# Patient Record
Sex: MALE | Race: WHITE | NOT HISPANIC OR LATINO | Employment: OTHER | URBAN - METROPOLITAN AREA
[De-identification: names, ages, dates, MRNs, and addresses within clinical notes are randomized per-mention and may not be internally consistent; named-entity substitution may affect disease eponyms.]

---

## 2017-02-15 ENCOUNTER — DOCUMENTATION ONLY (OUTPATIENT)
Dept: VASCULAR SURGERY | Facility: CLINIC | Age: 77
End: 2017-02-15

## 2017-04-11 ENCOUNTER — OFFICE VISIT (OUTPATIENT)
Dept: OPHTHALMOLOGY | Facility: CLINIC | Age: 77
End: 2017-04-11
Attending: OPHTHALMOLOGY
Payer: MEDICARE

## 2017-04-11 VITALS — HEIGHT: 68 IN | WEIGHT: 164 LBS | BODY MASS INDEX: 24.86 KG/M2

## 2017-04-11 DIAGNOSIS — Z96.1 PSEUDOPHAKIA: ICD-10-CM

## 2017-04-11 DIAGNOSIS — H26.9 CATARACT: Primary | ICD-10-CM

## 2017-04-11 DIAGNOSIS — H35.371 ERM OD (EPIRETINAL MEMBRANE, RIGHT EYE): ICD-10-CM

## 2017-04-11 PROCEDURE — 99214 OFFICE O/P EST MOD 30 MIN: CPT | Mod: 25,ZF

## 2017-04-11 RX ORDER — OFLOXACIN 3 MG/ML
1 SOLUTION/ DROPS OPHTHALMIC 3 TIMES DAILY
Qty: 1 BOTTLE | Refills: 1 | Status: SHIPPED | OUTPATIENT
Start: 2017-04-11 | End: 2018-01-06

## 2017-04-11 RX ORDER — PREDNISOLONE ACETATE 10 MG/ML
SUSPENSION/ DROPS OPHTHALMIC
Qty: 1 BOTTLE | Refills: 1 | Status: SHIPPED | OUTPATIENT
Start: 2017-04-11 | End: 2017-05-01

## 2017-04-11 ASSESSMENT — VISUAL ACUITY
OD_CC: 20/50
METHOD: SNELLEN - LINEAR
CORRECTION_TYPE: GLASSES
OS_CC: 20/40
OD_CC+: +2

## 2017-04-11 ASSESSMENT — EXTERNAL EXAM - LEFT EYE: OS_EXAM: NORMAL

## 2017-04-11 ASSESSMENT — REFRACTION_WEARINGRX
OD_AXIS: 004
OD_SPHERE: -4.00
OD_CYLINDER: +1.25
OS_CYLINDER: +1.25
OD_ADD: +2.50
OS_ADD: +2.50
OS_AXIS: 180
OS_SPHERE: -4.25

## 2017-04-11 ASSESSMENT — TONOMETRY
OD_IOP_MMHG: 15
IOP_METHOD: TONOPEN
OS_IOP_MMHG: 14

## 2017-04-11 ASSESSMENT — SLIT LAMP EXAM - LIDS
COMMENTS: MILD MGD
COMMENTS: MILD MGD

## 2017-04-11 ASSESSMENT — CONF VISUAL FIELD
OS_NORMAL: 1
OD_NORMAL: 1

## 2017-04-11 ASSESSMENT — EXTERNAL EXAM - RIGHT EYE: OD_EXAM: NORMAL

## 2017-04-11 ASSESSMENT — CUP TO DISC RATIO
OD_RATIO: 0.3
OS_RATIO: 0.3

## 2017-04-11 NOTE — PROGRESS NOTES
CC: Yearly-Exam    HPI: Felice Blood is a 76 year old male who presents for his yearly comprehensive exam. Noting worsening vision both eyes.  NO change in eye comfort or health    POHx:  None    Current Eye Medications:   None    Assessment & Plan   Felice Blood is a 76 year old male with the following diagnoses:     Assessment & Plan      Felice Blood is a 76 year old male with the following diagnoses:   1. Cataract - Both Eyes    2. ERM OD (epiretinal membrane, right eye)    3. Pseudophakia         Cataract, both eyes  Visually significant  Risks, benefits and alternatives to cataract extraction/IOL implantation discussed; consent obtained.  Will schedule surgery today      Special equipment/needs:    Anesthesia:Topical  Dilation:Good  Iris expansion:Not needed  Pseudoexfoliation: No pseudoexfoliation  Trypan Blue: No  Goal -2.00 right eye, -2.50 left eye    Right eye first (may wish to update glasses for driving after 1st eye)         Attending Physician Attestation: Complete documentation of historical and exam elements from today's encounter can be found in the full encounter summary report (not reduplicated in this progress note). I personally obtained the chief complaint(s) and history of present illness.  I confirmed and edited as necessary the review of systems, past medical/surgical history, family history, social history, and examination findings as documented by others; and I examined the patient myself. I personally reviewed the relevant tests, images, and reports as documented above. I formulated and edited as necessary the assessment and plan and discussed the findings and management plan with the patient and family. - Camron Hansen MD 9:50 AM 4/11/2017

## 2017-04-11 NOTE — MR AVS SNAPSHOT
After Visit Summary   4/11/2017    Felice Blood    MRN: [id number]           Patient Information     Date Of Birth          1940        Visit Information        Provider Department      4/11/2017 8:45 AM Camron Hansen MD [facility]        Today's Diagnoses     Cataract - Both Eyes    -  1    ERM OD (epiretinal membrane, right eye)        Pseudophakia           Follow-ups after your visit        Your next 10 appointments already scheduled     Apr 14, 2017  7:05 AM CDT   (Arrive by 6:50 AM)   PHYSICAL with Anthony Maddox MD   [facility] ([facility] [place])    [address] [place] [address]  4th [facility] [address]   [phone]            May 01, 2017 12:15 PM CDT   (Arrive by 12:00 PM)   Post-Op with Camron Hansen MD   [facility] ([facility])    [address] [place] [address]  4th [facility] [address] [phone]            May 16, 2017  8:15 AM CDT   Post-Op with Camron Hansen MD   Eye Clinic ([facility])    [address]  [address]  [address] Clin [address]  [facility] [address]   [phone]            May 18, 2017  8:15 AM CDT   Post-Op with Camron Hansen MD   Eye Clinic ([facility])    [address]  [address]  [address] Clin [address]a  [facility] [address]   [phone]            Jun 01, 2017  8:15 AM CDT   Post-Op with Camron Hansen MD   Eye Clinic ([facility])    [address]  [address]  [address] Clin [address]a  [facility] [address]   [phone]              Who to contact     Please call your clinic at [phone] to:    Ask questions about your health    Make or cancel appointments    Discuss your medicines    Learn about your test results    Speak to your doctor   If you have compliments or concerns about an experience at your clinic, or if you wish to file a complaint, please contact [facility] Patient Relations at [phone] or email us at [email or web address]         Additional Information About Your Visit        [employer]hart Information     DOZ is an electronic gateway that provides easy, online access to your medical records. With DOZ, you can request a clinic appointment, read your test results, renew a prescription or communicate with your care team.     To sign up for DOZ visit the website at [email or web address]   You will be asked to enter the access code listed below, as well as some personal information. Please follow the directions to create your username and password.     Your access code is: [email or web address]  Expires: 2017  8:30 AM     Your access code will  in 90 days. If you need help or a new code, please contact your [facility] or call [phone] for assistance.        Care EveryWhere ID     This is your Care EveryWhere ID. This could be used by other organizations to access your [facility] medical records  [id number]        Your Vitals Were     Height BMI (Body Mass Index)                1.727 m (5' 8\") 24.94 kg/m2           Blood Pressure from Last 3 Encounters:   16 146/85   10/25/16 142/84   16 134/86    Weight from Last 3 Encounters:   17 74.4 kg (164 lb)   16 73.8 kg (162 lb 9.6 oz)   10/25/16 73.5 kg (162 lb)              We Performed the Following     Biometry w/o IOL calc OU (both eyes)     [facility]-Operative Worksheet          Today's Medication Changes          These changes are accurate as of: 17 10:22 AM.  If you have any questions, ask your nurse or doctor.               Start taking these medicines.        Dose/Directions    ofloxacin 0.3 % ophthalmic solution   Commonly known as:  OCUFLOX   Used for:  Cataract   Started by:  Camron Hansen MD        Dose:  1 drop   Apply 1 drop to eye 3 times daily   Quantity:  1 Bottle   Refills:  1       " prednisoLONE acetate 1 % ophthalmic susp   Commonly known as:  PRED FORTE   Used for:  Cataract   Started by:  Camron Hansen MD        Operated eye  Start the day after surgery 1 drop four times a day for 1 week, then three times a day for 1 week, then twice a day for 1 week, then once a day for 1 week, then stop   Quantity:  1 Bottle   Refills:  1         Stop taking these medicines if you haven't already. Please contact your care team if you have questions.     CALCIUM-MAGNESUIUM-ZINC 333-133-8.3 MG Tabs   Stopped by:  Camron Hansen MD           glucosamine chondroitin 1500 complex Caps   Stopped by:  Camron Hansen MD                Where to get your medicines      These medications were sent to [facility] [address] [place] [address]     Phone:  [phone]     ofloxacin 0.3 % ophthalmic solution         Some of these will need a paper prescription and others can be bought over the counter.  Ask your nurse if you have questions.     Bring a paper prescription for each of these medications     prednisoLONE acetate 1 % ophthalmic susp                Primary Care Provider Office Phone # Fax #    Anthony Maddox [phone]       PRIMARY [facility] [address] [place] [address]        Thank you!     Thank you for choosing EYE CLINIC  for your care. Our goal is always to provide you with excellent care. Hearing back from our patients is one way we can continue to improve our services. Please take a few minutes to complete the written survey that you may receive in the mail after your visit with us. Thank you!             Your Updated Medication List - Protect others around you: Learn how to safely use, store and throw away your medicines at www.disposemymeds.org.          This list is accurate as of: 4/11/17 10:22 AM.  Always use your most recent med list.                   Brand Name Dispense  Instructions for use    aspirin 81 MG tablet      Take 1 tablet by mouth daily.       atorvastatin 10 MG tablet    LIPITOR    90 tablet    Take 1 tablet (10 mg) by mouth daily       azithromycin 250 MG tablet    ZITHROMAX    6 tablet    Take 1 tablet (250 mg) by mouth daily       cholecalciferol 1000 UNITS capsule    vitamin  -D     Take 1 capsule by mouth daily.       lisinopril 10 MG tablet    PRINIVIL/ZESTRIL    90 tablet    Take 1 tablet (10 mg) by mouth daily       montelukast 10 MG tablet    SINGULAIR    30 tablet    Take 1 tablet (10 mg) by mouth At Bedtime       ofloxacin 0.3 % ophthalmic solution    OCUFLOX    1 Bottle    Apply 1 drop to eye 3 times daily       prednisoLONE acetate 1 % ophthalmic susp    PRED FORTE    1 Bottle    Operated eye  Start the day after surgery 1 drop four times a day for 1 week, then three times a day for 1 week, then twice a day for 1 week, then once a day for 1 week, then stop       sildenafil 100 MG cap/tab    VIAGRA    10 tablet    Take  by mouth daily as needed for erectile dysfunction. One half to one tab po qd prn

## 2017-04-11 NOTE — NURSING NOTE
Chief Complaints and History of Present Illnesses   Patient presents with     Follow Up For     cataract eval     HPI    Affected eye(s):  Both   Symptoms:     Blurred vision   No floaters   No flashes   Glare         Do you have eye pain now?:  No      Comments:  Annual follow up and cataract evaluation.  The patient is interested in discussing cataract surgery.  The patient notes blurred vision and glare.  TERESO Leonard 9:08 AM 04/11/2017

## 2017-04-12 ENCOUNTER — OFFICE VISIT (OUTPATIENT)
Dept: INTERNAL MEDICINE | Facility: CLINIC | Age: 77
End: 2017-04-12

## 2017-04-12 VITALS
BODY MASS INDEX: 25.04 KG/M2 | DIASTOLIC BLOOD PRESSURE: 74 MMHG | OXYGEN SATURATION: 95 % | HEART RATE: 105 BPM | WEIGHT: 164.7 LBS | SYSTOLIC BLOOD PRESSURE: 121 MMHG | TEMPERATURE: 97.9 F

## 2017-04-12 DIAGNOSIS — R05.9 COUGH: ICD-10-CM

## 2017-04-12 DIAGNOSIS — J20.9 ACUTE BRONCHITIS, UNSPECIFIED ORGANISM: ICD-10-CM

## 2017-04-12 DIAGNOSIS — R05.9 COUGH: Primary | ICD-10-CM

## 2017-04-12 DIAGNOSIS — J18.9 COMMUNITY ACQUIRED PNEUMONIA: Primary | ICD-10-CM

## 2017-04-12 LAB
BASOPHILS # BLD AUTO: 0 10E9/L (ref 0–0.2)
BASOPHILS NFR BLD AUTO: 0.3 %
DIFFERENTIAL METHOD BLD: NORMAL
EOSINOPHIL # BLD AUTO: 0.1 10E9/L (ref 0–0.7)
EOSINOPHIL NFR BLD AUTO: 1.4 %
ERYTHROCYTE [DISTWIDTH] IN BLOOD BY AUTOMATED COUNT: 12.5 % (ref 10–15)
HCT VFR BLD AUTO: 44.2 % (ref 40–53)
HGB BLD-MCNC: 15.7 G/DL (ref 13.3–17.7)
IMM GRANULOCYTES # BLD: 0 10E9/L (ref 0–0.4)
IMM GRANULOCYTES NFR BLD: 0.2 %
LYMPHOCYTES # BLD AUTO: 1.7 10E9/L (ref 0.8–5.3)
LYMPHOCYTES NFR BLD AUTO: 19 %
MCH RBC QN AUTO: 32.3 PG (ref 26.5–33)
MCHC RBC AUTO-ENTMCNC: 35.5 G/DL (ref 31.5–36.5)
MCV RBC AUTO: 91 FL (ref 78–100)
MONOCYTES # BLD AUTO: 0.8 10E9/L (ref 0–1.3)
MONOCYTES NFR BLD AUTO: 8.7 %
NEUTROPHILS # BLD AUTO: 6.2 10E9/L (ref 1.6–8.3)
NEUTROPHILS NFR BLD AUTO: 70.4 %
NRBC # BLD AUTO: 0 10*3/UL
NRBC BLD AUTO-RTO: 0 /100
PLATELET # BLD AUTO: 234 10E9/L (ref 150–450)
RBC # BLD AUTO: 4.86 10E12/L (ref 4.4–5.9)
WBC # BLD AUTO: 8.9 10E9/L (ref 4–11)

## 2017-04-12 RX ORDER — BENZONATATE 100 MG/1
100 CAPSULE ORAL 3 TIMES DAILY PRN
Qty: 42 CAPSULE | Refills: 0 | Status: SHIPPED | OUTPATIENT
Start: 2017-04-12 | End: 2017-04-27

## 2017-04-12 RX ORDER — AZITHROMYCIN 250 MG/1
TABLET, FILM COATED ORAL
Qty: 6 TABLET | Refills: 0 | Status: SHIPPED | OUTPATIENT
Start: 2017-04-12 | End: 2017-04-27

## 2017-04-12 ASSESSMENT — PAIN SCALES - GENERAL: PAINLEVEL: NO PAIN (0)

## 2017-04-12 NOTE — MR AVS SNAPSHOT
After Visit Summary   4/12/2017    Felice Blood    MRN: [id number]           Patient Information     Date Of Birth          1940        Visit Information        Provider Department      4/12/2017 10:40 AM Sharmaine Garcia MD [facility]        Today's Diagnoses     Cough    -  1    Acute bronchitis, unspecified organism          Care Instructions    Primary Care Center Medication Refill Request Information:  * Please contact your pharmacy regarding ANY request for medication refills.  ** [facility] Prescription Fax = [phone]  * Please allow 3 business days for routine medication refills.  * Please allow 5 business days for controlled substance medication refills.     Primary Care Center Test Result notification information:  *You will be notified with in 7-10 days of your appointment day regarding the results of your test.  If you are on MyChart you will be notified as soon as the provider has reviewed the results and signed off on them.    Primary Care Center [phone]           Follow-ups after your visit        Your next 10 appointments already scheduled     Apr 14, 2017  7:05 AM CDT   (Arrive by 6:50 AM)   PHYSICAL with Anthony Maddox MD   [facility] ([facility])    [address] [place] [address]  4th [facility] [id number] [phone]            May 01, 2017   Procedure with Camron Hansen MD   [facility] ([facility] [place])    [address] [place] [address]  5th [facility] [id number] [phone]           Located in the Clinics and Surgery Center at [address] [place] [address].   parking is very convenient and highly recommended.  is a $6 flat rate fee.  Both  and self parkers should enter the main arrival plaza from [facility]; parking attendants will direct you based on your parking preference.            May 01, 2017 12:15 PM  CDT   (Arrive by 12:00 PM)   Post-Op with Camron Hansen MD   [facility] ([facility] [place])    [address]  4th Floor  [facility] [address]   [phone]            May 16, 2017  8:15 AM CDT   Post-Op with Camron Hansen MD   Eye Clinic ([facility])    [address]  [address] [facility]  [address] Clin [address]a  [facility] [address]   [phone]            May 18, 2017  8:15 AM CDT   Post-Op with Camron Hansen MD   Eye Clinic ([facility])    [address]  [address]   [address] Clin 9a  [facility] [address]   [phone]            Jun 01, 2017  8:15 AM CDT   Post-Op with Camron Hansen MD   Eye Clinic ([facility])    [address]  [address] [facility]  [address] Clin [address] [place] [address] [phone]              Who to contact     Please call your clinic at [phone] to:    Ask questions about your health    Make or cancel appointments    Discuss your medicines    Learn about your test results    Speak to your doctor   If you have compliments or concerns about an experience at your clinic, or if you wish to file a complaint, please contact [facility] Patient Relations at [phone] or email us at [email or web address]         Additional Information About Your Visit        [employer]hart Information     [employer] is an electronic gateway that provides easy, online access to your medical records. With [employer], you can request a clinic appointment, read your test results, renew a prescription or communicate with your care team.     To sign up for [employer] visit the website at [email or web address]   You will be asked to enter the access code listed below, as well as some personal information. Please follow the directions to create your username and password.     Your access code is: [email or web address]  Expires: 6/26/2017  8:30 AM     Your access code will   in 90 days. If you need help or a new code, please contact your [facility] or call [phone] for assistance.        Care EveryWhere ID     This is your Care EveryWhere ID. This could be used by other organizations to access your [facility] medical records  [id number]        Your Vitals Were     Pulse Temperature Pulse Oximetry BMI (Body Mass Index)          105 97.9  F (36.6  C) (Oral) 95% 25.04 kg/m2         Blood Pressure from Last 3 Encounters:   17 121/74   16 146/85   10/25/16 142/84    Weight from Last 3 Encounters:   17 74.7 kg (164 lb 11.2 oz)   17 74.4 kg (164 lb)   16 73.8 kg (162 lb 9.6 oz)                 Today's Medication Changes          These changes are accurate as of: 17 12:10 PM.  If you have any questions, ask your nurse or doctor.               Start taking these medicines.        Dose/Directions    benzonatate 100 MG capsule   Commonly known as:  TESSALON   Used for:  Acute bronchitis, unspecified organism   Started by:  Sharmaine Garcia MD        Dose:  100 mg   Take 1 capsule (100 mg) by mouth 3 times daily as needed for cough   Quantity:  42 capsule   Refills:  0         Stop taking these medicines if you haven't already. Please contact your care team if you have questions.     montelukast 10 MG tablet   Commonly known as:  SINGULAIR   Stopped by:  Sharmaine Garcia MD                Where to get your medicines      These medications were sent to [facility] [address] [place] [address]     Phone:  [phone]     benzonatate 100 MG capsule                Primary Care Provider Office Phone # Fax #    Anthony Maddox [phone]       PRIMARY CARE CENTER [address] [place] [address]        Thank you!     Thank you for choosing [facility]  for your care. Our goal is always to provide you with  excellent care. Hearing back from our patients is one way we can continue to improve our services. Please take a few minutes to complete the written survey that you may receive in the mail after your visit with us. Thank you!             Your Updated Medication List - Protect others around you: Learn how to safely use, store and throw away your medicines at www.disposemymeds.org.          This list is accurate as of: 4/12/17 12:10 PM.  Always use your most recent med list.                   Brand Name Dispense Instructions for use    aspirin 81 MG tablet      Take 1 tablet by mouth daily.       atorvastatin 10 MG tablet    LIPITOR    90 tablet    Take 1 tablet (10 mg) by mouth daily       azithromycin 250 MG tablet    ZITHROMAX    6 tablet    Take 1 tablet (250 mg) by mouth daily       benzonatate 100 MG capsule    TESSALON    42 capsule    Take 1 capsule (100 mg) by mouth 3 times daily as needed for cough       cholecalciferol 1000 UNITS capsule    vitamin  -D     Take 1 capsule by mouth daily.       lisinopril 10 MG tablet    PRINIVIL/ZESTRIL    90 tablet    Take 1 tablet (10 mg) by mouth daily       ofloxacin 0.3 % ophthalmic solution    OCUFLOX    1 Bottle    Apply 1 drop to eye 3 times daily       prednisoLONE acetate 1 % ophthalmic susp    PRED FORTE    1 Bottle    Operated eye  Start the day after surgery 1 drop four times a day for 1 week, then three times a day for 1 week, then twice a day for 1 week, then once a day for 1 week, then stop       sildenafil 100 MG cap/tab    VIAGRA    10 tablet    Take  by mouth daily as needed for erectile dysfunction. One half to one tab po qd prn

## 2017-04-12 NOTE — PROGRESS NOTES
Chief complaint: Cough  History of present illness: Felice is a 76-year-old man with a past medical history of essential hypertension and hyperlipidemia who presents with a 2 week history of  Fatigue and a 4 day history of severe cough.  He began coughing up yellow thick sputum tinged with hemoptysis  4 days ago and that has exhausted him. He coughs all night. He is not short of breath and has no pleurisy. He has no history of asthma and does not smoke cigarettes.   He has felt feverish but has not had a fever. He has had no myalgias. He has a mild headache for which she has taken ibuprofen. He has a mild sense of tightness in the chest such as one gets with bronchitis but no exertional chest pain. He takes no other medications. He does not have a history of asthma or COPD.    Immunization History   Administered Date(s) Administered     Influenza (High Dose) 3 valent vaccine 10/06/2014, 11/01/2016     Influenza (IIV3) 11/10/2011, 10/04/2012, 11/18/2013     Pneumococcal (PCV 13) 04/10/2015     Pneumococcal 23 valent 01/24/2006     TD (ADULT, 7+) 01/24/2006, 11/16/2015     Zoster vaccine, live 03/19/2010     Past Medical History:   Diagnosis Date     Cobblestone retinal degeneration      Impotence of organic origin      Nonsenile cataract      Pure hypercholesterolemia      Unspecified essential hypertension      Vitreous detachment of both eyes      Past Surgical History:   Procedure Laterality Date     APPENDECTOMY       TONSILLECTOMY        ROS: 4 point ROS neg other than the symptoms noted above in the HPI.    /74  Pulse 105  Temp 97.9  F (36.6  C) (Oral)  Wt 74.7 kg (164 lb 11.2 oz)  SpO2 95%  BMI 25.04 kg/m2    Exam:  Constitutional: coughing, exhausted appearing 76-year-old man accompanied by his wife  Neck: Neck supple. No adenopathy. Thyroid symmetric, normal size,, Carotids without bruits.  ENT: conjunctival injection, posterior pharynx is mildly injected without ulceration or exudate. TMs are  clear bilaterally.  There iso cervical lymphadenopathy  Cardiovascular: tachycardic, egular rate and rhythm without murmur or gallop.  Respiratory: clear to percussion, butleft lower lobe crackles are present    CBC RESULTS:   Recent Labs   Lab Test  04/12/17   1125   WBC  8.9   RBC  4.86   HGB  15.7   HCT  44.2   MCV  91   MCH  32.3   MCHC  35.5   RDW  12.5   PLT  234     Chest x-ray: By my read, there is no infiltrate cardiomegaly or pleural effusion.    ASSESSMENT  Acute bronchitis. No evidence of pneumonia.    Plan treat with Tessalon 100 mg t.i.d. As needed for cough.  I reviewed with him that there is no indication for treatment with antibiotics at this point.    I will contact patient if my read on the chest x-ray is incorrect.  If he develops high fever, more sputum production or shortness of breath, please contact clinic.    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 Low probability for pneumonia but still possible. I call the patient and we will start azithromycin for five day CAP course.    Sharmaine Garcia

## 2017-04-12 NOTE — PATIENT INSTRUCTIONS
[facility] Medication Refill Request Information:  * Please contact your pharmacy regarding ANY request for medication refills.  ** [facility] Prescription Fax = [phone]  * Please allow 3 business days for routine medication refills.  * Please allow 5 business days for controlled substance medication refills.     [facility] Test Result notification information:  *You will be notified with in 7-10 days of your appointment day regarding the results of your test.  If you are on MyChart you will be notified as soon as the provider has reviewed the results and signed off on them.    [facility] [phone]

## 2017-04-12 NOTE — NURSING NOTE
Chief Complaint   Patient presents with     Cough     Patient here for productive cough with yellow sputum x1 week. Patient states he is having dizziness, fatigue and headache.       Pilar Patel CMA at 10:38 AM on 4/12/2017.

## 2017-04-14 ENCOUNTER — OFFICE VISIT (OUTPATIENT)
Dept: FAMILY MEDICINE | Facility: CLINIC | Age: 77
End: 2017-04-14

## 2017-04-14 VITALS
WEIGHT: 158.8 LBS | SYSTOLIC BLOOD PRESSURE: 133 MMHG | DIASTOLIC BLOOD PRESSURE: 88 MMHG | HEART RATE: 79 BPM | BODY MASS INDEX: 22.73 KG/M2 | HEIGHT: 70 IN | RESPIRATION RATE: 16 BRPM

## 2017-04-14 DIAGNOSIS — E78.00 HIGH BLOOD CHOLESTEROL: ICD-10-CM

## 2017-04-14 DIAGNOSIS — H61.23 BILATERAL IMPACTED CERUMEN: ICD-10-CM

## 2017-04-14 DIAGNOSIS — Z12.11 SCREEN FOR COLON CANCER: ICD-10-CM

## 2017-04-14 DIAGNOSIS — E78.00 HIGH BLOOD CHOLESTEROL: Primary | ICD-10-CM

## 2017-04-14 DIAGNOSIS — I10 ESSENTIAL HYPERTENSION: ICD-10-CM

## 2017-04-14 DIAGNOSIS — N52.9 IMPOTENCE OF ORGANIC ORIGIN: ICD-10-CM

## 2017-04-14 DIAGNOSIS — E78.00 PURE HYPERCHOLESTEROLEMIA: ICD-10-CM

## 2017-04-14 LAB
ALBUMIN SERPL-MCNC: 3.6 G/DL (ref 3.4–5)
ALP SERPL-CCNC: 61 U/L (ref 40–150)
ALT SERPL W P-5'-P-CCNC: 20 U/L (ref 0–70)
ANION GAP SERPL CALCULATED.3IONS-SCNC: 7 MMOL/L (ref 3–14)
AST SERPL W P-5'-P-CCNC: 17 U/L (ref 0–45)
BILIRUB SERPL-MCNC: 0.6 MG/DL (ref 0.2–1.3)
BUN SERPL-MCNC: 9 MG/DL (ref 7–30)
CALCIUM SERPL-MCNC: 9 MG/DL (ref 8.5–10.1)
CHLORIDE SERPL-SCNC: 103 MMOL/L (ref 94–109)
CHOLEST SERPL-MCNC: 121 MG/DL
CO2 SERPL-SCNC: 27 MMOL/L (ref 20–32)
CREAT SERPL-MCNC: 0.76 MG/DL (ref 0.66–1.25)
GFR SERPL CREATININE-BSD FRML MDRD: NORMAL ML/MIN/1.7M2
GLUCOSE SERPL-MCNC: 97 MG/DL (ref 70–99)
HDLC SERPL-MCNC: 53 MG/DL
LDLC SERPL CALC-MCNC: 52 MG/DL
NONHDLC SERPL-MCNC: 68 MG/DL
POTASSIUM SERPL-SCNC: 3.8 MMOL/L (ref 3.4–5.3)
PROT SERPL-MCNC: 7.4 G/DL (ref 6.8–8.8)
SODIUM SERPL-SCNC: 137 MMOL/L (ref 133–144)
TRIGL SERPL-MCNC: 80 MG/DL

## 2017-04-14 RX ORDER — ATORVASTATIN CALCIUM 10 MG/1
10 TABLET, FILM COATED ORAL DAILY
Qty: 90 TABLET | Refills: 3 | Status: SHIPPED | OUTPATIENT
Start: 2017-04-14 | End: 2018-04-17

## 2017-04-14 RX ORDER — LISINOPRIL 10 MG/1
10 TABLET ORAL DAILY
Qty: 90 TABLET | Refills: 3 | Status: SHIPPED | OUTPATIENT
Start: 2017-04-14 | End: 2018-04-17

## 2017-04-14 RX ORDER — SILDENAFIL 100 MG/1
TABLET, FILM COATED ORAL
Qty: 10 TABLET | Refills: 11 | Status: SHIPPED | OUTPATIENT
Start: 2017-04-14 | End: 2018-01-06 | Stop reason: DRUGHIGH

## 2017-04-14 ASSESSMENT — PAIN SCALES - GENERAL: PAINLEVEL: NO PAIN (0)

## 2017-04-14 NOTE — LETTER
4/14/2017      RE: Felice WRAY Caitie  [address]  [facility] [address]       SUBJECTIVE:    Pt is a 76 year old male with pmh of     Patient Active Problem List   Diagnosis     Essential hypertension     Pure hypercholesterolemia     Impotence of organic origin       who is here for evaluation of had concerns including Pre-Op Exam.   Here for a preop. Eye surgery. Gradual decline visiual acuity, no red/warm/swollen or draining eye, no trauma or prednisone use.   Cough; see Dr Garcia note two days ago, bronchitis, vs small pneumonia although abnl area on CXR seen on old CXR's and likely not infectious. He's had similar episodes once or twice a year. No ENT symptoms, no GI symptoms, no fever, no SOB. Cough, mild fatigue only sx.       No personal or FH easy bleed or anesthesia reaction    Past Medical History:   Diagnosis Date     Cobblestone retinal degeneration      Impotence of organic origin      Nonsenile cataract      Pure hypercholesterolemia      Unspecified essential hypertension      Vitreous detachment of both eyes      Past Surgical History:   Procedure Laterality Date     APPENDECTOMY       TONSILLECTOMY       Family History   Problem Relation Age of Onset     Glaucoma No family hx of      Macular Degeneration No family hx of      No personal or FH easy bleeding or anesthesia reaction    Allergies   Allergen Reactions     Etodolac      Percocet [Oxycodone-Acetaminophen]            Current Outpatient Prescriptions   Medication Sig Dispense Refill     atorvastatin (LIPITOR) 10 MG tablet Take 1 tablet (10 mg) by mouth daily 90 tablet 3     lisinopril (PRINIVIL/ZESTRIL) 10 MG tablet Take 1 tablet (10 mg) by mouth daily 90 tablet 3     sildenafil (VIAGRA) 100 MG cap/tab Take  by mouth daily as needed for erectile dysfunction. One half to one tab po qd prn 10 tablet 11     benzonatate (TESSALON) 100 MG capsule Take 1 capsule (100 mg) by mouth 3 times daily as needed for cough 42 capsule 0      "azithromycin (ZITHROMAX) 250 MG tablet Take two tablets by mouth today and one tablet daily days two through five 6 tablet 0     prednisoLONE acetate (PRED FORTE) 1 % ophthalmic susp Operated eye    Start the day after surgery 1 drop four times a day for 1 week, then three times a day for 1 week, then twice a day for 1 week, then once a day for 1 week, then stop 1 Bottle 1     ofloxacin (OCUFLOX) 0.3 % ophthalmic solution Apply 1 drop to eye 3 times daily 1 Bottle 1     aspirin 81 MG tablet Take 1 tablet by mouth daily.       cholecalciferol (VITAMIN D3) 1000 UNIT capsule Take 1 capsule by mouth daily.       [DISCONTINUED] atorvastatin (LIPITOR) 10 MG tablet Take 1 tablet (10 mg) by mouth daily 90 tablet 3     [DISCONTINUED] lisinopril (PRINIVIL,ZESTRIL) 10 MG tablet Take 1 tablet (10 mg) by mouth daily 90 tablet 3     [DISCONTINUED] sildenafil (VIAGRA) 100 MG tablet Take  by mouth daily as needed for erectile dysfunction. One half to one tab po qd prn 10 tablet 11       Social History   Substance Use Topics     Smoking status: Never Smoker     Smokeless tobacco: Never Used     Alcohol use Yes       Ten pt ROS completed, o/w neg    OBJECTIVE:  /88 (BP Location: Right arm, Patient Position: Chair, Cuff Size: Adult Large)  Pulse 79  Resp 16  Ht 1.765 m (5' 9.5\")  Wt 72 kg (158 lb 12.8 oz)  BMI 23.11 kg/m2  GENERAL APPEARANCE: Alert, no acute distress  EYES: PERRL, EOM normal, conjunctiva and lids normal  HENT: Ears and TMs normal, oral mucosa and posterior oropharynx normal  NECK: No adenopathy,masses or thyromegaly  RESP: lungs clear to auscultation   CV: normal rate, regular rhythm, no murmur or gallop  ABDOMEN: soft, no organomegaly, masses or tenderness   (male): penis, scrotum, testes normal, no hernias  LYMPHATICS: No cervical, supraclavicular or inguinal adenopathy  MS: extremities normal, no peripheral edema  NEURO: Alert, oriented, speech and mentation normal  PSYCHE: mentation appears normal, " affect and mood normal    ASSESSMENT/PLAN:    Preop:  Cleared for surgery on a medical basis    Lipids, cmet done, normal, for high chol/htn and meds for    Htn: cont as is    ED: cont Viagra    Cough: if no better week after next he will let me know; consdier then chest ct and f/u in clinic    See me in a year earlier ac FAJARDO MD

## 2017-04-14 NOTE — PATIENT INSTRUCTIONS
[facility] Center Medication Refill Request Information:  * Please contact your pharmacy regarding ANY request for medication refills.  ** [facility] Prescription Fax = [phone]  * Please allow 3 business days for routine medication refills.  * Please allow 5 business days for controlled substance medication refills.     Primary Care Center Test Result notification information:  *You will be notified with in 7-10 days of your appointment day regarding the results of your test.  If you are on MyChart you will be notified as soon as the provider has reviewed the results and signed off on them.      Primary Care Center   [facility]  [address] [place] [address] (4th Floor )   [place] [address] [phone]  [phone]

## 2017-04-14 NOTE — NURSING NOTE
Chief Complaint   Patient presents with     Pre-Op Exam     pt having cataract surgery on 05/01/17 at [facility] and 05/17/17 at [facility] by Dr Camron Solomon CMA at 6:55 AM on 4/14/2017.      Surgeon (please enter first and last name): Dr Camron Hansen  Fax number for Preop Evaluation:   Location of Surgery: [facility] and [facility]  Date of Surgery: 05/01/17 and 05/17/17  Procedure: cataract  History of reaction to anesthesia? NO

## 2017-04-14 NOTE — PROGRESS NOTES
SUBJECTIVE:    Pt is a 76 year old male with pmh of     Patient Active Problem List   Diagnosis     Essential hypertension     Pure hypercholesterolemia     Impotence of organic origin       who is here for evaluation of had concerns including Pre-Op Exam.   Here for a preop. Eye surgery. Gradual decline visiual acuity, no red/warm/swollen or draining eye, no trauma or prednisone use.   Cough; see Dr Garcia note two days ago, bronchitis, vs small pneumonia although abnl area on CXR seen on old CXR's and likely not infectious. He's had similar episodes once or twice a year. No ENT symptoms, no GI symptoms, no fever, no SOB. Cough, mild fatigue only sx.       No personal or FH easy bleed or anesthesia reaction    Past Medical History:   Diagnosis Date     Cobblestone retinal degeneration      Impotence of organic origin      Nonsenile cataract      Pure hypercholesterolemia      Unspecified essential hypertension      Vitreous detachment of both eyes      Past Surgical History:   Procedure Laterality Date     APPENDECTOMY       TONSILLECTOMY       Family History   Problem Relation Age of Onset     Glaucoma No family hx of      Macular Degeneration No family hx of      No personal or FH easy bleeding or anesthesia reaction    Allergies   Allergen Reactions     Etodolac      Percocet [Oxycodone-Acetaminophen]            Current Outpatient Prescriptions   Medication Sig Dispense Refill     atorvastatin (LIPITOR) 10 MG tablet Take 1 tablet (10 mg) by mouth daily 90 tablet 3     lisinopril (PRINIVIL/ZESTRIL) 10 MG tablet Take 1 tablet (10 mg) by mouth daily 90 tablet 3     sildenafil (VIAGRA) 100 MG cap/tab Take  by mouth daily as needed for erectile dysfunction. One half to one tab po qd prn 10 tablet 11     benzonatate (TESSALON) 100 MG capsule Take 1 capsule (100 mg) by mouth 3 times daily as needed for cough 42 capsule 0     azithromycin (ZITHROMAX) 250 MG tablet Take two tablets by mouth today and one tablet daily  "days two through five 6 tablet 0     prednisoLONE acetate (PRED FORTE) 1 % ophthalmic susp Operated eye    Start the day after surgery 1 drop four times a day for 1 week, then three times a day for 1 week, then twice a day for 1 week, then once a day for 1 week, then stop 1 Bottle 1     ofloxacin (OCUFLOX) 0.3 % ophthalmic solution Apply 1 drop to eye 3 times daily 1 Bottle 1     aspirin 81 MG tablet Take 1 tablet by mouth daily.       cholecalciferol (VITAMIN D3) 1000 UNIT capsule Take 1 capsule by mouth daily.       [DISCONTINUED] atorvastatin (LIPITOR) 10 MG tablet Take 1 tablet (10 mg) by mouth daily 90 tablet 3     [DISCONTINUED] lisinopril (PRINIVIL,ZESTRIL) 10 MG tablet Take 1 tablet (10 mg) by mouth daily 90 tablet 3     [DISCONTINUED] sildenafil (VIAGRA) 100 MG tablet Take  by mouth daily as needed for erectile dysfunction. One half to one tab po qd prn 10 tablet 11       Social History   Substance Use Topics     Smoking status: Never Smoker     Smokeless tobacco: Never Used     Alcohol use Yes       Ten pt ROS completed, o/w neg    OBJECTIVE:  /88 (BP Location: Right arm, Patient Position: Chair, Cuff Size: Adult Large)  Pulse 79  Resp 16  Ht 1.765 m (5' 9.5\")  Wt 72 kg (158 lb 12.8 oz)  BMI 23.11 kg/m2  GENERAL APPEARANCE: Alert, no acute distress  EYES: PERRL, EOM normal, conjunctiva and lids normal  HENT: Ears and TMs normal, oral mucosa and posterior oropharynx normal  NECK: No adenopathy,masses or thyromegaly  RESP: lungs clear to auscultation   CV: normal rate, regular rhythm, no murmur or gallop  ABDOMEN: soft, no organomegaly, masses or tenderness   (male): penis, scrotum, testes normal, no hernias  LYMPHATICS: No cervical, supraclavicular or inguinal adenopathy  MS: extremities normal, no peripheral edema  NEURO: Alert, oriented, speech and mentation normal  PSYCHE: mentation appears normal, affect and mood normal    ASSESSMENT/PLAN:    Preop:  Cleared for surgery on a medical " basis    Lipids, cmet done, normal, for high chol/htn and meds for    Htn: cont as is    ED: cont Viagra    Cough: if no better week after next he will let me know; consdier then chest ct and f/u in clinic    See me in a year earlier prn    RANCHO FAJARDO MD

## 2017-04-17 DIAGNOSIS — N52.9 ED (ERECTILE DYSFUNCTION): Primary | ICD-10-CM

## 2017-04-17 RX ORDER — SILDENAFIL CITRATE 20 MG/1
100 TABLET ORAL DAILY
Qty: 50 TABLET | Refills: 11 | Status: SHIPPED | OUTPATIENT
Start: 2017-04-17 | End: 2018-04-17

## 2017-05-01 ENCOUNTER — HOSPITAL ENCOUNTER (OUTPATIENT)
Facility: AMBULATORY SURGERY CENTER | Age: 77
End: 2017-05-01
Attending: OPHTHALMOLOGY

## 2017-05-01 ENCOUNTER — SURGERY (OUTPATIENT)
Age: 77
End: 2017-05-01

## 2017-05-01 ENCOUNTER — OFFICE VISIT (OUTPATIENT)
Dept: OPHTHALMOLOGY | Facility: CLINIC | Age: 77
End: 2017-05-01

## 2017-05-01 ENCOUNTER — ANESTHESIA EVENT (OUTPATIENT)
Dept: SURGERY | Facility: AMBULATORY SURGERY CENTER | Age: 77
End: 2017-05-01

## 2017-05-01 ENCOUNTER — TELEPHONE (OUTPATIENT)
Dept: PSYCHIATRY | Facility: CLINIC | Age: 77
End: 2017-05-01

## 2017-05-01 ENCOUNTER — ANESTHESIA (OUTPATIENT)
Dept: SURGERY | Facility: AMBULATORY SURGERY CENTER | Age: 77
End: 2017-05-01

## 2017-05-01 VITALS
WEIGHT: 160 LBS | BODY MASS INDEX: 23.7 KG/M2 | TEMPERATURE: 97 F | SYSTOLIC BLOOD PRESSURE: 137 MMHG | OXYGEN SATURATION: 96 % | HEART RATE: 66 BPM | DIASTOLIC BLOOD PRESSURE: 82 MMHG | RESPIRATION RATE: 16 BRPM | HEIGHT: 69 IN

## 2017-05-01 DIAGNOSIS — H26.9 CATARACT: Primary | ICD-10-CM

## 2017-05-01 DIAGNOSIS — H26.9 CATARACT: ICD-10-CM

## 2017-05-01 DIAGNOSIS — Z96.1 PSEUDOPHAKIA: Primary | ICD-10-CM

## 2017-05-01 DIAGNOSIS — Z96.1 PSEUDOPHAKIA: ICD-10-CM

## 2017-05-01 DEVICE — IMPLANTABLE DEVICE: Type: IMPLANTABLE DEVICE | Site: EYE | Status: FUNCTIONAL

## 2017-05-01 RX ORDER — ONDANSETRON 4 MG/1
4 TABLET, ORALLY DISINTEGRATING ORAL EVERY 30 MIN PRN
Status: DISCONTINUED | OUTPATIENT
Start: 2017-05-01 | End: 2017-05-02 | Stop reason: HOSPADM

## 2017-05-01 RX ORDER — BALANCED SALT SOLUTION 6.4; .75; .48; .3; 3.9; 1.7 MG/ML; MG/ML; MG/ML; MG/ML; MG/ML; MG/ML
SOLUTION OPHTHALMIC PRN
Status: DISCONTINUED | OUTPATIENT
Start: 2017-05-01 | End: 2017-05-01 | Stop reason: HOSPADM

## 2017-05-01 RX ORDER — PREDNISOLONE ACETATE 10 MG/ML
SUSPENSION/ DROPS OPHTHALMIC
Qty: 1 BOTTLE | Refills: 1 | Status: SHIPPED | OUTPATIENT
Start: 2017-05-01 | End: 2018-01-06

## 2017-05-01 RX ORDER — PREDNISOLONE ACETATE 10 MG/ML
SUSPENSION/ DROPS OPHTHALMIC
Qty: 1 BOTTLE | Refills: 1 | Status: CANCELLED | OUTPATIENT
Start: 2017-05-01

## 2017-05-01 RX ORDER — MEPERIDINE HYDROCHLORIDE 25 MG/ML
12.5 INJECTION INTRAMUSCULAR; INTRAVENOUS; SUBCUTANEOUS
Status: DISCONTINUED | OUTPATIENT
Start: 2017-05-01 | End: 2017-05-02 | Stop reason: HOSPADM

## 2017-05-01 RX ORDER — PHENYLEPHRINE HYDROCHLORIDE 25 MG/ML
1 SOLUTION/ DROPS OPHTHALMIC
Status: COMPLETED | OUTPATIENT
Start: 2017-05-01 | End: 2017-05-01

## 2017-05-01 RX ORDER — DEXAMETHASONE SODIUM PHOSPHATE 4 MG/ML
4 INJECTION, SOLUTION INTRA-ARTICULAR; INTRALESIONAL; INTRAMUSCULAR; INTRAVENOUS; SOFT TISSUE EVERY 10 MIN PRN
Status: DISCONTINUED | OUTPATIENT
Start: 2017-05-01 | End: 2017-05-02 | Stop reason: HOSPADM

## 2017-05-01 RX ORDER — SODIUM CHLORIDE, SODIUM LACTATE, POTASSIUM CHLORIDE, CALCIUM CHLORIDE 600; 310; 30; 20 MG/100ML; MG/100ML; MG/100ML; MG/100ML
INJECTION, SOLUTION INTRAVENOUS CONTINUOUS
Status: DISCONTINUED | OUTPATIENT
Start: 2017-05-01 | End: 2017-05-02 | Stop reason: HOSPADM

## 2017-05-01 RX ORDER — CYCLOPENTOLATE HYDROCHLORIDE 10 MG/ML
1 SOLUTION/ DROPS OPHTHALMIC
Status: COMPLETED | OUTPATIENT
Start: 2017-05-01 | End: 2017-05-01

## 2017-05-01 RX ORDER — TETRACAINE HYDROCHLORIDE 5 MG/ML
SOLUTION OPHTHALMIC PRN
Status: DISCONTINUED | OUTPATIENT
Start: 2017-05-01 | End: 2017-05-01 | Stop reason: HOSPADM

## 2017-05-01 RX ORDER — PREDNISOLONE ACETATE 10 MG/ML
SUSPENSION/ DROPS OPHTHALMIC
Qty: 1 BOTTLE | Refills: 1 | Status: SHIPPED | OUTPATIENT
Start: 2017-05-01 | End: 2017-05-01

## 2017-05-01 RX ORDER — FENTANYL CITRATE 50 UG/ML
25-50 INJECTION, SOLUTION INTRAMUSCULAR; INTRAVENOUS
Status: DISCONTINUED | OUTPATIENT
Start: 2017-05-01 | End: 2017-05-01 | Stop reason: HOSPADM

## 2017-05-01 RX ORDER — FENTANYL CITRATE 50 UG/ML
25-50 INJECTION, SOLUTION INTRAMUSCULAR; INTRAVENOUS
Status: DISCONTINUED | OUTPATIENT
Start: 2017-05-01 | End: 2017-05-02 | Stop reason: HOSPADM

## 2017-05-01 RX ORDER — ONDANSETRON 2 MG/ML
4 INJECTION INTRAMUSCULAR; INTRAVENOUS EVERY 30 MIN PRN
Status: DISCONTINUED | OUTPATIENT
Start: 2017-05-01 | End: 2017-05-02 | Stop reason: HOSPADM

## 2017-05-01 RX ORDER — ALBUTEROL SULFATE 0.83 MG/ML
2.5 SOLUTION RESPIRATORY (INHALATION) EVERY 4 HOURS PRN
Status: DISCONTINUED | OUTPATIENT
Start: 2017-05-01 | End: 2017-05-01 | Stop reason: HOSPADM

## 2017-05-01 RX ORDER — PROPARACAINE HYDROCHLORIDE 5 MG/ML
1 SOLUTION/ DROPS OPHTHALMIC ONCE
Status: COMPLETED | OUTPATIENT
Start: 2017-05-01 | End: 2017-05-01

## 2017-05-01 RX ORDER — LIDOCAINE HYDROCHLORIDE 10 MG/ML
INJECTION, SOLUTION EPIDURAL; INFILTRATION; INTRACAUDAL; PERINEURAL PRN
Status: DISCONTINUED | OUTPATIENT
Start: 2017-05-01 | End: 2017-05-01 | Stop reason: HOSPADM

## 2017-05-01 RX ORDER — TROPICAMIDE 10 MG/ML
1 SOLUTION/ DROPS OPHTHALMIC
Status: COMPLETED | OUTPATIENT
Start: 2017-05-01 | End: 2017-05-01

## 2017-05-01 RX ORDER — NALOXONE HYDROCHLORIDE 0.4 MG/ML
.1-.4 INJECTION, SOLUTION INTRAMUSCULAR; INTRAVENOUS; SUBCUTANEOUS
Status: DISCONTINUED | OUTPATIENT
Start: 2017-05-01 | End: 2017-05-02 | Stop reason: HOSPADM

## 2017-05-01 RX ADMIN — CYCLOPENTOLATE HYDROCHLORIDE 1 DROP: 10 SOLUTION/ DROPS OPHTHALMIC at 09:02

## 2017-05-01 RX ADMIN — PHENYLEPHRINE HYDROCHLORIDE 1 DROP: 25 SOLUTION/ DROPS OPHTHALMIC at 09:02

## 2017-05-01 RX ADMIN — TROPICAMIDE 1 DROP: 10 SOLUTION/ DROPS OPHTHALMIC at 09:13

## 2017-05-01 RX ADMIN — BALANCED SALT SOLUTION 1 APPLICATOR: 6.4; .75; .48; .3; 3.9; 1.7 SOLUTION OPHTHALMIC at 09:50

## 2017-05-01 RX ADMIN — LIDOCAINE HYDROCHLORIDE 0.5 ML: 10 INJECTION, SOLUTION EPIDURAL; INFILTRATION; INTRACAUDAL; PERINEURAL at 09:33

## 2017-05-01 RX ADMIN — TETRACAINE HYDROCHLORIDE 2 DROP: 5 SOLUTION OPHTHALMIC at 09:24

## 2017-05-01 RX ADMIN — PROPARACAINE HYDROCHLORIDE 1 DROP: 5 SOLUTION/ DROPS OPHTHALMIC at 09:00

## 2017-05-01 RX ADMIN — Medication 500 ML: at 09:50

## 2017-05-01 RX ADMIN — TROPICAMIDE 1 DROP: 10 SOLUTION/ DROPS OPHTHALMIC at 09:20

## 2017-05-01 RX ADMIN — CYCLOPENTOLATE HYDROCHLORIDE 1 DROP: 10 SOLUTION/ DROPS OPHTHALMIC at 09:20

## 2017-05-01 RX ADMIN — PHENYLEPHRINE HYDROCHLORIDE 1 DROP: 25 SOLUTION/ DROPS OPHTHALMIC at 09:20

## 2017-05-01 RX ADMIN — PHENYLEPHRINE HYDROCHLORIDE 1 DROP: 25 SOLUTION/ DROPS OPHTHALMIC at 09:13

## 2017-05-01 RX ADMIN — CYCLOPENTOLATE HYDROCHLORIDE 1 DROP: 10 SOLUTION/ DROPS OPHTHALMIC at 09:13

## 2017-05-01 RX ADMIN — TROPICAMIDE 1 DROP: 10 SOLUTION/ DROPS OPHTHALMIC at 09:02

## 2017-05-01 ASSESSMENT — REFRACTION_WEARINGRX
OD_CYLINDER: +1.25
OD_AXIS: 004
OS_ADD: +2.50
OS_CYLINDER: +1.25
OD_SPHERE: -4.00
OS_AXIS: 180
OD_SPHERE: -4.00
OS_SPHERE: -4.25
OS_ADD: +2.50
OS_AXIS: 180
OS_CYLINDER: +1.25
OD_ADD: +2.50
OD_AXIS: 004
OD_ADD: +2.50
OD_CYLINDER: +1.25
OS_SPHERE: -4.25

## 2017-05-01 ASSESSMENT — VISUAL ACUITY
OD_PH_SC: 20/50+2
OD_SC: 20/150
METHOD: SNELLEN - LINEAR
OS_SC: X

## 2017-05-01 ASSESSMENT — TONOMETRY
IOP_METHOD: ICARE
OD_IOP_MMHG: 19
OS_IOP_MMHG: X
IOP_METHOD: ICARE
OD_IOP_MMHG: 18

## 2017-05-01 ASSESSMENT — EXTERNAL EXAM - RIGHT EYE: OD_EXAM: NORMAL

## 2017-05-01 ASSESSMENT — EXTERNAL EXAM - LEFT EYE: OS_EXAM: NORMAL

## 2017-05-01 ASSESSMENT — SLIT LAMP EXAM - LIDS
COMMENTS: MILD MGD
COMMENTS: MILD MGD

## 2017-05-01 NOTE — DISCHARGE INSTRUCTIONS
[facility]     Home Care Following Cataract Surgery    If you have a gauze eye patch on, please do not remove it until it is removed by your doctor at your first appointment after your surgery.  You will start your eye drops the next day.    OR    If you only have a clear eye shield on, you may remove the eye shield when you get home and begin eye drops today as directed by your doctor.      Wear the clear eye shield for protection when sleeping for the next 5 days.      Do not rub the eye that had the operation.      Your eye might be sensitive to light.  Wearing sunglasses may be more comfortable for you.      You may have some discomfort and irritation.  Acetaminophen (Tylenol) or Ibuprofen (Advil) may be taken for discomfort. If pain persists please call your doctor s office.      Keep the eye that had the surgery dry. You may wash your hair, bathe or shower, but keep your eye closed while doing so.       Avoid bending over, strenuous activity or heavy lifting until this activity has been approved by your doctor.      You have a follow-up appointment with your doctor tomorrow at the [facility] ([phone]).  Bring all your prescribed eye drops with you to this follow-up appointment.        If you take glaucoma medications, bring them with you to your follow-up appointment tomorrow.      Use medication exactly as prescribed by your doctor. You may restart your regular home medications.       Call your doctor s office at [phone] if any of the following should occur:    - Any sudden vision changes, including decreased vision  - Nausea or severe headache  - Increase in pain that is not controlled with Acetaminophen (Tylenol) or Ibuprofen (Advil)  - Signs of infection (pus, increasing redness or tenderness)  - Severe sensitivity to light  - An increase in floaters (black spots in front of your vision)

## 2017-05-01 NOTE — NURSING NOTE
Chief Complaints and History of Present Illnesses   Patient presents with     Post Op (Ophthalmology) Right Eye     POD 0 CEIOL RE     HPI    Symptoms:           Do you have eye pain now?:  No      Comments:  No pain. Things seem brighter  Miriam RIDER May 1, 2017 10:34 AM

## 2017-05-01 NOTE — TELEPHONE ENCOUNTER
Patient had surgery this morning and was seen in the clinic this afternoon. Called this afternoon for a refill of Pred forte drops.    Patient was called and informed that the order was sent to the pharmacy earlier today. The wife states she just picked it up.    Kathleen M Doege RN

## 2017-05-01 NOTE — IP AVS SNAPSHOT
[facility]    [address] [place] [address]    Phone:  [phone]    Fax:  [phone]                                       After Visit Summary   5/1/2017    Felice Blood    MRN: [id number]           After Visit Summary Signature Page     I have received my discharge instructions, and my questions have been answered. I have discussed any challenges I see with this plan with the nurse or doctor.    ..........................................................................................................................................  Patient/Patient Representative Signature      ..........................................................................................................................................  Patient Representative Print Name and Relationship to Patient    ..................................................               ................................................  Date                                            Time    ..........................................................................................................................................  Reviewed by Signature/Title    ...................................................              ..............................................  Date                                                            Time

## 2017-05-01 NOTE — PROGRESS NOTES
Assessment & Plan      Felice Blood is a 76 year old male with the following diagnoses:   1. Pseudophakia - Right Eye         right eye, day 0    Doing well  Keep patch in place at night for 5 days  Start post-operative drops and taper according to instructions  Post-operative do's and don'ts reviewed, questions answered    Recheck 2-3 weeks with refraction               Attending Physician Attestation:  I have seen and examined this patient.  I have confirmed and edited as necessary the chief complaint(s), history of present illness, review of systems, relevant history, and examination findings as documented by others.  I have personally reviewed the relevant tests, images, and reports as documented above.  I have confirmed and edited as necessary the assessment and plan and agree with this note.  - Camron Hansen MD 10:46 AM 5/1/2017

## 2017-05-01 NOTE — ANESTHESIA CARE TRANSFER NOTE
Patient: Felice Blood    Procedure(s):  Right Eye Phacoemulsification with intraocular Lens Implant - Wound Class: I-Clean    Diagnosis: Right Eye Cataract  Diagnosis Additional Information: No value filed.    Anesthesia Type:   MAC     Note:  Airway :Room Air  Patient transferred to:Phase II  Comments: Arrive Phase II, Stable, Airway Intact  137/85, 70,16,98,98.1  All questions answered.      Vitals: (Last set prior to Anesthesia Care Transfer)    CRNA VITALS  5/1/2017 0916 - 5/1/2017 0950      5/1/2017             NIBP: 131/85    Pulse: 68    SpO2: 98 %    Resp Rate (observed): 16    EKG: NSR                Electronically Signed By: ARGELIA Flores CRNA  May 1, 2017  9:50 AM

## 2017-05-01 NOTE — MR AVS SNAPSHOT
After Visit Summary   5/1/2017    Felice Blood    MRN: [id number]           Patient Information     Date Of Birth          1940        Visit Information        Provider Department      5/1/2017 12:15 PM Camron Hansen MD [facility]        Today's Diagnoses     Pseudophakia - Right Eye    -  1    Pseudophakia           Follow-ups after your visit        Your next 10 appointments already scheduled     May 01, 2017 12:15 PM CDT   (Arrive by 12:00 PM)   Post-Op with Camron Hansen MD   [facility] ([facility])    [address]  4th Floor  [facility] [address]   [phone]            May 16, 2017  8:15 AM CDT   Post-Op with Camron Hansen MD   Eye Clinic ([facility])    [address]  [address]  [address]  [facility] [address]   [phone]            May 18, 2017  8:15 AM CDT   Post-Op with Camron Hansen MD   Eye Clinic ([facility])    [address]  [address]  [address]  [facility] [address] [phone]            Jun 01, 2017  8:15 AM CDT   Post-Op with Camron Hansen MD   Eye Clinic ([facility])    [address]  [address]  [address]  [facility] [address]   [phone]              Who to contact     Please call your clinic at [phone] to:    Ask questions about your health    Make or cancel appointments    Discuss your medicines    Learn about your test results    Speak to your doctor   If you have compliments or concerns about an experience at your clinic, or if you wish to file a complaint, please contact [facility] Patient Relations at [phone] or email us at [email or web address]         Additional Information About Your Visit        MyChart Information     [employer] is an electronic gateway that provides easy, online access to your medical  records. With [employer], you can request a clinic appointment, read your test results, renew a prescription or communicate with your care team.     To sign up for [employer] visit the website at [email or web address]   You will be asked to enter the access code listed below, as well as some personal information. Please follow the directions to create your username and password.     Your access code is: [email or web address]  Expires: 2017  8:30 AM     Your access code will  in 90 days. If you need help or a new code, please contact your [facility] or call [phone] for assistance.        Care EveryWhere ID     This is your Care EveryWhere ID. This could be used by other organizations to access your [place] medical records  [id number]         Blood Pressure from Last 3 Encounters:   17 137/82   17 133/88   17 121/74    Weight from Last 3 Encounters:   17 72.6 kg (160 lb)   17 72 kg (158 lb 12.8 oz)   17 74.7 kg (164 lb 11.2 oz)              Today, you had the following     No orders found for display       Primary Care Provider Office Phone # Fax #    Anthony Maddox [phone]       [facility] [address] [place] [id number]        Thank you!     Thank you for choosing [facility]  for your care. Our goal is always to provide you with excellent care. Hearing back from our patients is one way we can continue to improve our services. Please take a few minutes to complete the written survey that you may receive in the mail after your visit with us. Thank you!             Your Updated Medication List - Protect others around you: Learn how to safely use, store and throw away your medicines at www.disposemymeds.org.          This list is accurate as of: 17 10:47 AM.  Always use your most recent med list.                   Brand Name Dispense Instructions for use    aspirin 81 MG tablet       Take 1 tablet by mouth daily.       atorvastatin 10 MG tablet    LIPITOR    90 tablet    Take 1 tablet (10 mg) by mouth daily       cholecalciferol 1000 UNITS capsule    vitamin  -D     Take 1 capsule by mouth daily.       lisinopril 10 MG tablet    PRINIVIL/ZESTRIL    90 tablet    Take 1 tablet (10 mg) by mouth daily       ofloxacin 0.3 % ophthalmic solution    OCUFLOX    1 Bottle    Apply 1 drop to eye 3 times daily       prednisoLONE acetate 1 % ophthalmic susp    PRED FORTE    1 Bottle    Operated eye  Start the day after surgery 1 drop four times a day for 1 week, then three times a day for 1 week, then twice a day for 1 week, then once a day for 1 week, then stop       sildenafil 100 MG cap/tab    VIAGRA    10 tablet    Take  by mouth daily as needed for erectile dysfunction. One half to one tab po qd prn       sildenafil 20 MG tablet    REVATIO/VIAGRA    50 tablet    Take 5 tablets (100 mg) by mouth daily

## 2017-05-01 NOTE — TELEPHONE ENCOUNTER
----- Message from Brittany Nagel sent at 5/1/2017 12:05 PM CDT -----  Regarding: MED REFILL  Contact: [phone]  Patient calls today requesting a refill of  prednisoLONE acetate (PRED FORTE) 1 % ophthalmic susp  Has the patient called the pharmacy to request?YES     The specific requested pharmacy [facility] [address] [place] [address]  How soon is the refill needed?5/1/2017    Is there any special information the nurse/provider would needed to be sure that this is taken care of correctly and quickly?N/A    Is there a particular number that should be used if there are questions when refilling this medication?NN/A

## 2017-05-01 NOTE — ANESTHESIA PREPROCEDURE EVALUATION
Anesthesia Evaluation     . Pt has had prior anesthetic.     No history of anesthetic complications          ROS/MED HX    ENT/Pulmonary: Comment: cataract - neg pulmonary ROS     Neurologic:  - neg neurologic ROS     Cardiovascular:  - neg cardiovascular ROS   (+) hypertension----. : . . . :. .       METS/Exercise Tolerance:     Hematologic:  - neg hematologic  ROS       Musculoskeletal:  - neg musculoskeletal ROS       GI/Hepatic:  - neg GI/hepatic ROS       Renal/Genitourinary:  - ROS Renal section negative       Endo: Comment: High cholesterol - neg endo ROS       Psychiatric:  - neg psychiatric ROS       Infectious Disease:  - neg infectious disease ROS       Malignancy:      - no malignancy   Other:    - neg other ROS                 Physical Exam  Normal systems: cardiovascular, pulmonary and dental    Airway   Mallampati: II  TM distance: >3 FB  Neck ROM: full    Dental     Cardiovascular       Pulmonary                     Anesthesia Plan      History & Physical Review      ASA Status:  2 .    NPO Status:  > 8 hours    Plan for MAC with Intravenous induction. Reason for MAC:  Procedure to face, neck, head or breast  PONV prophylaxis:  Ondansetron (or other 5HT-3)       Postoperative Care  Postoperative pain management:  IV analgesics and Oral pain medications.      Consents  Anesthetic plan, risks, benefits and alternatives discussed with:  Patient..                          .

## 2017-05-01 NOTE — ANESTHESIA POSTPROCEDURE EVALUATION
Patient: Felice Blood    Procedure(s):  Right Eye Phacoemulsification with intraocular Lens Implant - Wound Class: I-Clean    Diagnosis:Right Eye Cataract  Diagnosis Additional Information: No value filed.    Anesthesia Type:  MAC    Note:  Anesthesia Post Evaluation    Patient location during evaluation: PACU  Patient participation: Able to fully participate in evaluation  Level of consciousness: awake  Pain management: adequate  Airway patency: patent  Cardiovascular status: acceptable  Respiratory status: acceptable  Hydration status: balanced  PONV: none     Anesthetic complications: None          Last vitals:  Vitals:    05/01/17 0835 05/01/17 0954   BP: (!) 149/95 137/82   Pulse: 66    Resp: 16 16   Temp: 35.9  C (96.6  F) 36.1  C (97  F)   SpO2: 99% 96%         Electronically Signed By: Héctor Nelson MD  May 1, 2017  3:11 PM

## 2017-05-01 NOTE — OP NOTE
PREOPERATIVE DIAGNOSIS: Visually significant cataract, Right eye   POSTOPERATIVE DIAGNOSIS: Same   PROCEDURES:   1. Cataract extraction with intraocular lens implant Right eye.  SURGEON: Camron Hansen M.D.  ASSISTANT:  None     INDICATIONS: The patient Felice Blood presented to the eye clinic with decreased vision secondary to cataract in the Right eye. The risks, benefits and alternatives to cataract extraction were discussed. The patient elected to proceed. All questions were answered to the patient's satisfaction.   DESCRIPTION OF PROCEDURE:   Prior to the procedure, appropriate cardiac and respiratory monitors were applied to the patient.  In the pre-operative holding area, a drop of topical tetracaine followed by lidocaine gel followed by povidone iodine.  The patient was brought to the operating room where a surgical pause was carried out to identify with all members of the surgical team the correct surgical site.  With adequate anesthesia, the Right eye was prepped and draped in the usual sterile fashion. A lid speculum was placed, and the operating microscope was rotated into position. A paracentesis was created.  Through this limbal paracentesis, the anterior chamber was filled with preservative-free lidocaine followed by viscoelastic.  A temporal wound was created at the limbus using a 2.6 mm blade. A capsulorrhexis was initiated using a bent 25-gauge needle and was completed in continuous and circular fashion using the capsulorrhexis forceps. The lens nucleus was hydrodissected using balanced salt solution.  The lens nucleus was rotated and removed using phacoemulsification in a stop and chop technique.  Residual cortical material was removed using irrigation-aspiration.  The capsular bag was reinflated to its maximal extent with cohesive viscoelastic.  A 14.5 diopter ZCBOO inserted into the capsular bag.  The lens power selected was reviewed using the intraocular lens power measurements that  were obtained preoperatively to confirm that the correct lens was selected for the desired post-operative refractive state. The residual viscoelastic was removed in its entirety, the wound were hydrated and found to be self-sealing.  Tactile pressure was confirmed to be in a normal range.  The lid speculum was removed, Maxitrol ointment followed by a patch and shield were applied.  The patient tolerated the procedure well, and there were no complications.    PLAN: The patient will be discharged to home and will follow up tomorrow morning in the eye clinic.  EBL:  None  Complications:  None    Implant Name Type Inv. Item Serial No.  Lot No. LRB No. Used   EYE IMP IOL ROXANNE PCL TECNIS PCB00 14.5 Lens/Eye Implant EYE IMP IOL ROXANNE PCL TECNIS PCB00 14.5 [id number]   Right 1

## 2017-05-01 NOTE — IP AVS SNAPSHOT
MRN:[id number]                      After Visit Summary   5/1/2017    Felice Blood    MRN: [id number]           Thank you!     Thank you for choosing [facility] for your care. Our goal is always to provide you with excellent care. Hearing back from our patients is one way we can continue to improve our services. Please take a few minutes to complete the written survey that you may receive in the mail after you visit with us. Thank you!        Patient Information     Date Of Birth          1940        About your hospital stay     You were admitted on:  May 1, 2017 You last received care in the:  [facility]    You were discharged on:  May 1, 2017       Who to Call     For medical emergencies, please call 911.  For non-urgent questions about your medical care, please call your primary care provider or clinic, [phone]  For questions related to your surgery, please call your surgery clinic        Attending Provider     Provider Specialty    Camron Hansen MD Ophthalmology       Primary Care Provider Office Phone # Fax #    Anthony Maddox [phone]       PRIMARY [facility]ARE CENTER [address] [place] [address]        Your next 10 appointments already scheduled     May 01, 2017   Procedure with Camron Hansen MD   [facility] ([facility])    [address] [place], [place] [id number] [phone]           Located in the Clinics and Surgery Center at [address] [place] [address].   parking is very convenient and highly recommended.  is a $6 flat rate fee.  Both  and self parkers should enter the main arrival plaza from [facility]; parking attendants will direct you based on your parking preference.            May 01, 2017 12:15 PM CDT   (Arrive by 12:00 PM)   Post-Op with Camron Hansen MD   [facility] (  [facility])    [address]  4th Floor  [facility] [address]   [phone]            May 16, 2017  8:15 AM CDT   Post-Op with Camron Hansen MD   Eye Clinic ([facility])    [address]  [address]  [address]  [facility] [address]   [phone]            May 18, 2017  8:15 AM CDT   Post-Op with Camron Hansen MD   Eye Clinic ([facility])    [address]  [address]  [address]  [facility] [address]   [phone]            Jun 01, 2017  8:15 AM CDT   Post-Op with Camron Hansen MD   Eye Clinic ([facility])    [address]  [address]  [address]  [facility] [address]   [phone]              Further instructions from your care team       [facility]     Home Care Following Cataract Surgery    If you have a gauze eye patch on, please do not remove it until it is removed by your doctor at your first appointment after your surgery.  You will start your eye drops the next day.    OR    If you only have a clear eye shield on, you may remove the eye shield when you get home and begin eye drops today as directed by your doctor.      Wear the clear eye shield for protection when sleeping for the next 5 days.      Do not rub the eye that had the operation.      Your eye might be sensitive to light.  Wearing sunglasses may be more comfortable for you.      You may have some discomfort and irritation.  Acetaminophen (Tylenol) or Ibuprofen (Advil) may be taken for discomfort. If pain persists please call your doctor s office.      Keep the eye that had the surgery dry. You may wash your hair, bathe or shower, but keep your eye closed while doing so.       Avoid bending over, strenuous activity or heavy lifting until this activity has been approved by your doctor.      You have a follow-up appointment with your doctor tomorrow at the  "[facility] ([phone]).  Bring all your prescribed eye drops with you to this follow-up appointment.        If you take glaucoma medications, bring them with you to your follow-up appointment tomorrow.      Use medication exactly as prescribed by your doctor. You may restart your regular home medications.       Call your doctor s office at [phone] if any of the following should occur:    - Any sudden vision changes, including decreased vision  - Nausea or severe headache  - Increase in pain that is not controlled with Acetaminophen (Tylenol) or Ibuprofen (Advil)  - Signs of infection (pus, increasing redness or tenderness)  - Severe sensitivity to light  - An increase in floaters (black spots in front of your vision)      Pending Results     No orders found from 2017 to 2017.            Admission Information     Date & Time Provider Department Dept. Phone    2017 Camron Hansen MD [facility] [phone]      Your Vitals Were     Blood Pressure Pulse Temperature Respirations Height Weight    149/95 66 96.6  F (35.9  C) (Tympanic) 16 1.753 m (5' 9\") 72.6 kg (160 lb)    Pulse Oximetry BMI (Body Mass Index)                99% 23.63 kg/m2          [employer] Information     introNetworks is an electronic gateway that provides easy, online access to your medical records. With introNetworks, you can request a clinic appointment, read your test results, renew a prescription or communicate with your care team.     To sign up for introNetworks visit the website at [email or web address]   You will be asked to enter the access code listed below, as well as some personal information. Please follow the directions to create your username and password.     Your access code is: [email or web address]  Expires: 2017  8:30 AM     Your access code will  in 90 days. If you need help or a new code, please contact your [facility] or call " [phone] for assistance.        Care EveryWhere ID     This is your Care EveryWhere ID. This could be used by other organizations to access your [facility] medical records  [id number]           Review of your medicines      UNREVIEWED medicines. Ask your doctor about these medicines        Dose / Directions    aspirin 81 MG tablet        Dose:  1 tablet   Take 1 tablet by mouth daily.   Refills:  0       atorvastatin 10 MG tablet   Commonly known as:  LIPITOR   Used for:  Pure hypercholesterolemia, Screen for colon cancer, Impotence of organic origin, Essential hypertension, Bilateral impacted cerumen        Dose:  10 mg   Take 1 tablet (10 mg) by mouth daily   Quantity:  90 tablet   Refills:  3       cholecalciferol 1000 UNITS capsule   Commonly known as:  vitamin  -D        Dose:  1 capsule   Take 1 capsule by mouth daily.   Refills:  0       lisinopril 10 MG tablet   Commonly known as:  PRINIVIL/ZESTRIL   Used for:  Essential hypertension, Screen for colon cancer, Impotence of organic origin, Pure hypercholesterolemia, Bilateral impacted cerumen        Dose:  10 mg   Take 1 tablet (10 mg) by mouth daily   Quantity:  90 tablet   Refills:  3       ofloxacin 0.3 % ophthalmic solution   Commonly known as:  OCUFLOX   Used for:  Cataract        Dose:  1 drop   Apply 1 drop to eye 3 times daily   Quantity:  1 Bottle   Refills:  1       prednisoLONE acetate 1 % ophthalmic susp   Commonly known as:  PRED FORTE   Used for:  Cataract        Operated eye  Start the day after surgery 1 drop four times a day for 1 week, then three times a day for 1 week, then twice a day for 1 week, then once a day for 1 week, then stop   Quantity:  1 Bottle   Refills:  1       sildenafil 100 MG cap/tab   Commonly known as:  VIAGRA   Used for:  Impotence of organic origin, Pure hypercholesterolemia, Screen for colon cancer, Essential hypertension, Bilateral impacted cerumen        Take  by mouth daily as needed for erectile dysfunction.  One half to one tab po qd prn   Quantity:  10 tablet   Refills:  11       sildenafil 20 MG tablet   Commonly known as:  REVATIO/VIAGRA   Used for:  ED (erectile dysfunction)        Dose:  100 mg   Take 5 tablets (100 mg) by mouth daily   Quantity:  50 tablet   Refills:  11                Protect others around you: Learn how to safely use, store and throw away your medicines at www.disposemymeds.org.             Medication List: This is a list of all your medications and when to take them. Check marks below indicate your daily home schedule. Keep this list as a reference.      Medications           Morning Afternoon Evening Bedtime As Needed    aspirin 81 MG tablet   Take 1 tablet by mouth daily.                                atorvastatin 10 MG tablet   Commonly known as:  LIPITOR   Take 1 tablet (10 mg) by mouth daily                                cholecalciferol 1000 UNITS capsule   Commonly known as:  vitamin  -D   Take 1 capsule by mouth daily.                                lisinopril 10 MG tablet   Commonly known as:  PRINIVIL/ZESTRIL   Take 1 tablet (10 mg) by mouth daily                                ofloxacin 0.3 % ophthalmic solution   Commonly known as:  OCUFLOX   Apply 1 drop to eye 3 times daily                                prednisoLONE acetate 1 % ophthalmic susp   Commonly known as:  PRED FORTE   Operated eye  Start the day after surgery 1 drop four times a day for 1 week, then three times a day for 1 week, then twice a day for 1 week, then once a day for 1 week, then stop                                sildenafil 100 MG cap/tab   Commonly known as:  VIAGRA   Take  by mouth daily as needed for erectile dysfunction. One half to one tab po qd prn                                sildenafil 20 MG tablet   Commonly known as:  REVATIO/VIAGRA   Take 5 tablets (100 mg) by mouth daily

## 2017-05-16 ENCOUNTER — OFFICE VISIT (OUTPATIENT)
Dept: OPHTHALMOLOGY | Facility: CLINIC | Age: 77
End: 2017-05-16
Attending: OPHTHALMOLOGY
Payer: MEDICARE

## 2017-05-16 DIAGNOSIS — Z96.1 PSEUDOPHAKIA: Primary | ICD-10-CM

## 2017-05-16 DIAGNOSIS — H35.371 ERM OD (EPIRETINAL MEMBRANE, RIGHT EYE): ICD-10-CM

## 2017-05-16 DIAGNOSIS — H26.9 CATARACT OF LEFT EYE: ICD-10-CM

## 2017-05-16 PROCEDURE — 99213 OFFICE O/P EST LOW 20 MIN: CPT | Mod: ZF

## 2017-05-16 PROCEDURE — 92015 DETERMINE REFRACTIVE STATE: CPT | Mod: 52,ZF

## 2017-05-16 ASSESSMENT — VISUAL ACUITY
OS_CC: 20/25-3
METHOD: SNELLEN - LINEAR
OD_PH_SC: 20/40-2
CORRECTION_TYPE: GLASSES
OD_SC: 20/80

## 2017-05-16 ASSESSMENT — SLIT LAMP EXAM - LIDS
COMMENTS: MILD MGD
COMMENTS: MILD MGD

## 2017-05-16 ASSESSMENT — EXTERNAL EXAM - LEFT EYE: OS_EXAM: NORMAL

## 2017-05-16 ASSESSMENT — TONOMETRY
OS_IOP_MMHG: 15
OD_IOP_MMHG: 16
IOP_METHOD: APPLANATION

## 2017-05-16 ASSESSMENT — REFRACTION_MANIFEST
OD_ADD: +2.50
OD_SPHERE: -1.75
OD_CYLINDER: +0.75
OD_AXIS: 163

## 2017-05-16 ASSESSMENT — EXTERNAL EXAM - RIGHT EYE: OD_EXAM: NORMAL

## 2017-05-16 NOTE — MR AVS SNAPSHOT
After Visit Summary   5/16/2017    Felice Blood    MRN: [id number]           Patient Information     Date Of Birth          1940        Visit Information        Provider Department      5/16/2017 8:15 AM Camron Hansen MD Eye Clinic        Today's Diagnoses     Pseudophakia - Right Eye    -  1    ERM OD (epiretinal membrane, right eye)        Cataract of left eye           Follow-ups after your visit        Your next 10 appointments already scheduled     May 18, 2017  8:15 AM CDT   Post-Op with Camron Hansen MD   Eye Clinic ([facility])    [address]  [address] [address]  [facility] [address]   [phone]            Jun 01, 2017  8:15 AM CDT   Post-Op with Camron Hansen MD   Eye Clinic ([facility])    [address]  [address]  [address] [address]  [facility] [address]   [phone]              Who to contact     Please call your clinic at [phone] to:    Ask questions about your health    Make or cancel appointments    Discuss your medicines    Learn about your test results    Speak to your doctor   If you have compliments or concerns about an experience at your clinic, or if you wish to file a complaint, please contact [facility] Patient Relations at [phone] or email us at [email or web address]         Additional Information About Your Visit        MyChart Information     Uevoct is an electronic gateway that provides easy, online access to your medical records. With Omn[employer], you can request a clinic appointment, read your test results, renew a prescription or communicate with your care team.     To sign up for Uevoct visit the website at [email or web address]   You will be asked to enter the access code listed below, as well as some personal information. Please follow the directions to create your username and password.     Your access code is:  [email or web address]  Expires: 2017  8:30 AM     Your access code will  in 90 days. If you need help or a new code, please contact your [facility] or call [phone] for assistance.        Care EveryWhere ID     This is your Care EveryWhere ID. This could be used by other organizations to access your [facility] medical records  [id number]         Blood Pressure from Last 3 Encounters:   17 137/82   17 133/88   17 121/74    Weight from Last 3 Encounters:   17 72.6 kg (160 lb)   17 72 kg (158 lb 12.8 oz)   17 74.7 kg (164 lb 11.2 oz)              Today, you had the following     No orders found for display       Primary Care Provider Office Phone # Fax #    Anthony Maddox [phone]       PRIMARY CARE CENTER [address] [place] [address]        Thank you!     Thank you for choosing EYE CLINIC  for your care. Our goal is always to provide you with excellent care. Hearing back from our patients is one way we can continue to improve our services. Please take a few minutes to complete the written survey that you may receive in the mail after your visit with us. Thank you!             Your Updated Medication List - Protect others around you: Learn how to safely use, store and throw away your medicines at www.disposemymeds.org.          This list is accurate as of: 17 10:13 AM.  Always use your most recent med list.                   Brand Name Dispense Instructions for use    aspirin 81 MG tablet      Take 1 tablet by mouth daily.       atorvastatin 10 MG tablet    LIPITOR    90 tablet    Take 1 tablet (10 mg) by mouth daily       cholecalciferol 1000 UNITS capsule    vitamin  -D     Take 1 capsule by mouth daily.       lisinopril 10 MG tablet    PRINIVIL/ZESTRIL    90 tablet    Take 1 tablet (10 mg) by mouth daily       ofloxacin 0.3 % ophthalmic solution    OCUFLOX    1 Bottle    Apply 1 drop to eye 3 times  daily       prednisoLONE acetate 1 % ophthalmic susp    PRED FORTE    1 Bottle    Start OD 1 drop four times a day for 1 week, then three times a day for 1 week, then twice a day for 1 week, then once a day for 1 week       sildenafil 100 MG cap/tab    VIAGRA    10 tablet    Take  by mouth daily as needed for erectile dysfunction. One half to one tab po qd prn       sildenafil 20 MG tablet    REVATIO/VIAGRA    50 tablet    Take 5 tablets (100 mg) by mouth daily

## 2017-05-16 NOTE — PROGRESS NOTES
Assessment & Plan      Felice Blood is a 76 year old male with the following diagnoses:   1. Pseudophakia    2. ERM OD (epiretinal membrane, right eye)    3. Cataract of left eye         Right eye target -2, spherical equivalent today -1.37  Vision likely limited by ERM seen prior to CEIOL  Hoping for closer correction left eye.  Will increase goal refraction to -2.50 D    Doing well  Ok to resume normal activities  Taper Predforte as directed  Glasses prescription deferred until after CEIOL OS tomorrow  Artificial tears as needed          Kevon Steen MD    Attending Physician Attestation: Complete documentation of historical and exam elements from today's encounter can be found in the full encounter summary report (not reduplicated in this progress note). I personally obtained the chief complaint(s) and history of present illness.  I confirmed and edited as necessary the review of systems, past medical/surgical history, family history, social history, and examination findings as documented by others; and I examined the patient myself. I personally reviewed the relevant tests, images, and reports as documented above. I formulated and edited as necessary the assessment and plan and discussed the findings and management plan with the patient and family. - Camron Hansen MD  5/16/2017

## 2017-05-16 NOTE — NURSING NOTE
Chief Complaints and History of Present Illnesses   Patient presents with     Post Op (Ophthalmology) Right Eye     CEIOL 5/1/17     HPI    Affected eye(s):  Right   Symptoms:     No decreased vision   No floaters   No flashes   Dryness         Do you have eye pain now?:  No      Comments:  PD bid RE. VA seems stable. No floaters or flashes. Occ dryness  Miriam RIDER May 16, 2017 8:32 AM

## 2017-05-17 ENCOUNTER — TRANSFERRED RECORDS (OUTPATIENT)
Dept: HEALTH INFORMATION MANAGEMENT | Facility: CLINIC | Age: 77
End: 2017-05-17

## 2017-05-18 ENCOUNTER — OFFICE VISIT (OUTPATIENT)
Dept: OPHTHALMOLOGY | Facility: CLINIC | Age: 77
End: 2017-05-18
Attending: OPHTHALMOLOGY
Payer: MEDICARE

## 2017-05-18 DIAGNOSIS — Z96.1 PSEUDOPHAKIA OF BOTH EYES: Primary | ICD-10-CM

## 2017-05-18 DIAGNOSIS — H35.371 ERM OD (EPIRETINAL MEMBRANE, RIGHT EYE): ICD-10-CM

## 2017-05-18 PROCEDURE — 99213 OFFICE O/P EST LOW 20 MIN: CPT | Mod: ZF

## 2017-05-18 ASSESSMENT — EXTERNAL EXAM - RIGHT EYE: OD_EXAM: NORMAL

## 2017-05-18 ASSESSMENT — VISUAL ACUITY
OS_SC: 20/60
OD_SC: 20/70
OS_SC+: -1
OS_PH_SC: 20/30-1
OD_PH_SC: 20/40
OD_SC+: -1
METHOD: SNELLEN - LINEAR

## 2017-05-18 ASSESSMENT — TONOMETRY
OS_IOP_MMHG: 19
IOP_METHOD: TONOPEN
OD_IOP_MMHG: 19

## 2017-05-18 ASSESSMENT — EXTERNAL EXAM - LEFT EYE: OS_EXAM: NORMAL

## 2017-05-18 ASSESSMENT — SLIT LAMP EXAM - LIDS
COMMENTS: MILD MGD
COMMENTS: MILD MGD

## 2017-05-18 NOTE — PROGRESS NOTES
Assessment & Plan      Felice Blood is a 76 year old male with the following diagnoses:   1. 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 ERM OD (epiretinal membrane, right eye)         left eye, day 1  Right eye week 2. Vision likely limited 2/2 ERM    Doing well  Keep patch in place at night for 5 days  Start post-operative drops and taper according to instructions  Post-operative do's and don'ts reviewed, questions answered    Recheck 2-3 weeks with refraction      Kevon Steen MD         Attending Physician Attestation:  I have seen and examined this patient.  I have confirmed and edited as necessary the chief complaint(s), history of present illness, review of systems, relevant history, and examination findings as documented by others.  I have personally reviewed the relevant tests, images, and reports as documented above.  I have confirmed and edited as necessary the assessment and plan and agree with this note.  - Kevon Steen MD 8:34 AM 5/18/2017

## 2017-05-18 NOTE — NURSING NOTE
"Chief Complaints and History of Present Illnesses   Patient presents with     Post Op (Ophthalmology) Left Eye     cataract surgery LE     HPI    Affected eye(s):  Left   Symptoms:     No blurred vision   No decreased vision   No redness   No tearing   No Dryness   No itching   No burning      Duration:  1 day      Do you have eye pain now?:  No      Comments:  Pt is not having any issues today. \"So far everything is good\". Pt started drops yesterday.   Anthony Marion  8:16 AM May 18, 2017                "

## 2017-05-25 ENCOUNTER — OFFICE VISIT (OUTPATIENT)
Dept: OPHTHALMOLOGY | Facility: CLINIC | Age: 77
End: 2017-05-25
Attending: OPHTHALMOLOGY
Payer: MEDICARE

## 2017-05-25 DIAGNOSIS — H43.21 ASTEROID HYALITIS OF RIGHT EYE: ICD-10-CM

## 2017-05-25 DIAGNOSIS — Z96.1 PSEUDOPHAKIA OF BOTH EYES: Primary | ICD-10-CM

## 2017-05-25 DIAGNOSIS — H35.371 ERM OD (EPIRETINAL MEMBRANE, RIGHT EYE): ICD-10-CM

## 2017-05-25 PROCEDURE — 92015 DETERMINE REFRACTIVE STATE: CPT | Mod: GY,ZF

## 2017-05-25 PROCEDURE — 99213 OFFICE O/P EST LOW 20 MIN: CPT | Mod: ZF

## 2017-05-25 ASSESSMENT — SLIT LAMP EXAM - LIDS
COMMENTS: MILD MGD
COMMENTS: MILD MGD

## 2017-05-25 ASSESSMENT — TONOMETRY
OD_IOP_MMHG: 12
IOP_METHOD: TONOPEN
OS_IOP_MMHG: 13

## 2017-05-25 ASSESSMENT — CUP TO DISC RATIO
OD_RATIO: 0.3
OS_RATIO: 0.3

## 2017-05-25 ASSESSMENT — VISUAL ACUITY
OD_SC: 20/60
OD_SC+: -2
METHOD: SNELLEN - LINEAR
OS_SC+: -2
OS_SC: 20/60

## 2017-05-25 ASSESSMENT — REFRACTION_MANIFEST
OD_AXIS: 157
OS_CYLINDER: SPHERE
OD_CYLINDER: +0.75
OS_ADD: +2.75
OD_SPHERE: -1.75
OS_SPHERE: -2.00
OD_ADD: +2.75

## 2017-05-25 ASSESSMENT — EXTERNAL EXAM - RIGHT EYE: OD_EXAM: NORMAL

## 2017-05-25 ASSESSMENT — EXTERNAL EXAM - LEFT EYE: OS_EXAM: NORMAL

## 2017-05-25 NOTE — MR AVS SNAPSHOT
After Visit Summary   2017    Felice Blood    MRN: [id number]           Patient Information     Date Of Birth          1940        Visit Information        Provider Department      2017 7:45 AM Camron Hansen MD [facility]        Today's Diagnoses     Pseudophakia of both eyes - Both Eyes    -  1    ERM OD (epiretinal membrane, right eye)        Asteroid hyalitis of right eye           Follow-ups after your visit        Follow-up notes from your care team     Return in about 1 year (around 2018) for DFE.      Who to contact     Please call your clinic at [phone] to:    Ask questions about your health    Make or cancel appointments    Discuss your medicines    Learn about your test results    Speak to your doctor   If you have compliments or concerns about an experience at your clinic, or if you wish to file a complaint, please contact [facility] Patient Relations at [phone] or email us at [email or web address]         Additional Information About Your Visit        MyChart Information     CitizenShipper is an electronic gateway that provides easy, online access to your medical records. With CitizenShipper, you can request a clinic appointment, read your test results, renew a prescription or communicate with your care team.     To sign up for CitizenShipper visit the website at [email or web address]   You will be asked to enter the access code listed below, as well as some personal information. Please follow the directions to create your username and password.     Your access code is: [email or web address]  Expires: 2017  8:30 AM     Your access code will  in 90 days. If you need help or a new code, please contact your [facility] or call [phone] for assistance.        Care EveryWhere ID     This is your Care EveryWhere ID. This could be used by other organizations to access your [facility]  records  [id number]         Blood Pressure from Last 3 Encounters:   05/01/17 137/82   04/14/17 133/88   04/12/17 121/74    Weight from Last 3 Encounters:   05/01/17 72.6 kg (160 lb)   04/14/17 72 kg (158 lb 12.8 oz)   04/12/17 74.7 kg (164 lb 11.2 oz)              Today, you had the following     No orders found for display       Primary Care Provider Office Phone # Fax #    Anthony Maddox [phone]       [facility] [address] [place] [address] 88  [facility] [address]        Thank you!     Thank you for choosing EYE CLINIC  for your care. Our goal is always to provide you with excellent care. Hearing back from our patients is one way we can continue to improve our services. Please take a few minutes to complete the written survey that you may receive in the mail after your visit with us. Thank you!             Your Updated Medication List - Protect others around you: Learn how to safely use, store and throw away your medicines at www.disposemymeds.org.          This list is accurate as of: 5/25/17  8:49 AM.  Always use your most recent med list.                   Brand Name Dispense Instructions for use    aspirin 81 MG tablet      Take 1 tablet by mouth daily.       atorvastatin 10 MG tablet    LIPITOR    90 tablet    Take 1 tablet (10 mg) by mouth daily       cholecalciferol 1000 UNITS capsule    vitamin  -D     Take 1 capsule by mouth daily.       lisinopril 10 MG tablet    PRINIVIL/ZESTRIL    90 tablet    Take 1 tablet (10 mg) by mouth daily       ofloxacin 0.3 % ophthalmic solution    OCUFLOX    1 Bottle    Apply 1 drop to eye 3 times daily       prednisoLONE acetate 1 % ophthalmic susp    PRED FORTE    1 Bottle    Start OD 1 drop four times a day for 1 week, then three times a day for 1 week, then twice a day for 1 week, then once a day for 1 week       sildenafil 100 MG cap/tab    VIAGRA    10 tablet    Take  by mouth daily as needed for erectile dysfunction. One half to one tab po  qd prn       sildenafil 20 MG tablet    REVATIO/VIAGRA    50 tablet    Take 5 tablets (100 mg) by mouth daily

## 2017-05-25 NOTE — PROGRESS NOTES
Assessment & Plan      Felice Blood is a 76 year old male with the following diagnoses:   1. 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 ERM OD (epiretinal membrane, right eye)    3. Asteroid hyalitis of right eye           Doing well  More myopic outcome left eye as intended  Happy with uncorrected mid-near vision at this time  Ok to resume normal activities  Taper Predforte as directed  Artificial tears as needed          Attending Physician Attestation: Complete documentation of historical and exam elements from today's encounter can be found in the full encounter summary report (not reduplicated in this progress note). I personally obtained the chief complaint(s) and history of present illness.  I confirmed and edited as necessary the review of systems, past medical/surgical history, family history, social history, and examination findings as documented by others; and I examined the patient myself. I personally reviewed the relevant tests, images, and reports as documented above. I formulated and edited as necessary the assessment and plan and discussed the findings and management plan with the patient and family. - Camron Hansen MD  5/25/2017

## 2017-05-25 NOTE — NURSING NOTE
Chief Complaints and History of Present Illnesses   Patient presents with     Follow Up For     PCIOL OD 05/01/2017 OS 05/17/2017     HPI    Affected eye(s):  Both   Symptoms:     No blurred vision   No decreased vision         Do you have eye pain now?:  No      Comments:  Kim Adames COA 7:57 AM May 25, 2017

## 2018-01-06 ENCOUNTER — OFFICE VISIT (OUTPATIENT)
Dept: INTERNAL MEDICINE | Facility: CLINIC | Age: 78
End: 2018-01-06
Payer: MEDICARE

## 2018-01-06 VITALS
OXYGEN SATURATION: 97 % | WEIGHT: 164.3 LBS | HEART RATE: 85 BPM | SYSTOLIC BLOOD PRESSURE: 130 MMHG | RESPIRATION RATE: 20 BRPM | DIASTOLIC BLOOD PRESSURE: 78 MMHG | TEMPERATURE: 97.6 F | BODY MASS INDEX: 24.26 KG/M2

## 2018-01-06 DIAGNOSIS — R05.9 COUGH: Primary | ICD-10-CM

## 2018-01-06 DIAGNOSIS — R53.83 FATIGUE, UNSPECIFIED TYPE: ICD-10-CM

## 2018-01-06 RX ORDER — ALBUTEROL SULFATE 90 UG/1
2 AEROSOL, METERED RESPIRATORY (INHALATION) EVERY 4 HOURS PRN
Qty: 1 INHALER | Refills: 0 | Status: SHIPPED | OUTPATIENT
Start: 2018-01-06 | End: 2019-04-22

## 2018-01-06 RX ORDER — AZITHROMYCIN 250 MG/1
TABLET, FILM COATED ORAL
Qty: 6 TABLET | Refills: 0 | Status: SHIPPED | OUTPATIENT
Start: 2018-01-06 | End: 2018-01-23

## 2018-01-06 ASSESSMENT — PAIN SCALES - GENERAL: PAINLEVEL: NO PAIN (0)

## 2018-01-06 NOTE — NURSING NOTE
"Chief Complaint   Patient presents with     URI     Cold for \" for over week\" cough (yellow sputum), \" very fatigued\"   Julia Hobbs LPN 11:21 AM on 1/6/2018  "

## 2018-01-06 NOTE — PATIENT INSTRUCTIONS
[facility]: [phone]     [facility] Medication Refill Request Information:  * Please contact your pharmacy regarding ANY request for medication refills.  ** [facility] Prescription Fax = [phone]  * Please allow 3 business days for routine medication refills.  * Please allow 5 business days for controlled substance medication refills.     [facility] Test Result notification information:  *You will be notified with in 7-10 days of your appointment day regarding the results of your test.  If you are on MyChart you will be notified as soon as the provider has reviewed the results and signed off on them.

## 2018-01-06 NOTE — MR AVS SNAPSHOT
After Visit Summary   1/6/2018    Felice Blood    MRN: [id number]           Patient Information     Date Of Birth          1940        Visit Information        Provider Department      1/6/2018 11:20 AM Krissy Carbone MD [facility]        Today's Diagnoses     Cough    -  1    Fatigue, unspecified type          Care Instructions    Primary Care Center: [phone]     Primary Care Center Medication Refill Request Information:  * Please contact your pharmacy regarding ANY request for medication refills.  ** [facility] Prescription Fax = [phone]  * Please allow 3 business days for routine medication refills.  * Please allow 5 business days for controlled substance medication refills.     [facility] Center Test Result notification information:  *You will be notified with in 7-10 days of your appointment day regarding the results of your test.  If you are on MyChart you will be notified as soon as the provider has reviewed the results and signed off on them.          Follow-ups after your visit        Follow-up notes from your care team     Return if symptoms worsen or fail to improve.      Your next 10 appointments already scheduled     May 29, 2018  8:00 AM CDT   RETURN GENERAL with Camron Hansen MD   Eye Clinic ([facility])    Marcell Weaver [facility]  [address]  [address] [address]  [facility] [id number] [phone]              Who to contact     Please call your clinic at [phone] to:    Ask questions about your health    Make or cancel appointments    Discuss your medicines    Learn about your test results    Speak to your doctor   If you have compliments or concerns about an experience at your clinic, or if you wish to file a complaint, please contact [facility] Patient Relations at [phone] or email us at [email or web address]         Additional Information About Your Visit        MyChart  Information     R&V is an electronic gateway that provides easy, online access to your medical records. With R&V, you can request a clinic appointment, read your test results, renew a prescription or communicate with your care team.     To sign up for R&V visit the website at [email or web address]   You will be asked to enter the access code listed below, as well as some personal information. Please follow the directions to create your username and password.     Your access code is: [email or web address]  Expires: 2018 12:05 PM     Your access code will  in 90 days. If you need help or a new code, please contact your [facility] or call [phone] for assistance.        Care EveryWhere ID     This is your Care EveryWhere ID. This could be used by other organizations to access your [place] medical records  [id number]        Your Vitals Were     Pulse Temperature Respirations Pulse Oximetry BMI (Body Mass Index)       85 97.6  F (36.4  C) (Oral) 20 97% 24.26 kg/m2        Blood Pressure from Last 3 Encounters:   18 130/78   17 137/82   17 133/88    Weight from Last 3 Encounters:   18 74.5 kg (164 lb 4.8 oz)   17 72.6 kg (160 lb)   17 72 kg (158 lb 12.8 oz)              Today, you had the following     No orders found for display         Today's Medication Changes          These changes are accurate as of: 18 12:05 PM.  If you have any questions, ask your nurse or doctor.               Start taking these medicines.        Dose/Directions    albuterol 108 (90 BASE) MCG/ACT Inhaler   Commonly known as:  PROAIR HFA   Used for:  Cough   Started by:  Krissy Carbone MD        Dose:  2 puff   Inhale 2 puffs into the lungs every 4 hours as needed for shortness of breath / dyspnea or wheezing   Quantity:  1 Inhaler   Refills:  0       azithromycin 250 MG tablet   Commonly known as:  ZITHROMAX   Used for:  Cough   Started by:   Krissy Carbone MD        Take 2 tablets on day one, then one tablet daily for the following 4 days   Quantity:  6 tablet   Refills:  0         Stop taking these medicines if you haven't already. Please contact your care team if you have questions.     sildenafil 100 MG tablet   Commonly known as:  VIAGRA   Stopped by:  Krissy Carbone MD                Where to get your medicines      These medications were sent to [facility] [address] [place], [place] [address] [facility]  [address] [place] [address]     Phone:  [phone]     albuterol 108 (90 BASE) MCG/ACT Inhaler    azithromycin 250 MG tablet                Primary Care Provider Office Phone # Fax #    Anthony Maddox [phone]       [address] [address]        Equal Access to Services     [facility] : Hadii slava mendieta hadasho Sowilliam, waaxda luqadaha, qaybta kaalmada adecarliyada, ally bello . So [facility] [phone].    ATENCIÓN: Si habla español, tiene a broderick disposición servicios gratuitos de asistencia lingüística. [facility] [phone].    We comply with applicable federal civil rights laws and [place] laws. We do not discriminate on the basis of race, color, national origin, age, disability, sex, sexual orientation, or gender identity.            Thank you!     Thank you for choosing [facility]  for your care. Our goal is always to provide you with excellent care. Hearing back from our patients is one way we can continue to improve our services. Please take a few minutes to complete the written survey that you may receive in the mail after your visit with us. Thank you!             Your Updated Medication List - Protect others around you: Learn how to safely use, store and throw away your medicines at www.disposemymeds.org.          This list is accurate as of: 1/6/18 12:05 PM.  Always use your most recent med list.                   Brand Name  Dispense Instructions for use Diagnosis    albuterol 108 (90 BASE) MCG/ACT Inhaler    PROAIR HFA    1 Inhaler    Inhale 2 puffs into the lungs every 4 hours as needed for shortness of breath / dyspnea or wheezing    Cough       aspirin 81 MG tablet      Take 1 tablet by mouth daily.        atorvastatin 10 MG tablet    LIPITOR    90 tablet    Take 1 tablet (10 mg) by mouth daily    Pure hypercholesterolemia, Screen for colon cancer, Impotence of organic origin, Essential hypertension, Bilateral impacted cerumen       azithromycin 250 MG tablet    ZITHROMAX    6 tablet    Take 2 tablets on day one, then one tablet daily for the following 4 days    Cough       cholecalciferol 1000 UNITS capsule    vitamin  -D     Take 1 capsule by mouth daily.        lisinopril 10 MG tablet    PRINIVIL/ZESTRIL    90 tablet    Take 1 tablet (10 mg) by mouth daily    Essential hypertension, Screen for colon cancer, Impotence of organic origin, Pure hypercholesterolemia, Bilateral impacted cerumen       sildenafil 20 MG tablet    REVATIO    50 tablet    Take 5 tablets (100 mg) by mouth daily    ED (erectile dysfunction)

## 2018-01-06 NOTE — PROGRESS NOTES
CC: escalating productive cough, fatigue    S:  One week ago developed sinus congestion, NP cough, minor URI symptoms which continued at a low grade.  2 days ago became more rapidly fatigued, tired, with escalating productive cough.  No fevers, dizziness, vertigo, ear pain, sinus pain, headache, stiff neck, myalgias, arthralgias, sore throat.  Maybe feels a little SOB and wheezy.  No sharp chest pain, does have some low grade pain in a non specific area over bilateral anterior chest when coughs, breathes.  No hemoptysis, nausea, vomiting, abd pain, diarrhea, rashes, joint swelling.  Did use Mucinex and Afrin last night, Possible exposures to sick contacts at [employer].  No travels.  Does not smoke.  No hx asthma/copd.  Vaccines up to date.    Patient Active Problem List   Diagnosis     Essential hypertension     Pure hypercholesterolemia     Impotence of organic origin     Current Outpatient Prescriptions   Medication Sig Dispense Refill     azithromycin (ZITHROMAX) 250 MG tablet Take 2 tablets on day one, then one tablet daily for the following 4 days 6 tablet 0     albuterol (PROAIR HFA) 108 (90 BASE) MCG/ACT Inhaler Inhale 2 puffs into the lungs every 4 hours as needed for shortness of breath / dyspnea or wheezing 1 Inhaler 0     sildenafil (REVATIO/VIAGRA) 20 MG tablet Take 5 tablets (100 mg) by mouth daily 50 tablet 11     atorvastatin (LIPITOR) 10 MG tablet Take 1 tablet (10 mg) by mouth daily 90 tablet 3     lisinopril (PRINIVIL/ZESTRIL) 10 MG tablet Take 1 tablet (10 mg) by mouth daily 90 tablet 3     aspirin 81 MG tablet Take 1 tablet by mouth daily.       cholecalciferol (VITAMIN D3) 1000 UNIT capsule Take 1 capsule by mouth daily.       Allergies   Allergen Reactions     Etodolac Unknown     Percocet [Oxycodone-Acetaminophen] Nausea     Other reaction(s): Muscle Weakness     /78 (BP Location: Right arm, Patient Position: Sitting, Cuff Size: Adult Regular)  Pulse 85  Temp 97.6  F (36.4  C) (Oral)   Resp 20  Wt 74.5 kg (164 lb 4.8 oz)  SpO2 97%  BMI 24.26 kg/m2  Physical Examination:  General:  Pleasant, alert, slightly short of breath at rest when speaking, no audible wheezing.  Appears tired.  Eyes:  Pupils 2-3 mm, sclera white, EOM's full.    Ears:  Bilateral cerumen impaction, advised Debrox.  Nose:  Nasal passages clear, turbinates are mildly swollen bilaterally, left more than right.  Just used Afrin.  Throat/Mouth:  No pharyngeal erythema or exudate, oral mucosa and tongue moist, no suspicious oral lesions  Neck:  Full AROM, supple, thyroid smooth, symmetric, not enlarged, no nodules  Lungs:  Clear to auscultation throughout, no wheezes, rhonchi or rales but does have a mild increase in exp phase of breathing  C/V:  Regular rate and rhythm, no murmurs, rubs or gallops. No peripheral edema.   Abdomen:  Not distended.  Bowel sounds active.  No tenderness, no hepatosplenomegaly or masses.  No CVA tenderness or masses.  Lymph:  No cervical lymph nodes.  Neuro:  Alert and oriented, face symmetric. No tremor.  Gait steady.    M/S:   No joint deformities noted.  No joint swelling.  Skin:   No rashes or jaundice.  Normal moisture, good skin turgor.  Psych:  Broad range affect.  Not psychomotor slowed.  No signs of anxiety or agitation.    Felice was seen today for uri.    Diagnoses and all orders for this visit:    Cough.  Given course of illness my working diagnosis is initially viral URI, now super infection bronchitis with bacterial pathogens, associated with some reactive airways.  Less likely pneumonia.    -     azithromycin (ZITHROMAX) 250 MG tablet; Take 2 tablets on day one, then one tablet daily for the following 4 days  -     albuterol (PROAIR HFA) 108 (90 BASE) MCG/ACT Inhaler; Inhale 2 puffs into the lungs every 4 hours as needed for shortness of breath / dyspnea or wheezing  -     Supportive cares  -     If symptoms escalate, go to ED  -     I do not think a CXR nor labs are indicated given  clinical picture, and it would not change treatment    Fatigue, unspecified type    F/U prn    Krissy Carbone M.D.  Internal Medicine  Primary Care Center   pager [phone]

## 2018-01-23 ENCOUNTER — RADIANT APPOINTMENT (OUTPATIENT)
Dept: GENERAL RADIOLOGY | Facility: CLINIC | Age: 78
End: 2018-01-23
Attending: INTERNAL MEDICINE
Payer: MEDICARE

## 2018-01-23 ENCOUNTER — OFFICE VISIT (OUTPATIENT)
Dept: INTERNAL MEDICINE | Facility: CLINIC | Age: 78
End: 2018-01-23
Payer: MEDICARE

## 2018-01-23 VITALS
BODY MASS INDEX: 24.13 KG/M2 | SYSTOLIC BLOOD PRESSURE: 133 MMHG | WEIGHT: 163.4 LBS | OXYGEN SATURATION: 95 % | HEART RATE: 80 BPM | RESPIRATION RATE: 20 BRPM | DIASTOLIC BLOOD PRESSURE: 78 MMHG | TEMPERATURE: 98 F

## 2018-01-23 DIAGNOSIS — R05.3 PERSISTENT COUGH FOR 3 WEEKS OR LONGER: Primary | ICD-10-CM

## 2018-01-23 DIAGNOSIS — R09.89 BIBASILAR CRACKLES: ICD-10-CM

## 2018-01-23 DIAGNOSIS — R05.3 PERSISTENT COUGH FOR 3 WEEKS OR LONGER: ICD-10-CM

## 2018-01-23 RX ORDER — MONTELUKAST SODIUM 10 MG/1
10 TABLET ORAL AT BEDTIME
Qty: 30 TABLET | Refills: 1 | Status: CANCELLED | OUTPATIENT
Start: 2018-01-23

## 2018-01-23 RX ORDER — FLUTICASONE PROPIONATE 50 MCG
2 SPRAY, SUSPENSION (ML) NASAL DAILY
Qty: 1 BOTTLE | Refills: 1 | Status: CANCELLED | OUTPATIENT
Start: 2018-01-23

## 2018-01-23 RX ORDER — LEVOFLOXACIN 500 MG/1
500 TABLET, FILM COATED ORAL DAILY
Qty: 7 TABLET | Refills: 0 | Status: SHIPPED | OUTPATIENT
Start: 2018-01-23 | End: 2018-10-29

## 2018-01-23 RX ORDER — MONTELUKAST SODIUM 10 MG/1
10 TABLET ORAL AT BEDTIME
Qty: 30 TABLET | Refills: 0 | Status: SHIPPED | OUTPATIENT
Start: 2018-01-23 | End: 2019-09-13

## 2018-01-23 RX ORDER — FLUTICASONE PROPIONATE 50 MCG
2 SPRAY, SUSPENSION (ML) NASAL AT BEDTIME
Qty: 1 BOTTLE | Refills: 1 | Status: SHIPPED | OUTPATIENT
Start: 2018-01-23

## 2018-01-23 ASSESSMENT — PAIN SCALES - GENERAL: PAINLEVEL: NO PAIN (0)

## 2018-01-23 NOTE — MR AVS SNAPSHOT
After Visit Summary   1/23/2018    Felice Blood    MRN: [id number]           Patient Information     Date Of Birth          1940        Visit Information        Provider Department      1/23/2018 11:30 AM Laila Martinez MD [facility]        Today's Diagnoses     Persistent cough for 3 weeks or longer    -  1    Bibasilar crackles          Care Instructions    Primary Care Center Phone Number [phone]  Primary Care Center Medication Refill Request Information:  * Please contact your pharmacy regarding ANY request for medication refills.  ** [facility] Prescription Fax = [phone]  * Please allow 3 business days for routine medication refills.  * Please allow 5 business days for controlled substance medication refills.     Primary Care Center Test Result notification information:  *You will be notified with in 7-10 days of your appointment day regarding the results of your test.  If you are on MyChart you will be notified as soon as the provider has reviewed the results and signed off on them.                  Follow-ups after your visit        Your next 10 appointments already scheduled     May 29, 2018  8:00 AM CDT   RETURN GENERAL with Camron Hansen MD   Eye Clinic ([facility])    [address]  [address]  [address]  [facility] [address] [phone]              Who to contact     Please call your clinic at [phone] to:    Ask questions about your health    Make or cancel appointments    Discuss your medicines    Learn about your test results    Speak to your doctor   If you have compliments or concerns about an experience at your clinic, or if you wish to file a complaint, please contact [facility] Patient Relations at [phone] or email us at [email or web address]         Additional Information About Your Visit        MyChart Information     MyChart is an electronic  gateway that provides easy, online access to your medical records. With AxoGen, you can request a clinic appointment, read your test results, renew a prescription or communicate with your care team.     To sign up for AxoGen visit the website at [email or web address]   You will be asked to enter the access code listed below, as well as some personal information. Please follow the directions to create your username and password.     Your access code is: [email or web address]  Expires: 2018 12:05 PM     Your access code will  in 90 days. If you need help or a new code, please contact your [facility] or call [phone] for assistance.        Care EveryWhere ID     This is your Care EveryWhere ID. This could be used by other organizations to access your [facility] medical records  [id number]        Your Vitals Were     Pulse Temperature Respirations Pulse Oximetry BMI (Body Mass Index)       80 98  F (36.7  C) (Oral) 20 95% 24.13 kg/m2        Blood Pressure from Last 3 Encounters:   18 133/78   18 130/78   17 137/82    Weight from Last 3 Encounters:   18 74.1 kg (163 lb 6.4 oz)   18 74.5 kg (164 lb 4.8 oz)   17 72.6 kg (160 lb)                 Today's Medication Changes          These changes are accurate as of: 18 12:46 PM.  If you have any questions, ask your nurse or doctor.               Start taking these medicines.        Dose/Directions    fluticasone 50 MCG/ACT spray   Commonly known as:  FLONASE   Used for:  Persistent cough for 3 weeks or longer, Bibasilar crackles   Started by:  Laila Martinez MD        Dose:  2 spray   Spray 2 sprays into both nostrils At Bedtime   Quantity:  1 Bottle   Refills:  1       levofloxacin 500 MG tablet   Commonly known as:  LEVAQUIN   Used for:  Persistent cough for 3 weeks or longer, Bibasilar crackles   Started by:  Laila Martinez MD        Dose:  500 mg   Take 1  tablet (500 mg) by mouth daily   Quantity:  7 tablet   Refills:  0       montelukast 10 MG tablet   Commonly known as:  SINGULAIR   Used for:  Persistent cough for 3 weeks or longer, Bibasilar crackles   Started by:  Laila Martinez MD        Dose:  10 mg   Take 1 tablet (10 mg) by mouth At Bedtime   Quantity:  30 tablet   Refills:  0         Stop taking these medicines if you haven't already. Please contact your care team if you have questions.     azithromycin 250 MG tablet   Commonly known as:  ZITHROMAX   Stopped by:  Laila Martinez MD                Where to get your medicines      These medications were sent to [facility] [address] [place], [place] [address] [address] [address]     Phone:  [phone]     fluticasone 50 MCG/ACT spray    levofloxacin 500 MG tablet    montelukast 10 MG tablet                Primary Care Provider Office Phone # Fax #    Anthony Maddox [phone]       0 [address] [address]        Equal Access to Services     [facility]: Hadii aad ku hadasho Soomaali, waaxda luqadaha, qaybta kaalmada adeegyazahra, ally bello . So [facility] [phone].    ATENCIÓN: Si habla español, tiene a broderick disposición servicios gratuitos de asistencia lingüística. [facility] [phone].    We comply with applicable federal civil rights laws and [place] laws. We do not discriminate on the basis of race, color, national origin, age, disability, sex, sexual orientation, or gender identity.            Thank you!     Thank you for choosing [facility]  for your care. Our goal is always to provide you with excellent care. Hearing back from our patients is one way we can continue to improve our services. Please take a few minutes to complete the written survey that you may receive in the mail after your visit with us. Thank you!             Your Updated Medication  List - Protect others around you: Learn how to safely use, store and throw away your medicines at www.disposemymeds.org.          This list is accurate as of: 1/23/18 12:46 PM.  Always use your most recent med list.                   Brand Name Dispense Instructions for use Diagnosis    albuterol 108 (90 BASE) MCG/ACT Inhaler    PROAIR HFA    1 Inhaler    Inhale 2 puffs into the lungs every 4 hours as needed for shortness of breath / dyspnea or wheezing    Cough       aspirin 81 MG tablet      Take 1 tablet by mouth daily.        atorvastatin 10 MG tablet    LIPITOR    90 tablet    Take 1 tablet (10 mg) by mouth daily    Pure hypercholesterolemia, Screen for colon cancer, Impotence of organic origin, Essential hypertension, Bilateral impacted cerumen       cholecalciferol 1000 UNITS capsule    vitamin  -D     Take 1 capsule by mouth daily.        fluticasone 50 MCG/ACT spray    FLONASE    1 Bottle    Spray 2 sprays into both nostrils At Bedtime    Persistent cough for 3 weeks or longer, Bibasilar crackles       levofloxacin 500 MG tablet    LEVAQUIN    7 tablet    Take 1 tablet (500 mg) by mouth daily    Persistent cough for 3 weeks or longer, Bibasilar crackles       lisinopril 10 MG tablet    PRINIVIL/ZESTRIL    90 tablet    Take 1 tablet (10 mg) by mouth daily    Essential hypertension, Screen for colon cancer, Impotence of organic origin, Pure hypercholesterolemia, Bilateral impacted cerumen       montelukast 10 MG tablet    SINGULAIR    30 tablet    Take 1 tablet (10 mg) by mouth At Bedtime    Persistent cough for 3 weeks or longer, Bibasilar crackles       sildenafil 20 MG tablet    REVATIO    50 tablet    Take 5 tablets (100 mg) by mouth daily    ED (erectile dysfunction)

## 2018-01-23 NOTE — NURSING NOTE
Chief Complaint   Patient presents with     Cough     pt is here to follow up on cough     Roselia Leon CMA at 11:51 AM on 1/23/2018

## 2018-01-23 NOTE — PROGRESS NOTES
Health Maintenance   Health Maintenance Due   Topic Date Due     ADVANCE DIRECTIVE PLANNING Q5 YRS  11/14/1995    All health maintenance items UP TO DATE  Blood Pressure   BP Readings from Last 1 Encounters:   01/06/18 130/78    Single BP recheck started, 11:52 AM (4 minutes)

## 2018-01-23 NOTE — PATIENT INSTRUCTIONS
Primary Care Center Phone Number [phone]  Primary Care Center Medication Refill Request Information:  * Please contact your pharmacy regarding ANY request for medication refills.  ** [facility] Prescription Fax = [phone]  * Please allow 3 business days for routine medication refills.  * Please allow 5 business days for controlled substance medication refills.     Primary Care Center Test Result notification information:  *You will be notified with in 7-10 days of your appointment day regarding the results of your test.  If you are on MyChart you will be notified as soon as the provider has reviewed the results and signed off on them.

## 2018-01-23 NOTE — PROGRESS NOTES
CC: Cough    Subjective: Felice Blood is a 77 year old man with a history of HTN and HLD who presents for cough. He was seen in clinic 3 weeks ago for the same complaint and was prescribed azithromycin and an albuterol inhaler. He reports that neither medication helped his symptoms. He has been having now 6 weeks of productive cough that worsens when lying down with fatigue and mild shortness of breath. Has a lot of phlegm production that is yellow-grey. Denies fever, chills, chest pain, myalgias or joint pains. No history of asthma or COPD. Not a smoker. Had one episode of diarrhea.    ROS:  10-point ROS otherwise negative except as above    Past Medical History:   Diagnosis Date     Cobblestone retinal degeneration      Impotence of organic origin      Nonsenile cataract      Pure hypercholesterolemia      Unspecified essential hypertension      Vitreous detachment of both eyes      Current Outpatient Prescriptions   Medication     albuterol (PROAIR HFA) 108 (90 BASE) MCG/ACT Inhaler     sildenafil (REVATIO/VIAGRA) 20 MG tablet     atorvastatin (LIPITOR) 10 MG tablet     lisinopril (PRINIVIL/ZESTRIL) 10 MG tablet     aspirin 81 MG tablet     cholecalciferol (VITAMIN D3) 1000 UNIT capsule     No current facility-administered medications for this visit.      Social History     Social History     Marital status:      Spouse name: N/A     Number of children: N/A     Years of education: N/A     Occupational History     Not on file.     Social History Main Topics     Smoking status: Never Smoker     Smokeless tobacco: Never Used     Alcohol use Yes      Comment: 1 day     Drug use: Not on file     Sexual activity: Not on file     Other Topics Concern     Not on file     Social History Narrative     Objective:  /78  Pulse 80  Temp 98  F (36.7  C) (Oral)  Resp 20  Wt 74.1 kg (163 lb 6.4 oz)  SpO2 95%  BMI 24.13 kg/m2  Vitals reviewed. A febrile.     General: Mildly fatigued man. No distress.  Intermittent dry cough.  Ears: TM without fluid or injection  ENT: Oropharynx without erythema, exudate, or lesions. No sinus tenderness.   Neck: No palpable lymph nodes. No tenderness  CV: RRR. No murmurs, rubs, or gallops  Resp: Fine crackles in bilateral bases, L>R. Not improved after coughing.  Extremities: No swelling.    Assessment/Plan:  Persistent cough: Previously treated with azithromycin. Differential includes Walking pneumonia, pneumonia, or post-nasal drip. No other signs of heart failure or cardiac etiology. CXR shows some mild interstitial haziness indicating a possible pneumonia. Will broaden antibiotic regimen as well as prescribe flonase and montelukast for  control of cough.   - CXR  - Levaquin 7 days  - Montelukast, flonase    Argentina Tripathi, MS4    Scribe Disclosure:   I, Argentina Tripathi, MS4, am serving as a scribe; to document services personally performed by Laila White MD  - -based on data collection and the provider's statements to me.     Provider Disclosure:  I agree with above History, Review of Systems, Physical exam and Plan.  I have reviewed the content of the documentation and have edited it as needed. I have personally performed the services documented here and the documentation accurately represents those services and the decisions I have made.      Electronically signed by:  Laila White MD

## 2018-04-17 ENCOUNTER — RADIANT APPOINTMENT (OUTPATIENT)
Dept: GENERAL RADIOLOGY | Facility: CLINIC | Age: 78
End: 2018-04-17
Attending: FAMILY MEDICINE
Payer: MEDICARE

## 2018-04-17 ENCOUNTER — OFFICE VISIT (OUTPATIENT)
Dept: ORTHOPEDICS | Facility: CLINIC | Age: 78
End: 2018-04-17
Payer: MEDICARE

## 2018-04-17 ENCOUNTER — OFFICE VISIT (OUTPATIENT)
Dept: FAMILY MEDICINE | Facility: CLINIC | Age: 78
End: 2018-04-17
Payer: MEDICARE

## 2018-04-17 VITALS
HEART RATE: 70 BPM | WEIGHT: 164 LBS | DIASTOLIC BLOOD PRESSURE: 89 MMHG | BODY MASS INDEX: 24.22 KG/M2 | SYSTOLIC BLOOD PRESSURE: 145 MMHG

## 2018-04-17 VITALS
DIASTOLIC BLOOD PRESSURE: 89 MMHG | WEIGHT: 164.7 LBS | OXYGEN SATURATION: 97 % | HEART RATE: 70 BPM | RESPIRATION RATE: 20 BRPM | BODY MASS INDEX: 24.32 KG/M2 | SYSTOLIC BLOOD PRESSURE: 145 MMHG

## 2018-04-17 DIAGNOSIS — E78.00 HIGH BLOOD CHOLESTEROL: ICD-10-CM

## 2018-04-17 DIAGNOSIS — E78.00 PURE HYPERCHOLESTEROLEMIA: ICD-10-CM

## 2018-04-17 DIAGNOSIS — M79.671 RIGHT FOOT PAIN: Primary | ICD-10-CM

## 2018-04-17 DIAGNOSIS — Z12.11 SCREEN FOR COLON CANCER: ICD-10-CM

## 2018-04-17 DIAGNOSIS — H61.23 BILATERAL IMPACTED CERUMEN: ICD-10-CM

## 2018-04-17 DIAGNOSIS — E78.00 HIGH BLOOD CHOLESTEROL: Primary | ICD-10-CM

## 2018-04-17 DIAGNOSIS — I10 ESSENTIAL HYPERTENSION: ICD-10-CM

## 2018-04-17 DIAGNOSIS — M79.671 RIGHT FOOT PAIN: ICD-10-CM

## 2018-04-17 DIAGNOSIS — M76.71 PERONEAL TENDONITIS OF RIGHT LOWER LEG: ICD-10-CM

## 2018-04-17 DIAGNOSIS — N52.9 IMPOTENCE OF ORGANIC ORIGIN: ICD-10-CM

## 2018-04-17 DIAGNOSIS — N52.9 ERECTILE DYSFUNCTION, UNSPECIFIED ERECTILE DYSFUNCTION TYPE: ICD-10-CM

## 2018-04-17 LAB
ALBUMIN SERPL-MCNC: 3.9 G/DL (ref 3.4–5)
ALP SERPL-CCNC: 53 U/L (ref 40–150)
ALT SERPL W P-5'-P-CCNC: 18 U/L (ref 0–70)
ANION GAP SERPL CALCULATED.3IONS-SCNC: 6 MMOL/L (ref 3–14)
AST SERPL W P-5'-P-CCNC: 15 U/L (ref 0–45)
BILIRUB SERPL-MCNC: 0.9 MG/DL (ref 0.2–1.3)
BUN SERPL-MCNC: 10 MG/DL (ref 7–30)
CALCIUM SERPL-MCNC: 9.1 MG/DL (ref 8.5–10.1)
CHLORIDE SERPL-SCNC: 104 MMOL/L (ref 94–109)
CHOLEST SERPL-MCNC: 136 MG/DL
CO2 SERPL-SCNC: 27 MMOL/L (ref 20–32)
CREAT SERPL-MCNC: 0.82 MG/DL (ref 0.66–1.25)
GFR SERPL CREATININE-BSD FRML MDRD: >90 ML/MIN/1.7M2
GLUCOSE SERPL-MCNC: 95 MG/DL (ref 70–99)
HDLC SERPL-MCNC: 54 MG/DL
LDLC SERPL CALC-MCNC: 60 MG/DL
NONHDLC SERPL-MCNC: 82 MG/DL
POTASSIUM SERPL-SCNC: 4.2 MMOL/L (ref 3.4–5.3)
PROT SERPL-MCNC: 7.4 G/DL (ref 6.8–8.8)
SODIUM SERPL-SCNC: 136 MMOL/L (ref 133–144)
TRIGL SERPL-MCNC: 111 MG/DL

## 2018-04-17 RX ORDER — SILDENAFIL CITRATE 20 MG/1
100 TABLET ORAL DAILY
Qty: 50 TABLET | Refills: 11 | Status: SHIPPED | OUTPATIENT
Start: 2018-04-17 | End: 2019-04-22

## 2018-04-17 RX ORDER — LISINOPRIL 10 MG/1
10 TABLET ORAL DAILY
Qty: 90 TABLET | Refills: 3 | Status: SHIPPED | OUTPATIENT
Start: 2018-04-17 | End: 2019-04-22

## 2018-04-17 RX ORDER — ATORVASTATIN CALCIUM 10 MG/1
10 TABLET, FILM COATED ORAL DAILY
Qty: 90 TABLET | Refills: 3 | Status: SHIPPED | OUTPATIENT
Start: 2018-04-17 | End: 2019-04-22

## 2018-04-17 ASSESSMENT — PAIN SCALES - GENERAL: PAINLEVEL: NO PAIN (0)

## 2018-04-17 NOTE — MR AVS SNAPSHOT
After Visit Summary   4/17/2018    Felice Blood    MRN: [id number]           Patient Information     Date Of Birth          1940        Visit Information        Provider Department      4/17/2018 9:00 AM Anthony Maddox MD [facility]        Today's Diagnoses     High blood cholesterol    -  1    Erectile dysfunction, unspecified erectile dysfunction type        PURE HYPERCHOLESTEROLEM        Screen for colon cancer        Impotence of organic origin        Essential hypertension        Bilateral impacted cerumen          Care Instructions    Primary Care Center: [phone]     Primary Care Center Medication Refill Request Information:  * Please contact your pharmacy regarding ANY request for medication refills.  ** [facility] Prescription Fax = [phone]  * Please allow 3 business days for routine medication refills.  * Please allow 5 business days for controlled substance medication refills.     Primary Care Center Test Result notification information:  *You will be notified with in 7-10 days of your appointment day regarding the results of your test.  If you are on MyChart you will be notified as soon as the provider has reviewed the results and signed off on them.          Follow-ups after your visit        Follow-up notes from your care team     Return in about 1 year (around 4/17/2019).      Your next 10 appointments already scheduled     May 29, 2018  8:00 AM CDT   RETURN GENERAL with Camron Hansen MD   Eye Clinic ([facility])    [address] [place] [id number] [phone]              Future tests that were ordered for you today     Open Future Orders        Priority Expected Expires Ordered    Lipid panel reflex to direct LDL Fasting Routine 4/17/2018 5/1/2018 4/17/2018    Comprehensive metabolic panel Routine 4/17/2018 5/1/2018 4/17/2018            Who to contact     Please call your  clinic at [phone] to:    Ask questions about your health    Make or cancel appointments    Discuss your medicines    Learn about your test results    Speak to your doctor            Additional Information About Your Visit        MyChart Information     [employer] S[employer] Technologieshart is an electronic gateway that provides easy, online access to your medical records. With IMGuest, you can request a clinic appointment, read your test results, renew a prescription or communicate with your care team.     To sign up for First[employer] visit the website at [email or web address]   You will be asked to enter the access code listed below, as well as some personal information. Please follow the directions to create your username and password.     Your access code is: [email or web address]  Expires: 2018 10:04 AM     Your access code will  in 90 days. If you need help or a new code, please contact your [facility] or call [phone] for assistance.        Care EveryWhere ID     This is your Care EveryWhere ID. This could be used by other organizations to access your [place] medical records  [id number]        Your Vitals Were     Pulse Respirations Pulse Oximetry BMI (Body Mass Index)          70 20 97% 24.32 kg/m2         Blood Pressure from Last 3 Encounters:   18 145/89   18 133/78   18 130/78    Weight from Last 3 Encounters:   18 74.7 kg (164 lb 11.2 oz)   18 74.1 kg (163 lb 6.4 oz)   18 74.5 kg (164 lb 4.8 oz)                 Where to get your medicines      These medications were sent to [facility] [address] [place] [address]     Phone:  [phone]     atorvastatin 10 MG tablet    lisinopril 10 MG tablet    sildenafil 20 MG tablet          Primary Care Provider Office Phone # Fax #    Anthony Maddox [phone]       [address] [place] [id number]        Equal Access  to Services     [facility] [place] : Cecil Villalta, waelisada jessica, qameerarichard kaalmazahra mcclendon, ally baptiste. So [facility] [phone].    ATENCIÓN: Si habla ana, tiene a broderick disposición servicios gratuitos de asistencia lingüística. Llame al [phone].    We comply with applicable federal civil rights laws and [place] laws. We do not discriminate on the basis of race, color, national origin, age, disability, sex, sexual orientation, or gender identity.            Thank you!     Thank you for choosing [facility]  for your care. Our goal is always to provide you with excellent care. Hearing back from our patients is one way we can continue to improve our services. Please take a few minutes to complete the written survey that you may receive in the mail after your visit with us. Thank you!             Your Updated Medication List - Protect others around you: Learn how to safely use, store and throw away your medicines at www.disposemymeds.org.          This list is accurate as of 4/17/18 10:04 AM.  Always use your most recent med list.                   Brand Name Dispense Instructions for use Diagnosis    albuterol 108 (90 Base) MCG/ACT Inhaler    PROAIR HFA    1 Inhaler    Inhale 2 puffs into the lungs every 4 hours as needed for shortness of breath / dyspnea or wheezing    Cough       aspirin 81 MG tablet      Take 1 tablet by mouth daily.        atorvastatin 10 MG tablet    LIPITOR    90 tablet    Take 1 tablet (10 mg) by mouth daily    Pure hypercholesterolemia, Screen for colon cancer, Impotence of organic origin, Essential hypertension, Bilateral impacted cerumen       cholecalciferol 1000 units capsule    vitamin  -D     Take 1 capsule by mouth daily.        fluticasone 50 MCG/ACT spray    FLONASE    1 Bottle    Spray 2 sprays into both nostrils At Bedtime    Persistent cough for 3 weeks or longer, Bibasilar crackles       levofloxacin 500 MG tablet     LEVAQUIN    7 tablet    Take 1 tablet (500 mg) by mouth daily    Persistent cough for 3 weeks or longer, Bibasilar crackles       lisinopril 10 MG tablet    PRINIVIL/ZESTRIL    90 tablet    Take 1 tablet (10 mg) by mouth daily    Essential hypertension, Screen for colon cancer, Impotence of organic origin, Pure hypercholesterolemia, Bilateral impacted cerumen       montelukast 10 MG tablet    SINGULAIR    30 tablet    Take 1 tablet (10 mg) by mouth At Bedtime    Persistent cough for 3 weeks or longer, Bibasilar crackles       sildenafil 20 MG tablet    REVATIO    50 tablet    Take 5 tablets (100 mg) by mouth daily    Erectile dysfunction, unspecified erectile dysfunction type

## 2018-04-17 NOTE — PROGRESS NOTES
SPORTS & ORTHOPEDIC WALK-IN VISIT 4/17/2018    Primary Care Physician: Dr. Maddox    Hx of rolling foot a couple times walking on uneven trails, inversion SADAF. Progressively worsening pain. TTP base of 5th MT. Intermittently radiates up to his knee and hip. This is brought on with walking and standing for long periods of time,. He has no pain with laying down.    Reason for visit:     What part of your body is injured / painful?  right foot, base of the 5th MT    What caused the injury /pain? No inciting event     How long ago did your injury occur or pain begin? several years ago    What are your most bothersome symptoms? Pain    How would you characterize your symptom?  dull    What makes your symptoms better? Rest    What makes your symptoms worse? Other: prolonged standing.walking    Have you been previously seen for this problem? Yes, PCP    Medical History:    Any recent changes to your medical history? No    Any new medication prescribed since last visit? No    Have you had surgery on this body part before? No    Social History:    Occupation: Retired    Handedness: Right    Exercise: [employer] 3 times a week, walking/class    Review of Systems:    Do you have fever, chills, weight loss? No    Do you have any vision problems? No    Do you have any chest pain or edema? No    Do you have any shortness of breath or wheezing?  No    Do you have stomach problems? No    Do you have any numbness or focal weakness? No    Do you have diabetes? No    Do you have problems with bleeding or clotting? No    Do you have an rashes or other skin lesions? No

## 2018-04-17 NOTE — NURSING NOTE
Chief Complaint   Patient presents with     Physical     yearly physical     Pain     pain in right foot- has had for a couple years, but is getting worse   Julia Hobbs LPN 9:39 AM on 4/17/2018    Health Maintenance: Will bring Health Directive to clinic to have scanned into chart.Julia Hobbs LPN 9:39 AM on 4/17/2018

## 2018-04-17 NOTE — MR AVS SNAPSHOT
After Visit Summary   4/17/2018    Felice Blood    MRN: [id number]           Patient Information     Date Of Birth          1940        Visit Information        Provider Department      4/17/2018 10:10 AM Woo Rashid [facility]        Today's Diagnoses     Right foot pain    -  1    Peroneal tendonitis of right lower leg          Care Instructions    PERONEAL TENDONITIS  What is a peroneal tendon injury?   A peroneal tendon injury is a problem with the tendons and muscles on the outer side of your lower leg and foot. Tendons are strong bands of tissue that attach muscle to bone. The peroneal tendons help keep your foot and ankle stable when you walk.   Tendons can be injured suddenly or they may be slowly damaged over time. You can have tiny or partial tears in your tendon. If you have a complete tear of your tendon, it is called a rupture. Other tendon injuries may be called a strain, tendinosis, or tendonitis.  What is the cause?   Peroneal injuries can be caused by:  Overuse of the tendon from a sport or work activity that causes your foot and ankle to roll inward, like when you run on sloped surfaces or run in shoes that are getting worn out on the outside of the heel.   A sudden activity that forces your foot upward toward your shin, like landing on your feet after a fall, or rolling your ankle on a rock while running.   What are the symptoms?   You may hear a pop or a snap when the injury happens. You may have pain and swelling on the outer side of your lower leg or ankle.   How is it diagnosed?   Your healthcare provider will examine you and ask about your symptoms, activities, and medical history. You may have X-rays or other scans.  How is it treated?   While you are recovering from your injury, you will need to change your sport or activity to one that will not make your condition worse. For example, you may need to swim instead of run.    Your healthcare provider may recommend stretching and strengthening exercises to help you heal.  Use an elastic bandage or an ankle brace as directed by your provider. You may need to use crutches until you can walk without pain.   The pain often gets better within a few weeks with self-care, but some injuries may take several months or longer to heal. It s important to follow all of your healthcare provider s instructions.  How can I take care of myself?   To help relieve swelling and pain:  Put an ice pack, gel pack, or package of frozen vegetables wrapped in a cloth, on the area every 3 to 4 hours for up to 20 minutes at a time.   Do ice massage. To do this, first freeze water in a Styrofoam cup, then peel the top of the cup away to expose the ice. Hold the bottom of the cup and rub the ice over your tendon for 5 to 10 minutes. Do this several times a day while you have pain.   Keep your ankle up on a pillow when you sit or lie down.   Take pain medicine, such as acetaminophen, ibuprofen, or other medicine as directed by your provider. Nonsteroidal anti-inflammatory medicines (NSAIDs), such as ibuprofen, may cause stomach bleeding and other problems. These risks increase with age. Read the label and take as directed. Unless recommended by your healthcare provider, do not take for more than 10 days.  Moist heat may help relax your muscles and make it easier to move your leg. Put moist heat on the injured area for 10 to 15 minutes at a time before you do warm-up and stretching exercises. Moist heat includes heat patches or moist heating pads that you can purchase at most drugstores, a wet washcloth or towel that has been heated in the dryer, or a hot shower. Don t use heat if you have swelling.   Follow your healthcare provider's instructions, including any exercises recommended by your provider. Ask your provider:  How and when you will hear your test results   How long it will take to recover   What activities  you should avoid, including how much you can lift, and when you can return to your normal activities   How to take care of yourself at home   What symptoms or problems you should watch for and what to do if you have them  Make sure you know when you should come back for a checkup.  How can I help prevent a peroneal tendon injury?   Warm-up exercises and stretching before activities can help prevent injuries. For example, do exercises that keep your ankles and leg muscles strong. If your leg or ankle hurts after exercise, putting ice on it may help keep it from getting injured.   Follow safety rules and use any protective equipment recommended for your work or sport. For example, wear high-top athletic shoes or a supportive ankle brace. When you run, choose level surfaces and avoid rocks or holes.  Developed by [employer].  Published by [employer].  Copyright  2014 [facility] [employer] and/or one of its subsidiaries. All rights reserved.                Peroneal Tendon Exercises  You may start these exercises when you can stand comfortably on your injured leg with your heel resting on the floor and your full weight evenly distributed on both legs.  Towel stretch: Sit on a hard surface with your injured leg stretched out in front of you. Loop a towel around your toes and the ball of your foot and pull the towel toward your body keeping your leg straight. Hold this position for 15 to 30 seconds and then relax. Repeat 3 times.  When you don't feel much of a stretch using the towel, you can start the following exercises.  Standing calf stretch: Stand facing a wall with your hands on the wall at about eye level. Keep your injured leg back with your heel on the floor. Keep the other leg forward with the knee bent. Turn your back foot slightly inward (as if you were pigeon-toed). Slowly lean into the wall until you feel a stretch in the back of your calf. Hold the stretch for 15 to 30 seconds. Return to the starting  position. Repeat 3 times. Do this exercise several times each day.   Standing soleus stretch: Stand facing a wall with your hands on the wall at about chest height. Keep your injured leg back with your heel on the floor. Keep the other leg forward with the knee bent. Turn your back foot slightly inward (as if you were pigeon-toed). Bend your back knee slightly and gently lean into the wall until you feel a stretch in the lower calf of your injured leg. Hold the stretch for 15 to 30 seconds. Return to the starting position. Repeat 3 times.   Achilles stretch: Stand with the ball of one foot on a stair. Reach for the step below with your heel until you feel a stretch in the arch of your foot. Hold this position for 15 to 30 seconds and then relax. Repeat 3 times.   Heel raise: Stand behind a chair or counter with both feet flat on the floor. Using the chair or counter as a support, rise up onto your toes and hold for 5 seconds. Then slowly lower yourself down without holding onto the support. (It's OK to keep holding onto the support if you need to.) When this exercise becomes less painful, try doing this exercise while you are standing on the injured leg only. Repeat 15 times. Do 2 sets of 15. Rest 30 seconds between sets.   Step-up: Stand with the foot of your injured leg on a support 3 to 5 inches (8 to 13 centimeters) high --like a small step or block of wood. Keep your other foot flat on the floor. Shift your weight onto the injured leg on the support. Straighten your injured leg as the other leg comes off the floor. Return to the starting position by bending your injured leg and slowly lowering your uninjured leg back to the floor. Do 2 sets of 15.   Resisted ankle eversion: Sit with both legs stretched out in front of you, with your feet about a shoulder's width apart. Tie a loop in one end of elastic tubing. Put the foot of your injured leg through the loop so that the tubing goes around the arch of that foot  and wraps around the outside of the other foot. Hold onto the other end of the tubing with your hand to provide tension. Turn the foot of your injured leg up and out. Make sure you keep your other foot still so that it will allow the tubing to stretch as you move the foot of your injured leg. Return to the starting position. Do 2 sets of 15.   Balance and reach exercises: Stand next to a chair with your injured leg farther from the chair. The chair will provide support if you need it. Stand on the foot of your injured leg and bend your knee slightly. Try to raise the arch of this foot while keeping your big toe on the floor. Keep your foot in this position.   With the hand that is farther away from the chair, reach forward in front of you by bending at the waist. Avoid bending your knee any more as you do this. Repeat this 15 times. To make the exercise more challenging, reach farther in front of you. Do 2 sets of 15.   While keeping your arch raised, reach the hand that is farther away from the chair across your body toward the chair. The farther you reach, the more challenging the exercise. Do 2 sets of 15.  If you have access to a wobble board, do the following exercises:  Wobble board exercises   Stand on a wobble board with your feet shoulder-width apart.   Rock the board forwards and backwards 30 times, then side to side 30 times. Hold on to a chair if you need support.   Rotate the wobble board around so that the edge of the board is in contact with the floor at all times. Do this 30 times in a clockwise and then a counterclockwise direction.   Balance on the wobble board for as long as you can without letting the edges touch the floor. Try to do this for 2 minutes without touching the floor.   Rotate the wobble board in clockwise and counterclockwise circles, but do not let the edge of the board touch the floor.   When you have mastered the wobble exercises standing on both legs, try repeating them while  standing on just your injured leg. After you are able to do these exercises on one leg, try to do them with your eyes closed. Make sure you have something nearby to support you in case you lose your balance.  Developed by [employer].  Published by [employer].  Copyright  2014 [employer] and/or one of its subsidiaries. All rights reserved.                    Follow-ups after your visit        Additional Services     PHYSICAL THERAPY REFERRAL (Internal)       Physical Therapy Referral                  Your next 10 appointments already scheduled     May 29, 2018  8:00 AM CDT   RETURN GENERAL with Camron Hansen MD   Eye Clinic ([facility])    [address]  [facility]  [facility] [address]   [phone]              Future tests that were ordered for you today     Open Future Orders        Priority Expected Expires Ordered    Lipid panel reflex to direct LDL Fasting Routine 2018    Comprehensive metabolic panel Routine 2018            Who to contact     Please call your clinic at [phone] to:    Ask questions about your health    Make or cancel appointments    Discuss your medicines    Learn about your test results    Speak to your doctor            Additional Information About Your Visit        MyChart Information     [email or web address] is an electronic gateway that provides easy, online access to your medical records. With [employer], you can request a clinic appointment, read your test results, renew a prescription or communicate with your care team.     To sign up for [email or web address] visit the website at [email or web address]   You will be asked to enter the access code listed below, as well as some personal information. Please follow the directions to create your username and password.     Your access code is: [email or web address]  Expires: 2018 10:04 AM     Your access code will  in 90 days. If you need  help or a new code, please contact your [facility] or call [phone] for assistance.        Care EveryWhere ID     This is your Care EveryWhere ID. This could be used by other organizations to access your [facility] medical records  [id number]        Your Vitals Were     Pulse BMI (Body Mass Index)                70 24.22 kg/m2           Blood Pressure from Last 3 Encounters:   04/17/18 145/89   04/17/18 145/89   01/23/18 133/78    Weight from Last 3 Encounters:   04/17/18 74.4 kg (164 lb)   04/17/18 74.7 kg (164 lb 11.2 oz)   01/23/18 74.1 kg (163 lb 6.4 oz)              We Performed the Following     PHYSICAL THERAPY REFERRAL (Internal)          Where to get your medicines      These medications were sent to [facility] [address] [place] [id number]     Phone:  [phone]     atorvastatin 10 MG tablet    lisinopril 10 MG tablet    sildenafil 20 MG tablet          Primary Care Provider Office Phone # Fax #    Anthony Maddox [phone]       [address] [address]        Equal Access to Services     LUX [place]TH AH: Hadii slava ku hadasho Soomaali, waaxda luqadaha, qaybta kaalmada adeegyada, waxay phaniin hayroberton savanna baptiste. So [facility] [phone].    ATENCIÓN: Si habla español, tiene a broderick disposición servicios gratuitos de asistencia lingüística. Ll[facility] [phone].    We comply with applicable federal civil rights laws and [place] laws. We do not discriminate on the basis of race, color, national origin, age, disability, sex, sexual orientation, or gender identity.            Thank you!     Thank you for choosing [facility]  for your care. Our goal is always to provide you with excellent care. Hearing back from our patients is one way we can continue to improve our services. Please take a few minutes to complete the written survey  that you may receive in the mail after your visit with us. Thank you!             Your Updated Medication List - Protect others around you: Learn how to safely use, store and throw away your medicines at www.disposemymeds.org.          This list is accurate as of 4/17/18 11:29 AM.  Always use your most recent med list.                   Brand Name Dispense Instructions for use Diagnosis    albuterol 108 (90 Base) MCG/ACT Inhaler    PROAIR HFA    1 Inhaler    Inhale 2 puffs into the lungs every 4 hours as needed for shortness of breath / dyspnea or wheezing    Cough       aspirin 81 MG tablet      Take 1 tablet by mouth daily.        atorvastatin 10 MG tablet    LIPITOR    90 tablet    Take 1 tablet (10 mg) by mouth daily    Pure hypercholesterolemia, Screen for colon cancer, Impotence of organic origin, Essential hypertension, Bilateral impacted cerumen       cholecalciferol 1000 units capsule    vitamin  -D     Take 1 capsule by mouth daily.        fluticasone 50 MCG/ACT spray    FLONASE    1 Bottle    Spray 2 sprays into both nostrils At Bedtime    Persistent cough for 3 weeks or longer, Bibasilar crackles       levofloxacin 500 MG tablet    LEVAQUIN    7 tablet    Take 1 tablet (500 mg) by mouth daily    Persistent cough for 3 weeks or longer, Bibasilar crackles       lisinopril 10 MG tablet    PRINIVIL/ZESTRIL    90 tablet    Take 1 tablet (10 mg) by mouth daily    Essential hypertension, Screen for colon cancer, Impotence of organic origin, Pure hypercholesterolemia, Bilateral impacted cerumen       montelukast 10 MG tablet    SINGULAIR    30 tablet    Take 1 tablet (10 mg) by mouth At Bedtime    Persistent cough for 3 weeks or longer, Bibasilar crackles       sildenafil 20 MG tablet    REVATIO    50 tablet    Take 5 tablets (100 mg) by mouth daily    Erectile dysfunction, unspecified erectile dysfunction type

## 2018-04-17 NOTE — PROGRESS NOTES
[facility]  Primary Care Center   Established Patient Encounter   Anthony Maddox MD  04/17/2018      Chief Complaint:   Physical      History of Present Illness:   Felice Blood is a 77 year old male with a history of hyperlipidemia and hypertension who presents to clinic for an annual physical.  I last saw the patient on 4/14/17, at this time I performed a pre-op examination and discussed his cough.     Right foot: The patient reports that within the last couple years he has rolled his foot a couple times. Since, he has had progressively worsening pain in the lateral aspect of his foot. This pain intermittently radiates up to his knee and hip laterally. This is brought on with walking and standing for long periods of time, though is ricardo to complete exercises 3 times a week at the [facility], using exercise machines and classes. He has no pain with laying down and has not had redness, warmth, or swelling to the area. He has no numbness or tingling.     His blood pressures at home are varied. Typically he measures 138/83. Today his BP is 145/89, though this was taking after alarms in the building had been sounded.     Other topics discussed:  1. Routine Health Maintenance - He is aware of the shingles vaccine. He plans to get it.   2. Eating healthy, intermittent greasy foods.     Health Maintenance:  Lipid panel: every 5 years, yearly if risk factors - Recommended  Immunization History   Administered Date(s) Administered     Influenza (High Dose) 3 valent vaccine 10/06/2014, 11/01/2016     Influenza (IIV3) PF 11/10/2011, 10/04/2012, 11/18/2013     Pneumo Conj 13-V (2010&after) 04/10/2015     Pneumococcal 23 valent 01/24/2006     TD (ADULT, 7+) 01/24/2006, 11/16/2015     Zoster vaccine, live 03/19/2010        The following health maintenance issues were also reviewed and addressed as needed:  Blood pressure (>18 years annually)  Lifestyle habits (including exercise, diet, weight)  Health risk behaviors (sexual  history, alcohol, tobacco, drug use, partner violence)  Routine eye, dental, hearing, and skin exams     Review of Systems:   Pertinent items are noted in HPI, remainder of complete ROS is negative.      Active Medications:     Current Outpatient Prescriptions:      sildenafil (REVATIO) 20 MG tablet, Take 5 tablets (100 mg) by mouth daily, Disp: 50 tablet, Rfl: 11     atorvastatin (LIPITOR) 10 MG tablet, Take 1 tablet (10 mg) by mouth daily, Disp: 90 tablet, Rfl: 3     lisinopril (PRINIVIL/ZESTRIL) 10 MG tablet, Take 1 tablet (10 mg) by mouth daily, Disp: 90 tablet, Rfl: 3     fluticasone (FLONASE) 50 MCG/ACT spray, Spray 2 sprays into both nostrils At Bedtime, Disp: 1 Bottle, Rfl: 1     albuterol (PROAIR HFA) 108 (90 BASE) MCG/ACT Inhaler, Inhale 2 puffs into the lungs every 4 hours as needed for shortness of breath / dyspnea or wheezing, Disp: 1 Inhaler, Rfl: 0     aspirin 81 MG tablet, Take 1 tablet by mouth daily., Disp: , Rfl:      cholecalciferol (VITAMIN D3) 1000 UNIT capsule, Take 1 capsule by mouth daily., Disp: , Rfl:      montelukast (SINGULAIR) 10 MG tablet, Take 1 tablet (10 mg) by mouth At Bedtime (Patient not taking: Reported on 4/17/2018), Disp: 30 tablet, Rfl: 0     levofloxacin (LEVAQUIN) 500 MG tablet, Take 1 tablet (500 mg) by mouth daily (Patient not taking: Reported on 4/17/2018), Disp: 7 tablet, Rfl: 0     [DISCONTINUED] sildenafil (REVATIO/VIAGRA) 20 MG tablet, Take 5 tablets (100 mg) by mouth daily, Disp: 50 tablet, Rfl: 11     [DISCONTINUED] atorvastatin (LIPITOR) 10 MG tablet, Take 1 tablet (10 mg) by mouth daily, Disp: 90 tablet, Rfl: 3     [DISCONTINUED] lisinopril (PRINIVIL/ZESTRIL) 10 MG tablet, Take 1 tablet (10 mg) by mouth daily, Disp: 90 tablet, Rfl: 3      Allergies:   Etodolac and Percocet [oxycodone-acetaminophen]      Past Medical History:  Essential hypertension   Hyperlipidemia   Impotence of organic origin   Cobblestone retinal degeneration   Nonsenile cataract   Vitreous  detachment of both eyes      Past Surgical History:  Appendectomy    Cataract, right   Phacoemulsification with standard intraocular lens implant, right   Tonsillectomy      Family History:   History reviewed. No pertinent family history.        Social History:   Presents alone   Tobacco use: Never smoker   Alcohol consumption: 1/day   Marital status:        Physical Exam:   /89 (BP Location: Right arm, Patient Position: Sitting, Cuff Size: Adult Regular)  Pulse 70  Resp 20  Wt 74.7 kg (164 lb 11.2 oz)  SpO2 97%  BMI 24.32 kg/m2   Constitutional: Alert. In no distress.  Head: Normocephalic. No masses, lesions, tenderness or abnormalities.  ENT: No neck nodes or sinus tenderness.  Cardiovascular: RRR. No murmurs, clicks, gallops, or rub.  Respiratory: Clear to auscultation bilaterally, no wheezes or crackles.  Gastrointestinal: Abdomen soft. Non-tender. BS normal. No masses or organomegaly.  : Penis and testes are normal. No hernia noted  Musculoskeletal: Extremities normal. No gross deformities noted. Normal muscle tone. Right foot: Lateral mid foot tenderness to palpation with no bony step off noted and overlying skin is normal.   Skin: No suspicious lesions. No rashes.  Neurologic: Gait normal. Reflexes normal and symmetric. Sensation grossly WNL.  Psychiatric: Mentation appears normal. Normal affect.   Hematologic/Lymphatic/Immunologic: Normal cervical lymph nodes.         Assessment and Plan:  Right foot: Likely tendonitis. He would like to see sports medicine. Will follow up with me as needed.     High blood cholesterol  I instructed him to take his blood pressure measurements after a period of rest. If he is getting higher measurements we made need to increase his medications. Fasting labs will be obtained today.   - Lipid panel reflex to direct LDL Fasting  - Comprehensive metabolic panel    Erectile dysfunction, unspecified erectile dysfunction type  - sildenafil (REVATIO) 20 MG tablet   Dispense: 50 tablet; Refill: 11    PURE HYPERCHOLESTEROLEM  - atorvastatin (LIPITOR) 10 MG tablet  Dispense: 90 tablet; Refill: 3  - lisinopril (PRINIVIL/ZESTRIL) 10 MG tablet  Dispense: 90 tablet; Refill: 3    Screen for colon cancer  UTD, last colonoscopy reviewd    Essential hypertension  Continue blood pressure medication     Follow-up: In one year or PRN     Scribe Disclosure:   I, Pardeep Wong, am serving as a scribe to document services personally performed by Anthony Maddox MD at this visit, based upon the provider's statements to me. All documentation has been reviewed by the aforementioned provider prior to being entered into the official medical record.     Portions of this medical record were completed by a scribe. UPON MY REVIEW AND AUTHENTICATION BY ELECTRONIC SIGNATURE, this confirms (a) I performed the applicable clinical services, and (b) the record is accurate.    Anthony Maddox MD

## 2018-04-17 NOTE — LETTER
4/17/2018       RE: Felice Blood  [address] [facility] [address]     Dear Colleague,    Thank you for referring your patient, Felice Blood, to the [facility] SPORTS AND ORTHOPAEDIC WALK IN CLINIC at [facility]. Please see a copy of my visit note below.          SPORTS & ORTHOPEDIC WALK-IN VISIT 4/17/2018    Primary Care Physician: Dr. Maddox    Hx of rolling foot a couple times walking on uneven trails, inversion SADAF. Progressively worsening pain. TTP base of 5th MT. Intermittently radiates up to his knee and hip. This is brought on with walking and standing for long periods of time,. He has no pain with laying down.    Reason for visit:     What part of your body is injured / painful?  right foot, base of the 5th MT    What caused the injury /pain? No inciting event     How long ago did your injury occur or pain begin? several years ago    What are your most bothersome symptoms? Pain    How would you characterize your symptom?  dull    What makes your symptoms better? Rest    What makes your symptoms worse? Other: prolonged standing.walking    Have you been previously seen for this problem? Yes, PCP    Medical History:    Any recent changes to your medical history? No    Any new medication prescribed since last visit? No    Have you had surgery on this body part before? No    Social History:    Occupation: Retired    Handedness: Right    Exercise: [employer] 3 times a week, walking/class    Review of Systems:    Do you have fever, chills, weight loss? No    Do you have any vision problems? No    Do you have any chest pain or edema? No    Do you have any shortness of breath or wheezing?  No    Do you have stomach problems? No    Do you have any numbness or focal weakness? No    Do you have diabetes? No    Do you have problems with bleeding or clotting? No    Do you have an rashes or other skin lesions? No           CHIEF COMPLAINT:  Consult (RIght foot pain)    "    HISTORY OF PRESENT ILLNESS  Mr. Blood is a pleasant 77 year old year old male who presents to clinic today with right foot pain.  Felice explains that he has had intermittent lateral foot pain over the last year.  Patient states he initially experienced lateral foot pain after ankle inversion, \"a couple times\" while walking on uneven trails.  He states that he would have intermittent resolution of pain gradually after these inciting events.  However he denies any recent inversion type mechanism, but does have somewhat persistent pain at the lateral aspect of his foot over the past several weeks.  He is active and exercises regularly at the gym, walking and fitness classes.  This pain is not limiting his activities but he does have to work through the pain.  No swelling no numbness or tingling.  Treatment to date has included rest.  He was seen by his primary care physician today who recommended that he came to the sports & orthopedic walk-in clinic for evaluation.    Additional history: as documented    MEDICAL HISTORY  Patient Active Problem List   Diagnosis     Essential hypertension     Pure hypercholesterolemia     Impotence of organic origin       Current Outpatient Prescriptions   Medication Sig Dispense Refill     sildenafil (REVATIO) 20 MG tablet Take 5 tablets (100 mg) by mouth daily 50 tablet 11     atorvastatin (LIPITOR) 10 MG tablet Take 1 tablet (10 mg) by mouth daily 90 tablet 3     lisinopril (PRINIVIL/ZESTRIL) 10 MG tablet Take 1 tablet (10 mg) by mouth daily 90 tablet 3     montelukast (SINGULAIR) 10 MG tablet Take 1 tablet (10 mg) by mouth At Bedtime (Patient not taking: Reported on 4/17/2018) 30 tablet 0     levofloxacin (LEVAQUIN) 500 MG tablet Take 1 tablet (500 mg) by mouth daily (Patient not taking: Reported on 4/17/2018) 7 tablet 0     fluticasone (FLONASE) 50 MCG/ACT spray Spray 2 sprays into both nostrils At Bedtime 1 Bottle 1     albuterol (PROAIR HFA) 108 (90 BASE) MCG/ACT Inhaler " Inhale 2 puffs into the lungs every 4 hours as needed for shortness of breath / dyspnea or wheezing 1 Inhaler 0     [DISCONTINUED] sildenafil (REVATIO/VIAGRA) 20 MG tablet Take 5 tablets (100 mg) by mouth daily 50 tablet 11     [DISCONTINUED] atorvastatin (LIPITOR) 10 MG tablet Take 1 tablet (10 mg) by mouth daily 90 tablet 3     [DISCONTINUED] lisinopril (PRINIVIL/ZESTRIL) 10 MG tablet Take 1 tablet (10 mg) by mouth daily 90 tablet 3     aspirin 81 MG tablet Take 1 tablet by mouth daily.       cholecalciferol (VITAMIN D3) 1000 UNIT capsule Take 1 capsule by mouth daily.         Allergies   Allergen Reactions     Etodolac Unknown     Percocet [Oxycodone-Acetaminophen] Nausea     Other reaction(s): Muscle Weakness       Family History   Problem Relation Age of Onset     Glaucoma No family hx of      Macular Degeneration No family hx of        Additional medical/Social/Surgical histories reviewed in [facility] and updated as appropriate.     REVIEW OF SYSTEMS (4/17/2018)  CONSTITUTIONAL: Denies fever and weight loss  EYES: Denies acute vision changes  ENT: Denies hearing changes or difficulty swallowing  CARDIAC: Denies chest pain or edema  RESPIRATORY: Denies dyspnea, cough or wheeze  GASTROINTESTINAL: Denies abdominal pain, nausea, vomiting  MUSCULOSKELETAL: See HPI  SKIN: Denies any recent rash or lesion  NEUROLOGICAL: Denies numbness or focal weakness  PSYCHIATRIC: No history of psychiatric symptoms or problems  ENDOCRINE: Diagnosis of diabetes: No  HEMATOLOGY: Denies episodes of easy bleeding      PHYSICAL EXAM  /89  Pulse 70  Wt 74.4 kg (164 lb)  BMI 24.22 kg/m2    General  - normal appearance, in no obvious distress  CV  - normal pulses at posterior tib and dorsalis pedis  Pulm  - normal respiratory pattern, non-labored  Musculoskeletal - Right foot  - stance: normal gait without limp, normal stance without excessive pronation, normal heel inversion with standing heel raise, no obvious leg length  discrepancy  - inspection: no swelling or effusion,  normal bone and joint alignment, no obvious deformity  - palpation: Moderate tenderness to palpation at proximal fifth metatarsal and distal aspect of peroneus brevis tendon.  - ROM: normal active and passive ROM of great and lesser toes, no pain with MT translation  - strength: 5/5 in all planes   Neuro  - no sensory or motor deficit, grossly normal coordination, normal muscle tone  Skin  - no ecchymosis, erythema, warmth, or induration, no obvious rash  Psych  - interactive, appropriate, normal mood and affect    IMAGING : . Final results and radiologist's interpretation, available in the [facility] health record. Images were reviewed with the patient/family members in the office today. 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  Diffuse IP joint narrowing.  No abnormality seen at base of fifth metatarsal.    U/S in office LTD: Ultrasound evaluation using hockey-stick high-frequency linear transducer.  Lateral foot was assessed including peroneal tendons at lateral malleolus.  No tearing visualized Fernea's longus or brevis.  Following peroneus brevis into fifth metatarsal head, heterogeneous echoes consistent with tendinopathy is apparent.  No significant swelling however.     ASSESSMENT & PLAN  Mr. Blood is a 77 year old year old male who presents to clinic today with lateral foot pain that has been present intermittently over the last year.  Limited diagnostic ultrasound revealing chronic changes of peroneus brevis tendon.  Will initiate conservative treatment for peroneal tendinopathy.    Diagnosis:   Lateral left foot pain  Left peroneal tendinopathy    -Ice, ibuprofen  -Lace up ankle brace for exercise and during periods of exacerbation.  -Home exercise program and physical therapy referral provided  -No specific activity restrictions, however minimal impact/cross training is  advised  -Follow up 6 weeks    It was a pleasure seeing Felice today.    Woo Rashid DO, CAQSM  Primary Care Sports Medicine

## 2018-04-17 NOTE — PROGRESS NOTES
"CHIEF COMPLAINT:  Consult (RIght foot pain)       HISTORY OF PRESENT ILLNESS  Mr. Blood is a pleasant 77 year old year old male who presents to clinic today with right foot pain.  Felice explains that he has had intermittent lateral foot pain over the last year.  Patient states he initially experienced lateral foot pain after ankle inversion, \"a couple times\" while walking on uneven trails.  He states that he would have intermittent resolution of pain gradually after these inciting events.  However he denies any recent inversion type mechanism, but does have somewhat persistent pain at the lateral aspect of his foot over the past several weeks.  He is active and exercises regularly at the gym, walking and fitness classes.  This pain is not limiting his activities but he does have to work through the pain.  No swelling no numbness or tingling.  Treatment to date has included rest.  He was seen by his primary care physician today who recommended that he came to the sports & orthopedic walk-in clinic for evaluation.    Additional history: as documented    MEDICAL HISTORY  Patient Active Problem List   Diagnosis     Essential hypertension     Pure hypercholesterolemia     Impotence of organic origin       Current Outpatient Prescriptions   Medication Sig Dispense Refill     sildenafil (REVATIO) 20 MG tablet Take 5 tablets (100 mg) by mouth daily 50 tablet 11     atorvastatin (LIPITOR) 10 MG tablet Take 1 tablet (10 mg) by mouth daily 90 tablet 3     lisinopril (PRINIVIL/ZESTRIL) 10 MG tablet Take 1 tablet (10 mg) by mouth daily 90 tablet 3     montelukast (SINGULAIR) 10 MG tablet Take 1 tablet (10 mg) by mouth At Bedtime (Patient not taking: Reported on 4/17/2018) 30 tablet 0     levofloxacin (LEVAQUIN) 500 MG tablet Take 1 tablet (500 mg) by mouth daily (Patient not taking: Reported on 4/17/2018) 7 tablet 0     fluticasone (FLONASE) 50 MCG/ACT spray Spray 2 sprays into both nostrils At Bedtime 1 Bottle 1     " albuterol (PROAIR HFA) 108 (90 BASE) MCG/ACT Inhaler Inhale 2 puffs into the lungs every 4 hours as needed for shortness of breath / dyspnea or wheezing 1 Inhaler 0     [DISCONTINUED] sildenafil (REVATIO/VIAGRA) 20 MG tablet Take 5 tablets (100 mg) by mouth daily 50 tablet 11     [DISCONTINUED] atorvastatin (LIPITOR) 10 MG tablet Take 1 tablet (10 mg) by mouth daily 90 tablet 3     [DISCONTINUED] lisinopril (PRINIVIL/ZESTRIL) 10 MG tablet Take 1 tablet (10 mg) by mouth daily 90 tablet 3     aspirin 81 MG tablet Take 1 tablet by mouth daily.       cholecalciferol (VITAMIN D3) 1000 UNIT capsule Take 1 capsule by mouth daily.         Allergies   Allergen Reactions     Etodolac Unknown     Percocet [Oxycodone-Acetaminophen] Nausea     Other reaction(s): Muscle Weakness       Family History   Problem Relation Age of Onset     Glaucoma No family hx of      Macular Degeneration No family hx of        Additional medical/Social/Surgical histories reviewed in [facility] and updated as appropriate.     REVIEW OF SYSTEMS (4/17/2018)  CONSTITUTIONAL: Denies fever and weight loss  EYES: Denies acute vision changes  ENT: Denies hearing changes or difficulty swallowing  CARDIAC: Denies chest pain or edema  RESPIRATORY: Denies dyspnea, cough or wheeze  GASTROINTESTINAL: Denies abdominal pain, nausea, vomiting  MUSCULOSKELETAL: See HPI  SKIN: Denies any recent rash or lesion  NEUROLOGICAL: Denies numbness or focal weakness  PSYCHIATRIC: No history of psychiatric symptoms or problems  ENDOCRINE: Diagnosis of diabetes: No  HEMATOLOGY: Denies episodes of easy bleeding      PHYSICAL EXAM  /89  Pulse 70  Wt 74.4 kg (164 lb)  BMI 24.22 kg/m2    General  - normal appearance, in no obvious distress  CV  - normal pulses at posterior tib and dorsalis pedis  Pulm  - normal respiratory pattern, non-labored  Musculoskeletal - Right foot  - stance: normal gait without limp, normal stance without excessive pronation, normal heel inversion with  standing heel raise, no obvious leg length discrepancy  - inspection: no swelling or effusion,  normal bone and joint alignment, no obvious deformity  - palpation: Moderate tenderness to palpation at proximal fifth metatarsal and distal aspect of peroneus brevis tendon.  - ROM: normal active and passive ROM of great and lesser toes, no pain with MT translation  - strength: 5/5 in all planes   Neuro  - no sensory or motor deficit, grossly normal coordination, normal muscle tone  Skin  - no ecchymosis, erythema, warmth, or induration, no obvious rash  Psych  - interactive, appropriate, normal mood and affect    IMAGING : . Final results and radiologist's interpretation, available in the [facility] health record. Images were reviewed with the patient/family members in the office today. 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  Diffuse IP joint narrowing.  No abnormality seen at base of fifth metatarsal.    U/S in office LTD: Ultrasound evaluation using hockey-stick high-frequency linear transducer.  Lateral foot was assessed including peroneal tendons at lateral malleolus.  No tearing visualized Fernea's longus or brevis.  Following peroneus brevis into fifth metatarsal head, heterogeneous echoes consistent with tendinopathy is apparent.  No significant swelling however.     ASSESSMENT & PLAN  Mr. Blood is a 77 year old year old male who presents to clinic today with lateral foot pain that has been present intermittently over the last year.  Limited diagnostic ultrasound revealing chronic changes of peroneus brevis tendon.  Will initiate conservative treatment for peroneal tendinopathy.    Diagnosis:   Lateral left foot pain  Left peroneal tendinopathy    -Ice, ibuprofen  -Lace up ankle brace for exercise and during periods of exacerbation.  -Home exercise program and physical therapy referral provided  -No specific activity restrictions, however  minimal impact/cross training is advised  -Follow up 6 weeks    It was a pleasure seeing Felice today.    Woo Rashid DO, CAQSM  Primary Care Sports Medicine

## 2018-04-18 ENCOUNTER — TELEPHONE (OUTPATIENT)
Dept: FAMILY MEDICINE | Facility: CLINIC | Age: 78
End: 2018-04-18

## 2018-04-18 DIAGNOSIS — N52.9 ERECTILE DYSFUNCTION, UNSPECIFIED ERECTILE DYSFUNCTION TYPE: ICD-10-CM

## 2018-04-20 NOTE — TELEPHONE ENCOUNTER
Central Prior Authorization Team   Phone: [phone]      [place] Initiation    Medication: sildenafil  Insurance Company: [employer] - Phone [phone] Fax [phone]  Pharmacy Filling the Rx: [facility] [address] [place] [address]  Filling Pharmacy Phone: [phone]  Filling Pharmacy Fax:    Start Date: 4/20/2018

## 2018-04-23 ENCOUNTER — THERAPY VISIT (OUTPATIENT)
Dept: PHYSICAL THERAPY | Facility: CLINIC | Age: 78
End: 2018-04-23
Payer: MEDICARE

## 2018-04-23 DIAGNOSIS — M25.571 PAIN IN JOINT INVOLVING ANKLE AND FOOT, RIGHT: Primary | ICD-10-CM

## 2018-04-23 PROCEDURE — 97140 MANUAL THERAPY 1/> REGIONS: CPT | Mod: GP | Performed by: PHYSICAL THERAPIST

## 2018-04-23 PROCEDURE — G8978 MOBILITY CURRENT STATUS: HCPCS | Mod: GP | Performed by: PHYSICAL THERAPIST

## 2018-04-23 PROCEDURE — 97112 NEUROMUSCULAR REEDUCATION: CPT | Mod: GP | Performed by: PHYSICAL THERAPIST

## 2018-04-23 PROCEDURE — G8979 MOBILITY GOAL STATUS: HCPCS | Mod: GP | Performed by: PHYSICAL THERAPIST

## 2018-04-23 PROCEDURE — 97161 PT EVAL LOW COMPLEX 20 MIN: CPT | Mod: GP | Performed by: PHYSICAL THERAPIST

## 2018-04-23 NOTE — PROGRESS NOTES
[facility] Initial Evaluation  Subjective:  Patient is a 77 year old male presenting with rehab right ankle/foot hpi.   Felice Blood is a 77 year old male with a right foot condition.  Condition occurred with:  A twist.  Condition occurred: in the community.  This is a new condition  Pt presents to clinic with complaints of lateral R foot pain over the past 2 months. Pt has sprained R ankle on several occasions over the past 5 years with hiking. Pt recalls most recently twisting R ankle in August 2017 during hiking. Pt currently having most pain with prolonged standing and walking. Pt had MD appointment on 4/17/18 and referred to PT with diagnosis of R peroneal tendonitis. Pt was given ankle brace and has been using for workouts. .    Patient reports pain:  Lateral.  Radiates to:  No radiation.  Pain is described as aching and is intermittent and reported as 2/10.  Associated symptoms:  Loss of strength. Pain is the same all the time.  Symptoms are exacerbated by weight bearing, standing, walking and activity and relieved by rest and bracing/immobilizing.  Since onset symptoms are gradually worsening.  Special tests:  X-ray.  Previous treatment: none.    General health as reported by patient is excellent.  Pertinent medical history includes:  High blood pressure.  Medical allergies: no.  Other surgeries include:  None reported.  Current medications:  High blood pressure medication.  Current occupation is retired.        Barriers include:  None as reported by patient.    Red flags:  None as reported by patient.                        Objective:  Standing Alignment:                Ankle/Foot:  Pes planus R  General Deviations:  Toe out R            Ankle/Foot Evaluation  ROM:    AROM:    Dorsiflexion:  Left:   15  Right:   10  Plantarflexion:  Left:  55    Right:  55  Inversion:  Left:  WNL     Right:  WNL +pain  Eversion:  WNL     Right:  WNL        Strength:    Dorsiflexion:  Left: 5/5      Pain:   Right: 5/5   Pain:  Plantarflexion: Left: 5/5   Pain:   Right: 5/5  Pain:  Inversion:Left: 5/5  Pain:     Right: 5/5  Pain:  Eversion:Left: 5/5  Pain:  Right: 4+/5   Pain:+              Strength wnl ankle: glute med 4/5 R 4+/5 L, glute max 4/5 bilaterally.      PALPATION:     Right ankle tenderness present at:   gastroc/soleus  EDEMA: normal            FUNCTIONAL TESTS:         Quad:  Single Leg Squat Left: Control is moderate loss of control, significant loss of control, femoral IR and excessive anterior knee excursion.  Single Leg Squat Right: Control is exessive anterior knee excursion, femoral IR, moderate loss of control and significant loss of control  Bilateral Leg Squat:  Control is moderate loss of control and excessive anterior knee excursion    Proprioception:  Stork Balance Test: Left: EO x 5 seconds-poor pelvic control  Right: EO x 3 seconds-poor pelvic control                                                     General     ROS    Assessment/Plan:    Patient is a 77 year old male with right side ankle complaints.    Patient has the following significant findings with corresponding treatment plan.                Diagnosis 1:  R peroneal tendonitis  Pain -  hot/cold therapy, manual therapy, self management, education and home program  Decreased ROM/flexibility - manual therapy, therapeutic exercise, therapeutic activity and home program  Decreased strength - therapeutic exercise, therapeutic activities and home program  Impaired balance - neuro re-education, therapeutic activities and home program  Impaired gait - gait training, assistive devices and home program  Impaired muscle performance - neuro re-education and home program  Decreased function - therapeutic activities and home program    Therapy Evaluation Codes:   1) History comprised of:   Personal factors that impact the plan of care:      None.    Comorbidity factors that impact the plan of care are:      High blood pressure.      Medications impacting care: High blood pressure.  2) Examination of Body Systems comprised of:   Body structures and functions that impact the plan of care:      Ankle.   Activity limitations that impact the plan of care are:      Stairs, Standing and Walking.  3) Clinical presentation characteristics are:   Stable/Uncomplicated.  4) Decision-Making    Low complexity using standardized patient assessment instrument and/or measureable assessment of functional outcome.  Cumulative Therapy Evaluation is: Low complexity.    Previous and current functional limitations:  (See Goal Flow Sheet for this information)    Short term and Long term goals: (See Goal Flow Sheet for this information)     Communication ability:  Patient appears to be able to clearly communicate and understand verbal and written communication and follow directions correctly.  Treatment Explanation - The following has been discussed with the patient:   RX ordered/plan of care  Anticipated outcomes  Possible risks and side effects  This patient would benefit from PT intervention to resume normal activities.   Rehab potential is good.    Frequency:  1 X week, once daily  Duration:  for 8 weeks  Discharge Plan:  Achieve all LTG.  Independent in home treatment program.  Return to previous functional level by discharge.  Reach maximal therapeutic benefit.    Please refer to the daily flowsheet for treatment today, total treatment time and time spent performing 1:1 timed codes.

## 2018-04-23 NOTE — LETTER
DEPARTMENT OF HEALTH AND HUMAN SERVICES  CENTERS FOR MEDICARE & MEDICAID SERVICES    PLAN/UPDATED PLAN OF PROGRESS FOR OUTPATIENT REHABILITATION    PATIENTS NAME:  Felice Blood   : 1940    PROVIDER NUMBER:    [id number]  [facility]  [id number]     PROVIDER NAME: [facility] [facility]  MEDICAL RECORD NUMBER: [id number]     START OF CARE DATE:  SOC Date: 18   TYPE:  PT    PRIMARY/TREATMENT DIAGNOSIS: (Pertinent Medical Diagnosis)  Pain in joint involving ankle and foot, right    VISITS FROM START OF CARE:  1     [facility] Initial Evaluation  Subjective:  Patient is a 77 year old male presenting with rehab right ankle/foot hpi.   Felice Blood is a 77 year old male with a right foot condition.  Condition occurred with:  A twist.  Condition occurred: in the community.  This is a new condition  Pt presents to clinic with complaints of lateral R foot pain over the past 2 months. Pt has sprained R ankle on several occasions over the past 5 years with hiking. Pt recalls most recently twisting R ankle in 2017 during hiking. Pt currently having most pain with prolonged standing and walking. Pt had MD appointment on 18 and referred to PT with diagnosis of R peroneal tendonitis. Pt was given ankle brace and has been using for workouts. Patient reports pain:  Lateral.  Radiates to:  No radiation.  Pain is described as aching and is intermittent and reported as 2/10.  Associated symptoms:  Loss of strength. Pain is the same all the time.  Symptoms are exacerbated by weight bearing, standing, walking and activity and relieved by rest and bracing/immobilizing.  Since onset symptoms are gradually worsening.  Special tests:  X-ray.  Previous treatment: none. General health as reported by patient is excellent.  Pertinent medical history includes:  High blood pressure.  Medical allergies: no.  Other surgeries include:  None reported.  Current  medications:  High blood pressure medication.  Current occupation is retired.  Barriers include:  None as reported by patient. Red flags:  None as reported by patient.    Objective:  Standing Alignment:    Ankle/Foot:  Pes planus R  General Deviations:  Toe out R  Ankle/Foot Evaluation  ROM:    AROM:    Dorsiflexion:  Left:   15  Right:   10  Plantarflexion:  Left:  55    Right:  55  Inversion:  Left:  WNL     Right:  WNL +pain  Eversion:  WNL     Right:  WNL    PATIENTS NAME:  Felice Blood   : 1940  PRIMARY/TREATMENT DIAGNOSIS: (Pertinent Medical Diagnosis)  Pain in joint involving ankle and foot, right    Strength:    Dorsiflexion:  Left: 5/5     Pain:   Right: 5/5   Pain:  Plantarflexion: Left: 5/5   Pain:   Right: 5/5  Pain:  Inversion:Left: 5/5  Pain:     Right: 5/5  Pain:  Eversion:Left: 5/5  Pain:  Right: 4+/5   Pain:+  Strength wnl ankle: glute med 4/5 R 4+/5 L, glute max 4/5 bilaterally.  PALPATION:  Right ankle tenderness present at:   gastroc/soleus  EDEMA: normal  FUNCTIONAL TESTS:   Quad:  Single Leg Squat Left: Control is moderate loss of control, significant loss of control, femoral IR and excessive anterior knee excursion.  Single Leg Squat Right: Control is exessive anterior knee excursion, femoral IR, moderate loss of control and significant loss of control  Bilateral Leg Squat:  Control is moderate loss of control and excessive anterior knee excursion  Proprioception:  Stork Balance Test: Left: EO x 5 seconds-poor pelvic control  Right: EO x 3 seconds-poor pelvic control     Assessment/Plan:    Patient is a 77 year old male with right side ankle complaints.    Patient has the following significant findings with corresponding treatment plan.                Diagnosis 1:  R peroneal tendonitis  Pain -  hot/cold therapy, manual therapy, self management, education and home program  Decreased ROM/flexibility - manual therapy, therapeutic exercise, therapeutic activity and home  program  Decreased strength - therapeutic exercise, therapeutic activities and home program  Impaired balance - neuro re-education, therapeutic activities and home program  Impaired gait - gait training, assistive devices and home program  Impaired muscle performance - neuro re-education and home program  Decreased function - therapeutic activities and home program                                    PATIENTS NAME:  Felice Blood   : 1940  PRIMARY/TREATMENT DIAGNOSIS: (Pertinent Medical Diagnosis)  Pain in joint involving ankle and foot, right    Therapy Evaluation Codes:   1) History comprised of:   Personal factors that impact the plan of care:      None.    Comorbidity factors that impact the plan of care are:      High blood pressure.     Medications impacting care: High blood pressure.  2) Examination of Body Systems comprised of:   Body structures and functions that impact the plan of care:      Ankle.   Activity limitations that impact the plan of care are:      Stairs, Standing and Walking.  3) Clinical presentation characteristics are:   Stable/Uncomplicated.  4) Decision-Making    Low complexity using standardized patient assessment instrument and/or   measureable assessment of functional outcome.  Cumulative Therapy Evaluation is: Low complexity.    Previous and current functional limitations:  (See Goal Flow Sheet for this information)    Short term and Long term goals: (See Goal Flow Sheet for this information)   Communication ability:  Patient appears to be able to clearly communicate and understand verbal and written communication and follow directions correctly.  Treatment Explanation - The following has been discussed with the patient:   RX ordered/plan of care, Anticipated outcomes, Possible risks and side effects                                                PATIENTS NAME:  Felice Blood   : 1940  PRIMARY/TREATMENT DIAGNOSIS: (Pertinent Medical Diagnosis)  Pain in joint  "involving ankle and foot, right      This patient would benefit from PT intervention to resume normal activities.   Rehab potential is good.  Frequency:  1 X week, once daily  Duration:  for 8 weeks  Discharge Plan:  Achieve all LTG.  Independent in home treatment program.  Return to previous functional level by discharge.  Reach maximal therapeutic benefit.        Caregiver Signature/Credentials _____________________________ Date ________         Christian Mendez DPT     I have reviewed and certified the need for these services and plan of treatment while under my care.        PHYSICIAN'S SIGNATURE:   _________________________________________     Date___________   Woo Rashid MD    Certification period:  Beginning of Cert date period: 04/23/18 to  07/21/18     Functional Level Progress Report: Please see attached \"Goal Flow sheet for Functional level.\"    ____X____ Continue Services or       ________ DC Services                Service dates: From  SOC Date: 04/23/18  to present                         "

## 2018-04-23 NOTE — TELEPHONE ENCOUNTER
PRIOR AUTHORIZATION DENIED    Medication: sildenafil- PA DENIED    Denial Date: 4/23/2018    Denial Rational:   PATIENT DOES NOT HAVE PULMONARY ARTERIAL HYPERTENSION WHO GROUP I.        Appeal Information:

## 2018-04-24 NOTE — TELEPHONE ENCOUNTER
Sildenafil PA was denied.  Sildenafil 20 mg, 50  Tabs -- $ 30.  Since the direction is 20 mg, 5 tabs (100 mg total), can we send 100 mg tablet to see whether it would be cheaper ?    Soon-Mi

## 2018-04-24 NOTE — TELEPHONE ENCOUNTER
For the sidenafil 20 mg tabs, I tell people they can take up to 5 at a time (100 mg), but often they don't need that much. I'd suggest try 2 at first. So if he got the 20 mg tabs, try less at first. He can try 3, or 4, or 5 at a time to see what works for him.     But it's ok to send the 100 mg tab and then see which one is cheaper, that'd be 100 mg tabs, take 1/2 to 1 full tab qd prn, ok 12/eleven refills.

## 2018-05-04 ENCOUNTER — THERAPY VISIT (OUTPATIENT)
Dept: PHYSICAL THERAPY | Facility: CLINIC | Age: 78
End: 2018-05-04
Payer: MEDICARE

## 2018-05-04 DIAGNOSIS — M25.571 PAIN IN JOINT INVOLVING ANKLE AND FOOT, RIGHT: ICD-10-CM

## 2018-05-04 PROCEDURE — 97110 THERAPEUTIC EXERCISES: CPT | Mod: GP | Performed by: PHYSICAL THERAPY ASSISTANT

## 2018-05-11 ENCOUNTER — THERAPY VISIT (OUTPATIENT)
Dept: PHYSICAL THERAPY | Facility: CLINIC | Age: 78
End: 2018-05-11
Payer: MEDICARE

## 2018-05-11 DIAGNOSIS — M25.571 PAIN IN JOINT INVOLVING ANKLE AND FOOT, RIGHT: ICD-10-CM

## 2018-05-11 PROCEDURE — 97530 THERAPEUTIC ACTIVITIES: CPT | Mod: GP | Performed by: PHYSICAL THERAPIST

## 2018-05-11 PROCEDURE — 97110 THERAPEUTIC EXERCISES: CPT | Mod: GP | Performed by: PHYSICAL THERAPIST

## 2018-05-22 ENCOUNTER — OFFICE VISIT (OUTPATIENT)
Dept: ORTHOPEDICS | Facility: CLINIC | Age: 78
End: 2018-05-22
Payer: MEDICARE

## 2018-05-22 VITALS
DIASTOLIC BLOOD PRESSURE: 85 MMHG | HEART RATE: 92 BPM | BODY MASS INDEX: 24.22 KG/M2 | SYSTOLIC BLOOD PRESSURE: 144 MMHG | WEIGHT: 164 LBS

## 2018-05-22 DIAGNOSIS — M76.71 PERONEAL TENDONITIS OF RIGHT LOWER LEG: Primary | ICD-10-CM

## 2018-05-22 NOTE — PROGRESS NOTES
ESTABLISHED PATIENT FOLLOW-UP:  RECHECK (Right foot/ankle)       HISTORY OF PRESENT ILLNESS  Mr. Blood is a pleasant 77 year old year old male who presents to clinic today for follow-up of right foot/ankle peroneal tendonitis    Date of injury:   Date last seen: ***  Following Therapeutic Plan: ***  Pain: ***  Function: ***  Interval History: ***     Additional medical/Social/Surgical histories reviewed in [facility] and updated as appropriate.    REVIEW OF SYSTEMS (5/22/2018)  CONSTITUTIONAL: Denies fever and weight loss  GASTROINTESTINAL: Denies abdominal pain, nausea, vomiting***  MUSCULOSKELETAL: See HPI  SKIN: Denies any recent rash or lesion  NEUROLOGICAL: Denies numbness or focal weakness     PHYSICAL EXAM  /85  Pulse 92  Wt 74.4 kg (164 lb)  BMI 24.22 kg/m2    ***    IMAGING : {ds laterality:997191}. Final results and radiologist's interpretation, available in the [facility] health record. Images were reviewed with the patient/family members in the office today. My personal interpretation of the performed imaging is ***     ASSESSMENT & PLAN  Mr. Blood is a 77 year old year old male who presents to clinic today with RECHECK (Right foot/ankle)        - ***  -Follow up ***    It was a pleasure seeing Felice.    Woo Rashid DO, [facility]  Primary Care Sports Medicine

## 2018-05-22 NOTE — MR AVS SNAPSHOT
After Visit Summary   2018    Felice Blood    MRN: [id number]           Patient Information     Date Of Birth          1940        Visit Information        Provider Department      2018 8:00 AM Woo Rashid DO [facility]        Today's Diagnoses     Peroneal tendonitis of right lower leg    -  1       Follow-ups after your visit        Your next 10 appointments already scheduled     May 25, 2018 10:10 AM CDT   [facility] Extremity with Jill Bueno PT   [facility] ([facility])    [address]  [facility] [address]   [phone]            May 29, 2018  8:00 AM CDT   RETURN GENERAL with Camron Hansen MD   [facility] ([facility])    [address]  [facility] [address] [place] [id number] [phone]              Who to contact     Please call your clinic at [phone] to:    Ask questions about your health    Make or cancel appointments    Discuss your medicines    Learn about your test results    Speak to your doctor            Additional Information About Your Visit        MyChart Information     [employer] is an electronic gateway that provides easy, online access to your medical records. With [employer], you can request a clinic appointment, read your test results, renew a prescription or communicate with your care team.     To sign up for HQ plust visit the website at [email or web address]   You will be asked to enter the access code listed below, as well as some personal information. Please follow the directions to create your username and password.     Your access code is: [email or web address]  Expires: 2018 10:04 AM     Your access code will  in 90 days. If you need help or a new code, please contact your [facility] or call [phone] for assistance.        Care EveryWhere ID     This is  your Care EveryWhere ID. This could be used by other organizations to access your [place] medical records  [id number]        Your Vitals Were     Pulse BMI (Body Mass Index)                92 24.22 kg/m2           Blood Pressure from Last 3 Encounters:   05/22/18 144/85   04/17/18 145/89   04/17/18 145/89    Weight from Last 3 Encounters:   05/22/18 74.4 kg (164 lb)   04/17/18 74.4 kg (164 lb)   04/17/18 74.7 kg (164 lb 11.2 oz)              Today, you had the following     No orders found for display       Primary Care Provider Office Phone # Fax #    Anthony Maddox [phone]       [address] [address]        Equal Access to Services     LUX [place] : Hadii slava martinez Sowilliam, waaxda luqadaha, qaybta kaalmada adeegyada, ally bello . So [facility] [phone].    ATENCIÓN: Si habla español, tiene a broderick disposición servicios gratuitos de asistencia lingüística. Tory al [phone].    We comply with applicable federal civil rights laws and [place] laws. We do not discriminate on the basis of race, color, national origin, age, disability, sex, sexual orientation, or gender identity.            Thank you!     Thank you for choosing [facility]THOPA[facility] W[facility] IN CLINIC  for your care. Our goal is always to provide you with excellent care. Hearing back from our patients is one way we can continue to improve our services. Please take a few minutes to complete the written survey that you may receive in the mail after your visit with us. Thank you!             Your Updated Medication List - Protect others around you: Learn how to safely use, store and throw away your medicines at www.disposemymeds.org.          This list is accurate as of 5/22/18  8:47 AM.  Always use your most recent med list.                   Brand Name Dispense Instructions for use Diagnosis    albuterol 108 (90 Base) MCG/ACT Inhaler    PROAIR HFA    1 Inhaler     Inhale 2 puffs into the lungs every 4 hours as needed for shortness of breath / dyspnea or wheezing    Cough       aspirin 81 MG tablet      Take 1 tablet by mouth daily.        atorvastatin 10 MG tablet    LIPITOR    90 tablet    Take 1 tablet (10 mg) by mouth daily    Pure hypercholesterolemia, Screen for colon cancer, Impotence of organic origin, Essential hypertension, Bilateral impacted cerumen       cholecalciferol 1000 units capsule    vitamin  -D     Take 1 capsule by mouth daily.        fluticasone 50 MCG/ACT spray    FLONASE    1 Bottle    Spray 2 sprays into both nostrils At Bedtime    Persistent cough for 3 weeks or longer, Bibasilar crackles       levofloxacin 500 MG tablet    LEVAQUIN    7 tablet    Take 1 tablet (500 mg) by mouth daily    Persistent cough for 3 weeks or longer, Bibasilar crackles       lisinopril 10 MG tablet    PRINIVIL/ZESTRIL    90 tablet    Take 1 tablet (10 mg) by mouth daily    Essential hypertension, Screen for colon cancer, Impotence of organic origin, Pure hypercholesterolemia, Bilateral impacted cerumen       montelukast 10 MG tablet    SINGULAIR    30 tablet    Take 1 tablet (10 mg) by mouth At Bedtime    Persistent cough for 3 weeks or longer, Bibasilar crackles       sildenafil 20 MG tablet    REVATIO    50 tablet    Take 5 tablets (100 mg) by mouth daily    Erectile dysfunction, unspecified erectile dysfunction type

## 2018-05-22 NOTE — LETTER
"5/22/2018       RE: Felice Blood  [address]  [facility] [address]     Dear Colleague,    Thank you for referring your patient, Felice Blood, to the [facility] SP[facility]TS [facility]THOPAEDIC WALK IN CLINIC at [facility]. Please see a copy of my visit note below.          SPORTS & ORTHOPEDIC WALK-IN FOLLOW-UP VISIT 5/22/2018    Interval History:     Follow up reason: Recheck peroneal tendinitis    Date of injury: Chronic    Date last seen: 4/17/18    Following Therapeutic Plan: Yes     Pain: Improving    Function: Improving    Interval History: \"pretty good.\" states he believes his leg is getting a little stronger but sometimes it's sore. Has not been wearing ankle brace due to not fitting in shoes. Has been to PT 3 times and has another appointment scheduled for Friday.     Medical History:    Any recent changes to your medical history? No    Any new medication prescribed since last visit? No    Review of Systems:    Do you have fever, chills, weight loss? No    Do you have any vision problems? No    Do you have any chest pain or edema? No    Do you have any shortness of breath or wheezing?  No    Do you have stomach problems? No    Do you have any numbness or focal weakness? No    Do you have diabetes? No    Do you have problems with bleeding or clotting? No    Do you have an rashes or other skin lesions? No             ESTABLISHED PATIENT FOLLOW-UP:  RECHECK (Right foot/ankle)       HISTORY OF PRESENT ILLNESS  Mr. Blood is a pleasant 77 year old year old male who presents to clinic today for follow-up of right peroneal tendonitis.      Date of injury: chronic  Date last seen: 4/17/18  Following Therapeutic Plan: Yes  Pain: Improving  Function: Improving  Interval History: Patient is doing very well.  Resolving lateral foot pain.  Has been working with PT to strengthen core/hips as well as right ankle.  Wore ankle brace for week or so but did not fit well into " other shoes, so stopped.  Additional PT session this Friday.   Functionally able to do everything he wants to.  Planning backpacking trip this summer.  Continues workouts at the [employer] and performing HEP.     Additional medical/Social/Surgical histories reviewed in [facility] and updated as appropriate.    REVIEW OF SYSTEMS (5/22/2018)  CONSTITUTIONAL: Denies fever and weight loss  GASTROINTESTINAL: Denies abdominal pain, nausea, vomiting  MUSCULOSKELETAL: See HPI  SKIN: Denies any recent rash or lesion  NEUROLOGICAL: Denies numbness or focal weakness     PHYSICAL EXAM  /85  Pulse 92  Wt 74.4 kg (164 lb)  BMI 24.22 kg/m2      General  - normal appearance, in no obvious distress  CV  - normal pulses at posterior tib and dorsalis pedis  Pulm  - normal respiratory pattern, non-labored  Musculoskeletal - Right foot  - stance: normal gait without limp, normal stance without excessive pronation, normal heel inversion with standing heel raise, no obvious leg length discrepancy  - inspection: no swelling or effusion,  normal bone and joint alignment, no obvious deformity  - palpation: No tenderness to palpation at proximal fifth metatarsal and distal aspect of peroneus brevis tendon. Tenderness at peroneus longus/brevis muscle posterior to distal 1/3 of fibula.  - ROM: normal active and passive ROM of great and lesser toes, no pain with MT translation  - strength: 5/5 in all planes   Neuro  - no sensory or motor deficit, grossly normal coordination, normal muscle tone  Skin  - no ecchymosis, erythema, warmth, or induration, no obvious rash  Psych  - interactive, appropriate, normal mood and affect     ASSESSMENT & PLAN  Mr. Blood is a 77 year old year old male who presents to clinic today for f/u right peroneal tendonitis.  Patient doing remarkably well and resolved pain of lateral foot.  Continuing to strengthen hip and ankle.    Diagnosis:  Peroneal Tendinopathy right ankle  Right ankle weakness    -Continue  HEP/PT  -Activity as tolerated  -Ankle brace for exacerbations only  -Continue strengthening as maintenance after PT ends  -Follow up PRN    It was a pleasure seeing Felice.    Woo Rashid DO, CAQSM  Primary Care Sports Medicine

## 2018-05-22 NOTE — PROGRESS NOTES
"      SPORTS & ORTHOPEDIC WALK-IN FOLLOW-UP VISIT 5/22/2018    Interval History:     Follow up reason: Recheck peroneal tendinitis    Date of injury: Chronic    Date last seen: 4/17/18    Following Therapeutic Plan: Yes     Pain: Improving    Function: Improving    Interval History: \"pretty good.\" states he believes his leg is getting a little stronger but sometimes it's sore. Has not been wearing ankle brace due to not fitting in shoes. Has been to PT 3 times and has another appointment scheduled for Friday.     Medical History:    Any recent changes to your medical history? No    Any new medication prescribed since last visit? No    Review of Systems:    Do you have fever, chills, weight loss? No    Do you have any vision problems? No    Do you have any chest pain or edema? No    Do you have any shortness of breath or wheezing?  No    Do you have stomach problems? No    Do you have any numbness or focal weakness? No    Do you have diabetes? No    Do you have problems with bleeding or clotting? No    Do you have an rashes or other skin lesions? No           "

## 2018-05-22 NOTE — PROGRESS NOTES
ESTABLISHED PATIENT FOLLOW-UP:  RECHECK (Right foot/ankle)       HISTORY OF PRESENT ILLNESS  Mr. Blood is a pleasant 77 year old year old male who presents to clinic today for follow-up of right peroneal tendonitis.      Date of injury: chronic  Date last seen: 4/17/18  Following Therapeutic Plan: Yes  Pain: Improving  Function: Improving  Interval History: Patient is doing very well.  Resolving lateral foot pain.  Has been working with PT to strengthen core/hips as well as right ankle.  Wore ankle brace for week or so but did not fit well into other shoes, so stopped.  Additional PT session this Friday.   Functionally able to do everything he wants to.  Planning backpacking trip this summer.  Continues workouts at the [email or web address] and performing HEP.     Additional medical/Social/Surgical histories reviewed in [facility] and updated as appropriate.    REVIEW OF SYSTEMS (5/22/2018)  CONSTITUTIONAL: Denies fever and weight loss  GASTROINTESTINAL: Denies abdominal pain, nausea, vomiting  MUSCULOSKELETAL: See HPI  SKIN: Denies any recent rash or lesion  NEUROLOGICAL: Denies numbness or focal weakness     PHYSICAL EXAM  /85  Pulse 92  Wt 74.4 kg (164 lb)  BMI 24.22 kg/m2      General  - normal appearance, in no obvious distress  CV  - normal pulses at posterior tib and dorsalis pedis  Pulm  - normal respiratory pattern, non-labored  Musculoskeletal - Right foot  - stance: normal gait without limp, normal stance without excessive pronation, normal heel inversion with standing heel raise, no obvious leg length discrepancy  - inspection: no swelling or effusion,  normal bone and joint alignment, no obvious deformity  - palpation: No tenderness to palpation at proximal fifth metatarsal and distal aspect of peroneus brevis tendon. Tenderness at peroneus longus/brevis muscle posterior to distal 1/3 of fibula.  - ROM: normal active and passive ROM of great and lesser toes, no pain with MT translation  - strength: 5/5 in all  planes   Neuro  - no sensory or motor deficit, grossly normal coordination, normal muscle tone  Skin  - no ecchymosis, erythema, warmth, or induration, no obvious rash  Psych  - interactive, appropriate, normal mood and affect     ASSESSMENT & PLAN  Mr. Blood is a 77 year old year old male who presents to clinic today for f/u right peroneal tendonitis.  Patient doing remarkably well and resolved pain of lateral foot.  Continuing to strengthen hip and ankle.    Diagnosis:  Peroneal Tendinopathy right ankle  Right ankle weakness    -Continue HEP/PT  -Activity as tolerated  -Ankle brace for exacerbations only  -Continue strengthening as maintenance after PT ends  -Follow up PRN    It was a pleasure seeing Felice.    Woo Rashid DO, CAQSM  Primary Care Sports Medicine

## 2018-05-25 ENCOUNTER — THERAPY VISIT (OUTPATIENT)
Dept: PHYSICAL THERAPY | Facility: CLINIC | Age: 78
End: 2018-05-25
Payer: MEDICARE

## 2018-05-25 DIAGNOSIS — M25.571 PAIN IN JOINT INVOLVING ANKLE AND FOOT, RIGHT: Primary | ICD-10-CM

## 2018-05-25 PROCEDURE — 97530 THERAPEUTIC ACTIVITIES: CPT | Mod: GP | Performed by: PHYSICAL THERAPIST

## 2018-05-25 PROCEDURE — G8980 MOBILITY D/C STATUS: HCPCS | Mod: GP

## 2018-05-25 PROCEDURE — G8979 MOBILITY GOAL STATUS: HCPCS | Mod: GP

## 2018-05-25 PROCEDURE — 97110 THERAPEUTIC EXERCISES: CPT | Mod: GP | Performed by: PHYSICAL THERAPIST

## 2018-05-25 NOTE — PROGRESS NOTES
Subjective:  HPI                    Objective:  System    Physical Exam    General     ROS    Assessment/Plan:    DISCHARGE REPORT    Progress reporting period is from 4/23/18 to 5/25/18.       SUBJECTIVE    Subjective: Pt was at [facility] this morning. Reports stiffness, no pain. Minimal pain during walking.    Current pain level is 0/10  Previous pain level was  2/10  .   Changes in function:  Yes (See Goal flowsheet attached for changes in current functional level)  Adverse reaction to treatment or activity: None    OBJECTIVE  Changes noted in objective findings:  Yes  Objective: Gait normal. Strength: glute med 5-/5 shaky, HAB 4+/5, Glute max 5/5 B, peroneals 5/5 B, DF 5/5 B     ASSESSMENT/PLAN  Updated problem list and treatment plan: Diagnosis 1:  R peroneal sprain  Decreased strength - therapeutic exercise, therapeutic activities and home program  STG/LTGs have been met or progress has been made towards goals:  Yes (See Goal flow sheet completed today.)  Assessment of Progress: The patient has met all of their long term goals.  Self Management Plans:  Patient is independent in a home treatment program.  Patient is independent in self management of symptoms.  I have re-evaluated this patient and find that the nature, scope, duration and intensity of the therapy is appropriate for the medical condition of the patient.  Felice continues to require the following intervention to meet STG and LTG's:  PT intervention is no longer required to meet STG/LTG.    Recommendations:  This patient is ready to be discharged from therapy and continue their home treatment program.    Please refer to the daily flowsheet for treatment today, total treatment time and time spent performing 1:1 timed codes.

## 2018-05-29 ENCOUNTER — OFFICE VISIT (OUTPATIENT)
Dept: OPHTHALMOLOGY | Facility: CLINIC | Age: 78
End: 2018-05-29
Attending: OPHTHALMOLOGY
Payer: MEDICARE

## 2018-05-29 DIAGNOSIS — Z96.1 PSEUDOPHAKIA OF BOTH EYES: Primary | ICD-10-CM

## 2018-05-29 DIAGNOSIS — H43.21 ASTEROID HYALITIS OF RIGHT EYE: ICD-10-CM

## 2018-05-29 DIAGNOSIS — H26.491 PCO (POSTERIOR CAPSULAR OPACIFICATION), RIGHT: ICD-10-CM

## 2018-05-29 DIAGNOSIS — H52.7 REFRACTIVE ERROR: ICD-10-CM

## 2018-05-29 PROCEDURE — 92015 DETERMINE REFRACTIVE STATE: CPT | Mod: GY,ZF

## 2018-05-29 PROCEDURE — G0463 HOSPITAL OUTPT CLINIC VISIT: HCPCS | Mod: ZF

## 2018-05-29 ASSESSMENT — REFRACTION_MANIFEST
OD_CYLINDER: +0.75
OD_ADD: +2.75
OS_CYLINDER: SPHERE
OS_SPHERE: -1.75
OD_AXIS: 160
OS_ADD: +2.75
OD_SPHERE: -1.50

## 2018-05-29 ASSESSMENT — SLIT LAMP EXAM - LIDS
COMMENTS: MILD MGD
COMMENTS: MILD MGD

## 2018-05-29 ASSESSMENT — TONOMETRY
OS_IOP_MMHG: 21
IOP_METHOD: TONOPEN
OD_IOP_MMHG: 20

## 2018-05-29 ASSESSMENT — CUP TO DISC RATIO
OD_RATIO: 0.3
OS_RATIO: 0.3

## 2018-05-29 ASSESSMENT — REFRACTION_WEARINGRX
OS_ADD: +2.75
OS_SPHERE: -2.00
OD_ADD: +2.75
OD_CYLINDER: +0.50
OS_CYLINDER: SPHERE
OD_SPHERE: -1.25
OD_AXIS: 160

## 2018-05-29 ASSESSMENT — VISUAL ACUITY
OS_CC: 20/20
CORRECTION_TYPE: GLASSES
OD_CC: 20/25
METHOD: SNELLEN - LINEAR
OS_CC+: -1
OD_CC+: +2

## 2018-05-29 ASSESSMENT — EXTERNAL EXAM - RIGHT EYE: OD_EXAM: NORMAL

## 2018-05-29 ASSESSMENT — CONF VISUAL FIELD
OS_NORMAL: 1
METHOD: COUNTING FINGERS
OD_NORMAL: 1

## 2018-05-29 ASSESSMENT — EXTERNAL EXAM - LEFT EYE: OS_EXAM: NORMAL

## 2018-05-29 NOTE — NURSING NOTE
Chief Complaints and History of Present Illnesses   Patient presents with     Yearly Exam     Pseudophakia      HPI    Affected eye(s):  Both   Symptoms:        Frequency:  Constant       Do you have eye pain now?:  No      Comments:  States va is the same since last visit  No red watery or dry  +floater occ   Tatyana Nash COT 8:11 AM May 29, 2018

## 2018-05-29 NOTE — MR AVS SNAPSHOT
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      Who to contact     Please call your clinic at [phone] to:    Ask questions about your health    Make or cancel appointments    Discuss your medicines    Learn about your test results    Speak to your doctor            Additional Information About Your Visit        Care EveryWhere ID     This is your Care EveryWhere ID. This could be used by other organizations to access your [place] medical records  [id number]         Blood Pressure from Last 3 Encounters:   05/22/18 144/85   04/17/18 145/89   04/17/18 145/89    Weight from Last 3 Encounters:   05/22/18 74.4 kg (164 lb)   04/17/18 74.4 kg (164 lb)   04/17/18 74.7 kg (164 lb 11.2 oz)              Today, you had the following     No orders found for display       Primary Care Provider Office Phone # Fax #    Anthony Maddox [phone]       [address] [address]        Equal Access to Services     [facility] AH: Hadii aad ku hadasho Soomaali, waaxda luqadaha, qaybta kaalmada adeegyada, ally baptiste. So [facility] [phone].    ATENCIÓN: Si habla español, tiene a broderick disposición servicios gratuitos de asistencia lingüística. Llame al [phone].    We  comply with applicable federal civil rights laws and [place] laws. We do not discriminate on the basis of race, color, national origin, age, disability, sex, sexual orientation, or gender identity.            Thank you!     Thank you for choosing EYE CLINIC  for your care. Our goal is always to provide you with excellent care. Hearing back from our patients is one way we can continue to improve our services. Please take a few minutes to complete the written survey that you may receive in the mail after your visit with us. Thank you!             Your Updated Medication List - Protect others around you: Learn how to safely use, store and throw away your medicines at www.disposemymeds.org.          This list is accurate as of 5/29/18  8:50 AM.  Always use your most recent med list.                   Brand Name Dispense Instructions for use Diagnosis    albuterol 108 (90 Base) MCG/ACT Inhaler    PROAIR HFA    1 Inhaler    Inhale 2 puffs into the lungs every 4 hours as needed for shortness of breath / dyspnea or wheezing    Cough       aspirin 81 MG tablet      Take 1 tablet by mouth daily.        atorvastatin 10 MG tablet    LIPITOR    90 tablet    Take 1 tablet (10 mg) by mouth daily    Pure hypercholesterolemia, Screen for colon cancer, Impotence of organic origin, Essential hypertension, Bilateral impacted cerumen       cholecalciferol 1000 units capsule    vitamin  -D     Take 1 capsule by mouth daily.        fluticasone 50 MCG/ACT spray    FLONASE    1 Bottle    Spray 2 sprays into both nostrils At Bedtime    Persistent cough for 3 weeks or longer, Bibasilar crackles       levofloxacin 500 MG tablet    LEVAQUIN    7 tablet    Take 1 tablet (500 mg) by mouth daily    Persistent cough for 3 weeks or longer, Bibasilar crackles       lisinopril 10 MG tablet    PRINIVIL/ZESTRIL    90 tablet    Take 1 tablet (10 mg) by mouth daily    Essential hypertension, Screen for colon cancer, Impotence of organic origin, Pure  hypercholesterolemia, Bilateral impacted cerumen       montelukast 10 MG tablet    SINGULAIR    30 tablet    Take 1 tablet (10 mg) by mouth At Bedtime    Persistent cough for 3 weeks or longer, Bibasilar crackles       sildenafil 20 MG tablet    REVATIO    50 tablet    Take 5 tablets (100 mg) by mouth daily    Erectile dysfunction, unspecified erectile dysfunction type

## 2018-05-29 NOTE — PATIENT INSTRUCTIONS
Future Appointments  Date Time Provider Department Center   5/30/2019 8:45 AM Camron Hansen MD [facility] CLIN

## 2018-05-29 NOTE — PROGRESS NOTES
Assessment & Plan      Felice Blood is a 77 year old male with the following diagnoses:   1. 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 PCO (posterior capsular opacification), right    3. Asteroid hyalitis of right eye    4. Refractive error         Noting some glare when driving  Healthy dilated fundus exam  Mild posterior capsular opacity (PCO) right eye   Recommend glasses with glare coating for now.  If not improved will consider YAG right eye       Patient disposition:   Return in about 1 year (around 5/29/2019) for DFE.          Attending Physician Attestation:  Complete documentation of historical and exam elements from today's encounter can be found in the full encounter summary report (not reduplicated in this progress note).  I personally obtained the chief complaint(s) and history of present illness.  I confirmed and edited as necessary the review of systems, past medical/surgical history, family history, social history, and examination findings as documented by others; and I examined the patient myself.  I personally reviewed the relevant tests, images, and reports as documented above.  I formulated and edited as necessary the assessment and plan and discussed the findings and management plan with the patient and family. . - Camron Hansen MD

## 2018-10-29 ENCOUNTER — OFFICE VISIT (OUTPATIENT)
Dept: ORTHOPEDICS | Facility: CLINIC | Age: 78
End: 2018-10-29
Payer: MEDICARE

## 2018-10-29 ENCOUNTER — RADIANT APPOINTMENT (OUTPATIENT)
Dept: GENERAL RADIOLOGY | Facility: CLINIC | Age: 78
End: 2018-10-29
Attending: FAMILY MEDICINE
Payer: MEDICARE

## 2018-10-29 VITALS — HEART RATE: 89 BPM | DIASTOLIC BLOOD PRESSURE: 84 MMHG | SYSTOLIC BLOOD PRESSURE: 136 MMHG

## 2018-10-29 DIAGNOSIS — M54.50 LOW BACK PAIN: ICD-10-CM

## 2018-10-29 DIAGNOSIS — M54.42 ACUTE LEFT-SIDED LOW BACK PAIN WITH LEFT-SIDED SCIATICA: Primary | ICD-10-CM

## 2018-10-29 RX ORDER — PREDNISONE 20 MG/1
40 TABLET ORAL DAILY
Qty: 10 TABLET | Refills: 0 | Status: SHIPPED | OUTPATIENT
Start: 2018-10-29 | End: 2018-11-03

## 2018-10-29 NOTE — PROGRESS NOTES
[facility]  Orthopedics  Mt Hopkins MD  10/29/2018     Name: Felice Blood  MRN: [id number]  Age: 77 year old  : 1940  Referring provider: Referred Self     Chief Complaint: Left leg weakness and lower back pain    Date of Injury:  week , 10/29/18    History of Present Illness:   Felice Blood is a 77 year old, male who presents today for evaluation of his left leg weakness and lower back pain. The patient has been noticing weakness in his left leg for the past 2 weeks. He first noticed it when using a leg extension machine at the [facility], and he was unable to flex his leg to complete the movement. Additionally, this morning he was picking up a folding table and felt an immediate shooting pain in his back and gluteal region. His pain is sharp in nature, and it is exacerbated with going up and down stairs. He has taken Ibuprofen with little relief. No tingling, numbness. No clicking, locking, or catching.     Review of Systems:  A 10-point review of systems was obtained and is negative except for as noted in the HPI.     Medications:   Current Outpatient Prescriptions:      albuterol (PROAIR HFA) 108 (90 BASE) MCG/ACT Inhaler, Inhale 2 puffs into the lungs every 4 hours as needed for shortness of breath / dyspnea or wheezing, Disp: 1 Inhaler, Rfl: 0     aspirin 81 MG tablet, Take 1 tablet by mouth daily., Disp: , Rfl:      atorvastatin (LIPITOR) 10 MG tablet, Take 1 tablet (10 mg) by mouth daily, Disp: 90 tablet, Rfl: 3     cholecalciferol (VITAMIN D3) 1000 UNIT capsule, Take 1 capsule by mouth daily., Disp: , Rfl:      fluticasone (FLONASE) 50 MCG/ACT spray, Spray 2 sprays into both nostrils At Bedtime, Disp: 1 Bottle, Rfl: 1     levofloxacin (LEVAQUIN) 500 MG tablet, Take 1 tablet (500 mg) by mouth daily (Patient not taking: Reported on 2018), Disp: 7 tablet, Rfl: 0     lisinopril (PRINIVIL/ZESTRIL) 10 MG tablet, Take 1 tablet (10 mg) by mouth daily, Disp: 90 tablet, Rfl:  3     montelukast (SINGULAIR) 10 MG tablet, Take 1 tablet (10 mg) by mouth At Bedtime (Patient not taking: Reported on 4/17/2018), Disp: 30 tablet, Rfl: 0     sildenafil (REVATIO) 20 MG tablet, Take 5 tablets (100 mg) by mouth daily, Disp: 50 tablet, Rfl: 11    Allergies:  Etodolac   Percocet (nausea, muscle weakness)    Past Medical History:  Cobblestone retinal degeneration  Impotence of organic origin  Nonsensile cataract  Pure hypercholesterolemia  Unspecific essential hypertension  Vitreous detachment of both eyes    Past Surgical History:  Appendectomy   Cataract IOL, right (5/1/17)  Phacoemulsification with standard intraocular lens implant, right (5/1/17)  Tonsillectomy    Social History:  The patient is retired. The patient denies tobacco use and admits to alcohol use.     Family History:  No past pertinent family history.    Physical Examination:  General: alert, pleasant, no distress. sitting comfortably in chair    Back/Spine: no tenderness to palpation of spinous processes, or paraspinous musculature of lumbar spine. full ROM with flexion, extension, twisting, and pain with forward flexion. Slump test positive on left. Mild hamstring tightness noted. 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     Assessment:   77 year old, male with low back pain with left sided radicular symptoms     Plan:   -Begin taking prescribed Prednisone   -Begin HEP and PT   -Follow-up with me in 6 weeks for further evaluation   -sooner if new or worsening symptom.     All the patient's questions were answered to the best of my  ability.    Mt Hopkins MD      Scribe Disclosure:   I, James Knight, am serving as a scribe to document services personally performed by Mt Hopkins MD at this visit, based upon the provider's statements to me. All documentation has been reviewed by the aforementioned provider prior to being entered into the official medical record.

## 2018-10-29 NOTE — MR AVS SNAPSHOT
After Visit Summary   10/29/2018    Felice Blood    MRN: [id number]           Patient Information     Date Of Birth          1940        Visit Information        Provider Department      10/29/2018 1:20 PM Mt Hopkins MD [facility]        Today's Diagnoses     Low back pain    -  1      Care Instructions    Take prednisone in the morning with food for 5 days  Do not take ibuprofen, naproxen while taking prednisone  Perform home exercises as tolerated  Follow up with physical therapy  Follow up in clinic if worsening symptoms such as numbness, weakness, or if there is no improvement after 6 weeks.     [facility]  [facility]  [address] [place] [address]    Phone: [phone]  Fax: [phone]                   Follow-ups after your visit        Additional Services     [facility] PT, HAND, AND CHIROPRACTIC REFERRAL       Physical Therapy Referral                  Your next 10 appointments already scheduled     May 30, 2019  8:45 AM CDT   RETURN GENERAL with Camron Hansen MD   Eye Clinic ([facility])    [address]  [facility] [id number] [phone]              Future tests that were ordered for you today     Open Future Orders        Priority Expected Expires Ordered    [facility], [facility]IROPRACT[facility] REFERRAL Routine  10/29/2019 10/29/2018            Who to contact     Please call your clinic at [phone] to:    Ask questions about your health    Make or cancel appointments    Discuss your medicines    Learn about your test results    Speak to your doctor            Additional Information About Your Visit        UCampushart Information     [employer] is an electronic gateway that provides easy, online access to your medical records. With [employer], you can request a clinic appointment, read your test results, renew a  prescription or communicate with your care team.     To sign up for [employer]Botix[employer] visit the website at [email or web address]   You will be asked to enter the access code listed below, as well as some personal information. Please follow the directions to create your username and password.     Your access code is: [email or web address]  Expires: 2019  2:11 PM     Your access code will  in 90 days. If you need help or a new code, please contact your [facility] or call [phone] for assistance.        Care EveryWhere ID     This is your Care EveryWhere ID. This could be used by other organizations to access your [place] medical records  [id number]        Your Vitals Were     Pulse                   89            Blood Pressure from Last 3 Encounters:   10/29/18 136/84   18 144/85   18 145/89    Weight from Last 3 Encounters:   18 74.4 kg (164 lb)   18 74.4 kg (164 lb)   18 74.7 kg (164 lb 11.2 oz)                 Today's Medication Changes          These changes are accurate as of 10/29/18  2:11 PM.  If you have any questions, ask your nurse or doctor.               Start taking these medicines.        Dose/Directions    predniSONE 20 MG tablet   Commonly known as:  DELTASONE   Used for:  Low back pain   Started by:  Mt Hopkins MD        Dose:  40 mg   Take 2 tablets (40 mg) by mouth daily for 5 days   Quantity:  10 tablet   Refills:  0         Stop taking these medicines if you haven't already. Please contact your care team if you have questions.     levofloxacin 500 MG tablet   Commonly known as:  LEVAQUIN   Stopped by:  Mt Hopkins MD                Where to get your medicines      These medications were sent to [facility] [address] [place] [address]     Phone:  [phone]     predniSONE 20 MG tablet                Primary Care Provider Office Phone # Fax  #    Anthony Maddox [phone]       [address] [address]        Equal Access to Services     [facility] [place] : Hadii aad ku hadconstantinsloan Villalta, waelisada tysonreginoha, qameerata kaorlandoda hakan, ally phaniin hayaan josé luiscarli trujillo ace baptiste. So [facility] [phone].    ATENCIÓN: Si habla español, tiene a broderick disposición servicios gratuitos de asistencia lingüística. Llame al [phone].    We comply with applicable federal civil rights laws and [place] laws. We do not discriminate on the basis of race, color, national origin, age, disability, sex, sexual orientation, or gender identity.            Thank you!     Thank you for choosing [facility] CLIN[facility]  for your care. Our goal is always to provide you with excellent care. Hearing back from our patients is one way we can continue to improve our services. Please take a few minutes to complete the written survey that you may receive in the mail after your visit with us. Thank you!             Your Updated Medication List - Protect others around you: Learn how to safely use, store and throw away your medicines at www.disposemymeds.org.          This list is accurate as of 10/29/18  2:11 PM.  Always use your most recent med list.                   Brand Name Dispense Instructions for use Diagnosis    albuterol 108 (90 Base) MCG/ACT inhaler    PROAIR HFA    1 Inhaler    Inhale 2 puffs into the lungs every 4 hours as needed for shortness of breath / dyspnea or wheezing    Cough       aspirin 81 MG tablet      Take 1 tablet by mouth daily.        atorvastatin 10 MG tablet    LIPITOR    90 tablet    Take 1 tablet (10 mg) by mouth daily    Pure hypercholesterolemia, Screen for colon cancer, Impotence of organic origin, Essential hypertension, Bilateral impacted cerumen       cholecalciferol 1000 units capsule    vitamin  -D     Take 1 capsule by mouth daily.        fluticasone 50 MCG/ACT spray    FLONASE    1 Bottle     Spray 2 sprays into both nostrils At Bedtime    Persistent cough for 3 weeks or longer, Bibasilar crackles       lisinopril 10 MG tablet    PRINIVIL/ZESTRIL    90 tablet    Take 1 tablet (10 mg) by mouth daily    Essential hypertension, Screen for colon cancer, Impotence of organic origin, Pure hypercholesterolemia, Bilateral impacted cerumen       montelukast 10 MG tablet    SINGULAIR    30 tablet    Take 1 tablet (10 mg) by mouth At Bedtime    Persistent cough for 3 weeks or longer, Bibasilar crackles       predniSONE 20 MG tablet    DELTASONE    10 tablet    Take 2 tablets (40 mg) by mouth daily for 5 days    Low back pain       sildenafil 20 MG tablet    REVATIO    50 tablet    Take 5 tablets (100 mg) by mouth daily    Erectile dysfunction, unspecified erectile dysfunction type

## 2018-10-29 NOTE — LETTER
10/29/2018       RE: Felice Blood  [address] [facility] [address]     Dear Colleague,    Thank you for referring your patient, Felice Blood, to the [facility] SPORTS AND ORTHOPAEDIC WALK IN CLINIC at [facility]. Please see a copy of my visit note below.    [facility]  Orthopedics  Mt Hopkins MD  10/29/2018     Name: Felice Blood  MRN: [id number]  Age: 77 year old  : 1940  Referring provider: Referred Self     Chief Complaint: Left leg weakness and lower back pain    Date of Injury:  week of 2018, 10/29/18    History of Present Illness:   Felice Blood is a 77 year old, male who presents today for evaluation of his left leg weakness and lower back pain. The patient has been noticing weakness in his left leg for the past 2 weeks. He first noticed it when using a leg extension machine at the [facility], and he was unable to flex his leg to complete the movement. Additionally, this morning he was picking up a folding table and felt an immediate shooting pain in his back and gluteal region. His pain is sharp in nature, and it is exacerbated with going up and down stairs. He has taken Ibuprofen with little relief. No tingling, numbness. No clicking, locking, or catching.     Review of Systems:  A 10-point review of systems was obtained and is negative except for as noted in the HPI.     Medications:   Current Outpatient Prescriptions:      albuterol (PROAIR HFA) 108 (90 BASE) MCG/ACT Inhaler, Inhale 2 puffs into the lungs every 4 hours as needed for shortness of breath / dyspnea or wheezing, Disp: 1 Inhaler, Rfl: 0     aspirin 81 MG tablet, Take 1 tablet by mouth daily., Disp: , Rfl:      atorvastatin (LIPITOR) 10 MG tablet, Take 1 tablet (10 mg) by mouth daily, Disp: 90 tablet, Rfl: 3     cholecalciferol (VITAMIN D3) 1000 UNIT capsule, Take 1 capsule by mouth daily., Disp: , Rfl:      fluticasone (FLONASE) 50 MCG/ACT spray, Spray 2  sprays into both nostrils At Bedtime, Disp: 1 Bottle, Rfl: 1     levofloxacin (LEVAQUIN) 500 MG tablet, Take 1 tablet (500 mg) by mouth daily (Patient not taking: Reported on 4/17/2018), Disp: 7 tablet, Rfl: 0     lisinopril (PRINIVIL/ZESTRIL) 10 MG tablet, Take 1 tablet (10 mg) by mouth daily, Disp: 90 tablet, Rfl: 3     montelukast (SINGULAIR) 10 MG tablet, Take 1 tablet (10 mg) by mouth At Bedtime (Patient not taking: Reported on 4/17/2018), Disp: 30 tablet, Rfl: 0     sildenafil (REVATIO) 20 MG tablet, Take 5 tablets (100 mg) by mouth daily, Disp: 50 tablet, Rfl: 11    Allergies:  Etodolac   Percocet (nausea, muscle weakness)    Past Medical History:  Cobblestone retinal degeneration  Impotence of organic origin  Nonsensile cataract  Pure hypercholesterolemia  Unspecific essential hypertension  Vitreous detachment of both eyes    Past Surgical History:  Appendectomy   Cataract IOL, right (5/1/17)  Phacoemulsification with standard intraocular lens implant, right (5/1/17)  Tonsillectomy    Social History:  The patient is retired. The patient denies tobacco use and admits to alcohol use.     Family History:  No past pertinent family history.    Physical Examination:  General: alert, pleasant, no distress. sitting comfortably in chair    Back/Spine: no tenderness to palpation of spinous processes, or paraspinous musculature of lumbar spine. full ROM with flexion, extension, twisting, and pain with forward flexion. Slump test positive on left. Mild hamstring tightness noted. 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      Assessment:   77 year old, male with low back pain with left sided radicular symptoms     Plan:   -Begin taking prescribed Prednisone   -Begin HEP and PT   -Follow-up with me in 6 weeks for further evaluation   -sooner if new or worsening symptom.     All the patient's questions were answered to the best of my ability.    Mt Hopkins MD      Scribe Disclosure:   I, James Knight, am serving as a scribe to document services personally performed by Mt Hopkins MD at this visit, based upon the provider's statements to me. All documentation has been reviewed by the aforementioned provider prior to being entered into the official medical record.            SPORTS & ORTHOPEDIC WALK-IN VISIT 10/29/2018    Primary Care Physician: Dr. Maddox  Past few weeks has noticed some weakness in L leg. This morning was picking up a folding table and had some back pain the shot through   Reason for visit:     What part of your body is injured / painful?  left low back    What caused the injury /pain? Lifting up table    How long ago did your injury occur or pain begin? today    What are your most bothersome symptoms? Pain    How would you characterize your symptom?  sharp    What makes your symptoms better? Rest and Ibuprofen    What makes your symptoms worse? Other: up/down stairs    Have you been previously seen for this problem? No    Medical History:    Any recent changes to your medical history? No    Any new medication prescribed since last visit? No    Have you had surgery on this body part before? No    Social History:    Occupation: Retired    Handedness: Right    Exercise: [employer] 3x/wk    Review of Systems:    Do you have fever, chills, weight loss? No    Do you have any vision problems? No    Do you have any chest pain or edema? No    Do you have any shortness of breath or wheezing?  No    Do you have stomach problems? No    Do you have any numbness or focal weakness? No    Do you have diabetes? No    Do you have  problems with bleeding or clotting? No    Do you have an rashes or other skin lesions? No         Again, thank you for allowing me to participate in the care of your patient.      Sincerely,    Mt Hopkins MD

## 2018-10-29 NOTE — PROGRESS NOTES
SPORTS & ORTHOPEDIC WALK-IN VISIT 10/29/2018    Primary Care Physician: Dr. Maddox  Past few weeks has noticed some weakness in L leg. This morning was picking up a folding table and had some back pain the shot through   Reason for visit:     What part of your body is injured / painful?  left low back    What caused the injury /pain? Lifting up table    How long ago did your injury occur or pain begin? today    What are your most bothersome symptoms? Pain    How would you characterize your symptom?  sharp    What makes your symptoms better? Rest and Ibuprofen    What makes your symptoms worse? Other: up/down stairs    Have you been previously seen for this problem? No    Medical History:    Any recent changes to your medical history? No    Any new medication prescribed since last visit? No    Have you had surgery on this body part before? No    Social History:    Occupation: Retired    Handedness: Right    Exercise: [employer] 3x/wk    Review of Systems:    Do you have fever, chills, weight loss? No    Do you have any vision problems? No    Do you have any chest pain or edema? No    Do you have any shortness of breath or wheezing?  No    Do you have stomach problems? No    Do you have any numbness or focal weakness? No    Do you have diabetes? No    Do you have problems with bleeding or clotting? No    Do you have an rashes or other skin lesions? No

## 2018-10-29 NOTE — PATIENT INSTRUCTIONS
Take prednisone in the morning with food for 5 days  Do not take ibuprofen, naproxen while taking prednisone  Perform home exercises as tolerated  Follow up with physical therapy  Follow up in clinic if worsening symptoms such as numbness, weakness, or if there is no improvement after 6 weeks.     Sports & Orthopaedic Walk-In Clinic  [facility]  [address] [place] [id number]    Phone: [phone]  Fax: [phone]

## 2018-10-30 ENCOUNTER — THERAPY VISIT (OUTPATIENT)
Dept: PHYSICAL THERAPY | Facility: CLINIC | Age: 78
End: 2018-10-30
Payer: MEDICARE

## 2018-10-30 DIAGNOSIS — M54.42 ACUTE LEFT-SIDED LOW BACK PAIN WITH LEFT-SIDED SCIATICA: Primary | ICD-10-CM

## 2018-10-30 DIAGNOSIS — M54.50 LOW BACK PAIN: ICD-10-CM

## 2018-10-30 PROCEDURE — G8982 BODY POS GOAL STATUS: HCPCS | Mod: GP | Performed by: PHYSICAL THERAPIST

## 2018-10-30 PROCEDURE — 97161 PT EVAL LOW COMPLEX 20 MIN: CPT | Mod: GP | Performed by: PHYSICAL THERAPIST

## 2018-10-30 PROCEDURE — G8981 BODY POS CURRENT STATUS: HCPCS | Mod: GP | Performed by: PHYSICAL THERAPIST

## 2018-10-30 PROCEDURE — 97530 THERAPEUTIC ACTIVITIES: CPT | Mod: GP | Performed by: PHYSICAL THERAPIST

## 2018-10-30 PROCEDURE — 97110 THERAPEUTIC EXERCISES: CPT | Mod: GP | Performed by: PHYSICAL THERAPIST

## 2018-10-30 NOTE — LETTER
DEPARTMENT OF HEALTH AND HUMAN SERVICES  CENTERS FOR MEDICARE & MEDICAID SERVICES    PLAN/UPDATED PLAN OF PROGRESS FOR OUTPATIENT REHABILITATION    PATIENTS NAME:  Felice Blood   : 1940    PROVIDER NUMBER:    [id number]  [facility]  [id number]     PROVIDER NAME: [facility]  MEDICAL RECORD NUMBER: [id number]     START OF CARE DATE:  SOC Date: 10/30/18   TYPE:  PT    PRIMARY/TREATMENT DIAGNOSIS: (Pertinent Medical Diagnosis)  Low back pain  Acute left-sided low back pain with left-sided sciatica    VISITS FROM START OF CARE: 1     [facility] Initial Evaluation -- Lumbar  Date: 2018  Felice Blood is a 77 year old male with a lumbar spine condition.   Referral: Sports Med  Work mechanical stresses:  NA  Employment status:  Retired  Leisure mechanical stresses: Gym 2 x week (Arm and leg machines, walking), 1 x week aerobics class  Functional disability score (NADIYA/STarT Back):  See NADIYA/STarT back flowsheet  VAS score (0-10): 310  Patient goals/expectations:  Reduce pain to return back to gym    HISTORY:  Present symptoms: Lt low back, posterior thigh to medial ankle  Pain quality (sharp/shooting/stabbing/aching/burning/cramping):  Achy, sharp   Paresthesia (yes/no):  No  Present since (onset date): ~ 10/14/18.     Symptoms (improving/unchanging/worsening):  Worsening.   Symptoms commenced as a result of: Initially started after running on track at gym and H-S curl machine.  Worsened after lifting at home.   Condition occurred in the following environment:  Community/Home    Symptoms at onset (back/thigh/leg): thigh/leg  Constant symptoms (back/thigh/leg): None  Intermittent symptoms (back/thigh/leg): back/thigh/leg  Symptoms are made worse with the following: Always Bending, Always Sitting, Always Rising, Sometimes Standing, Sometimes Walking, Time of day - No effect and Always When still   Symptoms are made better with the  following: Always On the move and Other - Prednisone (Currently on day 2 of 5)  Disturbed sleep (yes/no):  No       PATIENTS NAME:  Felice Blood   : 1940  PRIMARY/TREATMENT DIAGNOSIS: (Pertinent Medical Diagnosis)  Low back pain  Acute left-sided low back pain with left-sided sciatica    HISTORY (continued)  Sleeping postures (prone/sup/side R/L): sup  Previous episodes (0/1-5/6-10/11+): 2-3   Year of first episode:   Previous history: Yes  Previous treatments: PT with prior episodes    Specific Questions:  Cough/Sneeze/Strain (pos/neg): Neg  Bowel/Bladder (normal/abnormal): Normal  Gait (normal/abnormal): Normal  Medications (nil/NSAIDS/analg/steroids/anticoag/other):  Steroids and Other - High blood pressure  Medical allergies:  Percocet  General health (excellent/good/fair/poor):  Excellent  Pertinent medical history:  High blood pressure  Imaging (None/Xray/MRI/Other):  X-ray (DJD L4-5, L5-S1) per radiology report  Recent or major surgery (yes/no):  No  Night pain (yes/no): No  Accidents (yes/no): No  Unexplained weight loss (yes/no): No  Barriers at home: None  Other red flags: None    EXAMINATION    Posture:   Sitting (good/fair/poor): Poor  Standing (good/fair/poor):Fair  Lordosis (red/acc/normal): Reduced  Correction of posture (better/worse/no effect): Better  Lateral Shift (right/left/nil): Nil  Relevant (yes/no):  No  Other Observations: NA    Neurological:  Motor deficit:  Lt knee ext 5-/5; H-S 4/5; DF 5-/5    Reflexes:  NT  Sensory deficit:  NT    Dural signs:  Pos slump (L)    Movement Loss:   Wilson Mod Min Nil Pain   Flexion  X   Lt low back, knee   Extension   X     Side Gliding R   X     Side Gliding L   X       PATIENTS NAME:  Felice Blood   : 1940  PRIMARY/TREATMENT DIAGNOSIS: (Pertinent Medical Diagnosis)  Low back pain  Acute left-sided low back pain with left-sided sciatica    Test Movements:   During: produces, abolishes, increases, decreases, no effect,  centralizing, peripheralizing   After: better, worse, no better, no worse, no effect, centralized, peripheralized  Pretest symptoms standing:    Symptoms During Symptoms After ROM increased ROM decreased No Effect   FIS        Rep FIS        EIS        Rep EIS        Pretest symptoms lying:     Symptoms During Symptoms After ROM increased ROM decreased No Effect   TALIA        Rep TALIA        EIL        Rep EIL        If required, pretest symptoms:    Symptoms During Symptoms After ROM increased ROM decreased No Effect   SGIS - R        Rep SGIS - R        SGIS - L        Rep SGIS - L          Static Tests:  Sitting slouched:    Sitting erect:    Standing slouched   Standing erect:    Lying prone in extension:   Long sitting:    Other Tests: Slouch/Overcorrect - NE, NE, Improved quad and H-S strength  Provisional Classification:  Inconclusive/Other - Mechanically Inconclusive - Re-assess after round of prednisone    Principle of Management:  Education:  Posture, centralization, chemical vs mechanical pain    Equipment provided:  None.  Use of rolled towel for posture correction  Mechanical therapy (Y/N):  Y   Extension principle:      Lateral Principle:    Flexion principle:      Other:  Slouch/Over correct x 10-15 reps every 2 hrs            PATIENTS NAME:  Felice Blood   : 1940  PRIMARY/TREATMENT DIAGNOSIS: (Pertinent Medical Diagnosis)  Low back pain  Acute left-sided low back pain with left-sided sciatica    ASSESSMENT/PLAN:  Patient is a 77 year old male with lumbar complaints.    Patient has the following significant findings with corresponding treatment plan.                Diagnosis 1:  LBP  Pain -  self management, education, directional preference exercise and home program  Decreased ROM/flexibility - manual therapy, therapeutic exercise and home program  Decreased joint mobility - manual therapy, therapeutic exercise and home program  Decreased strength - therapeutic exercise, therapeutic  activities and home program  Impaired muscle performance - neuro re-education and home program  Decreased function - therapeutic activities and home program  Impaired posture - neuro re-education and home program    Therapy Evaluation Codes:   1) History comprised of:   Personal factors that impact the plan of care:      None.    Comorbidity factors that impact the plan of care are:      High blood pressure.     Medications impacting care: High blood pressure and Steroids.  2) Examination of Body Systems comprised of:   Body structures and functions that impact the plan of care:      Lumbar spine.   Activity limitations that impact the plan of care are:      Bending, Sitting and Standing.  3) Clinical presentation characteristics are:   Stable/Uncomplicated.  4) Decision-Making    Low complexity using standardized patient assessment instrument and/or   measureable assessment of functional outcome.  Cumulative Therapy Evaluation is: Low complexity.    Previous and current functional limitations:  (See Goal Flow Sheet for this information)    Short term and Long term goals: (See Goal Flow Sheet for this information)   Communication ability:  Patient appears to be able to clearly communicate and understand verbal and written communication and follow directions correctly.  Treatment Explanation - The following has been discussed with the patient:   RX ordered/plan of care, Anticipated outcomes, Possible risks and side effects                      PATIENTS NAME:  Felice Blood   : 1940  PRIMARY/TREATMENT DIAGNOSIS: (Pertinent Medical Diagnosis)  Low back pain  Acute left-sided low back pain with left-sided sciatica      This patient would benefit from PT intervention to resume normal activities.   Rehab potential is good.  Frequency:  1 X week, once daily  Duration:  for 6 weeks  Discharge Plan:  Achieve all LTG.  Independent in home treatment program.  Reach maximal therapeutic  "benefit.              Caregiver Signature/Credentials _____________________________ Date ________          ELKE ArizmendiT, Cert MDT     I have reviewed and certified the need for these services and plan of treatment while under my care.        PHYSICIAN'S SIGNATURE:   _________________________________________      Date___________   Mt Hopkins MD    Certification period:  Beginning of Cert date period: 10/30/18 to   01/27/19     Functional Level Progress Report: Please see attached \"Goal Flow sheet for Functional level.\"    ____X____ Continue Services or       ________ DC Services                Service dates: From  SOC Date: 10/30/18 to present                         "

## 2018-10-30 NOTE — PROGRESS NOTES
[facility] Initial Evaluation -- Lumbar    Date: October 30, 2018  Felice Blood is a 77 year old male with a lumbar spine condition.   Referral: Sports Med  Work mechanical stresses:  NA  Employment status:  Retired  Leisure mechanical stresses: Gym 2 x week (Arm and leg machines, walking), 1 x week aerobics class  Functional disability score (NADIYA/STarT Back):  See NADIYA/STarT back flowsheet  VAS score (0-10): 3/10  Patient goals/expectations:  Reduce pain to return back to gym    HISTORY:    Present symptoms: Lt low back, posterior thigh to medial ankle  Pain quality (sharp/shooting/stabbing/aching/burning/cramping):  Achy, sharp   Paresthesia (yes/no):  No    Present since (onset date): ~ 10/14/18.     Symptoms (improving/unchanging/worsening):  Worsening.     Symptoms commenced as a result of: Initially started after running on track at gym and H-S curl machine.  Worsened after lifting at home.   Condition occurred in the following environment:  Community/Home      Symptoms at onset (back/thigh/leg): thigh/leg  Constant symptoms (back/thigh/leg): None  Intermittent symptoms (back/thigh/leg): back/thigh/leg    Symptoms are made worse with the following: Always Bending, Always Sitting, Always Rising, Sometimes Standing, Sometimes Walking, Time of day - No effect and Always When still   Symptoms are made better with the following: Always On the move and Other - Prednisone (Currently on day 2 of 5)    Disturbed sleep (yes/no):  No Sleeping postures (prone/sup/side R/L): sup    Previous episodes (0/1-5/6-10/11+): 2-3 Year of first episode: 1996    Previous history: Yes  Previous treatments: PT with prior episodes      Specific Questions:  Cough/Sneeze/Strain (pos/neg): Neg  Bowel/Bladder (normal/abnormal): Normal  Gait (normal/abnormal): Normal  Medications (nil/NSAIDS/analg/steroids/anticoag/other):  Steroids and Other - High blood pressure  Medical allergies:  Percocet  [facility]  (excellent/good/fair/poor):  Excellent  Pertinent medical history:  High blood pressure  Imaging (None/Xray/MRI/Other):  X-ray (DJD L4-5, L5-S1) per radiology report  Recent or major surgery (yes/no):  No  Night pain (yes/no): No  Accidents (yes/no): No  Unexplained weight loss (yes/no): No  Barriers at home: None  Other red flags: None    EXAMINATION    Posture:   Sitting (good/fair/poor): Poor  Standing (good/fair/poor):Fair  Lordosis (red/acc/normal): Reduced  Correction of posture (better/worse/no effect): Better    Lateral Shift (right/left/nil): Nil  Relevant (yes/no):  No  Other Observations: NA    Neurological:    Motor deficit:  Lt knee ext 5-/5; H-S 4/5; DF 5-/5  Reflexes:  NT  Sensory deficit:  NT  Dural signs:  Pos slump (L)    Movement Loss:   Wilson Mod Min Nil Pain   Flexion  X   Lt low back, knee   Extension   X     Side Gliding R   X     Side Gliding L   X       Test Movements:   During: produces, abolishes, increases, decreases, no effect, centralizing, peripheralizing   After: better, worse, no better, no worse, no effect, centralized, peripheralized    Pretest symptoms standing:    Symptoms During Symptoms After ROM increased ROM decreased No Effect   FIS        Rep FIS        EIS        Rep EIS        Pretest symptoms lying:     Symptoms During Symptoms After ROM increased ROM decreased No Effect   TALIA        Rep TALIA        EIL        Rep EIL        If required, pretest symptoms:    Symptoms During Symptoms After ROM increased ROM decreased No Effect   SGIS - R        Rep SGIS - R        SGIS - L        Rep SGIS - L          Static Tests:  Sitting slouched:    Sitting erect:    Standing slouched   Standing erect:    Lying prone in extension:   Long sitting:      Other Tests: Slouch/Overcorrect - NE, NE, Improved quad and H-S strength    Provisional Classification:  Inconclusive/Other - Mechanically Inconclusive - Re-assess after round of prednisone    Principle of Management:  Education:  Posture,  centralization, chemical vs mechanical pain   Equipment provided:  None.  Use of rolled towel for posture correction  Mechanical therapy (Y/N):  Y   Extension principle:    Lateral Principle:    Flexion principle:    Other:  Slouch/Over correct x 10-15 reps every 2 hrs    ASSESSMENT/PLAN:    Patient is a 77 year old male with lumbar complaints.    Patient has the following significant findings with corresponding treatment plan.                Diagnosis 1:  LBP  Pain -  self management, education, directional preference exercise and home program  Decreased ROM/flexibility - manual therapy, therapeutic exercise and home program  Decreased joint mobility - manual therapy, therapeutic exercise and home program  Decreased strength - therapeutic exercise, therapeutic activities and home program  Impaired muscle performance - neuro re-education and home program  Decreased function - therapeutic activities and home program  Impaired posture - neuro re-education and home program    Therapy Evaluation Codes:   1) History comprised of:   Personal factors that impact the plan of care:      None.    Comorbidity factors that impact the plan of care are:      High blood pressure.     Medications impacting care: High blood pressure and Steroids.  2) Examination of Body Systems comprised of:   Body structures and functions that impact the plan of care:      Lumbar spine.   Activity limitations that impact the plan of care are:      Bending, Sitting and Standing.  3) Clinical presentation characteristics are:   Stable/Uncomplicated.  4) Decision-Making    Low complexity using standardized patient assessment instrument and/or measureable assessment of functional outcome.  Cumulative Therapy Evaluation is: Low complexity.    Previous and current functional limitations:  (See Goal Flow Sheet for this information)    Short term and Long term goals: (See Goal Flow Sheet for this information)     Communication ability:  Patient appears to  be able to clearly communicate and understand verbal and written communication and follow directions correctly.  Treatment Explanation - The following has been discussed with the patient:   RX ordered/plan of care  Anticipated outcomes  Possible risks and side effects  This patient would benefit from PT intervention to resume normal activities.   Rehab potential is good.    Frequency:  1 X week, once daily  Duration:  for 6 weeks  Discharge Plan:  Achieve all LTG.  Independent in home treatment program.  Reach maximal therapeutic benefit.    Please refer to the daily flowsheet for treatment today, total treatment time and time spent performing 1:1 timed codes.

## 2018-11-08 ENCOUNTER — THERAPY VISIT (OUTPATIENT)
Dept: PHYSICAL THERAPY | Facility: CLINIC | Age: 78
End: 2018-11-08
Payer: MEDICARE

## 2018-11-08 DIAGNOSIS — M54.42 ACUTE LEFT-SIDED LOW BACK PAIN WITH LEFT-SIDED SCIATICA: ICD-10-CM

## 2018-11-08 PROCEDURE — 97110 THERAPEUTIC EXERCISES: CPT | Mod: GP | Performed by: PHYSICAL THERAPIST

## 2018-11-08 PROCEDURE — 97530 THERAPEUTIC ACTIVITIES: CPT | Mod: GP | Performed by: PHYSICAL THERAPIST

## 2018-11-15 ENCOUNTER — THERAPY VISIT (OUTPATIENT)
Dept: PHYSICAL THERAPY | Facility: CLINIC | Age: 78
End: 2018-11-15
Payer: MEDICARE

## 2018-11-15 DIAGNOSIS — M54.42 ACUTE LEFT-SIDED LOW BACK PAIN WITH LEFT-SIDED SCIATICA: ICD-10-CM

## 2018-11-15 PROCEDURE — 97530 THERAPEUTIC ACTIVITIES: CPT | Mod: GP | Performed by: PHYSICAL THERAPIST

## 2018-11-15 PROCEDURE — 97110 THERAPEUTIC EXERCISES: CPT | Mod: GP | Performed by: PHYSICAL THERAPIST

## 2018-12-06 ENCOUNTER — THERAPY VISIT (OUTPATIENT)
Dept: PHYSICAL THERAPY | Facility: CLINIC | Age: 78
End: 2018-12-06
Payer: MEDICARE

## 2018-12-06 DIAGNOSIS — M54.42 ACUTE LEFT-SIDED LOW BACK PAIN WITH LEFT-SIDED SCIATICA: ICD-10-CM

## 2018-12-06 PROCEDURE — 97530 THERAPEUTIC ACTIVITIES: CPT | Mod: GP | Performed by: PHYSICAL THERAPIST

## 2018-12-06 PROCEDURE — 97110 THERAPEUTIC EXERCISES: CPT | Mod: GP | Performed by: PHYSICAL THERAPIST

## 2018-12-07 ENCOUNTER — OFFICE VISIT (OUTPATIENT)
Dept: ORTHOPEDICS | Facility: CLINIC | Age: 78
End: 2018-12-07
Payer: MEDICARE

## 2018-12-07 VITALS
DIASTOLIC BLOOD PRESSURE: 80 MMHG | SYSTOLIC BLOOD PRESSURE: 130 MMHG | HEART RATE: 85 BPM | BODY MASS INDEX: 24.22 KG/M2 | WEIGHT: 164 LBS

## 2018-12-07 DIAGNOSIS — M54.42 ACUTE LEFT-SIDED LOW BACK PAIN WITH LEFT-SIDED SCIATICA: Primary | ICD-10-CM

## 2018-12-07 NOTE — PROGRESS NOTES
[facility]  Orthopedics  Mt Hopkins MD  2018      Name: Felice Blood  MRN: [id number]  Age: 78 year old  : 1940  Referring provider: Referred Self      Chief Complaint: No chief complaint on file.     Date of Injury:   Left lend week of   Lower back: 10/29/2018     History of Present Illness:   Felice Blood is a 78 year old, male who presents today for follow-up regarding left leg weakness and lower back pain. I last evaluated the patient on 10/29/2018, at which time the patient reported having left leg weakness for two weeks prior to the visit after using a leg extension machine at the [facility]. He started noticing shooting pain in his lower back after trying to  a folding table earlier that day. He denied any tingling, numbness, clicking, locking or catching. We elected to proceed with Prednisone, HEP and PT. Today in regards to his back, the patient reports his back pain has improved approximately 50% since his last visit. He is very pleased with his physical therapy sessions and no longer has any pain or weakness in his legs. This morning, the patient woke up and felt a shooting pain in his back while getting out of his bed. He notes that he has pain and soreness primarily with extension movements. He states that he does not believe that the prednisone have given any relief. He does not voice any further concerns at this time.     Review of Systems:   A 10-point review of systems was obtained and is negative except for as noted in the HPI.      Physical Examination:  There were no vitals taken for this visit.  General: alert, pleasant, no distress. sitting comfortably in chair  Back/Spine: no tenderness to palpation of spinous processes, or paraspinous musculature of lumbar spine. full ROM with flexion, twisting, and side bending without pain. Pain with extension. Straight leg raise negative bilaterally. Moderate hamstring tightness noted bilaterally. No pain in  back with MENA testing bilaterally  Neuro: SILT on bilateral lower extremities, can stand on toes and heel walk without difficulty.     Assessment:   78 year old, male with mechanical lower back pain, suspect muscular strain improving with PT     Plan:   1. Continue physical therapy to focus on strengthening.   2. Continue at home exercises.   3. Recommended taking tylenol, ibuprofen or heat therapy as needed for pain management   4. Follow up with me PRN or if symptoms do not improve. If his symptoms do not continue to improve we will consider an MRI.       Mt Hopkins MD    Scribe Disclosure:   I, Camron Sun, am serving as a scribe to document services personally performed by Mt Hopkins MD at this visit, based upon the provider's statements to me. All documentation has been reviewed by the aforementioned provider prior to being entered into the official medical record.

## 2018-12-07 NOTE — PROGRESS NOTES
[facility]  Orthopedics  Mt Hopkins MD  2018     Name: Felice Blood  MRN: [id number]  Age: 78 year old  : 1940  Referring provider: Referred Self     Chief Complaint: No chief complaint on file.     Date of Injury:   Left lend week of   Lower back: 10/29/2018    History of Present Illness:   Felice Blood is a 78 year old, male who presents today for follow-up regarding left leg weakness and lower back pain. I last evaluated the patient on 10/29/2018, at which time the patient reported having left leg weakness for two weeks prior to the visit after using a leg extension machine at the [facility]. He started noticing shooting pain in his lower back after trying to  a folding table earlier that day. He denied any tingling, numbness, clicking, locking or catching. We elected to proceed with Prednisone, HEP and PT. Today, the patient reports ***     Review of Systems:   A 10-point review of systems was obtained and is negative except for as noted in the HPI.     Physical Examination:  There were no vitals taken for this visit.  General: alert, pleasant, no distress. sitting comfortably in chair  Back/Spine: no tenderness to palpation of spinous processes, or paraspinous musculature of lumbar spine. full ROM with flexion, extension, twisting, and side bending without pain. Straight leg raise negative bilaterally. ***hamstring tightness noted. *** pain in back with MENA testing bilaterally  Neuro: SILT on bilateral lower extremities, can stand on toes and heel walk without difficulty, patellar and achilles reflexes 2+ and symmetric, babinski downgoing bilaterally       Imaging:  Radiographs of the *** - *** views (2018)  ***    I have independently reviewed the above imaging studies; the results were discussed with the patient.     Assessment:   78 year old, male with low back pain with left sided radicular symptoms  ***    Plan:   ***    Procedure:   After written  informed consent was obtained, the patient's {LEFT/RIGHT/BILATLY:904670504} *** was prepped with chloraprep.  A combination of {ORTHO INJECTION M} of {ORTHO INJ CHOICE:684561} and {NUMBERS 1-10:972121}cc {LIDOCAINE:536558501} were injected into the ***.  There were no immediate complications.      Scribe Disclosure:   I, Camron Sun, am serving as a scribe to document services personally performed by Mt Hopkins MD at this visit, based upon the provider's statements to me. All documentation has been reviewed by the aforementioned provider prior to being entered into the official medical record.

## 2018-12-07 NOTE — PROGRESS NOTES
SPORTS & ORTHOPEDIC WALK-IN FOLLOW-UP VISIT 12/7/2018    Interval History:     Follow up reason: LBP recheck     Date of injury: 10/29/18- lifting up table    Date last seen: 10/29/18    Following Therapeutic Plan: Yes     Pain: Improving- was improving, did have some pain this AM when got up from chair    Function: Unchanged    Interval History: Has done 4 sessions of PT. Was improving, this morning when getting up from chair, must have rotated funny as increase in pain.    Medical History:    Any recent changes to your medical history? No    Any new medication prescribed since last visit? No    Review of Systems:    Do you have fever, chills, weight loss? No    Do you have any vision problems? No    Do you have any chest pain or edema? No    Do you have any shortness of breath or wheezing?  No    Do you have stomach problems? No    Do you have any numbness or focal weakness? No    Do you have diabetes? No    Do you have problems with bleeding or clotting? No    Do you have an rashes or other skin lesions? No

## 2018-12-27 ENCOUNTER — THERAPY VISIT (OUTPATIENT)
Dept: PHYSICAL THERAPY | Facility: CLINIC | Age: 78
End: 2018-12-27
Payer: MEDICARE

## 2018-12-27 DIAGNOSIS — M54.42 ACUTE LEFT-SIDED LOW BACK PAIN WITH LEFT-SIDED SCIATICA: ICD-10-CM

## 2018-12-27 PROCEDURE — 97110 THERAPEUTIC EXERCISES: CPT | Mod: GP | Performed by: PHYSICAL THERAPIST

## 2018-12-27 PROCEDURE — 97530 THERAPEUTIC ACTIVITIES: CPT | Mod: GP | Performed by: PHYSICAL THERAPIST

## 2018-12-27 NOTE — PROGRESS NOTES
PROGRESS  REPORT    Progress reporting period is from 10.30.18 to 12.27.18.       SUBJECTIVE  Subjective changes noted by patient:  Pt states that pain is much better overall.  Able to sit for longer periods now compared to 2-3 weeks ago.  Did return back to the gym 2 x since last visit and walked and did upper and lower body weight machines without issues.  Has done stretches 2 x daily with TALIA and EIL.    Current Pain level: 0/10.     Initial Pain level: (3-8/10).   Changes in function:  Yes (See Goal flowsheet attached for changes in current functional level)  Adverse reaction to treatment or activity: None    OBJECTIVE  Objective: L/S AROM:  WNL all planes.  Myotomes:  WNL.      ASSESSMENT/PLAN  Updated problem list and treatment plan:   Diagnosis 1:  LBP    Decreased ROM/flexibility - therapeutic exercise and home program  Decreased joint mobility - therapeutic exercise and home program  Decreased strength - therapeutic exercise, therapeutic activities and home program  Impaired muscle performance - neuro re-education and home program  STG/LTGs have been met or progress has been made towards goals:  Yes (See Goal flow sheet completed today.)  Assessment of Progress: The patient's condition is improving.  The patient has met all of their long term goals.  Self Management Plans:  Patient is independent in a home treatment program.  Patient is independent in self management of symptoms.  I have re-evaluated this patient and find that the nature, scope, duration and intensity of the therapy is appropriate for the medical condition of the patient.  Felice continues to require the following intervention to meet STG and LTG's:  PT intervention is no longer required to meet STG/LTG.    Recommendations:  This patient is ready to be discharged from therapy and continue their home treatment program.

## 2019-04-22 ENCOUNTER — OFFICE VISIT (OUTPATIENT)
Dept: FAMILY MEDICINE | Facility: CLINIC | Age: 79
End: 2019-04-22
Payer: MEDICARE

## 2019-04-22 VITALS
SYSTOLIC BLOOD PRESSURE: 145 MMHG | BODY MASS INDEX: 23.95 KG/M2 | DIASTOLIC BLOOD PRESSURE: 88 MMHG | HEART RATE: 68 BPM | OXYGEN SATURATION: 95 % | WEIGHT: 162.2 LBS

## 2019-04-22 DIAGNOSIS — H61.23 BILATERAL IMPACTED CERUMEN: ICD-10-CM

## 2019-04-22 DIAGNOSIS — E78.00 PURE HYPERCHOLESTEROLEMIA: ICD-10-CM

## 2019-04-22 DIAGNOSIS — Z12.11 SCREEN FOR COLON CANCER: ICD-10-CM

## 2019-04-22 DIAGNOSIS — I10 ESSENTIAL HYPERTENSION: ICD-10-CM

## 2019-04-22 DIAGNOSIS — R05.9 COUGH: ICD-10-CM

## 2019-04-22 DIAGNOSIS — N52.9 IMPOTENCE OF ORGANIC ORIGIN: ICD-10-CM

## 2019-04-22 DIAGNOSIS — N52.9 ERECTILE DYSFUNCTION, UNSPECIFIED ERECTILE DYSFUNCTION TYPE: ICD-10-CM

## 2019-04-22 LAB
ALBUMIN SERPL-MCNC: 3.7 G/DL (ref 3.4–5)
ALP SERPL-CCNC: 53 U/L (ref 40–150)
ALT SERPL W P-5'-P-CCNC: 24 U/L (ref 0–70)
ANION GAP SERPL CALCULATED.3IONS-SCNC: 7 MMOL/L (ref 3–14)
AST SERPL W P-5'-P-CCNC: 20 U/L (ref 0–45)
BILIRUB SERPL-MCNC: 1 MG/DL (ref 0.2–1.3)
BUN SERPL-MCNC: 10 MG/DL (ref 7–30)
CALCIUM SERPL-MCNC: 9.3 MG/DL (ref 8.5–10.1)
CHLORIDE SERPL-SCNC: 103 MMOL/L (ref 94–109)
CHOLEST SERPL-MCNC: 120 MG/DL
CO2 SERPL-SCNC: 24 MMOL/L (ref 20–32)
CREAT SERPL-MCNC: 0.76 MG/DL (ref 0.66–1.25)
GFR SERPL CREATININE-BSD FRML MDRD: 87 ML/MIN/{1.73_M2}
GLUCOSE SERPL-MCNC: 95 MG/DL (ref 70–99)
HDLC SERPL-MCNC: 51 MG/DL
LDLC SERPL CALC-MCNC: 51 MG/DL
NONHDLC SERPL-MCNC: 68 MG/DL
POTASSIUM SERPL-SCNC: 3.8 MMOL/L (ref 3.4–5.3)
PROT SERPL-MCNC: 7 G/DL (ref 6.8–8.8)
SODIUM SERPL-SCNC: 134 MMOL/L (ref 133–144)
TRIGL SERPL-MCNC: 88 MG/DL

## 2019-04-22 RX ORDER — ALBUTEROL SULFATE 90 UG/1
2 AEROSOL, METERED RESPIRATORY (INHALATION) EVERY 4 HOURS PRN
Qty: 18 G | Refills: 1 | Status: SHIPPED | OUTPATIENT
Start: 2019-04-22 | End: 2022-03-15

## 2019-04-22 RX ORDER — LISINOPRIL 10 MG/1
10 TABLET ORAL DAILY
Qty: 90 TABLET | Refills: 3 | Status: SHIPPED | OUTPATIENT
Start: 2019-04-22 | End: 2020-05-07

## 2019-04-22 RX ORDER — ATORVASTATIN CALCIUM 10 MG/1
10 TABLET, FILM COATED ORAL DAILY
Qty: 90 TABLET | Refills: 3 | Status: SHIPPED | OUTPATIENT
Start: 2019-04-22 | End: 2020-07-16

## 2019-04-22 RX ORDER — SILDENAFIL CITRATE 20 MG/1
100 TABLET ORAL DAILY
Qty: 60 TABLET | Refills: 11 | Status: SHIPPED | OUTPATIENT
Start: 2019-04-22 | End: 2020-02-21

## 2019-04-22 ASSESSMENT — PAIN SCALES - GENERAL: PAINLEVEL: NO PAIN (0)

## 2019-04-22 NOTE — NURSING NOTE
Chief Complaint   Patient presents with     Physical     Pt is here for an annual physical       Juanita Florian EMT at 8:14 AM on 4/22/2019

## 2019-04-22 NOTE — PATIENT INSTRUCTIONS
[facility] Phone Number: [phone]   [facility] Medication Refill Request Information:  * Please contact your pharmacy regarding ANY request for medication refills.  ** [facility] Prescription Fax = [phone]  * Please allow 3 business days for routine medication refills.  * Please allow 5 business days for controlled substance medication refills.     [facility] Test Result notification information:  *You will be notified with in 7-10 days of your appointment day regarding the results of your test.  If you are on MyChart you will be notified as soon as the provider has reviewed the results and signed off on them.

## 2019-04-22 NOTE — PROGRESS NOTES
[facility]  Primary Care Center   Anthony Maddox MD  04/22/2019      Chief Complaint:   Physical       History of Present Illness:   Felice Blood is a 78 year old male with a history of hypertension and hypercholesterolemia who presents for a physical.     He saw walk in orthopedics who referred him to PT for foot pain, PT care resolved his foot pain. He also had back pain, and again saw ortho walk in and PT which helped.     He is having difficulties with allergies and has found an albuterol inhaler helpful in the past. He also has rhinorrhea and sneezing. Allergies usually occur in the spring and fall without issues over the summer.     His blood pressure was high today. He has a BP cuff next to his bed and last night his reading was 125/82 at home. He has had lower readings after using the treadmill. He still goes to the [facility] a few times a week.     No emergency department visits.     For erectile dysfunction he takes Sildenafil 60-80mg at a time.     He also wondered if he should be taking a calcium supplement. He continues on daily aspirin. He also take 1000 international unit(s) vitamin D daily.     Healthcare maintenance:   Colon Cancer screening (age 50 - 75): yearly FIT or colonoscopy every 5 - 10 years - Up to Date completed 2016  Glucose: every 5 years, yearly if risk factors? - Recommended  Lipid panel: every 5 years, yearly if risk factors - Recommended  Immunizations (Tetanus every 10 years, Influenza yearly, Pneumonia PPV-23 and PCV-13 age ? 65 or sooner if risk factors) - Up to Date including Shingrix (x2)  Immunization History   Administered Date(s) Administered     Influenza (High Dose) 3 valent vaccine 10/06/2014, 11/01/2016     Influenza (IIV3) PF 11/10/2011, 10/04/2012, 11/18/2013     Pneumo Conj 13-V (2010&after) 04/10/2015     Pneumococcal 23 valent 01/24/2006     TD (ADULT, 7+) 01/24/2006, 11/16/2015     Zoster vaccine, live 03/19/2010      The following health maintenance issues  were also reviewed and addressed as needed:  Blood pressure (>18 years annually)  Lifestyle habits (including exercise, diet, weight)  Health risk behaviors (sexual history, alcohol, tobacco, drug use, partner violence)  Routine eye, dental, hearing, and skin exams  Advanced directives    Review of Systems:   Pertinent items are noted in HPI, remainder of complete ROS is negative.      Active Medications:     Current Outpatient Medications:      albuterol (PROAIR HFA) 108 (90 Base) MCG/ACT inhaler, Inhale 2 puffs into the lungs every 4 hours as needed for shortness of breath / dyspnea or wheezing, Disp: 18 g, Rfl: 1     aspirin 81 MG tablet, Take 1 tablet by mouth daily., Disp: , Rfl:      atorvastatin (LIPITOR) 10 MG tablet, Take 1 tablet (10 mg) by mouth daily, Disp: 90 tablet, Rfl: 3     cholecalciferol (VITAMIN D3) 1000 UNIT capsule, Take 1 capsule by mouth daily., Disp: , Rfl:      fluticasone (FLONASE) 50 MCG/ACT spray, Spray 2 sprays into both nostrils At Bedtime, Disp: 1 Bottle, Rfl: 1     lisinopril (PRINIVIL/ZESTRIL) 10 MG tablet, Take 1 tablet (10 mg) by mouth daily, Disp: 90 tablet, Rfl: 3     sildenafil (REVATIO) 20 MG tablet, Take 5 tablets (100 mg) by mouth daily, Disp: 60 tablet, Rfl: 11     montelukast (SINGULAIR) 10 MG tablet, Take 1 tablet (10 mg) by mouth At Bedtime (Patient not taking: Reported on 4/17/2018), Disp: 30 tablet, Rfl: 0      Allergies:   Etodolac and Percocet [oxycodone-acetaminophen]      Past Medical History:  Cobblestone retinal degeneration  Impotence  Pure hypercholesterolemia  Hypertension     Past Surgical History:  Appendectomy  Cataracts  Cataracts  Tonsillectomy     Social History:     Non smoker    Physical Exam:   /88   Pulse 68   Wt 73.6 kg (162 lb 3.2 oz)   SpO2 95%   BMI 23.95 kg/m     Constitutional: Alert. In no distress.  Head: Normocephalic. No masses, lesions, tenderness or abnormalities.  ENT: No neck nodes or sinus tenderness.  Cardiovascular:  RRR. No murmurs, clicks, gallops, or rub.  Respiratory: Clear to auscultation bilaterally, no wheezes or crackles.  Gastrointestinal: Abdomen soft. Non-tender. BS normal. No masses or organomegaly.  Musculoskeletal: Extremities normal. No gross deformities noted. Normal muscle tone.  Skin: No suspicious lesions. No rashes.  Neurologic: Gait normal. Reflexes normal and symmetric. Sensation grossly WNL.  Psychiatric: Mentation appears normal. Normal affect.   Hematologic/Lymphatic/Immunologic: Normal cervical lymph nodes.    (male): normal male genitalia without lesions or urethral discharge, no hernia     Assessment and Plan:  Cough  Seasonal.  - albuterol (PROAIR HFA) 108 (90 Base) MCG/ACT inhaler  Dispense: 18 g; Refill: 1    Pure hypercholesterolemia   - atorvastatin (LIPITOR) 10 MG tablet  Dispense: 90 tablet; Refill: 3  - lisinopril (PRINIVIL/ZESTRIL) 10 MG tablet  Dispense: 90 tablet; Refill: 3    Essential hypertension  - atorvastatin (LIPITOR) 10 MG tablet  Dispense: 90 tablet; Refill: 3  - lisinopril (PRINIVIL/ZESTRIL) 10 MG tablet  Dispense: 90 tablet; Refill: 3    Erectile dysfunction, unspecified erectile dysfunction type  - sildenafil (REVATIO) 20 MG tablet  Dispense: 60 tablet; Refill: 11    Vaccination history    He had both Shingrix vaccines.      Follow-up: No follow-ups on file.         Scribe Disclosure:  I, Magdiel White, am serving as a scribe to document services personally performed by Anthony Maddox MD at this visit, based upon the provider's statements to me. All documentation has been reviewed by the aforementioned provider prior to being entered into the official medical record.    Portions of this medical record were completed by a scribe. UPON MY REVIEW AND AUTHENTICATION BY ELECTRONIC SIGNATURE, this confirms (a) I performed the applicable clinical services, and (b) the record is accurate.   Anthony Maddox MD

## 2019-05-17 ENCOUNTER — OFFICE VISIT (OUTPATIENT)
Dept: INTERNAL MEDICINE | Facility: CLINIC | Age: 79
End: 2019-05-17
Payer: MEDICARE

## 2019-05-17 VITALS
TEMPERATURE: 97.6 F | OXYGEN SATURATION: 97 % | SYSTOLIC BLOOD PRESSURE: 136 MMHG | HEART RATE: 73 BPM | BODY MASS INDEX: 23.98 KG/M2 | DIASTOLIC BLOOD PRESSURE: 83 MMHG | WEIGHT: 162.4 LBS

## 2019-05-17 DIAGNOSIS — J20.9 ACUTE BRONCHITIS, UNSPECIFIED ORGANISM: Primary | ICD-10-CM

## 2019-05-17 RX ORDER — DOXYCYCLINE HYCLATE 100 MG
100 TABLET ORAL 2 TIMES DAILY
Qty: 14 TABLET | Refills: 0 | Status: SHIPPED | OUTPATIENT
Start: 2019-05-17 | End: 2019-05-24

## 2019-05-17 ASSESSMENT — PAIN SCALES - GENERAL: PAINLEVEL: NO PAIN (0)

## 2019-05-17 NOTE — PATIENT INSTRUCTIONS
[facility] Medication Refill Request Information:  * Please contact your pharmacy regarding ANY request for medication refills.  ** [facility]ax = [phone]  * Please allow 3 business days for routine medication refills.  * Please allow 5 business days for controlled substance medication refills.     [facility] Test Result notification information:  *You will be notified with in 7-10 days of your appointment day regarding the results of your test.  If you are on MyChart you will be notified as soon as the provider has reviewed the results and signed off on them.    [facility]: [phone]       Buy some Flonase nasal steroid spray for post nasal drainage.  Use Albuterol inhaler 4 times per day to help with breathing  Use Mucinex for mucus  Will prescribe antibiotic  Let us know if symptoms do not improve over next 3-4 days

## 2019-05-17 NOTE — NURSING NOTE
Chief Complaint   Patient presents with     Cough     productive cough x3days, itchy throat, congested chest. Also feeling dizzy and diarrhea       Thomas Spencer, EMT 9:33 AM on 5/17/2019.

## 2019-05-17 NOTE — PROGRESS NOTES
[facility]  Primary Care Center   Cookie Mota MD  05/17/2019      Chief Complaint:   Cough     History of Present Illness:   Felice Blood is a 78 year old male with a history of hypertension and hypercholesteremia who presents for evaluation of cough and cold. A couple weeks ago he states that he had allergy symptoms including dripping nose and watery eyes. He took montelukast, however is unsure how helpful this was. Then, in the last 3 days he developed a sore and itchy throat, most likely from a men's retreat he went on this past weekend. Last night he felt feverish, although a thermometer has not shown an elevated temperature. This morning his cough moved to chest, he felt dizzy and tired, and had some loose diarrhea one time. However, his sore throat has improved today. Other symptoms include drainage to the back of his throat an coughing up phlegm particularly at night. His wife feels this is somewhat unusual and his cough has been keeping him up. He denies any face or sinus pain, pre existing lung or heart trouble, stomach pain, vomiting, or painful swollen glands.To help he took 2 Mucinex tablets this morning and has been using his albuterol inhaler. He has been trying to stay hydrated  by drinking lots of fluids. He has not tried any steroid nasal sprays like Flonase. He does feel this has interfered with his daily activities. Of note, he has had pneumonia in the past and has concern he has this again.     Review of Systems:   Pertinent items are noted in HPI, remainder of complete ROS is negative.      Active Medications:      albuterol (PROAIR HFA) 108 (90 Base) MCG/ACT inhaler, Inhale 2 puffs into the lungs every 4 hours as needed for shortness of breath / dyspnea or wheezing, Disp: 18 g, Rfl: 1     aspirin 81 MG tablet, Take 1 tablet by mouth daily., Disp: , Rfl:      atorvastatin (LIPITOR) 10 MG tablet, Take 1 tablet (10 mg) by mouth daily, Disp: 90 tablet, Rfl: 3     cholecalciferol  (VITAMIN D3) 1000 UNIT capsule, Take 1 capsule by mouth daily., Disp: , Rfl:      fluticasone (FLONASE) 50 MCG/ACT spray, Spray 2 sprays into both nostrils At Bedtime, Disp: 1 Bottle, Rfl: 1     lisinopril (PRINIVIL/ZESTRIL) 10 MG tablet, Take 1 tablet (10 mg) by mouth daily, Disp: 90 tablet, Rfl: 3     montelukast (SINGULAIR) 10 MG tablet, Take 1 tablet (10 mg) by mouth At Bedtime (Patient not taking: Reported on 4/17/2018), Disp: 30 tablet, Rfl: 0     sildenafil (REVATIO) 20 MG tablet, Take 5 tablets (100 mg) by mouth daily, Disp: 60 tablet, Rfl: 11      Allergies:   Etodolac and Percocet [oxycodone-acetaminophen]      Past Medical History:  Cobblestone retinal degeneration  Impotence of organic origin  Non senile cataract  Hypercholesterolemia  Hypertension  Vitreous detachment of both eyes     Past Surgical History:  Appendectomy  Cataract IOL  Phacoemulsification with standard intraocular lens implant  Tonsillectomy     Family History:   No pertinent family history.       Social History:   This patient was accompanied by: wife  Smoking status: never  Smokeless tobacco: never  Alcohol use: no, 1 day   Drug use: n/a     Physical Exam:   /83   Pulse 73   Temp 97.6  F (36.4  C) (Oral)   Wt 73.7 kg (162 lb 6.4 oz)   SpO2 97%   BMI 23.98 kg/m     Constitutional: Alert, oriented, pleasant, no acute distress, mildly fatigued appearing, intermittnen cough  Head: Normocephalic, atraumatic  Eyes: Extra-ocular movements intact, no scleral icterus  ENT: Oropharynx clear, moist mucus membranes, good dentition  Neck: Supple, no lymphadenopathy  Cardiovascular: Regular rate and rhythm, no murmurs, rubs or gallops, peripheral pulses full/symmetric  Respiratory: Good air movement bilaterally, lungs clear, no wheezes/rales/rhonchi  Musculoskeletal: No edema, normal muscle tone, normal gait  Neurologic: Alert and oriented, cranial nerves 2-12 intact.  Psychiatric: normal mentation, affect and mood      Assessment and  Plan:  Acute bronchitis, unspecified organism  Do not suspect pneumonia or feel chest XR is needed at this time. Recommended some conservative management, continuing using the albuterol inhaler 4x daily and Mucinex, and trying nasal steroid sprays like Flonase. Will prescribe him doxycycline given severity of his bronchitis like symptoms. If symptoms do not start to improve in the next few days, he is encouraged to call the clinic again.   - doxycycline hyclate (VIBRA-TABS) 100 MG tablet  Dispense: 14 tablet; Refill: 0        Scribe Disclosure:  I, Romy Hsu, am serving as a scribe to document services personally performed by Cookie Mota MD at this visit, based upon the provider's statements to me. All documentation has been reviewed by the aforementioned provider prior to being entered into the official medical record.     Portions of this medical record were completed by a scribe. UPON MY REVIEW AND AUTHENTICATION BY ELECTRONIC SIGNATURE, this confirms (a) I performed the applicable clinical services, and (b) the record is accurate.   Cookie Mota MD  Internal Medicine    25 min spent face to face, of which >50% time spent on counseling/coordinating care exclusive of any procedure time

## 2019-05-24 ENCOUNTER — ANCILLARY PROCEDURE (OUTPATIENT)
Dept: GENERAL RADIOLOGY | Facility: CLINIC | Age: 79
End: 2019-05-24
Attending: FAMILY MEDICINE
Payer: MEDICARE

## 2019-05-24 ENCOUNTER — OFFICE VISIT (OUTPATIENT)
Dept: FAMILY MEDICINE | Facility: CLINIC | Age: 79
End: 2019-05-24
Payer: MEDICARE

## 2019-05-24 ENCOUNTER — TELEPHONE (OUTPATIENT)
Dept: INTERNAL MEDICINE | Facility: CLINIC | Age: 79
End: 2019-05-24

## 2019-05-24 VITALS
HEART RATE: 83 BPM | WEIGHT: 163.6 LBS | RESPIRATION RATE: 16 BRPM | TEMPERATURE: 97.5 F | DIASTOLIC BLOOD PRESSURE: 77 MMHG | BODY MASS INDEX: 24.16 KG/M2 | SYSTOLIC BLOOD PRESSURE: 128 MMHG | OXYGEN SATURATION: 97 %

## 2019-05-24 DIAGNOSIS — R05.9 COUGH: Primary | ICD-10-CM

## 2019-05-24 DIAGNOSIS — R05.9 COUGH: ICD-10-CM

## 2019-05-24 ASSESSMENT — PAIN SCALES - GENERAL: PAINLEVEL: NO PAIN (0)

## 2019-05-24 NOTE — PROGRESS NOTES
[facility] [place]  Primary Care Center   Anthony Maddox MD  05/24/2019      Chief Complaint:   Cough     History of Present Illness:   Felice Blood is a 78 year old male with a history of hypertension and hypercholesteremia who presents for evaluation of cough. Felice states that the past 12 days he has been sick with a productive coughing, yellow phlegm coming up, post nasal drip, running nose and fatigue.  He denies fever, nausea, vomiting, throat pain, and sinus pain. He states his ears have been plugged but denies any pian in his ears. He notes that he gets up several time a night to cough. He said its hard to say if he has shortness of breath.  He also denies smoking, asthma, recent travels, or anyone else he knows that is sick. He has never had pneumonia, but has had spots on lungs that he has been given medication for previously. He saw Dr. Mota on 5/17 and was given doxycycline, however it has been a week and he does not feel better. He has also been using an albuterol inhaler that he feels has helped to relieve symptoms.      Review of Systems:   Pertinent items are noted in HPI, remainder of complete ROS is negative.      Active Medications:       albuterol (PROAIR HFA) 108 (90 Base) MCG/ACT inhaler, Inhale 2 puffs into the lungs every 4 hours as needed for shortness of breath / dyspnea or wheezing, Disp: 18 g, Rfl: 1     aspirin 81 MG tablet, Take 1 tablet by mouth daily., Disp: , Rfl:      atorvastatin (LIPITOR) 10 MG tablet, Take 1 tablet (10 mg) by mouth daily, Disp: 90 tablet, Rfl: 3     cholecalciferol (VITAMIN D3) 1000 UNIT capsule, Take 1 capsule by mouth daily., Disp: , Rfl:      fluticasone (FLONASE) 50 MCG/ACT spray, Spray 2 sprays into both nostrils At Bedtime, Disp: 1 Bottle, Rfl: 1     lisinopril (PRINIVIL/ZESTRIL) 10 MG tablet, Take 1 tablet (10 mg) by mouth daily, Disp: 90 tablet, Rfl: 3     montelukast (SINGULAIR) 10 MG tablet, Take 1 tablet (10 mg) by mouth At Bedtime, Disp: 30  tablet, Rfl: 0     sildenafil (REVATIO) 20 MG tablet, Take 5 tablets (100 mg) by mouth daily, Disp: 60 tablet, Rfl: 11      Allergies:   Etodolac; Percocet [oxycodone-acetaminophen]; and Seasonal allergies      Past Medical History:  Cobblestone retinal degeneration  Impotence of organic origin  Non senile cataract  Hypercholesterolemia  Hypertension   Vitreous detachment of both eyes      Past Surgical History:  Appendectomy  Cataract IOL  Phacoemulsification with standard intraocular lens implant  Tonsillectomy      Family History:   No pertinent family history.       Social History:   This patient was accompanied by: wife  Smoking status: never  Smokeless tobacco: never  Alcohol use: yes  Drug use: n/a     Physical Exam:   /77 (BP Location: Right arm, Patient Position: Sitting, Cuff Size: Adult Regular)   Pulse 83   Temp 97.5  F (36.4  C) (Oral)   Resp 16   Wt 74.2 kg (163 lb 9.6 oz)   SpO2 97%   BMI 24.16 kg/m     Constitutional: Alert. In no distress.  Head: Normocephalic.   ENT: No neck nodes or sinus tenderness.  Cardiovascular: RRR. No murmurs, clicks, gallops, or rub.  Respiratory: Clear to auscultation bilaterally, no wheezes or crackles.  Musculoskeletal: No gross deformities noted.   Psychiatric: Mentation appears normal. Normal affect.    Hematologic/Lymphatic/Immunologic: Normal cervical lymph nodes.     Assessment and Plan:  Cough  If no better by next week sometime he will let us know.   - XR Chest 2 Views  - amoxicillin-clavulanate (AUGMENTIN) 875-125 MG tablet  Dispense: 20 tablet; Refill: 0        Scribe Disclosure:  Romy DUBOSE, am serving as a scribe to document services personally performed by Anthony Maddox MD at this visit, based upon the provider's statements to me. All documentation has been reviewed by the aforementioned provider prior to being entered into the official medical record.     Portions of this medical record were completed by a scribe. UPON MY REVIEW  AND AUTHENTICATION BY ELECTRONIC SIGNATURE, this confirms (a) I performed the applicable clinical services, and (b) the record is accurate.   Anthony Maddox MD

## 2019-05-24 NOTE — NURSING NOTE
Chief Complaint   Patient presents with     Cough       Alexis Penaloza CMA (AAMA) at 12:14 PM on 5/24/2019

## 2019-05-24 NOTE — TELEPHONE ENCOUNTER
Spoke with pt, informed the result and recommendation.    Soon-Mi  ------------------------------------------------------------------        ----- Message from Anthony Maddox MD sent at 5/24/2019  3:51 PM CDT -----  Let him know xray looks fine; a second radiologist reviews it later too, so if they see anyting we will let him know but unlikely, take the augmentin, call next week if not getting better

## 2019-05-30 ENCOUNTER — OFFICE VISIT (OUTPATIENT)
Dept: OPHTHALMOLOGY | Facility: CLINIC | Age: 79
End: 2019-05-30
Attending: OPHTHALMOLOGY
Payer: MEDICARE

## 2019-05-30 DIAGNOSIS — H26.491 PCO (POSTERIOR CAPSULAR OPACIFICATION), RIGHT: ICD-10-CM

## 2019-05-30 DIAGNOSIS — H52.7 REFRACTIVE ERROR: ICD-10-CM

## 2019-05-30 DIAGNOSIS — Z96.1 PSEUDOPHAKIA OF BOTH EYES: Primary | ICD-10-CM

## 2019-05-30 DIAGNOSIS — H43.21 ASTEROID HYALITIS OF RIGHT EYE: ICD-10-CM

## 2019-05-30 PROCEDURE — 66821 AFTER CATARACT LASER SURGERY: CPT | Mod: RT,ZF | Performed by: OPHTHALMOLOGY

## 2019-05-30 PROCEDURE — 92015 DETERMINE REFRACTIVE STATE: CPT | Mod: GY,ZF

## 2019-05-30 PROCEDURE — G0463 HOSPITAL OUTPT CLINIC VISIT: HCPCS | Mod: 25,ZF

## 2019-05-30 ASSESSMENT — VISUAL ACUITY
OS_CC: 20/25
CORRECTION_TYPE: GLASSES
OD_CC+: +1
METHOD: SNELLEN - LINEAR
OD_CC: 20/30

## 2019-05-30 ASSESSMENT — REFRACTION_MANIFEST
OS_CYLINDER: +0.50
OD_AXIS: 165
OD_ADD: +2.75
OS_ADD: +2.75
OD_SPHERE: -2.00
OS_AXIS: 015
OD_CYLINDER: +1.00
OS_SPHERE: -2.25

## 2019-05-30 ASSESSMENT — CONF VISUAL FIELD
OS_NORMAL: 1
OD_NORMAL: 1

## 2019-05-30 ASSESSMENT — REFRACTION_WEARINGRX
OS_AXIS: 035
OS_SPHERE: -1.50
OD_SPHERE: -1.25
OD_ADD: +2.75
OS_ADD: +2.75
OS_CYLINDER: +0.50
OD_AXIS: 151
OD_CYLINDER: +0.75

## 2019-05-30 ASSESSMENT — CUP TO DISC RATIO
OS_RATIO: 0.3
OD_RATIO: 0.3

## 2019-05-30 ASSESSMENT — TONOMETRY
OD_IOP_MMHG: 18
IOP_METHOD: ICARE
OS_IOP_MMHG: 18

## 2019-05-30 ASSESSMENT — EXTERNAL EXAM - LEFT EYE: OS_EXAM: NORMAL

## 2019-05-30 ASSESSMENT — SLIT LAMP EXAM - LIDS
COMMENTS: MILD MGD
COMMENTS: MILD MGD

## 2019-05-30 ASSESSMENT — EXTERNAL EXAM - RIGHT EYE: OD_EXAM: NORMAL

## 2019-05-30 NOTE — NURSING NOTE
Chief Complaints and History of Present Illnesses   Patient presents with     Annual Eye Exam     Chief Complaint(s) and History of Present Illness(es)     Annual Eye Exam               Comments       Felice Blood is a 77 year old male with the following diagnoses:   1. Pseudophakia of both eyes - Both Eyes   2. PCO (posterior capsular opacification), right   3. Asteroid hyalitis of right eye   4. Refractive error     Slight change in vision since the last visit.     Sadia CISSE 8:24 AM May 30, 2019

## 2019-05-30 NOTE — PROGRESS NOTES
HPI       Felice Blood is a 77 year old male with the following diagnoses:   1. Pseudophakia of both eyes - Both Eyes   2. PCO (posterior capsular opacification), right   3. Asteroid hyalitis of right eye   4. Refractive error     Slight change in vision since the last visit.     Sadia CISSE 8:24 AM May 30, 2019       Last edited by Sadia Padilla on 5/30/2019  8:24 AM. (History)          Assessment & Plan      Felice Blood is a 77 year old male with the following diagnoses:   1. 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 PCO (posterior capsular opacification), right    3. Asteroid hyalitis of right eye    4. Refractive error       Noting some worsening glare/filminess in right eye vision  VISUALLY SIGNIFICANT posterior capsular opacity (PCO) right eye   RBA to Y[facility] today, consent obtained.      Healthy dilated fundus exam  Refractive options reviewed  Refraction given     Patient disposition:   Return in about 1 year (around 5/30/2020) for DFE.       Steve Bailey MD  Ophthalmology Resident, PGY-2    Attending Physician Attestation:  Complete documentation of historical and exam elements from today's encounter can be found in the full encounter summary report (not reduplicated in this progress note).  I personally obtained the chief complaint(s) and history of present illness.  I confirmed and edited as necessary the review of systems, past medical/surgical history, family history, social history, and examination findings as documented by others; and I examined the patient myself.  I personally reviewed the relevant tests, images, and reports as documented above.  I formulated and edited as necessary the assessment and plan and discussed the findings and management plan with the patient and family. Attending Physician Procedure Attestation: I was present for the entire procedure     . - Camron Hansen MD     
no

## 2019-06-10 ENCOUNTER — OFFICE VISIT (OUTPATIENT)
Dept: INTERNAL MEDICINE | Facility: CLINIC | Age: 79
End: 2019-06-10
Payer: MEDICARE

## 2019-06-10 VITALS — SYSTOLIC BLOOD PRESSURE: 148 MMHG | DIASTOLIC BLOOD PRESSURE: 88 MMHG | HEART RATE: 67 BPM | OXYGEN SATURATION: 97 %

## 2019-06-10 DIAGNOSIS — R05.9 COUGH: ICD-10-CM

## 2019-06-10 DIAGNOSIS — R05.9 COUGH: Primary | ICD-10-CM

## 2019-06-10 LAB
BASOPHILS # BLD AUTO: 0 10E9/L (ref 0–0.2)
BASOPHILS NFR BLD AUTO: 0.4 %
DIFFERENTIAL METHOD BLD: NORMAL
EOSINOPHIL # BLD AUTO: 0.3 10E9/L (ref 0–0.7)
EOSINOPHIL NFR BLD AUTO: 4.5 %
ERYTHROCYTE [DISTWIDTH] IN BLOOD BY AUTOMATED COUNT: 12.9 % (ref 10–15)
HCT VFR BLD AUTO: 44.8 % (ref 40–53)
HGB BLD-MCNC: 15.6 G/DL (ref 13.3–17.7)
IMM GRANULOCYTES # BLD: 0 10E9/L (ref 0–0.4)
IMM GRANULOCYTES NFR BLD: 0.1 %
LYMPHOCYTES # BLD AUTO: 1.9 10E9/L (ref 0.8–5.3)
LYMPHOCYTES NFR BLD AUTO: 28.4 %
MCH RBC QN AUTO: 32 PG (ref 26.5–33)
MCHC RBC AUTO-ENTMCNC: 34.8 G/DL (ref 31.5–36.5)
MCV RBC AUTO: 92 FL (ref 78–100)
MONOCYTES # BLD AUTO: 0.6 10E9/L (ref 0–1.3)
MONOCYTES NFR BLD AUTO: 9.4 %
NEUTROPHILS # BLD AUTO: 3.8 10E9/L (ref 1.6–8.3)
NEUTROPHILS NFR BLD AUTO: 57.2 %
NRBC # BLD AUTO: 0 10*3/UL
NRBC BLD AUTO-RTO: 0 /100
PLATELET # BLD AUTO: 241 10E9/L (ref 150–450)
RBC # BLD AUTO: 4.87 10E12/L (ref 4.4–5.9)
WBC # BLD AUTO: 6.7 10E9/L (ref 4–11)

## 2019-06-10 RX ORDER — PREDNISONE 20 MG/1
40 TABLET ORAL DAILY
Qty: 10 TABLET | Refills: 0 | Status: SHIPPED | OUTPATIENT
Start: 2019-06-10 | End: 2019-06-15

## 2019-06-10 RX ORDER — BENZONATATE 200 MG/1
200 CAPSULE ORAL 3 TIMES DAILY PRN
Qty: 21 CAPSULE | Refills: 0 | Status: SHIPPED | OUTPATIENT
Start: 2019-06-10 | End: 2019-09-13 | Stop reason: ALTCHOICE

## 2019-06-10 ASSESSMENT — PAIN SCALES - GENERAL: PAINLEVEL: NO PAIN (0)

## 2019-06-10 NOTE — PATIENT INSTRUCTIONS
Primary Care Center Phone Number: [phone]   [facility] Medication Refill Request Information:  * Please contact your pharmacy regarding ANY request for medication refills.  ** [facility] Prescription Fax = [phone]  * Please allow 3 business days for routine medication refills.  * Please allow 5 business days for controlled substance medication refills.     [facility] Test Result notification information:  *You will be notified with in 7-10 days of your appointment day regarding the results of your test.  If you are on MyChart you will be notified as soon as the provider has reviewed the results and signed off on them.      Take 2, 20 mg tablets of Prednisone (40 mg total) every morning for 5 days.  Take one puff of the new inhaler (fluticasone-salmeterol) twice daily for one month.  Use this inhaler regularly, not as needed.  You may use your albuterol inhaler, 2 puffs every 4 hours, as needed if you are having a bout of coughing despite taking the long-acting inhaler  Take one benzonatate capsule every 8 hours as needed for cough  Go to the lab today  If your white blood cell count is significantly high, I will contact you.  Have an ear wash today here in the clinic.  Follow up with Dr. Maddox if your symptoms persist for more than the next 2 weeks.  Dr. Carbone

## 2019-06-10 NOTE — NURSING NOTE
Chief Complaint   Patient presents with     Cough     Pt c/o a  persistant cough and sinus drainage for about a month        Chel Nieves, Juanita EMT at 4:47 PM on 6/10/2019

## 2019-06-10 NOTE — NURSING NOTE
Bilateral ear lavage completed in clinic today for impacted cerumen. Irrigated with luke warm and hydrogen peroxide. Patient stated they were not allergic to hydrogen peroxide.    Zero amount of cerumen was removed from both ear(s).  The impacted cerumen was hardened and I advised pt to use debrox ear drops to loosen the wax and then to come back for a nurse visit to do a ear lavage again. Pt agreed to this plan and will call to schedule nurse visit at a later date.      YUE Florian at 5:32 PM on 6/10/2019

## 2019-06-10 NOTE — PROGRESS NOTES
CC:  cough    HPI:    C/O constant sinus drainage, and non-productive cough, keeping him up at night. Tired.  Has tried flonase (2 sprays each nostril every evening for 3 weeks), singulair, sudafed, mucinex, albuterol inhaler, saline rinses)    Saw Dr. Mota on 5/17/19 for cough and cold x couple weeks.  Allergy symptoms initially, progressed to sore throat, feverishness w/o elevated temp, then chest cough, nocturnal coughing, dizziness, fatigue, one episode of loose stools).  Exam negative. Advised conservative management with MDI, mucinex, nasal steroid sprays, and doxycycline.    Saw Dr. Maddox on 5/24/19 CXR with stable left basilar opacities since at least 10/25/16.  Likely atelectasis or scarring given chronicity.  No new focal airspace opacities.  Rx'd augmentin.    Interestingly, he had nearly the exact same clinical picture last year starting in January. He did not recall this, though did remember being given an inhaler at some time in the past. Notes and chest x ray reviewed with patient.    10 point ROS is otherwise negative. No reflux, does not think he has been wheezing (however, did have wheezing on exam)  [place], [place] near future and would like to be well for those trips.    Patient Active Problem List   Diagnosis     Essential hypertension     Pure hypercholesterolemia     Impotence of organic origin     Past Medical History:   Diagnosis Date     Cobblestone retinal degeneration      Impotence of organic origin      Nonsenile cataract      Pure hypercholesterolemia      Unspecified essential hypertension      Vitreous detachment of both eyes      Past Surgical History:   Procedure Laterality Date     APPENDECTOMY       CATARACT IOL, RT/LT Right 05/01/2017     PHACOEMULSIFICATION WITH STANDARD INTRAOCULAR LENS IMPLANT Right 5/1/2017    Procedure: PHACOEMULSIFICATION WITH STANDARD INTRAOCULAR LENS IMPLANT;  Right Eye  Phacoemulsification with intraocular Lens Implant;  Surgeon: Camron Hansen MD;  Location: [facility] OR     TONSILLECTOMY       Current Outpatient Medications   Medication Sig Dispense Refill     albuterol (PROAIR HFA) 108 (90 Base) MCG/ACT inhaler Inhale 2 puffs into the lungs every 4 hours as needed for shortness of breath / dyspnea or wheezing 18 g 1     amoxicillin-clavulanate (AUGMENTIN) 875-125 MG tablet Take 1 tablet by mouth 2 times daily 20 tablet 0     aspirin 81 MG tablet Take 1 tablet by mouth daily.       atorvastatin (LIPITOR) 10 MG tablet Take 1 tablet (10 mg) by mouth daily 90 tablet 3     cholecalciferol (VITAMIN D3) 1000 UNIT capsule Take 1 capsule by mouth daily.       fluticasone (FLONASE) 50 MCG/ACT spray Spray 2 sprays into both nostrils At Bedtime 1 Bottle 1     lisinopril (PRINIVIL/ZESTRIL) 10 MG tablet Take 1 tablet (10 mg) by mouth daily 90 tablet 3     montelukast (SINGULAIR) 10 MG tablet Take 1 tablet (10 mg) by mouth At Bedtime 30 tablet 0     sildenafil (REVATIO) 20 MG tablet Take 5 tablets (100 mg) by mouth daily 60 tablet 11     Allergies   Allergen Reactions     Etodolac Unknown     Percocet [Oxycodone-Acetaminophen] Nausea     Other reaction(s): Muscle Weakness     Seasonal Allergies Cough     Sinus drainage, watery eyes     /88   Pulse 67   SpO2 97%     Physical Examination:    General:  Conversant, generally healthy appearing, no acute distress but frequently coughing during OV  Head: atraumatic  Eyes:  Pupils 2-3 mm, sclera white, EOM's full, conjunctiva moist   Ears:  TM's partially obscured by cerumen bilaterally (washed by assistant today)  Nose:  Nasal passages clear, turbinates mildly swollen but not red  Throat/Mouth:  No pharyngeal erythema, exudate, ulcers, oral mucosa and tongue moist, normal hard and soft palate  Neck:  Trachea midline, Full AROM, supple, thyroid smooth, symmetric, not enlarged, no nodules, no neck lymphadenopathy  Lungs:  Faint wheeze LLL, mild  increase in exp phase of breathing, otherwise lungs clear.  C/V:  Regular rate and rhythm, no murmurs, rubs or gallops.  No JVD, no peripheral edema  Lymph:  No cervical lymph nodes.  Neuro: Alert and oriented, face symmetric. No tremor.  Skin:   Normal temperature., turgor and texture. No rashes, suspicious lesions,  jaundice or ulcers    Extremities:  No peripheral edema, no digital cyanosis  Psych:  No signs of anxiety or agitation.    Felice was seen today for cough.    Diagnoses and all orders for this visit:    Cough.  I suspect he has some post-infectious inflammatory, reactive airways. Possibly underlying allergies/rhinitis.  -     predniSONE (DELTASONE) 20 MG tablet; Take 2 tablets (40 mg) by mouth daily for 5 days  -     Start fluticasone-salmeterol (ADVAIR) 100-50 MCG/DOSE inhaler; Inhale 1 puff into the lungs 2 times daily for one month  -     Continue albuterol rescue as needed.  -     Add benzonatate (TESSALON) 200 MG capsule; Take 1 capsule (200 mg) by mouth 3 times daily as needed for cough  -     CBC with platelets differential; Future    Plan discussed with the patient and his wife, written instructions given as well.  F/U with Dr. Maddox if symptoms persist for more than 2 weeks.    Total time spent 25 minutes.  More than 50% of the time spent with Mr. Blood on counseling / coordinating his care      Krissy Carbone M.D.  Internal Medicine  Primary Care Center   pager [phone]

## 2019-09-13 ENCOUNTER — OFFICE VISIT (OUTPATIENT)
Dept: FAMILY MEDICINE | Facility: CLINIC | Age: 79
End: 2019-09-13
Payer: MEDICARE

## 2019-09-13 VITALS
WEIGHT: 159 LBS | DIASTOLIC BLOOD PRESSURE: 80 MMHG | SYSTOLIC BLOOD PRESSURE: 142 MMHG | OXYGEN SATURATION: 96 % | BODY MASS INDEX: 23.48 KG/M2 | HEART RATE: 80 BPM

## 2019-09-13 DIAGNOSIS — F32.1 MODERATE MAJOR DEPRESSION (H): Primary | ICD-10-CM

## 2019-09-13 DIAGNOSIS — F41.1 GENERALIZED ANXIETY DISORDER: ICD-10-CM

## 2019-09-13 RX ORDER — FLUOXETINE 10 MG/1
10 CAPSULE ORAL DAILY
Qty: 30 CAPSULE | Refills: 0 | Status: SHIPPED | OUTPATIENT
Start: 2019-09-13 | End: 2019-09-24

## 2019-09-13 ASSESSMENT — ANXIETY QUESTIONNAIRES
1. FEELING NERVOUS, ANXIOUS, OR ON EDGE: NEARLY EVERY DAY
2. NOT BEING ABLE TO STOP OR CONTROL WORRYING: NEARLY EVERY DAY
7. FEELING AFRAID AS IF SOMETHING AWFUL MIGHT HAPPEN: NEARLY EVERY DAY
6. BECOMING EASILY ANNOYED OR IRRITABLE: SEVERAL DAYS
5. BEING SO RESTLESS THAT IT IS HARD TO SIT STILL: NEARLY EVERY DAY
3. WORRYING TOO MUCH ABOUT DIFFERENT THINGS: NEARLY EVERY DAY
GAD7 TOTAL SCORE: 19

## 2019-09-13 ASSESSMENT — ENCOUNTER SYMPTOMS
FATIGUE: 0
CHILLS: 0
NERVOUS/ANXIOUS: 1
DEPRESSION: 1
FEVER: 0

## 2019-09-13 ASSESSMENT — PAIN SCALES - GENERAL: PAINLEVEL: NO PAIN (0)

## 2019-09-13 ASSESSMENT — PATIENT HEALTH QUESTIONNAIRE - PHQ9
SUM OF ALL RESPONSES TO PHQ QUESTIONS 1-9: 11
5. POOR APPETITE OR OVEREATING: NEARLY EVERY DAY

## 2019-09-13 NOTE — PATIENT INSTRUCTIONS
First, please start the Prozac tomorrow. Please monitor for side effects. Please call if you have any concerns. Please return in 2 weeks for follow up with me.   [facility] Medication Refill Request Information:  * Please contact your pharmacy regarding ANY request for medication refills.  ** [facility] Prescription Fax = [phone]  * Please allow 3 business days for routine medication refills.  * Please allow 5 business days for controlled substance medication refills.     [facility] Test Result notification information:  *You will be notified with in 7-10 days of your appointment day regarding the results of your test.  If you are on MyChart you will be notified as soon as the provider has reviewed the results and signed off on them.    [facility]: [phone]

## 2019-09-13 NOTE — PROGRESS NOTES
"       HPI       Felice Blood is a 78 year old man who presents with sadness and anxiety. He has noticed this for the past few months, \"in August, it really got bad. Now I have trouble sleeping as well.\"  No history of anxiety or depression. No recent traumas or illness. He lives with his wife in their home in [place]. He reports worrying about things like how long he has left to live, who will take care of his house once he's gone and how long his wife will be here.   Chief Complaint   Patient presents with     Anxiety     Pt is having anxiety and difficulty going to sleep.       Problem, Medication and Allergy Lists were reviewed and updated if needed.    Patient is an established patient of this clinic.           Review of Systems:   Review of Systems     Constitutional:  Negative for fever, chills and fatigue.   Psychiatric/Behavioral:  Positive for depression.                Physical Exam:     Vitals:    09/13/19 1442   BP: (!) 142/80   Pulse: 80   SpO2: 96%   Weight: 72.1 kg (159 lb)     Body mass index is 23.48 kg/m .  Vitals were reviewed and were normal     Physical Exam   Constitutional: He is oriented to person, place, and time. He appears well-developed and well-nourished.   HENT:   Head: Normocephalic.   Musculoskeletal: Normal range of motion.   Neurological: He is alert and oriented to person, place, and time.   Skin: Skin is warm and dry.   Psychiatric: He has a normal mood and affect. His behavior is normal.         Results:   CAROLIN 7 score is 19  PHQ 9 score is 11    Assessment and Plan     1. Moderate major depression (H)    - FLUoxetine (PROZAC) 10 MG capsule; Take 1 capsule (10 mg) by mouth daily  Dispense: 30 capsule; Refill: 0    2. Generalized anxiety disorder    - FLUoxetine (PROZAC) 10 MG capsule; Take 1 capsule (10 mg) by mouth daily  Dispense: 30 capsule; Refill: 0      There are no discontinued medications.  Upon discussion, Felice will consider counseling. He will call if he " wants a referral. After reviewing all options, he does want to start on the prozac. Patient instructions: first, please start the Prozac tomorrow. Please monitor for side effects. Please call if you have any concerns. Please return in 2 weeks for follow up with me. Options for treatment and follow-up care were reviewed with the patient. Felice Blood  engaged in the decision making process and verbalized understanding of the options discussed and agreed with the final plan.  ARGELIA Phillips, CNP

## 2019-09-13 NOTE — NURSING NOTE
78 year old  Chief Complaint   Patient presents with     Anxiety     Pt is having anxiety and difficulty going to sleep.         Blood pressure (!) 142/80, pulse 80, weight 72.1 kg (159 lb), SpO2 96 %. Body mass index is 23.48 kg/m .  BP completed using cuff size:      Gracia Akbar MA  September 13, 2019 2:44 PM

## 2019-09-14 ASSESSMENT — ANXIETY QUESTIONNAIRES: GAD7 TOTAL SCORE: 19

## 2019-09-21 NOTE — PROGRESS NOTES
[facility]  Primary Care Center   Anthony Maddox MD  2019      Chief Complaint:   Recheck Medication       History of Present Illness:   Felice Blood is a 78 year old male with a history of hypertension and hypercholesteremia who presents for evaluation of anxiety. On , Felice saw a nurse practitioner in the clinic for anxiety. He expressed concerned of anxiety as a result of concerns over death and who will take care of his wife and house after he passes. The nurse practitioner gave him Prozac 10 mg daily. He took one the next morning after going to clinic and noticed after taking the pill his sexual performance was abnormal and he could not orgasm. He is also concerned because after looking up Prozac he found he could not take it with alcohol, which was upsetting to him because he does not want to give up beer and sexual activity. He took Prozac for two days, totaling two pills, and stopped on the second day because after taking the second pill he had an anxiety attack. That night he was pacing, could not sleep, was nervous and sweaty. He has not taken any Prozac since 9/15. He has never experienced anxiety before prior to this summer where it started to build up. He has never seen a therapist for this. Recently, his sister-in-law passed away and the  was last week. This has him anxious about death again and he does not know how to deal with this anxiety. He reports getting an hour of activity a day and living a fairly healthy lifestyle with a good diet. He says his biggest problem is sleeping at night due to anxiety.     Other concerns discussed:  1. Discussed flu shot, deferred for now   2. Last visit,  he had a lingering cough; he says it has since gone away        Review of Systems:   Pertinent items are noted in HPI or as in patient entered ROS below, remainder of complete ROS is negative.     Active Medications:     Current Outpatient Medications:      albuterol (PROAIR HFA) 108  (90 Base) MCG/ACT inhaler, Inhale 2 puffs into the lungs every 4 hours as needed for shortness of breath / dyspnea or wheezing, Disp: 18 g, Rfl: 1     aspirin 81 MG tablet, Take 1 tablet by mouth daily., Disp: , Rfl:      atorvastatin (LIPITOR) 10 MG tablet, Take 1 tablet (10 mg) by mouth daily, Disp: 90 tablet, Rfl: 3     busPIRone (BUSPAR) 5 MG tablet, Take 1 tablet (5 mg) by mouth 3 times daily, Disp: 60 tablet, Rfl: 1     cholecalciferol (VITAMIN D3) 1000 UNIT capsule, Take 1 capsule by mouth daily., Disp: , Rfl:      fluticasone (FLONASE) 50 MCG/ACT spray, Spray 2 sprays into both nostrils At Bedtime, Disp: 1 Bottle, Rfl: 1     hydrOXYzine (ATARAX) 25 MG tablet, Take one po bid prn anxiety; watch for drowsiness, Disp: 20 tablet, Rfl: 0     lisinopril (PRINIVIL/ZESTRIL) 10 MG tablet, Take 1 tablet (10 mg) by mouth daily, Disp: 90 tablet, Rfl: 3     sildenafil (REVATIO) 20 MG tablet, Take 5 tablets (100 mg) by mouth daily, Disp: 60 tablet, Rfl: 11      Allergies:   Etodolac; Percocet [oxycodone-acetaminophen]; and Seasonal allergies      Past Medical History:  Cobblestone retinal degeneration  Impotence of organic origin  Hypertension  Nonsenile cataract  Pure hypercholesterolemia  Vitreous detachment of both eyes      Past Surgical History:  Appendectomy  Cataract, right (5/2017)  Phacoemulsification with standard intraocular lens implant, right (5/2017)  Tonsillectomy     Family History:   No family history of glaucoma and macular degeneration.      Social History:   Presents with wife.   Tobacco Use: none  Alcohol Use: one day/week  Drug Use: none  PCP: Anthony Maddox      Physical Exam:   /77 (BP Location: Right arm, Patient Position: Sitting, Cuff Size: Adult Regular)   Pulse 79   Wt 69.4 kg (153 lb)   SpO2 97%   BMI 22.59 kg/m     Constitutional: Alert. In no distress.  Head: The scalp, face, and head appear normal.  Musculoskeletal: Extremities appear normal. No gross deformities noted.    Neurologic: Gait normal. Speech is normal and fluent.  Psychiatric: Mentation appears normal. Anxious affect.     PHQ-9: 2  CAROLIN-7: 8     Assessment and Plan:  Anxiety  Possibly triggered by some family issues, which could be an adjustment disorder. I have asked him to see me back in the first part of October.   - busPIRone (BUSPAR) 5 MG tablet  Dispense: 60 tablet; Refill: 1  - hydrOXYzine (ATARAX) 25 MG tablet  Dispense: 20 tablet; Refill: 0  - BEHAVIORAL / SPIRITUAL HEALTH ([facility] ONLY)       Follow-up: In 1-2 weeks (around the first week of October).         Scribe Disclosure:  I, Diana Jose, am serving as a scribe to document services personally performed by Anthony Maddox MD at this visit, based upon the provider's statements to me. All documentation has been reviewed by the aforementioned provider prior to being entered into the official medical record.     Portions of this medical record were completed by a scribe. UPON MY REVIEW AND AUTHENTICATION BY ELECTRONIC SIGNATURE, this confirms (a) I performed the applicable clinical services, and (b) the record is accurate.   Anthony Maddox MD MD

## 2019-09-23 ENCOUNTER — DOCUMENTATION ONLY (OUTPATIENT)
Dept: CARE COORDINATION | Facility: CLINIC | Age: 79
End: 2019-09-23

## 2019-09-24 ENCOUNTER — OFFICE VISIT (OUTPATIENT)
Dept: FAMILY MEDICINE | Facility: CLINIC | Age: 79
End: 2019-09-24
Payer: MEDICARE

## 2019-09-24 VITALS
WEIGHT: 153 LBS | OXYGEN SATURATION: 97 % | DIASTOLIC BLOOD PRESSURE: 77 MMHG | SYSTOLIC BLOOD PRESSURE: 134 MMHG | BODY MASS INDEX: 22.59 KG/M2 | HEART RATE: 79 BPM

## 2019-09-24 DIAGNOSIS — F41.9 ANXIETY: Primary | ICD-10-CM

## 2019-09-24 RX ORDER — BUSPIRONE HYDROCHLORIDE 5 MG/1
5 TABLET ORAL 3 TIMES DAILY
Qty: 60 TABLET | Refills: 1 | Status: SHIPPED | OUTPATIENT
Start: 2019-09-24 | End: 2019-10-08

## 2019-09-24 RX ORDER — HYDROXYZINE HYDROCHLORIDE 25 MG/1
TABLET, FILM COATED ORAL
Qty: 20 TABLET | Refills: 0 | Status: SHIPPED | OUTPATIENT
Start: 2019-09-24 | End: 2019-10-08

## 2019-09-24 ASSESSMENT — PATIENT HEALTH QUESTIONNAIRE - PHQ9
SUM OF ALL RESPONSES TO PHQ QUESTIONS 1-9: 2
5. POOR APPETITE OR OVEREATING: SEVERAL DAYS

## 2019-09-24 ASSESSMENT — ANXIETY QUESTIONNAIRES
7. FEELING AFRAID AS IF SOMETHING AWFUL MIGHT HAPPEN: MORE THAN HALF THE DAYS
3. WORRYING TOO MUCH ABOUT DIFFERENT THINGS: MORE THAN HALF THE DAYS
6. BECOMING EASILY ANNOYED OR IRRITABLE: NOT AT ALL
GAD7 TOTAL SCORE: 8
2. NOT BEING ABLE TO STOP OR CONTROL WORRYING: SEVERAL DAYS
5. BEING SO RESTLESS THAT IT IS HARD TO SIT STILL: SEVERAL DAYS
1. FEELING NERVOUS, ANXIOUS, OR ON EDGE: SEVERAL DAYS
IF YOU CHECKED OFF ANY PROBLEMS ON THIS QUESTIONNAIRE, HOW DIFFICULT HAVE THESE PROBLEMS MADE IT FOR YOU TO DO YOUR WORK, TAKE CARE OF THINGS AT HOME, OR GET ALONG WITH OTHER PEOPLE: SOMEWHAT DIFFICULT

## 2019-09-24 ASSESSMENT — PAIN SCALES - GENERAL: PAINLEVEL: NO PAIN (0)

## 2019-09-24 NOTE — NURSING NOTE
Chief Complaint   Patient presents with     Recheck Medication     pt here for anxiety problems and med reactions       Anna Fisher, GIANNA, EMT at 8:40 AM on 9/24/2019.

## 2019-09-24 NOTE — PATIENT INSTRUCTIONS
[facility] Medication Refill Request Information:  * Please contact your pharmacy regarding ANY request for medication refills.  ** [facility] Prescription Fax = [phone]  * Please allow 3 business days for routine medication refills.  * Please allow 5 business days for controlled substance medication refills.     [facility] Test Result notification information:  *You will be notified with in 7-10 days of your appointment day regarding the results of your test.  If you are on MyChart you will be notified as soon as the provider has reviewed the results and signed off on them.    [facility]: [phone]     [facility]: [phone]

## 2019-09-25 ASSESSMENT — ANXIETY QUESTIONNAIRES: GAD7 TOTAL SCORE: 8

## 2019-09-27 ENCOUNTER — TELEPHONE (OUTPATIENT)
Dept: FAMILY MEDICINE | Facility: CLINIC | Age: 79
End: 2019-09-27

## 2019-09-27 NOTE — TELEPHONE ENCOUNTER
CENTRAL PRIOR AUTHORIZATION  [phone]    PA Initiation    Medication: Hydroxyzine HCL  Insurance Company: Medicare Blue RX - Phone [phone] Fax [phone]  Pharmacy Filling the Rx: [address]  Filling Pharmacy Phone: [phone]  Filling Pharmacy Fax:    Start Date: 9/27/2019

## 2019-09-27 NOTE — TELEPHONE ENCOUNTER
[facility] Call Center    Phone Message    May a detailed message be left on voicemail: yes    Reason for Call: Medication Question or concern regarding medication   Prescription Clarification  Name of Medication: Hydroxyzine  Prescribing Provider: Dr. Maddox   Pharmacy: [facility]   What on the order needs clarification? Patient requesting an update of Prior Auth.          Action Taken: Message routed to:  [facility] ([facility]): [facility] primary  ------------------------------------    I updated the patient that there are no updates at this time. We can quynh the request as urgent.    Joan Carroll RN  9/27/2019 5:11 PM

## 2019-09-27 NOTE — TELEPHONE ENCOUNTER
Prior Authorization Retail Medication Request    Medication/Dose: Hydroxyzine HCL  ICD code (if different than what is on RX):  F41.9  Previously Tried and Failed:  UnknownRationale:  none    Insurance Name:  [employer] Blue [employer]  Insurance ID:  [id number]

## 2019-09-30 NOTE — TELEPHONE ENCOUNTER
I called Felice to discuss the hydroxyzine denial. He did  the hydroxyzine on Saturday and paid out of pocket. He reported it was less than $10 for 20 tablets, and he is ok with that cost. He will keep the clinic updated regarding hydroxyzine use. He does find this helpful currently.     Joan Carroll RN

## 2019-09-30 NOTE — TELEPHONE ENCOUNTER
I looked at online [facility] pricing, generic version 30 tabs 12.88$, can you check with Felice to see how he is doing, if still needs additional med (I gave Buspar too) see if he'd be ok getting at [facility], could send there and pay out of pocket? There's no other med similar enough to just switch to that.

## 2019-09-30 NOTE — TELEPHONE ENCOUNTER
PRIOR AUTHORIZATION DENIED    Medication: Hydroxyzine HCL - Denied     Denial Date: 9/27/2019    Denial Rational: You plan does not allow coverage of this medication based on your prescriber answering no to the following question(s): Has the patient experienced an inadequate treatment response/or intolerance to 2 to two non-HRM (Non High Risk Medication) alternative drugs: Buspirone, Duloxetine, Escitalopram, Sertraline or Venlafaxine ER?            Appeal Information: IF THE PROVIDER WOULD LIKE APPEAL THIS DENIAL, PLEASE HAVE THEM PROVIDE A LETTER OF MEDICAL NECESSITY ALONG WITH ANY DOCUMENTATION THAT STATES THERAPIES TRIED/OUTCOMES. ONCE IT HAS BEEN PLACED IN THE PATIENT'S CHART, PLEASE NOTIFY THE PA TEAM ONCE IT HAS BEEN COMPLETED AND WE CAN INITIATE THE APPEAL ON BEHALF OF THE PROVIDER AND PATIENT.

## 2019-10-01 ENCOUNTER — TELEPHONE (OUTPATIENT)
Dept: BEHAVIORAL HEALTH | Facility: CLINIC | Age: 79
End: 2019-10-01

## 2019-10-01 NOTE — TELEPHONE ENCOUNTER
Writer spoke with client regarding Dr. Maddox's referral to see Gideon LINDSAY for help managing anxiety. Unfortunately Medicare does not reimburse for LMFTs, so nicholer provided the following resources to client and asked client to call writer back if he was unable to get an appointment anywhere.    [facility] in [facility] and [facility] in [facility]

## 2019-10-08 ENCOUNTER — OFFICE VISIT (OUTPATIENT)
Dept: FAMILY MEDICINE | Facility: CLINIC | Age: 79
End: 2019-10-08
Payer: MEDICARE

## 2019-10-08 VITALS
OXYGEN SATURATION: 97 % | BODY MASS INDEX: 22.89 KG/M2 | HEART RATE: 64 BPM | SYSTOLIC BLOOD PRESSURE: 133 MMHG | WEIGHT: 155 LBS | DIASTOLIC BLOOD PRESSURE: 76 MMHG

## 2019-10-08 DIAGNOSIS — F41.9 ANXIETY: ICD-10-CM

## 2019-10-08 RX ORDER — HYDROXYZINE HYDROCHLORIDE 25 MG/1
TABLET, FILM COATED ORAL
Qty: 20 TABLET | Refills: 0 | Status: SHIPPED | OUTPATIENT
Start: 2019-10-08 | End: 2019-12-10

## 2019-10-08 RX ORDER — BUSPIRONE HYDROCHLORIDE 5 MG/1
10 TABLET ORAL 3 TIMES DAILY
Qty: 120 TABLET | Refills: 1 | Status: SHIPPED | OUTPATIENT
Start: 2019-10-08 | End: 2019-11-16

## 2019-10-08 ASSESSMENT — ANXIETY QUESTIONNAIRES
7. FEELING AFRAID AS IF SOMETHING AWFUL MIGHT HAPPEN: NOT AT ALL
5. BEING SO RESTLESS THAT IT IS HARD TO SIT STILL: NOT AT ALL
6. BECOMING EASILY ANNOYED OR IRRITABLE: NOT AT ALL
GAD7 TOTAL SCORE: 3
2. NOT BEING ABLE TO STOP OR CONTROL WORRYING: SEVERAL DAYS
1. FEELING NERVOUS, ANXIOUS, OR ON EDGE: SEVERAL DAYS
3. WORRYING TOO MUCH ABOUT DIFFERENT THINGS: NOT AT ALL

## 2019-10-08 ASSESSMENT — PATIENT HEALTH QUESTIONNAIRE - PHQ9
5. POOR APPETITE OR OVEREATING: SEVERAL DAYS
SUM OF ALL RESPONSES TO PHQ QUESTIONS 1-9: 2

## 2019-10-08 ASSESSMENT — PAIN SCALES - GENERAL: PAINLEVEL: NO PAIN (0)

## 2019-10-08 NOTE — PATIENT INSTRUCTIONS
[facility] Medication Refill Request Information:  * Please contact your pharmacy regarding ANY request for medication refills.  ** [facility] Prescription Fax = [phone]  * Please allow 3 business days for routine medication refills.  * Please allow 5 business days for controlled substance medication refills.     [facility] Test Result notification information:  *You will be notified with in 7-10 days of your appointment day regarding the results of your test.  If you are on MyChart you will be notified as soon as the provider has reviewed the results and signed off on them.    [facility]: [phone]

## 2019-10-08 NOTE — NURSING NOTE
Chief Complaint   Patient presents with     Recheck Medication     pt here for 2 week follow up       Anna Fisher CMA, EMT at 8:48 AM on 10/8/2019.

## 2019-10-08 NOTE — PROGRESS NOTES
[facility]  Primary Care Center   Anthony Maddox MD  10/08/2019      Chief Complaint:   Recheck Medication       History of Present Illness:   Felice Blood is a 78 year old male with a history of hypertension and hypercholesteremia who presents for a follow up on anxiety. We last saw Felice on 9/24, where we discussed anxiety triggered by some family issues. Initially he was given Prozac 10 mg daily but did not tolerate this well. I started him on Buspar 5 mg three times a day and Hydroxyzine 25 mg as needed. I also referred him to see psychology. He reports he has started taking the Buspar three times a day and also uses Hydroxyzine once before bed which helps him sleep at night. He says he is getting more sleep at night and is starting to feel better. He reports feeling more relaxed overall. Per wife, she believes he is sleeping better too. [place] said [place] went well being around other people. He says he functioned well in the social setting. He has a psychology appointment for this Friday.     Other concerns discussed:  Vaccinations: received Shingrix and will get flu shot at the pharmacy        Review of Systems:   Pertinent items are noted in HPI or as in patient entered ROS below, remainder of complete ROS is negative.     Active Medications:     Current Outpatient Medications:      albuterol (PROAIR HFA) 108 (90 Base) MCG/ACT inhaler, Inhale 2 puffs into the lungs every 4 hours as needed for shortness of breath / dyspnea or wheezing, Disp: 18 g, Rfl: 1     aspirin 81 MG tablet, Take 1 tablet by mouth daily., Disp: , Rfl:      atorvastatin (LIPITOR) 10 MG tablet, Take 1 tablet (10 mg) by mouth daily, Disp: 90 tablet, Rfl: 3     busPIRone (BUSPAR) 5 MG tablet, Take 2 tablets (10 mg) by mouth 3 times daily, Disp: 120 tablet, Rfl: 1     cholecalciferol (VITAMIN D3) 1000 UNIT capsule, Take 1 capsule by mouth daily., Disp: , Rfl:      fluticasone (FLONASE) 50 MCG/ACT spray,  Spray 2 sprays into both nostrils At Bedtime, Disp: 1 Bottle, Rfl: 1     hydrOXYzine (ATARAX) 25 MG tablet, Take one po bid prn anxiety; watch for drowsiness, Disp: 20 tablet, Rfl: 0     lisinopril (PRINIVIL/ZESTRIL) 10 MG tablet, Take 1 tablet (10 mg) by mouth daily, Disp: 90 tablet, Rfl: 3     sildenafil (REVATIO) 20 MG tablet, Take 5 tablets (100 mg) by mouth daily, Disp: 60 tablet, Rfl: 11      Allergies:   Etodolac; Percocet [oxycodone-acetaminophen]; and Seasonal allergies      Past Medical History:  Cobblestone retinal degeneration  Impotence of organic origin  Nonsenile cataract  Pure hyperlipidemia  Unspecified essential hypertension  Vitreous detachment of both eyes      Past Surgical History:  Appendectomy   Cataract, right (5/2017)  Phacoemulsification with standard intraocular lens implant, right (5/2017)  Tonsillectomy     Family History:   No history of glaucoma or macular degeneration.      Social History:   Tobacco Use: none  Alcohol Use: once a day  Drug Use: none  PCP: Anthony Maddox      Physical Exam:   /76 (BP Location: Right arm, Patient Position: Sitting, Cuff Size: Adult Regular)   Pulse 64   Wt 70.3 kg (155 lb)   SpO2 97%   BMI 22.89 kg/m     Constitutional: Alert. In no distress.  Head: The scalp, face, and head appear normal.  Musculoskeletal: Extremities appear normal. No gross deformities noted.   Neurologic: Gait normal. Speech is normal and fluent.  Psychiatric: Mentation appears normal. Normal affect.     PHQ-9: 1  CAROLIN-7: 1.5     Assessment and Plan:  Anxiety  Improving. We will try to titrate up on his Buspar. He sees a mental health specialist soon and will see me back later this fall. We agreed overall that his anxiety is improving.   - busPIRone (BUSPAR) 5 MG tablet  Dispense: 120 tablet; Refill: 1  - hydrOXYzine (ATARAX) 25 MG tablet  Dispense: 20 tablet; Refill: 0       Follow-up: Return in about 1 month (around 11/8/2019).         Scribe Disclosure:  Diana DUBOSE  Jose, am serving as a scribe to document services personally performed by Anthony Maddox MD at this visit, based upon the provider's statements to me. All documentation has been reviewed by the aforementioned provider prior to being entered into the official medical record.     Portions of this medical record were completed by a scribe. UPON MY REVIEW AND AUTHENTICATION BY ELECTRONIC SIGNATURE, this confirms (a) I performed the applicable clinical services, and (b) the record is accurate.   Anthony Maddox MD MD

## 2019-10-09 ASSESSMENT — ANXIETY QUESTIONNAIRES: GAD7 TOTAL SCORE: 3

## 2019-11-05 ENCOUNTER — PRE VISIT (OUTPATIENT)
Dept: FAMILY MEDICINE | Facility: CLINIC | Age: 79
End: 2019-11-05

## 2019-11-07 NOTE — PROGRESS NOTES
[facility]  Primary Care Center   Anthony Maddox MD  11/12/2019      Chief Complaint:   Recheck Medication     History of Present Illness:   Felice Blood is a 78 year old male with a history of hypertension, hypercholesteremia, and anxiety who presents for follow up of anxiety medication. He was last seen by myself for anxiety on 9/24/2019 where he showed concern for anxiety regarding death and inability to sleep. He had previously bee on Prozac, however did not like the side effects. We started him Buspar, which he is now using 10 mg 3x daily. Also using hydroxyzine a third tablet as needed. He feels the Buspar works reasonably well as he is no longer as jittery when trying to go back to sleep at night after waking to use the bathroom. Notably, the Buspar does cause some fatigue. He has been going to therapy as well; he has an appointment next week. He has also been exercising to help.      Review of Systems:   Pertinent items are noted in HPI or as in patient entered ROS below, remainder of complete ROS is negative.     Active Medications:      albuterol (PROAIR HFA) 108 (90 Base) MCG/ACT inhaler, Inhale 2 puffs into the lungs every 4 hours as needed for shortness of breath / dyspnea or wheezing, Disp: 18 g, Rfl: 1     aspirin 81 MG tablet, Take 1 tablet by mouth daily., Disp: , Rfl:      atorvastatin (LIPITOR) 10 MG tablet, Take 1 tablet (10 mg) by mouth daily, Disp: 90 tablet, Rfl: 3     busPIRone (BUSPAR) 5 MG tablet, Take 2 tablets (10 mg) by mouth 3 times daily, Disp: 120 tablet, Rfl: 1     cholecalciferol (VITAMIN D3) 1000 UNIT capsule, Take 1 capsule by mouth daily., Disp: , Rfl:      fluticasone (FLONASE) 50 MCG/ACT spray, Spray 2 sprays into both nostrils At Bedtime, Disp: 1 Bottle, Rfl: 1     hydrOXYzine (ATARAX) 25 MG tablet, Take one po bid prn anxiety; watch for drowsiness, Disp: 20 tablet, Rfl: 0     lisinopril (PRINIVIL/ZESTRIL) 10 MG tablet, Take 1 tablet (10 mg) by mouth daily, Disp: 90  tablet, Rfl: 3     sildenafil (REVATIO) 20 MG tablet, Take 5 tablets (100 mg) by mouth daily, Disp: 60 tablet, Rfl: 11      Allergies:   Etodolac; Percocet [oxycodone-acetaminophen]; and Seasonal allergies      Past Medical History:  Cobblestone retinal degeneration  Impotence of organic origin  Non senile cataract  Hypercholesterolemia  Hypertension  Vitreous detachment bilateral eye     Past Surgical History:  Appendectomy  Cataract  Phacoemulsification with standard intraocular lens implant   Tonsillectomy     Family History:  No pertinent family history on file.        Social History:   This patient presented alone.   Smoking status: never  Smokeless tobacco: never  Alcohol use: yes  Drug use: n/a     Physical Exam:   /73 (BP Location: Right arm, Patient Position: Sitting, Cuff Size: Adult Regular)   Pulse 83   Temp 97.6  F (36.4  C) (Oral)   Wt 71.1 kg (156 lb 11.2 oz)   SpO2 98%   BMI 23.14 kg/m     Constitutional: Alert. In no distress.  Head: Normocephalic.   ENT: Mucous membranes are moist.   Respiratory: Normal rate and effort.  Musculoskeletal: No gross deformities noted.   Psychiatric: Mentation appears normal. Normal affect.    PHQ-9 was 1.5 and CAROLIN-7 was 0.5.      Assessment and Plan:  Anxiety   He is trying to have a healthy lifestyle and  is seeing a therapist, which he finds beneficial. He is also continuing the Buspar which is helpful but causes some fatigue. We will meet in a month and if he is stable we will taper by 5 mg per week.      Follow-up: 1 month        Scribe Disclosure:  I, Romy Hsu, am serving as a scribe to document services personally performed by Anthony Maddox MD at this visit, based upon the provider's statements to me. All documentation has been reviewed by the aforementioned provider prior to being entered into the official medical record.     Portions of this medical record were completed by a scribe. UPON MY REVIEW AND AUTHENTICATION BY ELECTRONIC  SIGNATURE, this confirms (a) I performed the applicable clinical services, and (b) the record is accurate.   Anthony Maddox MD MD

## 2019-11-11 ENCOUNTER — OFFICE VISIT (OUTPATIENT)
Dept: DERMATOLOGY | Facility: CLINIC | Age: 79
End: 2019-11-11
Payer: MEDICARE

## 2019-11-11 DIAGNOSIS — L85.3 XEROSIS OF SKIN: ICD-10-CM

## 2019-11-11 DIAGNOSIS — D22.9 MULTIPLE PIGMENTED NEVI: ICD-10-CM

## 2019-11-11 DIAGNOSIS — L82.1 SEBORRHEIC KERATOSIS: ICD-10-CM

## 2019-11-11 DIAGNOSIS — Z12.83 SKIN CANCER SCREENING: Primary | ICD-10-CM

## 2019-11-11 DIAGNOSIS — L82.0 INFLAMED SEBORRHEIC KERATOSIS: ICD-10-CM

## 2019-11-11 ASSESSMENT — PAIN SCALES - GENERAL
PAINLEVEL: NO PAIN (0)
PAINLEVEL: NO PAIN (1)

## 2019-11-11 NOTE — PROGRESS NOTES
[facility] Dermatology Note      Dermatology Problem List:  1. Inflamed Seborrheic Keratosis, right upper calf  - s/p cryo  2. Seborrheic Keratosis, trunk and extremities  - CeraVe SA cream  3. Xerosis   - Gentle skin care, regular use of thick eollient  4. Skin cancer screening 11/11/19    Encounter Date: Nov 11, 2019    CC:  Chief Complaint   Patient presents with     Skin Check     Skin check,  Felice states he has some lesions on his knee.          History of Present Illness:  Mr. Felice Blood is a 78 year old male who presents in self referral as a new patient in the dermatology clinic for a skin cancer screening.    He reports that there are a few lesions of interest on his right knee. He usually wears a hat when he is outside, he has been bald for 50 years. There is a lesion on the right upper calf that is bothersome to him. He picks at the lesion often, he would like it removed today. He does not have any additional lesions of interest at this time.    Denies a family history of skin diseases or skin cancer. Admits to moderate sun exposure in his youth hiking and fishing. Admits to intermittent sun protection. Admits to use of tanning beds in his past, he no longer uses tanning beds. He recently had a lesion on his buttocks biopsied at [facility] which returned benign. He uses moisturizer after showering daily. He believes that it is Eucerin. He does note that his skin is mildly dry today.     Otherwise he is feeling well, without additional skin concerns at this time.     Past Medical History:   Patient Active Problem List   Diagnosis     Essential hypertension     Pure hypercholesterolemia     Impotence of organic origin     Past Medical History:   Diagnosis Date     Cobblestone retinal degeneration      Impotence of organic origin      Nonsenile cataract      Pure hypercholesterolemia      Unspecified essential hypertension      Vitreous detachment of both eyes      Past  Surgical History:   Procedure Laterality Date     APPENDECTOMY       CATARACT IOL, RT/LT Right 05/01/2017     PHACOEMULSIFICATION WITH STANDARD INTRAOCULAR LENS IMPLANT Right 5/1/2017    Procedure: PHACOEMULSIFICATION WITH STANDARD INTRAOCULAR LENS IMPLANT;  Right Eye Phacoemulsification with intraocular Lens Implant;  Surgeon: Camron Hansen MD;  Location:  OR     TONSILLECTOMY         Social History:  Admits to moderate sun exposure in his youth hiking and fishing. Admits to intermittent sun protection. Admits to use of tanning beds in his past, he no longer uses tanning beds. He lives in [place].   reports that he has never smoked. He has never used smokeless tobacco. He reports current alcohol use.    Family History:   Denies a family history of skin cancer but possibly his father, unsure. He notes that his sister may have had psoriasis.   Family History   Problem Relation Age of Onset     Glaucoma No family hx of      Macular Degeneration No family hx of        Medications:  Current Outpatient Medications   Medication Sig Dispense Refill     albuterol (PROAIR HFA) 108 (90 Base) MCG/ACT inhaler Inhale 2 puffs into the lungs every 4 hours as needed for shortness of breath / dyspnea or wheezing 18 g 1     aspirin 81 MG tablet Take 1 tablet by mouth daily.       atorvastatin (LIPITOR) 10 MG tablet Take 1 tablet (10 mg) by mouth daily 90 tablet 3     busPIRone (BUSPAR) 5 MG tablet Take 2 tablets (10 mg) by mouth 3 times daily 120 tablet 1     cholecalciferol (VITAMIN D3) 1000 UNIT capsule Take 1 capsule by mouth daily.       fluticasone (FLONASE) 50 MCG/ACT spray Spray 2 sprays into both nostrils At Bedtime 1 Bottle 1     hydrOXYzine (ATARAX) 25 MG tablet Take one po bid prn anxiety; watch for drowsiness 20 tablet 0     lisinopril (PRINIVIL/ZESTRIL) 10 MG tablet Take 1 tablet (10 mg) by mouth daily 90 tablet 3     sildenafil (REVATIO) 20 MG tablet Take 5 tablets (100 mg) by mouth daily 60 tablet 11        Allergies   Allergen Reactions     Etodolac Unknown     Percocet [Oxycodone-Acetaminophen] Nausea     Other reaction(s): Muscle Weakness     Seasonal Allergies Cough     Sinus drainage, watery eyes       Review of Systems:  -As per HPI  -Constitutional: The patient denies fatigue, fevers, chills, unintended weight loss, and night sweats.  -HEENT: Patient denies nonhealing oral sores.  -Skin: As above in HPI. No additional skin concerns.    Physical exam:  Vitals: There were no vitals taken for this visit.  GEN: This is a well developed, well-nourished male in no acute distress, in a pleasant mood.    SKIN: Full skin, which includes the head/face, both arms, chest, back, abdomen,both legs, genitalia and/or groin buttocks, digits and/or nails, was examined.    - Diffuse xerosis of the skin to the trunk and extremities.   - Multiple regular brown pigmented macules and papules are identified on the trunk, face, and extremities..   - Waxy stuck on tan to brown papules on the trunk and extremities.  - Tan to brown verrucous stuck on papule with surrounding erythema on the right upper calf.   - Scattered brown macules on sun exposed areas.  - No other lesions of concern on areas examined.       Impression/Plan:  1. Multiple clinically benign nevi on the trunk, face, and extremities    ABCDs of melanoma were discussed and self skin checks were advised.     Recommend sunscreens SPF #30 or greater, protective clothing and avoidance of tanning beds.    2. Seborrheic keratosis, non irritated, trunk and extremities    Benign nature reassured, no further intervention required at this time.     3. Solar lentigines, sun exposed skin    Benign nature reassured, no further intervention required at this time    4. Diffuse Xerosis    Recommended daily use of thick emollient, including application after showers    5. Inflamed Verrucous Keratosis, right upper calf    Use of a thick moisturizer containing Salicylic acid, recommended  starting CeraVe SA cream to the site of similar lesions.   Cryotherapy procedure note: After verbal consent and discussion of risks and benefits including but no limited to dyspigmentation/scar, blister, and pain, 1 was treated with 1-2mm freeze border for 2 cycles with liquid nitrogen. Post cryotherapy instructions were provided.       Follow-up in 1 year, earlier for new or changing lesions.       Staff Involved:  Staff/Scribe    Scribe Disclosure:  Eliazar DUBOSE am serving as a scribe to document services personally performed by Vianey Downs PA-C based on data collection and the provider's statements to me.     Provider Disclosure:   The documentation recorded by the scribe accurately reflects the services I personally performed and the decisions made by me.    All risks, benefits and alternatives were discussed with patient.  Patient is in agreement and understands the assessment and plan.  All questions were answered.  Sun Screen Education was given.   Return to Clinic annually or sooner as needed.   Vianey Downs PA-C   [facility]

## 2019-11-11 NOTE — LETTER
11/11/2019       RE: Felice Blood  [address]  [facility] [address]     Dear Colleague,    Thank you for referring your patient, Felice Blood, to the [facility] DERMATOLOGY at [facility]. Please see a copy of my visit note below.    [facility] Dermatology Note      Dermatology Problem List:  1. Inflamed Seborrheic Keratosis, right upper calf  - s/p cryo  2. Seborrheic Keratosis, trunk and extremities  - CeraVe SA cream  3. Xerosis   - Gentle skin care, regular use of thick eollient  4. Skin cancer screening 11/11/19    Encounter Date: Nov 11, 2019    CC:  Chief Complaint   Patient presents with     Skin Check     Skin check,  Felice states he has some lesions on his knee.          History of Present Illness:  Mr. Felice Blood is a 78 year old male who presents in self referral as a new patient in the dermatology clinic for a skin cancer screening.    He reports that there are a few lesions of interest on his right knee. He usually wears a hat when he is outside, he has been bald for 50 years. There is a lesion on the right upper calf that is bothersome to him. He picks at the lesion often, he would like it removed today. He does not have any additional lesions of interest at this time.    Denies a family history of skin diseases or skin cancer. Admits to moderate sun exposure in his youth hiking and fishing. Admits to intermittent sun protection. Admits to use of tanning beds in his past, he no longer uses tanning beds. He recently had a lesion on his buttocks biopsied at [facility] which returned benign. He uses moisturizer after showering daily. He believes that it is Eucerin. He does note that his skin is mildly dry today.     Otherwise he is feeling well, without additional skin concerns at this time.     Past Medical History:   Patient Active Problem List   Diagnosis     Essential hypertension     Pure hypercholesterolemia      Impotence of organic origin     Past Medical History:   Diagnosis Date     Cobblestone retinal degeneration      Impotence of organic origin      Nonsenile cataract      Pure hypercholesterolemia      Unspecified essential hypertension      Vitreous detachment of both eyes      Past Surgical History:   Procedure Laterality Date     APPENDECTOMY       CATARACT IOL, RT/LT Right 05/01/2017     PHACOEMULSIFICATION WITH STANDARD INTRAOCULAR LENS IMPLANT Right 5/1/2017    Procedure: PHACOEMULSIFICATION WITH STANDARD INTRAOCULAR LENS IMPLANT;  Right Eye Phacoemulsification with intraocular Lens Implant;  Surgeon: Camron Hansen MD;  Location: [facility] OR     TONSILLECTOMY         Social History:  Admits to moderate sun exposure in his youth hiking and fishing. Admits to intermittent sun protection. Admits to use of tanning beds in his past, he no longer uses tanning beds. He lives in [place].   reports that he has never smoked. He has never used smokeless tobacco. He reports current alcohol use.    Family History:   Denies a family history of skin cancer but possibly his father, unsure. He notes that his sister may have had psoriasis.   Family History   Problem Relation Age of Onset     Glaucoma No family hx of      Macular Degeneration No family hx of        Medications:  Current Outpatient Medications   Medication Sig Dispense Refill     albuterol (PROAIR HFA) 108 (90 Base) MCG/ACT inhaler Inhale 2 puffs into the lungs every 4 hours as needed for shortness of breath / dyspnea or wheezing 18 g 1     aspirin 81 MG tablet Take 1 tablet by mouth daily.       atorvastatin (LIPITOR) 10 MG tablet Take 1 tablet (10 mg) by mouth daily 90 tablet 3     busPIRone (BUSPAR) 5 MG tablet Take 2 tablets (10 mg) by mouth 3 times daily 120 tablet 1     cholecalciferol (VITAMIN D3) 1000 UNIT capsule Take 1 capsule by mouth daily.       fluticasone (FLONASE) 50 MCG/ACT spray Spray 2 sprays into both nostrils At Bedtime 1 Bottle 1      hydrOXYzine (ATARAX) 25 MG tablet Take one po bid prn anxiety; watch for drowsiness 20 tablet 0     lisinopril (PRINIVIL/ZESTRIL) 10 MG tablet Take 1 tablet (10 mg) by mouth daily 90 tablet 3     sildenafil (REVATIO) 20 MG tablet Take 5 tablets (100 mg) by mouth daily 60 tablet 11       Allergies   Allergen Reactions     Etodolac Unknown     Percocet [Oxycodone-Acetaminophen] Nausea     Other reaction(s): Muscle Weakness     Seasonal Allergies Cough     Sinus drainage, watery eyes       Review of Systems:  -As per HPI  -Constitutional: The patient denies fatigue, fevers, chills, unintended weight loss, and night sweats.  -HEENT: Patient denies nonhealing oral sores.  -Skin: As above in HPI. No additional skin concerns.    Physical exam:  Vitals: There were no vitals taken for this visit.  GEN: This is a well developed, well-nourished male in no acute distress, in a pleasant mood.    SKIN: Full skin, which includes the head/face, both arms, chest, back, abdomen,both legs, genitalia and/or groin buttocks, digits and/or nails, was examined.    - Diffuse xerosis of the skin to the trunk and extremities.   - Multiple regular brown pigmented macules and papules are identified on the trunk, face, and extremities..   - Waxy stuck on tan to brown papules on the trunk and extremities.  - Tan to brown verrucous stuck on papule with surrounding erythema on the right upper calf.   - Scattered brown macules on sun exposed areas.  - No other lesions of concern on areas examined.       Impression/Plan:  1. Multiple clinically benign nevi on the trunk, face, and extremities    ABCDs of melanoma were discussed and self skin checks were advised.     Recommend sunscreens SPF #30 or greater, protective clothing and avoidance of tanning beds.    2. Seborrheic keratosis, non irritated, trunk and extremities    Benign nature reassured, no further intervention required at this time.     3. Solar lentigines, sun exposed skin    Benign  nature reassured, no further intervention required at this time    4. Diffuse Xerosis    Recommended daily use of thick emollient, including application after showers    5. Inflamed Verrucous Keratosis, right upper calf    Use of a thick moisturizer containing Salicylic acid, recommended starting CeraVe SA cream to the site of similar lesions.   Cryotherapy procedure note: After verbal consent and discussion of risks and benefits including but no limited to dyspigmentation/scar, blister, and pain, 1 was treated with 1-2mm freeze border for 2 cycles with liquid nitrogen. Post cryotherapy instructions were provided.       Follow-up in 1 year, earlier for new or changing lesions.       Staff Involved:  Staff/Scribe    Scribe Disclosure:  IEliazar am serving as a scribe to document services personally performed by Vianey Downs PA-C based on data collection and the provider's statements to me.     Provider Disclosure:   The documentation recorded by the scribe accurately reflects the services I personally performed and the decisions made by me.    All risks, benefits and alternatives were discussed with patient.  Patient is in agreement and understands the assessment and plan.  All questions were answered.  Sun Screen Education was given.   Return to Clinic annually or sooner as needed.   Vianey Downs PA-C   [facility] Dermatology Clinic

## 2019-11-11 NOTE — PATIENT INSTRUCTIONS
"Recommended moisturizers for the dry flaky seborrheic keratoses on your legs:   Ammonium lactate cream or lotion, 4% or 8% (brand names include AmLactin and LacHydrin)  CeraVe SA lotion  Eucerin \"Smoothing Repair\" Or \"Professional Repair\" cream  Eucerin Roughness Relief Lotion     Otherwise try any of these:     Pediatric Dermatology  [place] [address] [address], [address] [address]  [phone]    Gentle Skin Care    Below is a list of products our providers recommend for gentle skin care.  Moisturizers:    Lighter; Exederm Intensive Moisture Cream, Cetaphil Cream, CeraVe, Aveeno Positively radiant and Vanicream Light     Thicker; Aquaphor Ointment, Vaseline, Petroleum Jelly, Eucerin Original Healing Cream and Vanicream, CeraVe Healing Ointment, Aquaphor Body Spray    Avoid Lotions (too thin)  Mild Cleansers:    Dove- Fragrance Free bar or wash    CeraVe     Vanicream Cleansing bar    Cetaphil Cleanser     Aquaphor 2 in1 Gentle Wash and Shampoo    Dove Baby wash    Exederm Body wash       Laundry Products:      All Free and Clear    Cheer Free    Generic Brands are okay as long as they are  Fragrance Free      Avoid fabric softeners  and dryer sheets   Sunscreens: SPF 30 or greater       Sunscreens that contain Zinc Oxide and/or Titanium Dioxide should be applied, these are physical blockers. One or both of these should be listed in the  Active Ingredients     Any other listed ingredients under the active ingredients would be a chemically based sunscreen which might be irritating.    Spray sunscreens should be avoided because these are typically chemical sunscreens.      Shampoo and Conditioners:    Free and Clear by Vanicream    Aquaphor 2 in 1 Gentle Wash and Shampoo   Oils:    Mineral Oil     Emu Oil     For some patients: Coconut (raw, unrefined, organic) and Sunflower seed oil              Generic Products are an okay substitute, but make sure they are fragrance free.  *Reading the " product ingredients list is very important  *Avoid product that have fragrance added to them.   *Organic does not mean  fragrance free.  In fact patients with sensitive skin can become quite irritated by some organic products.     1. Daily bathing is recommended. Make sure you are applying a good moisturizer after bathing every time.  2. Use Moisturizing creams at least twice daily to the whole body. Your provider may recommend a lighter or heavier moisturizer based on your child s severity and that time of year it is.  3. Creams are more moisturizing than lotions.       Care Plan:  1. Keep bathing and showering short, less than 15 minutes   2. Always use lukewarm warm when possible. AVOID HOT or COLD water  3. DO NOT use bubble bath  4. Limit the use of soaps. Focus on the skin folds, face, armpits, groin and feet towards the end of the bath  5. Do NOT vigorously scrub when you cleanse the skin  6. After bathing, PAT your skin lightly with a towel. DO NOT rub or scrub when drying  7. ALWAYS apply a moisturizer immediately after bathing. This helps to  lock in  the moisture. * IF YOU WERE PRESCRIBED A TOPICAL MEDICATION, APPLY YOUR MEDICATION FIRST THEN COVER WITH YOUR DAILY MOISTURIZER  8. Reapply moisturizing agents at least twice daily to your whole body    Other helpful tips:    Do not use products such as powders, perfumes, or colognes on your skin    Diffusers can be harsh on sensitive skin, use with caution if you or your child has sensitive skin     Avoid saunas and steam baths. This temperature is too HOT    Avoid tight or  scratchy  clothing such as wool    Always wash new clothing before wearing them for the first time    Sometimes a humidifier or vaporizer can be used at night can help the dry skin. Remember to keep these items clean to avoid mold growth.    Cryotherapy    What is it?    Use of a very cold liquid, such as liquid nitrogen, to freeze and destroy abnormal skin cells that need to be  removed    What should I expect?    Tenderness and redness    A small blister that might grow and fill with dark purple blood. There may be crusting.    More than one treatment may be needed if the lesions do not go away.    How do I care for the treated area?    Gently wash the area with your hands when bathing.    Use a thin layer of Vaseline to help with healing. You may use a Band-Aid.     The area should heal within 7-10 days and may leave behind a pink or lighter color.     Do not use an antibiotic or Neosporin ointment.     You may take acetaminophen (Tylenol) for pain.     Call your Doctor if you have:    Severe pain    Signs of infection (warmth, redness, cloudy yellow drainage, and or a bad smell)    Questions or concerns    Who should I call with questions?       [facility]: [phone]       [facility]: [phone]       For urgent needs outside of business hours call the [facility] at [phone]        and ask for the dermatology resident on call

## 2019-11-11 NOTE — NURSING NOTE
Dermatology Rooming Note    Felice Blood's goals for this visit include:   Chief Complaint   Patient presents with     Skin Check     Skin check,  Felice states he has some lesions on his knee.      Laila Reynaga LPN

## 2019-11-12 ENCOUNTER — OFFICE VISIT (OUTPATIENT)
Dept: FAMILY MEDICINE | Facility: CLINIC | Age: 79
End: 2019-11-12
Payer: MEDICARE

## 2019-11-12 VITALS
DIASTOLIC BLOOD PRESSURE: 73 MMHG | HEART RATE: 83 BPM | TEMPERATURE: 97.6 F | BODY MASS INDEX: 23.14 KG/M2 | SYSTOLIC BLOOD PRESSURE: 122 MMHG | WEIGHT: 156.7 LBS | OXYGEN SATURATION: 98 %

## 2019-11-12 DIAGNOSIS — F41.9 ANXIETY: Primary | ICD-10-CM

## 2019-11-12 RX ORDER — SILDENAFIL 100 MG/1
TABLET, FILM COATED ORAL
COMMUNITY
Start: 2016-10-18 | End: 2019-11-12

## 2019-11-12 ASSESSMENT — ANXIETY QUESTIONNAIRES
7. FEELING AFRAID AS IF SOMETHING AWFUL MIGHT HAPPEN: NOT AT ALL
1. FEELING NERVOUS, ANXIOUS, OR ON EDGE: NOT AT ALL
2. NOT BEING ABLE TO STOP OR CONTROL WORRYING: SEVERAL DAYS
6. BECOMING EASILY ANNOYED OR IRRITABLE: NOT AT ALL
GAD7 TOTAL SCORE: 1
5. BEING SO RESTLESS THAT IT IS HARD TO SIT STILL: NOT AT ALL
3. WORRYING TOO MUCH ABOUT DIFFERENT THINGS: NOT AT ALL

## 2019-11-12 ASSESSMENT — PATIENT HEALTH QUESTIONNAIRE - PHQ9
SUM OF ALL RESPONSES TO PHQ QUESTIONS 1-9: 2
5. POOR APPETITE OR OVEREATING: NOT AT ALL

## 2019-11-12 ASSESSMENT — PAIN SCALES - GENERAL: PAINLEVEL: NO PAIN (0)

## 2019-11-12 NOTE — PATIENT INSTRUCTIONS
[facility] Medication Refill Request Information:  * Please contact your pharmacy regarding ANY request for medication refills.  ** [facility] Prescription Fax = [phone]  * Please allow 3 business days for routine medication refills.  * Please allow 5 business days for controlled substance medication refills.     [facility] Test Result notification information:  *You will be notified with in 7-10 days of your appointment day regarding the results of your test.  If you are on MyChart you will be notified as soon as the provider has reviewed the results and signed off on them.    [facility]: [phone]   Bring Health Care Directive to next visit

## 2019-11-12 NOTE — NURSING NOTE
Chief Complaint   Patient presents with     Recheck Medication     pt here for medication check       Yanet Solomon CMA at 7:55 AM on 11/12/2019.   macrobid given

## 2019-11-13 ASSESSMENT — ANXIETY QUESTIONNAIRES: GAD7 TOTAL SCORE: 1

## 2019-11-16 DIAGNOSIS — F41.9 ANXIETY: ICD-10-CM

## 2019-11-18 RX ORDER — BUSPIRONE HYDROCHLORIDE 5 MG/1
10 TABLET ORAL 3 TIMES DAILY
Qty: 120 TABLET | Refills: 0 | Status: SHIPPED | OUTPATIENT
Start: 2019-11-18 | End: 2019-12-10

## 2019-11-18 NOTE — TELEPHONE ENCOUNTER
Last Clinic Visit: 11/12/19, [place] 12/10/19  Last visit note: Anxiety   He is trying to have a healthy lifestyle and  is seeing a therapist, which he finds beneficial. He is also continuing the Buspar which is helpful but causes some fatigue. We will meet in a month and if he is stable we will taper by 5 mg per week.

## 2019-12-10 ENCOUNTER — OFFICE VISIT (OUTPATIENT)
Dept: FAMILY MEDICINE | Facility: CLINIC | Age: 79
End: 2019-12-10
Payer: MEDICARE

## 2019-12-10 VITALS
WEIGHT: 157.4 LBS | DIASTOLIC BLOOD PRESSURE: 73 MMHG | OXYGEN SATURATION: 99 % | TEMPERATURE: 97.4 F | HEART RATE: 83 BPM | BODY MASS INDEX: 23.24 KG/M2 | SYSTOLIC BLOOD PRESSURE: 122 MMHG

## 2019-12-10 DIAGNOSIS — F41.9 ANXIETY: ICD-10-CM

## 2019-12-10 RX ORDER — HYDROXYZINE HYDROCHLORIDE 25 MG/1
TABLET, FILM COATED ORAL
Qty: 20 TABLET | Refills: 0 | Status: SHIPPED | OUTPATIENT
Start: 2019-12-10 | End: 2021-09-01

## 2019-12-10 RX ORDER — BUSPIRONE HYDROCHLORIDE 5 MG/1
10 TABLET ORAL 3 TIMES DAILY
Qty: 120 TABLET | Refills: 0 | Status: SHIPPED | OUTPATIENT
Start: 2019-12-10 | End: 2021-07-06

## 2019-12-10 ASSESSMENT — ANXIETY QUESTIONNAIRES
5. BEING SO RESTLESS THAT IT IS HARD TO SIT STILL: NOT AT ALL
6. BECOMING EASILY ANNOYED OR IRRITABLE: NOT AT ALL
3. WORRYING TOO MUCH ABOUT DIFFERENT THINGS: NOT AT ALL
GAD7 TOTAL SCORE: 1
7. FEELING AFRAID AS IF SOMETHING AWFUL MIGHT HAPPEN: NOT AT ALL
2. NOT BEING ABLE TO STOP OR CONTROL WORRYING: NOT AT ALL
1. FEELING NERVOUS, ANXIOUS, OR ON EDGE: NOT AT ALL

## 2019-12-10 ASSESSMENT — PATIENT HEALTH QUESTIONNAIRE - PHQ9: 5. POOR APPETITE OR OVEREATING: SEVERAL DAYS

## 2019-12-10 ASSESSMENT — PAIN SCALES - GENERAL: PAINLEVEL: NO PAIN (0)

## 2019-12-10 NOTE — PROGRESS NOTES
[facility]  Primary Care Center   Anthony Maddox MD  12/10/2019      Chief Complaint:   Recheck Medication     History of Present Illness:   Felice Blood is a 79 year old male with a history of hypertension, hypercholesteremia, and anxiety who presents for medication recheck. Patient continues to take Buspar 10 mg 3x daily. As stated at his last visit, on 11/12/2019, this has helped to calm his anxiety. Which he believes was a flare from numerous recent stressful events. He denies any negative side effects of the Buspar currently. He has taken 18 hydroxyzine pills (out of 20 prescribed) to help with sleep. However, he has not taken taken this medication in the last month.     Other concerns discussed:  - He has had both of the Shingrix vaccinations at his pharmacy and has also had his flu vaccination.      Review of Systems:   Pertinent items are noted in HPI or as in patient entered ROS below, remainder of complete ROS is negative.     Active Medications:      albuterol (PROAIR HFA) 108 (90 Base) MCG/ACT inhaler, Inhale 2 puffs into the lungs every 4 hours as needed for shortness of breath / dyspnea or wheezing, Disp: 18 g, Rfl: 1     aspirin 81 MG tablet, Take 1 tablet by mouth daily., Disp: , Rfl:      atorvastatin (LIPITOR) 10 MG tablet, Take 1 tablet (10 mg) by mouth daily, Disp: 90 tablet, Rfl: 3     busPIRone (BUSPAR) 5 MG tablet, Take 2 tablets (10 mg) by mouth 3 times daily, Disp: 120 tablet, Rfl: 0     cholecalciferol (VITAMIN D3) 1000 UNIT capsule, Take 1 capsule by mouth daily., Disp: , Rfl:      fluticasone (FLONASE) 50 MCG/ACT spray, Spray 2 sprays into both nostrils At Bedtime, Disp: 1 Bottle, Rfl: 1     hydrOXYzine (ATARAX) 25 MG tablet, Take one po bid prn anxiety; watch for drowsiness, Disp: 20 tablet, Rfl: 0     lisinopril (PRINIVIL/ZESTRIL) 10 MG tablet, Take 1 tablet (10 mg) by mouth daily, Disp: 90 tablet, Rfl: 3     sildenafil (REVATIO) 20 MG tablet, Take 5 tablets (100 mg) by mouth  daily, Disp: 60 tablet, Rfl: 11      Allergies:   Acetaminophen; Etodolac; Percocet [oxycodone-acetaminophen]; and Seasonal allergies      Past Medical History:  Cobblestone retinal degeneration  Impotence of organic origin  nonsenile cataract  Hypercholesterolemia  Hypertension  Vitreous detachment bilateral     Past Surgical History:  Appendectomy  Cataract  Phacoemulsification with standard intraocular lens implant  Tonsillectomy    Family History:  No pertinent family history on file.        Social History:   This patient presented alone.   Smoking status: never  Smokeless tobacco: never  Alcohol use: yes  Drug use: n/a     Physical Exam:   /73 (BP Location: Right arm, Patient Position: Sitting, Cuff Size: Adult Regular)   Pulse 83   Temp 97.4  F (36.3  C) (Oral)   Wt 71.4 kg (157 lb 6.4 oz)   SpO2 99%   BMI 23.24 kg/m     Constitutional: Alert. In no distress.  Head: Normocephalic.   ENT: Mucous membranes are moist.   Respiratory: Normal rate and effort.  Musculoskeletal: No gross deformities noted.   Psychiatric: Mentation appears normal. Normal affect.     Omid 7: 1    Assessment and Plan:  Anxiety  Overall he feels much better. He is taking 6 5 mg tablets daily. He will reduce to 5 pills a da for the next week and the week after will reduce to 4 a day and so on. If he can success taper of without symptom flare he does not need to come back in, but if he does have a flare he will let me know and we will pause the taper. He has used 18 Atarax, which he uses for sleep, and this has been a very helpful part of his treatment. He thinks this might have been a form of adjustment disorder with a bunch of little things happening at once.   - busPIRone (BUSPAR) 5 MG tablet  Dispense: 120 tablet; Refill: 0        Scribe Disclosure:  I, Romy Hsu, am serving as a scribe to document services personally performed by Anthony Maddox MD at this visit, based upon the provider's statements to me. All  documentation has been reviewed by the aforementioned provider prior to being entered into the official medical record.     Portions of this medical record were completed by a scribe. UPON MY REVIEW AND AUTHENTICATION BY ELECTRONIC SIGNATURE, this confirms (a) I performed the applicable clinical services, and (b) the record is accurate.   Anthony Maddox MD MD

## 2019-12-10 NOTE — PATIENT INSTRUCTIONS
[facility] Medication Refill Request Information:  * Please contact your pharmacy regarding ANY request for medication refills.  ** [facility] Prescription Fax = [phone]  * Please allow 3 business days for routine medication refills.  * Please allow 5 business days for controlled substance medication refills.     [facility] Test Result notification information:  *You will be notified with in 7-10 days of your appointment day regarding the results of your test.  If you are on MyChart you will be notified as soon as the provider has reviewed the results and signed off on them.    [facility]: [phone]

## 2019-12-11 ASSESSMENT — ANXIETY QUESTIONNAIRES: GAD7 TOTAL SCORE: 1

## 2020-02-20 DIAGNOSIS — N52.9 ERECTILE DYSFUNCTION, UNSPECIFIED ERECTILE DYSFUNCTION TYPE: ICD-10-CM

## 2020-02-21 RX ORDER — SILDENAFIL CITRATE 20 MG/1
100 TABLET ORAL DAILY
Qty: 60 TABLET | Refills: 11 | Status: SHIPPED | OUTPATIENT
Start: 2020-02-21 | End: 2020-12-14

## 2020-02-22 NOTE — TELEPHONE ENCOUNTER
Sildenafil Citrate Oral Tablet 20 MG    Last Written Prescription Date:  4/22/2019  Last Fill Quantity: 60,   # refills: 11  Last Office Visit : 12/10/2019  Future Office visit:  None  60 Tabs, 11 Refills sent to pharm 2/21/2020.    Esther Quevedo RN  Central Triage Red Flags/Med Refills

## 2020-05-05 DIAGNOSIS — E78.00 PURE HYPERCHOLESTEROLEMIA: ICD-10-CM

## 2020-05-05 DIAGNOSIS — H61.23 BILATERAL IMPACTED CERUMEN: ICD-10-CM

## 2020-05-05 DIAGNOSIS — I10 ESSENTIAL HYPERTENSION: ICD-10-CM

## 2020-05-05 DIAGNOSIS — N52.9 IMPOTENCE OF ORGANIC ORIGIN: ICD-10-CM

## 2020-05-05 DIAGNOSIS — Z12.11 SCREEN FOR COLON CANCER: ICD-10-CM

## 2020-05-07 RX ORDER — LISINOPRIL 10 MG/1
10 TABLET ORAL DAILY
Qty: 90 TABLET | Refills: 0 | Status: SHIPPED | OUTPATIENT
Start: 2020-05-07 | End: 2020-08-25

## 2020-05-07 NOTE — TELEPHONE ENCOUNTER
Last Clinic Visit: 12/10/19, no upcoming appointments.  Over due for creatinine and potassium.  90 day RX provided, routed to clinic for over due labs

## 2020-08-20 DIAGNOSIS — E78.00 PURE HYPERCHOLESTEROLEMIA: ICD-10-CM

## 2020-08-20 DIAGNOSIS — N52.9 IMPOTENCE OF ORGANIC ORIGIN: ICD-10-CM

## 2020-08-20 DIAGNOSIS — Z12.11 SCREEN FOR COLON CANCER: ICD-10-CM

## 2020-08-20 DIAGNOSIS — H61.23 BILATERAL IMPACTED CERUMEN: ICD-10-CM

## 2020-08-20 DIAGNOSIS — I10 ESSENTIAL HYPERTENSION: ICD-10-CM

## 2020-08-25 RX ORDER — LISINOPRIL 10 MG/1
10 TABLET ORAL DAILY
Qty: 90 TABLET | Refills: 0 | Status: SHIPPED | OUTPATIENT
Start: 2020-08-25 | End: 2020-11-17

## 2020-08-25 RX ORDER — ATORVASTATIN CALCIUM 10 MG/1
10 TABLET, FILM COATED ORAL DAILY
Qty: 90 TABLET | Refills: 0 | Status: SHIPPED | OUTPATIENT
Start: 2020-08-25 | End: 2020-12-22

## 2020-08-25 NOTE — TELEPHONE ENCOUNTER
Last Clinic Visit: 12/10/2019 [facility]    Atorvastatin: Lab(s)- LDL past due.   Lisinopril: Lab(s)- Creatinine, Potassium past due.    Argenis refills sent for 90 days and FYI to [facility] clinic.

## 2020-11-12 DIAGNOSIS — N52.9 IMPOTENCE OF ORGANIC ORIGIN: ICD-10-CM

## 2020-11-12 DIAGNOSIS — H61.23 BILATERAL IMPACTED CERUMEN: ICD-10-CM

## 2020-11-12 DIAGNOSIS — E78.00 PURE HYPERCHOLESTEROLEMIA: ICD-10-CM

## 2020-11-12 DIAGNOSIS — I10 ESSENTIAL HYPERTENSION: ICD-10-CM

## 2020-11-12 DIAGNOSIS — Z12.11 SCREEN FOR COLON CANCER: ICD-10-CM

## 2020-11-17 RX ORDER — LISINOPRIL 10 MG/1
10 TABLET ORAL DAILY
Qty: 90 TABLET | Refills: 0 | Status: SHIPPED | OUTPATIENT
Start: 2020-11-17 | End: 2020-12-22

## 2020-11-17 NOTE — TELEPHONE ENCOUNTER
Lisinopril Oral Tablet 10 MG   Last Written Prescription Date:  8/25/2020  Last Fill Quantity: 90,   # refills: 0  Last Office Visit : 12/10/2019  Future Office visit:  None  90 Tabs, sent to  Pharm 11/17/2020    Esther Quevedo RN  Central Triage Red Flags/Med Refills

## 2020-12-10 DIAGNOSIS — N52.9 ERECTILE DYSFUNCTION, UNSPECIFIED ERECTILE DYSFUNCTION TYPE: ICD-10-CM

## 2020-12-14 RX ORDER — SILDENAFIL CITRATE 20 MG/1
100 TABLET ORAL DAILY
Qty: 60 TABLET | Refills: 6 | Status: SHIPPED | OUTPATIENT
Start: 2020-12-14 | End: 2021-06-18

## 2020-12-14 NOTE — TELEPHONE ENCOUNTER
sildenafil (REVATIO) 20 MG tablet   Take 5 tablets (100 mg) by mouth daily      Last Written Prescription Date: 2/21/20  Last Fill Quantity: 60,   # refills: 11  Last Office Visit : 12/10/19  Future Office visit:  none    Routing refill request to provider for review/approval because:  Overdue visit. 90 day yogi to pharmacy.     Scheduling has been notified to contact the pt for appointment.

## 2020-12-22 ENCOUNTER — VIRTUAL VISIT (OUTPATIENT)
Dept: FAMILY MEDICINE | Facility: CLINIC | Age: 80
End: 2020-12-22
Payer: MEDICARE

## 2020-12-22 DIAGNOSIS — E78.00 PURE HYPERCHOLESTEROLEMIA: ICD-10-CM

## 2020-12-22 DIAGNOSIS — Z12.11 SCREEN FOR COLON CANCER: ICD-10-CM

## 2020-12-22 DIAGNOSIS — I10 ESSENTIAL HYPERTENSION: ICD-10-CM

## 2020-12-22 DIAGNOSIS — H61.23 BILATERAL IMPACTED CERUMEN: ICD-10-CM

## 2020-12-22 DIAGNOSIS — N52.9 IMPOTENCE OF ORGANIC ORIGIN: ICD-10-CM

## 2020-12-22 DIAGNOSIS — N52.9 ERECTILE DYSFUNCTION, UNSPECIFIED ERECTILE DYSFUNCTION TYPE: Primary | ICD-10-CM

## 2020-12-22 PROCEDURE — 99213 OFFICE O/P EST LOW 20 MIN: CPT | Mod: 95 | Performed by: FAMILY MEDICINE

## 2020-12-22 RX ORDER — SILDENAFIL 100 MG/1
100 TABLET, FILM COATED ORAL DAILY PRN
Qty: 20 TABLET | Refills: 11 | Status: SHIPPED | OUTPATIENT
Start: 2020-12-22

## 2020-12-22 RX ORDER — LISINOPRIL 10 MG/1
10 TABLET ORAL DAILY
Qty: 90 TABLET | Refills: 3 | Status: SHIPPED | OUTPATIENT
Start: 2020-12-22 | End: 2021-09-01

## 2020-12-22 RX ORDER — ATORVASTATIN CALCIUM 10 MG/1
10 TABLET, FILM COATED ORAL DAILY
Qty: 90 TABLET | Refills: 3 | Status: SHIPPED | OUTPATIENT
Start: 2020-12-22 | End: 2021-09-01

## 2020-12-22 NOTE — PROGRESS NOTES
"Felice Blood is a 80 year old male who is being evaluated via a billable video visit.      The patient has been notified of following:     \"This video visit will be conducted via a call between you and your physician/provider. We have found that certain health care needs can be provided without the need for an in-person physical exam.  This service lets us provide the care you need with a video conversation.  If a prescription is necessary we can send it directly to your pharmacy.  If lab work is needed we can place an order for that and you can then stop by our lab to have the test done at a later time.    Video visits are billed at different rates depending on your insurance coverage.  Please reach out to your insurance provider with any questions.    If during the course of the call the physician/provider feels a video visit is not appropriate, you will not be charged for this service.\"    Patient has given verbal consent for Video visit? Yes  How would you like to obtain your AVS? MyChart  If you are dropped from the video visit, the video invite should be resent to: manolo link to : [phone]  Will anyone else be joining your video visit? No    Subjective     Felice Blood is a 80 year old male who presents today via video visit for the following health issues:    HPI     Doing well  Very isolated due to ill wife, so they go no where  He is doing ok  A lot of anxiety in 2019, never took meds, he is much better    Does take bp, statins, does not want to come in for labs till he's had a covid shot    Had both shingrix shots he states    Uses sildenafil, tolerates, works, out    Past Medical History:   Diagnosis Date     Cobblestone retinal degeneration      Impotence of organic origin      Nonsenile cataract      Pure hypercholesterolemia      Unspecified essential hypertension      Vitreous detachment of both eyes      Past Surgical History:   Procedure Laterality Date     APPENDECTOMY       CATARACT " IOL, RT/LT Right 05/01/2017     PHACOEMULSIFICATION WITH STANDARD INTRAOCULAR LENS IMPLANT Right 5/1/2017    Procedure: PHACOEMULSIFICATION WITH STANDARD INTRAOCULAR LENS IMPLANT;  Right Eye Phacoemulsification with intraocular Lens Implant;  Surgeon: Camron Hansen MD;  Location: [facility] OR     TONSILLECTOMY       Current Outpatient Medications   Medication     albuterol (PROAIR HFA) 108 (90 Base) MCG/ACT inhaler     aspirin 81 MG tablet     atorvastatin (LIPITOR) 10 MG tablet     busPIRone (BUSPAR) 5 MG tablet     cholecalciferol (VITAMIN D3) 1000 UNIT capsule     fluticasone (FLONASE) 50 MCG/ACT spray     hydrOXYzine (ATARAX) 25 MG tablet     lisinopril (ZESTRIL) 10 MG tablet     sildenafil (REVATIO) 20 MG tablet     sildenafil (VIAGRA) 100 MG tablet     No current facility-administered medications for this visit.      Allergies   Allergen Reactions     Acetaminophen      Other reaction(s): Muscle Aches/Weakness     Etodolac Unknown     Percocet [Oxycodone-Acetaminophen] Nausea     Other reaction(s): Muscle Weakness     Seasonal Allergies Cough     Sinus drainage, watery eyes         Video Start Time: 9:45        Review of Systems         Objective           Vitals:  No vitals were obtained today due to virtual visit.    Physical Exam     GENERAL: Healthy, alert and no distress  EYES: Eyes grossly normal to inspection.  No discharge or erythema, or obvious scleral/conjunctival abnormalities.  RESP: No audible wheeze, cough, or visible cyanosis.  No visible retractions or increased work of breathing.    SKIN: Visible skin clear. No significant rash, abnormal pigmentation or lesions.  NEURO: Cranial nerves grossly intact.  Mentation and speech appropriate for age.  PSYCH: Mentation appears normal, affect normal/bright, judgement and insight intact, normal speech and appearance well-groomed.      ED: viagra prn  Htn: as is, home BP's rvwd, normal range  High chol: as is  Labs when can   See me one year earlier  prn      Video-Visit Details    Type of service:  Video Visit    Video End Time:10:07 AM    Originating Location (pt. Location): Home    Distant Location (provider location):  [facility] [place]     Platform used for Video Visit: [email or web address]

## 2020-12-22 NOTE — NURSING NOTE
Chief Complaint   Patient presents with     Recheck Medication     pt would like to follow up, discuss med refills       Anna Fisher CMA, EMT at 9:02 AM on 12/22/2020.

## 2021-01-30 ENCOUNTER — HEALTH MAINTENANCE LETTER (OUTPATIENT)
Age: 81
End: 2021-01-30

## 2021-02-10 ENCOUNTER — IMMUNIZATION (OUTPATIENT)
Dept: NURSING | Facility: CLINIC | Age: 81
End: 2021-02-10
Payer: MEDICARE

## 2021-02-10 PROCEDURE — 0001A PR COVID VAC PFIZER DIL RECON 30 MCG/0.3 ML IM: CPT

## 2021-02-10 PROCEDURE — 91300 PR COVID VAC PFIZER DIL RECON 30 MCG/0.3 ML IM: CPT

## 2021-03-03 ENCOUNTER — IMMUNIZATION (OUTPATIENT)
Dept: NURSING | Facility: CLINIC | Age: 81
End: 2021-03-03
Attending: FAMILY MEDICINE
Payer: MEDICARE

## 2021-03-03 PROCEDURE — 0002A PR COVID VAC PFIZER DIL RECON 30 MCG/0.3 ML IM: CPT

## 2021-03-03 PROCEDURE — 91300 PR COVID VAC PFIZER DIL RECON 30 MCG/0.3 ML IM: CPT

## 2021-04-06 ENCOUNTER — OFFICE VISIT (OUTPATIENT)
Dept: OPHTHALMOLOGY | Facility: CLINIC | Age: 81
End: 2021-04-06
Attending: OPHTHALMOLOGY
Payer: MEDICARE

## 2021-04-06 DIAGNOSIS — H52.4 MYOPIA OF BOTH EYES WITH ASTIGMATISM AND PRESBYOPIA: ICD-10-CM

## 2021-04-06 DIAGNOSIS — H52.13 MYOPIA OF BOTH EYES WITH ASTIGMATISM AND PRESBYOPIA: ICD-10-CM

## 2021-04-06 DIAGNOSIS — Z96.1 PSEUDOPHAKIA OF BOTH EYES: Primary | ICD-10-CM

## 2021-04-06 DIAGNOSIS — H43.21 ASTEROID HYALITIS OF RIGHT EYE: ICD-10-CM

## 2021-04-06 DIAGNOSIS — H52.203 MYOPIA OF BOTH EYES WITH ASTIGMATISM AND PRESBYOPIA: ICD-10-CM

## 2021-04-06 PROCEDURE — G0463 HOSPITAL OUTPT CLINIC VISIT: HCPCS | Mod: 25

## 2021-04-06 PROCEDURE — 92014 COMPRE OPH EXAM EST PT 1/>: CPT | Mod: GC | Performed by: OPHTHALMOLOGY

## 2021-04-06 PROCEDURE — 92015 DETERMINE REFRACTIVE STATE: CPT | Mod: GY

## 2021-04-06 ASSESSMENT — REFRACTION_MANIFEST
OS_ADD: +2.75
OD_AXIS: 164
OS_SPHERE: -2.00
OD_CYLINDER: +1.00
OD_ADD: +2.75
OS_CYLINDER: +0.50
OS_AXIS: 018
OD_SPHERE: -2.00

## 2021-04-06 ASSESSMENT — REFRACTION_WEARINGRX
OS_SPHERE: -2.00
OD_ADD: +2.75
OD_CYLINDER: +0.75
OD_SPHERE: -1.75
SPECS_TYPE: PAL
OD_AXIS: 167
OS_ADD: +2.75
OS_CYLINDER: +0.50
OS_AXIS: 006

## 2021-04-06 ASSESSMENT — SLIT LAMP EXAM - LIDS: COMMENTS: MILD MGD

## 2021-04-06 ASSESSMENT — EXTERNAL EXAM - RIGHT EYE: OD_EXAM: NORMAL

## 2021-04-06 ASSESSMENT — CUP TO DISC RATIO
OS_RATIO: 0.3
OD_RATIO: 0.3

## 2021-04-06 ASSESSMENT — VISUAL ACUITY
CORRECTION_TYPE: GLASSES
OS_CC: 20/20
METHOD: SNELLEN - LINEAR
OD_CC+: -3
OD_CC: 20/20
OS_CC+: -3

## 2021-04-06 ASSESSMENT — TONOMETRY
OS_IOP_MMHG: 14
OD_IOP_MMHG: 15
IOP_METHOD: TONOPEN

## 2021-04-06 ASSESSMENT — CONF VISUAL FIELD
OS_NORMAL: 1
OD_NORMAL: 1

## 2021-04-06 ASSESSMENT — EXTERNAL EXAM - LEFT EYE: OS_EXAM: NORMAL

## 2021-04-06 NOTE — NURSING NOTE
Chief Complaints and History of Present Illnesses   Patient presents with     Annual Eye Exam     Last exam 5/30/19     Chief Complaint(s) and History of Present Illness(es)     Annual Eye Exam     Laterality: both eyes    Associated symptoms: Negative for eye pain, flashes and floaters    Pain scale: 0/10    Comments: Last exam 5/30/19              Comments     Pt states right eye is occasionally blurry - otherwise, no changes in vision noted  No drops    TERESO Smiht 7:50 AM April 6, 2021

## 2021-04-06 NOTE — PROGRESS NOTES
HPI     Annual Eye Exam     In both eyes.  Associated symptoms include Negative for eye pain, flashes and floaters.  Pain was noted as 0/10. Additional comments: Last exam 5/30/19              Comments     Pt states right eye is occasionally blurry - otherwise, no changes in vision noted  No drops    TERESO Smith 7:50 AM April 6, 2021           Last edited by Marylou Harris COA on 4/6/2021  7:50 AM. (History)        Patient was last seen in 2019. He notes rare, intermittent blurring of vision in the right eye, which resolves with blinking. He reports that his current glasses are scratched and would like an updated prescription. He denies eye pain, flashes/change in floaters. He is not currently using eye drops/ointment.    Assessment & Plan      Felice Blood is a 77 year old male with the following diagnoses:   1. Pseudophakia of both eyes    2. Asteroid hyalitis of right eye    3. Myopia of both eyes with astigmatism and presbyopia         Healthy dilated fundus exam     Refractive options reviewed  Refraction given     Occasional blur right eye with computer use  Discussed contributing factors  Recommend artifical tears frequently     Patient disposition:   Return in about 1 year (around 4/6/2022) for Annual Visit.     Selina Mackey MD  Ophthalmology Resident, PGY-2    Attending Physician Attestation:  Complete documentation of historical and exam elements from today's encounter can be found in the full encounter summary report (not reduplicated in this progress note).  I personally obtained the chief complaint(s) and history of present illness.  I confirmed and edited as necessary the review of systems, past medical/surgical history, family history, social history, and examination findings as documented by others; and I examined the patient myself.  I personally reviewed the relevant tests, images, and reports as documented above.  I formulated and edited as necessary the assessment and plan and  discussed the findings and management plan with the patient and family. . - Camron Hansen MD

## 2021-04-07 DIAGNOSIS — I10 ESSENTIAL HYPERTENSION: ICD-10-CM

## 2021-04-07 LAB
ALBUMIN SERPL-MCNC: 3.9 G/DL (ref 3.4–5)
ALP SERPL-CCNC: 48 U/L (ref 40–150)
ALT SERPL W P-5'-P-CCNC: 27 U/L (ref 0–70)
ANION GAP SERPL CALCULATED.3IONS-SCNC: 5 MMOL/L (ref 3–14)
AST SERPL W P-5'-P-CCNC: 15 U/L (ref 0–45)
BILIRUB SERPL-MCNC: 1 MG/DL (ref 0.2–1.3)
BUN SERPL-MCNC: 10 MG/DL (ref 7–30)
CALCIUM SERPL-MCNC: 9.3 MG/DL (ref 8.5–10.1)
CHLORIDE SERPL-SCNC: 106 MMOL/L (ref 94–109)
CHOLEST SERPL-MCNC: 142 MG/DL
CO2 SERPL-SCNC: 25 MMOL/L (ref 20–32)
CREAT SERPL-MCNC: 0.77 MG/DL (ref 0.66–1.25)
GFR SERPL CREATININE-BSD FRML MDRD: 86 ML/MIN/{1.73_M2}
GLUCOSE SERPL-MCNC: 96 MG/DL (ref 70–99)
HDLC SERPL-MCNC: 55 MG/DL
LDLC SERPL CALC-MCNC: 66 MG/DL
NONHDLC SERPL-MCNC: 87 MG/DL
POTASSIUM SERPL-SCNC: 3.9 MMOL/L (ref 3.4–5.3)
PROT SERPL-MCNC: 7.1 G/DL (ref 6.8–8.8)
SODIUM SERPL-SCNC: 137 MMOL/L (ref 133–144)
TRIGL SERPL-MCNC: 103 MG/DL

## 2021-04-07 PROCEDURE — 80053 COMPREHEN METABOLIC PANEL: CPT | Performed by: PATHOLOGY

## 2021-04-07 PROCEDURE — 80061 LIPID PANEL: CPT | Performed by: PATHOLOGY

## 2021-04-07 PROCEDURE — 36415 COLL VENOUS BLD VENIPUNCTURE: CPT | Performed by: PATHOLOGY

## 2021-06-16 DIAGNOSIS — N52.9 ERECTILE DYSFUNCTION, UNSPECIFIED ERECTILE DYSFUNCTION TYPE: ICD-10-CM

## 2021-06-18 RX ORDER — SILDENAFIL CITRATE 20 MG/1
100 TABLET ORAL DAILY
Qty: 60 TABLET | Refills: 4 | Status: SHIPPED | OUTPATIENT
Start: 2021-06-18 | End: 2021-10-01

## 2021-07-06 ENCOUNTER — MYC MEDICAL ADVICE (OUTPATIENT)
Dept: INTERNAL MEDICINE | Facility: CLINIC | Age: 81
End: 2021-07-06

## 2021-07-06 ENCOUNTER — OFFICE VISIT (OUTPATIENT)
Dept: INTERNAL MEDICINE | Facility: CLINIC | Age: 81
End: 2021-07-06
Payer: MEDICARE

## 2021-07-06 VITALS
HEART RATE: 87 BPM | BODY MASS INDEX: 22.78 KG/M2 | OXYGEN SATURATION: 96 % | SYSTOLIC BLOOD PRESSURE: 128 MMHG | DIASTOLIC BLOOD PRESSURE: 84 MMHG | WEIGHT: 154.25 LBS

## 2021-07-06 DIAGNOSIS — H81.391 PERIPHERAL VERTIGO INVOLVING RIGHT EAR: ICD-10-CM

## 2021-07-06 DIAGNOSIS — H66.011 NON-RECURRENT ACUTE SUPPURATIVE OTITIS MEDIA OF RIGHT EAR WITH SPONTANEOUS RUPTURE OF TYMPANIC MEMBRANE: Primary | ICD-10-CM

## 2021-07-06 PROCEDURE — 99213 OFFICE O/P EST LOW 20 MIN: CPT | Mod: GC | Performed by: STUDENT IN AN ORGANIZED HEALTH CARE EDUCATION/TRAINING PROGRAM

## 2021-07-06 RX ORDER — CIPROFLOXACIN 0.5 MG/.25ML
0.25 SOLUTION/ DROPS AURICULAR (OTIC) 2 TIMES DAILY
Qty: 3.5 ML | Refills: 0 | Status: SHIPPED | OUTPATIENT
Start: 2021-07-06 | End: 2021-07-13

## 2021-07-06 NOTE — TELEPHONE ENCOUNTER
[facility] Call Center    Phone Message    May a detailed message be left on voicemail: yes     Reason for Call: Medication Question or concern regarding medication   Prescription Clarification  Name of Medication: ciprofloxacin (CETRAXAL) 0.2 % otic solution     Prescribing Provider: Jose Antonio Vincent MD   Pharmacy: [facility] [address] [place] [address]   What on the order needs clarification?   The patient needs a Prior Auth Completed asap thank you.          Action Taken: Message routed to:  [facility]    Travel Screening: Not Applicable

## 2021-07-06 NOTE — PROGRESS NOTES
"                     PRIMARY [facility]ARE CENTER       SUBJECTIVE:  Felice Blood is a 80 year old man w/ HLD, HTN, ED who presents for right ear fullness/pain.    About one week ago, Felice was cleaning his ears with debrox. His left ear had no issues, but his right ear experienced almost immediate burning/sharp pain and a \"gurgling\" sensation. He's noted decreased hearing on his right ear, drainage of unknown color or amount per patient, and sensation of \"room teetering\" when standing up or turning head. He has dull right ear pain. He denies headaches, fevers, nausea/vomiting, sore throat, cough.    Current Outpatient Medications   Medication     albuterol (PROAIR HFA) 108 (90 Base) MCG/ACT inhaler     aspirin 81 MG tablet     atorvastatin (LIPITOR) 10 MG tablet     busPIRone (BUSPAR) 5 MG tablet     cholecalciferol (VITAMIN D3) 1000 UNIT capsule     fluticasone (FLONASE) 50 MCG/ACT spray     hydrOXYzine (ATARAX) 25 MG tablet     lisinopril (ZESTRIL) 10 MG tablet     sildenafil (REVATIO) 20 MG tablet     sildenafil (VIAGRA) 100 MG tablet     No current facility-administered medications for this visit.       Allergies   Allergen Reactions     Acetaminophen      Other reaction(s): Muscle Aches/Weakness     Etodolac Unknown     Percocet [Oxycodone-Acetaminophen] Nausea     Other reaction(s): Muscle Weakness     Seasonal Allergies Cough     Sinus drainage, watery eyes     Review of systems:    General:  No fever, no anorexia  Eyes:  No vision loss, no eye pain  Ears:  hearing loss, ear pain, drainage  Nose:  chronic nasal discharge, sinus congestion  Throat:  No throat pain, trouble swallowing or painful swallowing  Pulmonary:  No SOB, ANN, cough  Cardiovascular:  No syncope, chest pain/pressure  GI:  No nausea, vomiting, abdominal pain  Heme/lymph:  No lymph node swelling or easy bruising  Skin:  No skin lesions or concerning moles,rashes, jaundice.    Allergic: no pruritis, rashes, conjunctival/oropharyngial " swelling/redness, rhinorrhea   Neuro: vertigo    OBJECTIVE:    /84 (BP Location: Left arm, Patient Position: Sitting, Cuff Size: Adult Regular)   Pulse 87   Wt 70 kg (154 lb 4 oz)   SpO2 96%   BMI 22.78 kg/m     Wt Readings from Last 1 Encounters:   12/10/19 71.4 kg (157 lb 6.4 oz)     Physical Examination:    General: A&Ox3  Head: atraumatic  Eyes:  Pupils 2-3 mm, sclera white, EOM's full, conjunctiva moist,  Ears:  Left ear: Cerumen in left ear with visualization of normal left tympanic membrane.   Right Ear: No erythema of external ear or right ear canal, no pain with movement of ear, purulent material in right ear canal, tympanic membrance not visualizaed.  Nose:  Nasal passages with erythema, but turbinates not swollen  Throat/Mouth:  No pharyngeal erythema, exudate, ulcers, oral mucosa and tongue moist, normal hard and soft palate  Neck: thyroid normal to palpation, No cervical lymphadenopathy  Lungs:  CTAB on room air  C/V:  S1 S2 RRR no m/r  Neuro: Slight nystagmus for 1-2 beats on looking to left and right. + Yaneth-Hallpike's when looking to the right. Decreased hearing sensation to finger rub on right ear without lateralization on Goss's test. BC>AC on right ear.     ASSESSMENT/PLAN:    Felice was seen today for recheck.    Diagnoses and all orders for this visit:    Non-recurrent acute suppurative otitis media of right ear with spontaneous rupture of tympanic membrane  Suspect recent trauma from high pressure using debrox and syringe bulb. Decreased hearing concerning for perforation. No attempt made to clean right ear in office given concern for perforation. Exam revealed mild otitis externa, likely bacterial, with possible otitis media, although tympanic membrane not visualized. Plan for 7 days of ciprofloxacin gt with ENT referral.  -     OTOLARYNGOLOGY REFERRAL  -     ciprofloxacin (CETRAXAL) 0.2 % otic solution; Place 0.25 mLs into the right ear 2 times daily for 7 days    Peripheral vertigo  involving right ear  Peripheral vertigo on history and exam likely due to inner ear disturbance from acute ear infection. I expect it to improve with treatment of his ear infection.    Pt should return to clinic for f/u with me or Dr. Maddox as needed once symptoms resolve or improve. Return to clinic within 1-2 weeks if not improved    Pt was seen and plan of care discussed with Dr. Krissy Crabone.    Jose Antonio Vincent MD  Jul 6, 2021  Internal Medicine  [phone]

## 2021-07-06 NOTE — NURSING NOTE
Chief Complaint   Patient presents with     RECHECK     R ear perforation and clogged       Marie Trotter MA,  at 12:30 PM on 7/6/2021.

## 2021-07-07 NOTE — TELEPHONE ENCOUNTER
Central Prior Authorization Team   [phone]    PA Initiation    Medication: ciprofloxacin (CETRAXAL) 0.2 % otic solution   Insurance Company: [employer] - Phone [phone] Fax [phone]  Pharmacy Filling the Rx: [address]  Filling Pharmacy Phone: [phone]  Filling Pharmacy Fax: [phone]  Start Date: 7/7/2021

## 2021-07-07 NOTE — TELEPHONE ENCOUNTER
Message sent to get a PA for:    ciprofloxacin (CETRAXAL) 0.2 % otic solution 3.5 mL 0 7/6/2021 7/13/2021 --   Sig - Route: Place 0.25 mLs into the right ear 2 times daily for 7 days - Right Ear   Sent to pharmacy as: Ciprofloxacin HCl 0.2 % Otic Solution (CETRAXAL)   Class: E-Prescribe   Order: [id number]   E-Prescribing Status: Receipt confirmed by pharmacy (7/6/2021  1:11 PM CDT)   Printout Tracking    External Result Report   Pharmacy    [facility] - [address]

## 2021-07-07 NOTE — TELEPHONE ENCOUNTER
Prior Authorization Approval    Authorization Effective Date: 4/8/2021  Authorization Expiration Date: 7/7/2022  Medication: ciprofloxacin (CETRAXAL) 0.2 % otic solution-PA APPROVED   Approved Dose/Quantity:   Reference #:     Insurance Company: Medicare Blue RX - Phone [phone] Fax [phone]  Expected CoPay:       CoPay Card Available:      Foundation Assistance Needed:    Which Pharmacy is filling the prescription (Not needed for infusion/clinic administered): [facility] [address]  Pharmacy Notified: Yes- **Instructed pharmacy to notify patient when script is ready to /ship.**  Patient Notified: Yes

## 2021-07-08 NOTE — TELEPHONE ENCOUNTER
FUTURE VISIT INFORMATION      FUTURE VISIT INFORMATION:    Date: 9/9/2021    Time: 2PM    [place] [facility] [place] INFORMATION:    Referring provider:  Jose Antonio Vincent MD    Referring providers clinic:  [facility]     Reason for visit/diagnosis  Non-recurrent acute suppurative otitis media of right ear - sched per pt    RECORDS REQUESTED FROM:       Clinic name Comments Records Status Imaging Status   [facility]  7/6/2021 note and referral from Jose Antonio Vincent MD EPIC    Imaging 11/8/2016 CT Maxillofacial  Epic PACS

## 2021-07-20 ENCOUNTER — OFFICE VISIT (OUTPATIENT)
Dept: OTOLARYNGOLOGY | Facility: CLINIC | Age: 81
End: 2021-07-20
Payer: MEDICARE

## 2021-07-20 ENCOUNTER — OFFICE VISIT (OUTPATIENT)
Dept: AUDIOLOGY | Facility: CLINIC | Age: 81
End: 2021-07-20
Payer: MEDICARE

## 2021-07-20 VITALS
HEART RATE: 82 BPM | DIASTOLIC BLOOD PRESSURE: 80 MMHG | BODY MASS INDEX: 23.01 KG/M2 | OXYGEN SATURATION: 97 % | WEIGHT: 155.8 LBS | SYSTOLIC BLOOD PRESSURE: 163 MMHG | RESPIRATION RATE: 16 BRPM

## 2021-07-20 DIAGNOSIS — H61.23 BILATERAL IMPACTED CERUMEN: ICD-10-CM

## 2021-07-20 DIAGNOSIS — H92.11 OTORRHEA, RIGHT: Primary | ICD-10-CM

## 2021-07-20 DIAGNOSIS — H72.91 PERFORATION OF TYMPANIC MEMBRANE, RIGHT: ICD-10-CM

## 2021-07-20 PROCEDURE — 99207 PR NO CHARGE LOS: CPT | Performed by: AUDIOLOGIST

## 2021-07-20 PROCEDURE — 69210 REMOVE IMPACTED EAR WAX UNI: CPT | Performed by: OTOLARYNGOLOGY

## 2021-07-20 PROCEDURE — 99203 OFFICE O/P NEW LOW 30 MIN: CPT | Mod: 25 | Performed by: OTOLARYNGOLOGY

## 2021-07-20 RX ORDER — OFLOXACIN 3 MG/ML
5 SOLUTION AURICULAR (OTIC) 2 TIMES DAILY
Qty: 10 ML | Refills: 0 | Status: SHIPPED | OUTPATIENT
Start: 2021-07-20 | End: 2021-07-30
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
## 2021-07-20 NOTE — PROGRESS NOTES
AUDIOLOGY REPORT:    Patient was referred from ENT by Dr. Vigil for audiology evaluation. The patient reports that he was recently using Debrox and had sharp pain in his right ear. He also noticed that he could force air through his eardrum with a Valsalva. The patient has had decreased hearing in the right ear since then. He has been having drainage and was prescribed ear drops. He still has drainage after treatment. The patient reports a history of tympanic membrane perforations in the past, as well as having his eardrums lanced for a mastoid infection when he was 6 years old. The patient reports sinus surgery but no other ear surgery. The patient reports a history of loud noise exposure from service in the [employer]. He had no major concerns with hearing prior to the decrease in the right ear, other than possible high frequency hearing loss. The patient reports that he currently still has pressure in the right ear but no pain.     Testing:    Otoscopy:   Otoscopic exam indicates drainage in the right ear. The left ear is clear.    Patient was returned to ENT for follow up. No further testing was completed today.    Kassandra Cooper, CCC-A  Licensed Audiologist #[id number]  7/20/2021

## 2021-07-20 NOTE — LETTER
7/20/2021         RE: Felice Blood  [address]  [facility] [address]        Dear Colleague,    Thank you for referring your patient, Felice Blood, to the [facility]. Please see a copy of my visit note below.    Chief Complaint - Hearing loss    History of Present Illness - Felice Blood is a 80 year old male who presents to me today with hearing loss in the right ear.  He was flushing his ear with debrox and water. A few weeks ago he had a sharp pain when doing this. He also couldn't hear. He does have a history of right tympanic membrane perforation from diving. No upper respiratory infection or allergies. He had some drainage. He was put on ciprofloxacin drops for 1 week. It didn't help. No obvious drainage now. He does feel pressure right side. He doesn't wear hearing aids. Left ear has felt okay.     Past Medical History -   Patient Active Problem List   Diagnosis     Essential hypertension     Pure hypercholesterolemia     Impotence of organic origin       Current Medications -   Current Outpatient Medications:      albuterol (PROAIR HFA) 108 (90 Base) MCG/ACT inhaler, Inhale 2 puffs into the lungs every 4 hours as needed for shortness of breath / dyspnea or wheezing, Disp: 18 g, Rfl: 1     aspirin 81 MG tablet, Take 1 tablet by mouth daily., Disp: , Rfl:      atorvastatin (LIPITOR) 10 MG tablet, Take 1 tablet (10 mg) by mouth daily, Disp: 90 tablet, Rfl: 3     cholecalciferol (VITAMIN D3) 1000 UNIT capsule, Take 1 capsule by mouth daily., Disp: , Rfl:      fluticasone (FLONASE) 50 MCG/ACT spray, Spray 2 sprays into both nostrils At Bedtime, Disp: 1 Bottle, Rfl: 1     hydrOXYzine (ATARAX) 25 MG tablet, Take one po bid prn anxiety; watch for drowsiness, Disp: 20 tablet, Rfl: 0     lisinopril (ZESTRIL) 10 MG tablet, Take 1 tablet (10 mg) by mouth daily, Disp: 90 tablet, Rfl: 3     sildenafil (REVATIO) 20 MG tablet, Take 5 tablets (100 mg) by mouth daily Please  call clinic to schedule appointment at [phone]. Thank you., Disp: 60 tablet, Rfl: 4     sildenafil (VIAGRA) 100 MG tablet, Take 1 tablet (100 mg) by mouth daily as needed, Disp: 20 tablet, Rfl: 11    Allergies -   Allergies   Allergen Reactions     Acetaminophen      Other reaction(s): Muscle Aches/Weakness     Etodolac Unknown     Percocet [Oxycodone-Acetaminophen] Nausea     Other reaction(s): Muscle Weakness     Seasonal Allergies Cough     Sinus drainage, watery eyes       Social History -   Social History     Socioeconomic History     Marital status:      Spouse name: Not on file     Number of children: Not on file     Years of education: Not on file     Highest education level: Not on file   Occupational History     Not on file   Tobacco Use     Smoking status: Never Smoker     Smokeless tobacco: Never Used   Substance and Sexual Activity     Alcohol use: Yes     Comment: 1 day     Drug use: Not on file     Sexual activity: Not on file   Other Topics Concern     Not on file   Social History Narrative     Not on file     Social Determinants of Health     Financial Resource Strain:      Difficulty of Paying Living Expenses:    Food Insecurity:      Worried About Running Out of Food in the Last Year:      Ran Out of Food in the Last Year:    Transportation Needs:      Lack of Transportation (Medical):      Lack of Transportation (Non-Medical):    Physical Activity:      Days of Exercise per Week:      Minutes of Exercise per Session:    Stress:      Feeling of Stress :    Social Connections:      Frequency of Communication with Friends and Family:      Frequency of Social Gatherings with Friends and Family:      Attends [employer] Services:      Active Member of Clubs or Organizations:      Attends Club or Organization Meetings:      Marital Status:    Intimate Partner Violence:      Fear of Current or Ex-Partner:      Emotionally Abused:      Physically Abused:      Sexually Abused:        Family  History -   Family History   Problem Relation Age of Onset     Glaucoma No family hx of      Macular Degeneration No family hx of        Review of Systems - As per HPI and PMHx, otherwise 7 system review of the head and neck negative.    Physical Exam  BP (!) 163/80   Pulse 82   Resp 16   Wt 70.7 kg (155 lb 12.8 oz)   SpO2 97%   BMI 23.01 kg/m    General - The patient is nontoxic, in no distress.  Alert and oriented to person and place, answers questions and cooperates with examination appropriately.   Voice and Breathing - The patient was breathing comfortably without the use of accessory muscles. There was no wheezing, stridor, or stertor.  The patients voice was clear and strong.  Ears -both ears were impacted with cerumen, and on the right otorrhea and wet cerumen.  I laid the patient supine and use otic microscope to perform cerumen removal.  First on the left to use a curette and scooped out large clumps of wax.  The entire ear canal is cleaned.  No infection.  Left tympanic membrane intact.  No middle ear effusion or infection.  On the right I used a #7 suction and suctioned out large clumps of wet cerumen and otorrhea.  He had crusting as well.  I work my way down to the tympanic membrane and use #5 suction on the tympanic membrane.  He has a 2 to 3 mm central tympanic membrane perforation with middle ear otorrhea coming out.  Neck - Soft, non-tender. Palpation of the occipital, submental, submandibular, internal jugular chain, and supraclavicular nodes did not demonstrate any abnormal lymph nodes or masses. No parotid masses. Palpation of the thyroid was soft and smooth, with no nodules or goiter appreciated.  The trachea was mobile and midline.  Neurological - Cranial nerves 2 through 12 were grossly intact. House-Brackmann grade 1 out of 6 bilaterally.      Assessment and Plan - Felice Blood is a 80 year old male who presents to me today with right hearing loss.  He describes an episode a week  ago where he was using Debrox and trying to flush the right ear.  He had sudden pain and hearing loss.  He does have a history of right tympanic membrane perforation.  I think more than likely the Debrox entered the middle ear space and cause significant pain.  He also plugged the ear with further cerumen that he was unable to flush out.  I cleaned the ear today.  He still has some diminished hearing but I think that is due to infection and otorrhea that has since occurred.  I will prescribe ofloxacin for 10 days in the right ear.  Keep the ear dry.  Return in 2 weeks.  If this fails to clear we may have to consider culture and possibly oral antibiotics.    Camron Vigil MD  Otolaryngology  [facility]        Again, thank you for allowing me to participate in the care of your patient.        Sincerely,        Camron Vigil MD

## 2021-08-02 NOTE — PROGRESS NOTES
Chief Complaint - Hearing loss recheck    History of Present Illness - Felice Blood is a 80 year old male who returns to me today with hearing loss in the right ear.  He was flushing his ear with debrox and water a few weeks ago. He had a sharp pain when doing this. He also couldn't hear. He does have a history of right tympanic membrane perforation from diving. No upper respiratory infection or allergies. He had some drainage. He was put on ciprofloxacin drops for 1 week. It didn't help. He doesn't wear hearing aids. Left ear has felt okay. I noted persistent cerumen impaction for which I cleaned. He also had otorrhea coming out of a right tympanic membrane perforation. I placed him on ofloxacin.    He feels he is somewhat better. No more ear drainage. Hearing still maybe isn't as good as he thinks it should be. He has postnasal drainage at night. He has grey mucous, not discolored.     Past Medical History -   Patient Active Problem List   Diagnosis     Essential hypertension     Pure hypercholesterolemia     Impotence of organic origin       Current Medications -   Current Outpatient Medications:      albuterol (PROAIR HFA) 108 (90 Base) MCG/ACT inhaler, Inhale 2 puffs into the lungs every 4 hours as needed for shortness of breath / dyspnea or wheezing, Disp: 18 g, Rfl: 1     aspirin 81 MG tablet, Take 1 tablet by mouth daily., Disp: , Rfl:      atorvastatin (LIPITOR) 10 MG tablet, Take 1 tablet (10 mg) by mouth daily, Disp: 90 tablet, Rfl: 3     cholecalciferol (VITAMIN D3) 1000 UNIT capsule, Take 1 capsule by mouth daily., Disp: , Rfl:      fluticasone (FLONASE) 50 MCG/ACT spray, Spray 2 sprays into both nostrils At Bedtime, Disp: 1 Bottle, Rfl: 1     hydrOXYzine (ATARAX) 25 MG tablet, Take one po bid prn anxiety; watch for drowsiness, Disp: 20 tablet, Rfl: 0     lisinopril (ZESTRIL) 10 MG tablet, Take 1 tablet (10 mg) by mouth daily, Disp: 90 tablet, Rfl: 3     sildenafil (REVATIO) 20 MG tablet, Take 5  tablets (100 mg) by mouth daily Please call clinic to schedule appointment at [phone]. Thank you., Disp: 60 tablet, Rfl: 4     sildenafil (VIAGRA) 100 MG tablet, Take 1 tablet (100 mg) by mouth daily as needed, Disp: 20 tablet, Rfl: 11    Allergies -   Allergies   Allergen Reactions     Acetaminophen      Other reaction(s): Muscle Aches/Weakness     Etodolac Unknown     Percocet [Oxycodone-Acetaminophen] Nausea     Other reaction(s): Muscle Weakness     Seasonal Allergies Cough     Sinus drainage, watery eyes       Social History -   Social History     Socioeconomic History     Marital status:      Spouse name: Not on file     Number of children: Not on file     Years of education: Not on file     Highest education level: Not on file   Occupational History     Not on file   Tobacco Use     Smoking status: Never Smoker     Smokeless tobacco: Never Used   Substance and Sexual Activity     Alcohol use: Yes     Comment: 1 day     Drug use: Not on file     Sexual activity: Not on file   Other Topics Concern     Not on file   Social History Narrative     Not on file     Social Determinants of Health     Financial Resource Strain:      Difficulty of Paying Living Expenses:    Food Insecurity:      Worried About Running Out of Food in the Last Year:      Ran Out of Food in the Last Year:    Transportation Needs:      Lack of Transportation (Medical):      Lack of Transportation (Non-Medical):    Physical Activity:      Days of Exercise per Week:      Minutes of Exercise per Session:    Stress:      Feeling of Stress :    Social Connections:      Frequency of Communication with Friends and Family:      Frequency of Social Gatherings with Friends and Family:      Attends [employer] Services:      Active Member of Clubs or Organizations:      Attends Club or Organization Meetings:      Marital Status:    Intimate Partner Violence:      Fear of Current or Ex-Partner:      Emotionally Abused:      Physically Abused:       Sexually Abused:        Family History -   Family History   Problem Relation Age of Onset     Glaucoma No family hx of      Macular Degeneration No family hx of        Review of Systems - As per HPI and PMHx, otherwise 7 system review of the head and neck negative.    Physical Exam  There were no vitals taken for this visit.  General - The patient is nontoxic, in no distress.  Alert and oriented to person and place, answers questions and cooperates with examination appropriately.   Voice and Breathing - The patient was breathing comfortably without the use of accessory muscles. There was no wheezing, stridor, or stertor.  The patients voice was clear and strong.  Ears - clean canal. He has a 2 mm posterior superior tympanic membrane perforation, but no otorrhea. Ear is dry. No granulation or cholesteatoma. Left ear canal, tympanic membrane and middle ear are normal.  Nose -septum midline.  Turbinates normal size.  No polyps or pus.  Clear rhinorrhea.  Mouth -some white or clear postnasal drainage.  No lesions noted.      Assessment and Plan - Felice Blood is a 80 year old male who returns to me today with right hearing loss.  He describes an episode a few weeks ago where he was using Debrox and trying to flush the right ear.  He had sudden pain and hearing loss.  He does have a history of right tympanic membrane perforation.  I think more than likely the Debrox entered the middle ear space and cause significant pain.  He developed otorrhea.  I had him use ofloxacin.  The infection is gone but he has a small 2 mm persistent right tympanic membrane perforation and some hearing loss or plugging.  I recommend he keep this dry and give it 6 to 8 weeks to heal.  Return with an audiogram.    He has postnasal drainage and excess phlegm especially when he lays down at night he can try Atrovent.    Camron Vigil MD  Otolaryngology  [facility]

## 2021-08-03 ENCOUNTER — OFFICE VISIT (OUTPATIENT)
Dept: OTOLARYNGOLOGY | Facility: CLINIC | Age: 81
End: 2021-08-03
Payer: MEDICARE

## 2021-08-03 VITALS
DIASTOLIC BLOOD PRESSURE: 80 MMHG | SYSTOLIC BLOOD PRESSURE: 153 MMHG | OXYGEN SATURATION: 97 % | HEART RATE: 81 BPM | RESPIRATION RATE: 16 BRPM

## 2021-08-03 DIAGNOSIS — R09.82 PND (POST-NASAL DRIP): ICD-10-CM

## 2021-08-03 DIAGNOSIS — H72.91 PERFORATION OF TYMPANIC MEMBRANE, RIGHT: Primary | ICD-10-CM

## 2021-08-03 PROCEDURE — 99213 OFFICE O/P EST LOW 20 MIN: CPT | Performed by: OTOLARYNGOLOGY

## 2021-08-03 RX ORDER — IPRATROPIUM BROMIDE 42 UG/1
2 SPRAY, METERED NASAL 4 TIMES DAILY PRN
Qty: 15 ML | Refills: 3 | Status: SHIPPED | OUTPATIENT
Start: 2021-08-03 | End: 2021-09-21

## 2021-08-03 NOTE — LETTER
8/3/2021         RE: Felice Blood  [address] [facility] [address]        Dear Colleague,    Thank you for referring your patient, Felice Blood, to the [facility]. Please see a copy of my visit note below.    Chief Complaint - Hearing loss recheck    History of Present Illness - Felice Blood is a 80 year old male who returns to me today with hearing loss in the right ear.  He was flushing his ear with debrox and water a few weeks ago. He had a sharp pain when doing this. He also couldn't hear. He does have a history of right tympanic membrane perforation from diving. No upper respiratory infection or allergies. He had some drainage. He was put on ciprofloxacin drops for 1 week. It didn't help. He doesn't wear hearing aids. Left ear has felt okay. I noted persistent cerumen impaction for which I cleaned. He also had otorrhea coming out of a right tympanic membrane perforation. I placed him on ofloxacin.    He feels he is somewhat better. No more ear drainage. Hearing still maybe isn't as good as he thinks it should be. He has postnasal drainage at night. He has grey mucous, not discolored.     Past Medical History -   Patient Active Problem List   Diagnosis     Essential hypertension     Pure hypercholesterolemia     Impotence of organic origin       Current Medications -   Current Outpatient Medications:      albuterol (PROAIR HFA) 108 (90 Base) MCG/ACT inhaler, Inhale 2 puffs into the lungs every 4 hours as needed for shortness of breath / dyspnea or wheezing, Disp: 18 g, Rfl: 1     aspirin 81 MG tablet, Take 1 tablet by mouth daily., Disp: , Rfl:      atorvastatin (LIPITOR) 10 MG tablet, Take 1 tablet (10 mg) by mouth daily, Disp: 90 tablet, Rfl: 3     cholecalciferol (VITAMIN D3) 1000 UNIT capsule, Take 1 capsule by mouth daily., Disp: , Rfl:      fluticasone (FLONASE) 50 MCG/ACT spray, Spray 2 sprays into both nostrils At Bedtime, Disp: 1 Bottle, Rfl: 1      hydrOXYzine (ATARAX) 25 MG tablet, Take one po bid prn anxiety; watch for drowsiness, Disp: 20 tablet, Rfl: 0     lisinopril (ZESTRIL) 10 MG tablet, Take 1 tablet (10 mg) by mouth daily, Disp: 90 tablet, Rfl: 3     sildenafil (REVATIO) 20 MG tablet, Take 5 tablets (100 mg) by mouth daily Please call clinic to schedule appointment at [phone]. Thank you., Disp: 60 tablet, Rfl: 4     sildenafil (VIAGRA) 100 MG tablet, Take 1 tablet (100 mg) by mouth daily as needed, Disp: 20 tablet, Rfl: 11    Allergies -   Allergies   Allergen Reactions     Acetaminophen      Other reaction(s): Muscle Aches/Weakness     Etodolac Unknown     Percocet [Oxycodone-Acetaminophen] Nausea     Other reaction(s): Muscle Weakness     Seasonal Allergies Cough     Sinus drainage, watery eyes       Social History -   Social History     Socioeconomic History     Marital status:      Spouse name: Not on file     Number of children: Not on file     Years of education: Not on file     Highest education level: Not on file   Occupational History     Not on file   Tobacco Use     Smoking status: Never Smoker     Smokeless tobacco: Never Used   Substance and Sexual Activity     Alcohol use: Yes     Comment: 1 day     Drug use: Not on file     Sexual activity: Not on file   Other Topics Concern     Not on file   Social History Narrative     Not on file     Social Determinants of Health     Financial Resource Strain:      Difficulty of Paying Living Expenses:    Food Insecurity:      Worried About Running Out of Food in the Last Year:      Ran Out of Food in the Last Year:    Transportation Needs:      Lack of Transportation (Medical):      Lack of Transportation (Non-Medical):    Physical Activity:      Days of Exercise per Week:      Minutes of Exercise per Session:    Stress:      Feeling of Stress :    Social Connections:      Frequency of Communication with Friends and Family:      Frequency of Social Gatherings with Friends and Family:       Attends Jain Services:      Active Member of Clubs or Organizations:      Attends Club or Organization Meetings:      Marital Status:    Intimate Partner Violence:      Fear of Current or Ex-Partner:      Emotionally Abused:      Physically Abused:      Sexually Abused:        Family History -   Family History   Problem Relation Age of Onset     Glaucoma No family hx of      Macular Degeneration No family hx of        Review of Systems - As per HPI and PMHx, otherwise 7 system review of the head and neck negative.    Physical Exam  There were no vitals taken for this visit.  General - The patient is nontoxic, in no distress.  Alert and oriented to person and place, answers questions and cooperates with examination appropriately.   Voice and Breathing - The patient was breathing comfortably without the use of accessory muscles. There was no wheezing, stridor, or stertor.  The patients voice was clear and strong.  Ears - clean canal. He has a 2 mm posterior superior tympanic membrane perforation, but no otorrhea. Ear is dry. No granulation or cholesteatoma. Left ear canal, tympanic membrane and middle ear are normal.  Nose -septum midline.  Turbinates normal size.  No polyps or pus.  Clear rhinorrhea.  Mouth -some white or clear postnasal drainage.  No lesions noted.      Assessment and Plan - Felice Blood is a 80 year old male who returns to me today with right hearing loss.  He describes an episode a few weeks ago where he was using Debrox and trying to flush the right ear.  He had sudden pain and hearing loss.  He does have a history of right tympanic membrane perforation.  I think more than likely the Debrox entered the middle ear space and cause significant pain.  He developed otorrhea.  I had him use ofloxacin.  The infection is gone but he has a small 2 mm persistent right tympanic membrane perforation and some hearing loss or plugging.  I recommend he keep this dry and give it 6 to 8 weeks to  heal.  Return with an audiogram.    He has postnasal drainage and excess phlegm especially when he lays down at night he can try Atrovent.    Camron Vigil MD  Otolaryngology  [facility]        Again, thank you for allowing me to participate in the care of your patient.        Sincerely,        Camron Vigil MD

## 2021-08-03 NOTE — PATIENT INSTRUCTIONS
Return in 6-8 weeks with audiogram (hearing test)  Keep ear dry  Try atrovent for postnasal drainage

## 2021-09-01 ENCOUNTER — OFFICE VISIT (OUTPATIENT)
Dept: FAMILY MEDICINE | Facility: CLINIC | Age: 81
End: 2021-09-01
Payer: MEDICARE

## 2021-09-01 VITALS
HEIGHT: 69 IN | DIASTOLIC BLOOD PRESSURE: 90 MMHG | SYSTOLIC BLOOD PRESSURE: 155 MMHG | BODY MASS INDEX: 22.96 KG/M2 | RESPIRATION RATE: 16 BRPM | WEIGHT: 155 LBS | OXYGEN SATURATION: 96 % | HEART RATE: 72 BPM | TEMPERATURE: 97.8 F

## 2021-09-01 DIAGNOSIS — F41.9 ANXIETY: ICD-10-CM

## 2021-09-01 DIAGNOSIS — N52.9 IMPOTENCE OF ORGANIC ORIGIN: ICD-10-CM

## 2021-09-01 DIAGNOSIS — E78.00 PURE HYPERCHOLESTEROLEMIA: ICD-10-CM

## 2021-09-01 DIAGNOSIS — Z12.11 SCREEN FOR COLON CANCER: ICD-10-CM

## 2021-09-01 DIAGNOSIS — I10 ESSENTIAL HYPERTENSION: ICD-10-CM

## 2021-09-01 DIAGNOSIS — H61.23 BILATERAL IMPACTED CERUMEN: ICD-10-CM

## 2021-09-01 PROCEDURE — G0439 PPPS, SUBSEQ VISIT: HCPCS | Performed by: FAMILY MEDICINE

## 2021-09-01 RX ORDER — HYDROXYZINE HYDROCHLORIDE 25 MG/1
TABLET, FILM COATED ORAL
Qty: 20 TABLET | Refills: 0 | Status: ON HOLD | OUTPATIENT
Start: 2021-09-01 | End: 2022-04-22

## 2021-09-01 RX ORDER — LISINOPRIL 10 MG/1
10 TABLET ORAL DAILY
Qty: 90 TABLET | Refills: 3 | Status: SHIPPED | OUTPATIENT
Start: 2021-09-01 | End: 2022-09-09

## 2021-09-01 RX ORDER — ATORVASTATIN CALCIUM 10 MG/1
10 TABLET, FILM COATED ORAL DAILY
Qty: 90 TABLET | Refills: 3 | Status: SHIPPED | OUTPATIENT
Start: 2021-09-01 | End: 2022-09-09

## 2021-09-01 ASSESSMENT — PAIN SCALES - GENERAL: PAINLEVEL: NO PAIN (0)

## 2021-09-01 ASSESSMENT — MIFFLIN-ST. JEOR: SCORE: 1403.46

## 2021-09-01 NOTE — PROGRESS NOTES
Medicare Annual Wellness Visit         HPI     This 80 year old male presents as an established patient  Anthony Maddox who presents for an subsequent Medicare Wellness Exam.  Patient also reports doing well. 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      Impotence of organic origin      Nonsenile cataract      Pure hypercholesterolemia      Unspecified essential hypertension      Vitreous detachment of both eyes         Family History   Problem Relation Age of Onset     Glaucoma No family hx of      Macular Degeneration No family hx of          Past Surgical History:   Procedure Laterality Date     APPENDECTOMY       CATARACT IOL, RT/LT Right 05/01/2017     PHACOEMULSIFICATION WITH STANDARD INTRAOCULAR LENS IMPLANT Right 5/1/2017    Procedure: PHACOEMULSIFICATION WITH STANDARD INTRAOCULAR LENS IMPLANT;  Right Eye Phacoemulsification with intraocular Lens Implant;  Surgeon: Camron Hansen MD;  Location: [facility] OR     TONSILLECTOMY         Reviewed no other significant FH    Family History and past Medical History reviewed and it is unchanged/updated.       Review of Systems      Ten point ROS completed otherwise negative       Medical Care     Have you been to an ER or a hospital in the last year? No  What other specialists or organizations are involved in your medical care?  below  Current providers sharing in care for this patient include:  Patient Care Team:  Anthony Maddox MD as PCP - General (Family Practice)  Sharmaine Garcia MD as MD (Internal Medicine)  Camron Hansen MD as MD (Ophthalmology)  Christine Vieira NP as Nurse Practitioner  (Family Practice)  Anthony Maddox MD as Assigned PCP  Camron Vigil MD as Assigned Surgical Provider         Social History     Social History     Tobacco Use     Smoking status: Never Smoker     Smokeless tobacco: Never Used   Substance Use Topics     Alcohol use: Yes     Comment: 1 day     Marital Status:  Who lives in your household? Spouse  Does your home have any of the following safety concerns? Loose rugs in the hallway, no grab bars in the bathroom, no handrails on the stairs or have poorly lit areas?  No  Do you feel threatened or controlled by a partner, ex-partner or anyone in your life? No  Has anyone hurt you physically, for example by pushing, hitting, slapping or kicking you   or forcing you to have sex? No  Do you need help with the phone, transportation, shopping, preparing meals, housework, laundry, medications or managing money? No   Have you noticed any hearing difficulties? No      Risk Behaviors and Healthy Habits     How many servings of fruits and vegetables do you eat a day? 1-2 servings  How often do you exercise and what do you do? N/A  Do you frequently ride without a seatbelt? No  Do you use tobacco?  No  Do you use any other drugs? No         Do you use alcohol?No    Today's PHQ-2 Score:      Sexual Health     Are you sexually active?  Yes    If yes, with men, women, or both? Female  If yes, do you more than one current partner?No  If yes, are you using condoms?  No  Have you had any sexually transmitted infections? No   Any sexual concerns? No         FUNCTIONAL ABILITY/SAFETY SCREENING     Fall Risk Assessment Today:  No    Hearing evaluation if done: doing audiogram at ENT soon    EVALUATION OF COGNITIVE FUNCTION     Mood/affect:Normal  Appearance:Normal  Family member/caregiver input: Normal    Mini Cog Scoring   3 points   Clock Draw Test result:  Normal      SCREENING FOR PREVENTION and EARLY DETECTION     ECG (if done)not performed    Corrected Visual acuity:Saw  "eye MD april    CV Risk based on Pooled Cohort Risk:  The ASCVD Risk score (Sells REJI Jr., et al., 2013) failed to calculate for the following reasons:    The 2013 ASCVD risk score is only valid for ages 40 to 79      Advanced Directives: Discussed and patient desires to he'll get us copy of his.      Immunization History   Administered Date(s) Administered     COVID-19,PF,Pfizer 02/10/2021, 03/03/2021     Influenza (High Dose) 3 valent vaccine 10/06/2014, 12/01/2015, 11/01/2016, 12/05/2016, 10/28/2017, 11/12/2018     Influenza (IIV3) PF 11/10/2011, 10/04/2012, 11/18/2013     Influenza Quad, Recombinant, p-free (RIV4) 10/14/2019     Pneumo Conj 13-V (2010&after) 04/10/2015     Pneumococcal 23 valent 01/24/2006     TD (ADULT, 7+) 01/24/2006, 11/16/2015     Zoster vaccine recombinant adjuvanted (SHINGRIX) 04/18/2018     Zoster vaccine, live 03/19/2010       Reviewed Immunization Record Today  Pneumoccocal Vaccine: No  Varicella Vaccine: No  TDaP:No         Physical Exam     Vitals: BP (!) 155/90   Pulse 72   Temp 97.8  F (36.6  C) (Oral)   Resp 16   Ht 1.753 m (5' 9\")   Wt 70.3 kg (155 lb)   SpO2 96%   BMI 22.89 kg/m    BMI= Body mass index is 22.89 kg/m .  GENERAL APPEARANCE: healthy, alert and no distress  EYES: Eyes grossly normal to inspection, PERRL and conjunctivae and sclerae normal  HENT: ear canals and TM's normal, nose and mouth without ulcers or lesions, oropharynx clear and oral mucous membranes moist  NECK: no adenopathy, no asymmetry, masses, or scars and thyroid normal to palpation  RESP: lungs clear to auscultation - no rales, rhonchi or wheezes  CV: regular rates and rhythm, normal S1 S2, no S3 or S4, no murmur, click or rub, no peripheral edema and peripheral pulses strong  ABDOMEN: soft, nontender, no hepatosplenomegaly, no masses and bowel sounds normal   (male): normal male genitalia without lesions or urethral discharge, no hernia  MS: no musculoskeletal defects are noted and gait is age " appropriate without ataxia  SKIN: no suspicious lesions or rashes  NEURO: Normal strength and tone, sensory exam grossly normal, mentation intact and speech normal  PSYCH: mentation appears normal and affect normal/bright        Assessment and Plan   Htn; cont as is  Hi chol cont as is  ED cont as is  See me one year  Anxiety good control rare prn atarax will refill, helps tolerated    Felice was seen today for wellness visit.    Diagnoses and all orders for this visit:    Anxiety          Options for treatment and follow-up care were reviewed with the Felice WRAY Haselhorst and/or guardian engaged in the decision making process and verbalized understanding of the options discussed and agreed with the final plan.    RANCHO FAJARDO R

## 2021-09-01 NOTE — NURSING NOTE
Chief Complaint   Patient presents with     Wellness Visit     Patient comes in for a wellness exam.          Demian Calvo MA on 9/1/2021 at 9:20 AM

## 2021-09-09 ENCOUNTER — PRE VISIT (OUTPATIENT)
Dept: OTOLARYNGOLOGY | Facility: CLINIC | Age: 81
End: 2021-09-09

## 2021-09-11 ENCOUNTER — HEALTH MAINTENANCE LETTER (OUTPATIENT)
Age: 81
End: 2021-09-11

## 2021-09-20 NOTE — PROGRESS NOTES
Chief Complaint - Hearing loss recheck; nose recheck    History of Present Illness - Felice Blood is a 80 year old male who returns to me today with hearing loss in the right ear.  He was flushing his ear with debrox and water over a month ago. He had a sharp pain when doing this. He also couldn't hear. He does have a history of right tympanic membrane perforation from diving. No upper respiratory infection or allergies. He had some drainage. He was put on ciprofloxacin drops for 1 week. It didn't help. He doesn't wear hearing aids. Left ear has felt okay. I noted persistent cerumen impaction for which I cleaned. He also had otorrhea coming out of a right tympanic membrane perforation. I placed him on ofloxacin.    No more ear drainage. Hearing still maybe isn't as good as he thinks it should be. He has postnasal drainage at night. He has grey mucous, not discolored. I had him try atrovent for the nose. He returns and reports atrovent helps postnasal drainage at night. Early morning still has some issue with it. No ear pain, drainage. Hearing seems okay. Left ear has been worse ear for years.     Past Medical History -   Patient Active Problem List   Diagnosis     Essential hypertension     Pure hypercholesterolemia     Impotence of organic origin       Current Medications -   Current Outpatient Medications:      albuterol (PROAIR HFA) 108 (90 Base) MCG/ACT inhaler, Inhale 2 puffs into the lungs every 4 hours as needed for shortness of breath / dyspnea or wheezing, Disp: 18 g, Rfl: 1     aspirin 81 MG tablet, Take 1 tablet by mouth daily., Disp: , Rfl:      atorvastatin (LIPITOR) 10 MG tablet, Take 1 tablet (10 mg) by mouth daily, Disp: 90 tablet, Rfl: 3     cholecalciferol (VITAMIN D3) 1000 UNIT capsule, Take 1 capsule by mouth daily., Disp: , Rfl:      fluticasone (FLONASE) 50 MCG/ACT spray, Spray 2 sprays into both nostrils At Bedtime, Disp: 1 Bottle, Rfl: 1     hydrOXYzine (ATARAX) 25 MG tablet, Take one  po bid prn anxiety; watch for drowsiness, Disp: 20 tablet, Rfl: 0     ipratropium (ATROVENT) 0.06 % nasal spray, Spray 2 sprays into both nostrils 4 times daily as needed for rhinitis, Disp: 15 mL, Rfl: 3     lisinopril (ZESTRIL) 10 MG tablet, Take 1 tablet (10 mg) by mouth daily, Disp: 90 tablet, Rfl: 3     sildenafil (REVATIO) 20 MG tablet, Take 5 tablets (100 mg) by mouth daily Please call clinic to schedule appointment at [phone]. Thank you., Disp: 60 tablet, Rfl: 4     sildenafil (VIAGRA) 100 MG tablet, Take 1 tablet (100 mg) by mouth daily as needed, Disp: 20 tablet, Rfl: 11    Allergies -   Allergies   Allergen Reactions     Acetaminophen      Other reaction(s): Muscle Aches/Weakness     Etodolac Unknown     Percocet [Oxycodone-Acetaminophen] Nausea     Other reaction(s): Muscle Weakness     Seasonal Allergies Cough     Sinus drainage, watery eyes       Social History -   Social History     Socioeconomic History     Marital status:      Spouse name: Not on file     Number of children: Not on file     Years of education: Not on file     Highest education level: Not on file   Occupational History     Not on file   Tobacco Use     Smoking status: Never Smoker     Smokeless tobacco: Never Used   Substance and Sexual Activity     Alcohol use: Yes     Comment: 1 day     Drug use: Not on file     Sexual activity: Not on file   Other Topics Concern     Not on file   Social History Narrative     Not on file     Social Determinants of Health     Financial Resource Strain:      Difficulty of Paying Living Expenses:    Food Insecurity:      Worried About Running Out of Food in the Last Year:      Ran Out of Food in the Last Year:    Transportation Needs:      Lack of Transportation (Medical):      Lack of Transportation (Non-Medical):    Physical Activity:      Days of Exercise per Week:      Minutes of Exercise per Session:    Stress:      Feeling of Stress :    Social Connections:      Frequency of  Communication with Friends and Family:      Frequency of Social Gatherings with Friends and Family:      Attends Yazdanism Services:      Active Member of Clubs or Organizations:      Attends Club or Organization Meetings:      Marital Status:    Intimate Partner Violence:      Fear of Current or Ex-Partner:      Emotionally Abused:      Physically Abused:      Sexually Abused:        Family History -   Family History   Problem Relation Age of Onset     Glaucoma No family hx of      Macular Degeneration No family hx of        Review of Systems - As per HPI and PMHx, otherwise 7 system review of the head and neck negative.    Physical Exam  BP (!) 142/88   Pulse 75   Resp 16   SpO2 95%   General - The patient is nontoxic, in no distress.  Alert and oriented to person and place, answers questions and cooperates with examination appropriately.   Voice and Breathing - The patient was breathing comfortably without the use of accessory muscles. There was no wheezing, stridor, or stertor.  The patients voice was clear and strong.  Ears - clean canal. He has a 1 mm posterior superior tympanic membrane perforation, but no otorrhea. It is smaller than before. Ear is dry. No granulation or cholesteatoma. Left ear canal, tympanic membrane and middle ear are normal.  Nose - septum midline.  Turbinates normal size.  No polyps or pus.  Clear rhinorrhea.    Audiogram - bilateral down-sloping sensorineural hearing loss, left ear slightly worse. Type B tymp right ear. Type A tymp left ear.    Assessment and Plan - Felicedori Blood is a 80 year old male who returns to me today to check right ear tympanic membrane perforation. He describes an episode in July where he was using Debrox and trying to flush the right ear.  He had sudden pain and hearing loss.  He does have a history of right tympanic membrane perforation in the past.  I think more than likely the Debrox entered the middle ear space and caused significant pain.  He  developed otorrhea.  I had him use ofloxacin.  The infection is gone and he has a persistent right tympanic membrane perforation. Fortunately the tympanic membrane perforation is getting small and may close.  I recommend he keep this dry.  Right ear hearing seems back to baseline. He has sensorineural hearing loss bilateral and should consider hearing aids. Recheck hearing in 1 year.    He has postnasal drainage and excess phlegm especially when he lays down at night he can continue Atrovent.    Camron Vigil MD  Otolaryngology  [facility]

## 2021-09-21 ENCOUNTER — OFFICE VISIT (OUTPATIENT)
Dept: AUDIOLOGY | Facility: CLINIC | Age: 81
End: 2021-09-21
Payer: MEDICARE

## 2021-09-21 ENCOUNTER — OFFICE VISIT (OUTPATIENT)
Dept: OTOLARYNGOLOGY | Facility: CLINIC | Age: 81
End: 2021-09-21
Payer: MEDICARE

## 2021-09-21 VITALS
DIASTOLIC BLOOD PRESSURE: 88 MMHG | OXYGEN SATURATION: 95 % | HEART RATE: 75 BPM | SYSTOLIC BLOOD PRESSURE: 142 MMHG | RESPIRATION RATE: 16 BRPM

## 2021-09-21 DIAGNOSIS — H72.91 PERFORATION OF TYMPANIC MEMBRANE, RIGHT: Primary | ICD-10-CM

## 2021-09-21 DIAGNOSIS — R09.82 PND (POST-NASAL DRIP): ICD-10-CM

## 2021-09-21 DIAGNOSIS — H90.3 SNHL (SENSORY-NEURAL HEARING LOSS), ASYMMETRICAL: ICD-10-CM

## 2021-09-21 PROCEDURE — 99213 OFFICE O/P EST LOW 20 MIN: CPT | Performed by: OTOLARYNGOLOGY

## 2021-09-21 PROCEDURE — 92550 TYMPANOMETRY & REFLEX THRESH: CPT | Performed by: AUDIOLOGIST

## 2021-09-21 PROCEDURE — 99207 PR NO CHARGE LOS: CPT | Performed by: AUDIOLOGIST

## 2021-09-21 PROCEDURE — 92557 COMPREHENSIVE HEARING TEST: CPT | Performed by: AUDIOLOGIST

## 2021-09-21 RX ORDER — IPRATROPIUM BROMIDE 42 UG/1
2 SPRAY, METERED NASAL 4 TIMES DAILY PRN
Qty: 15 ML | Refills: 3 | Status: SHIPPED | OUTPATIENT
Start: 2021-09-21 | End: 2022-06-21

## 2021-09-21 NOTE — LETTER
9/21/2021         RE: Felice Blood  [address] [facility] [address]        Dear Colleague,    Thank you for referring your patient, Felice Blood, to the [facility]. Please see a copy of my visit note below.    Chief Complaint - Hearing loss recheck; nose recheck    History of Present Illness - Felice Blood is a 80 year old male who returns to me today with hearing loss in the right ear.  He was flushing his ear with debrox and water over a month ago. He had a sharp pain when doing this. He also couldn't hear. He does have a history of right tympanic membrane perforation from diving. No upper respiratory infection or allergies. He had some drainage. He was put on ciprofloxacin drops for 1 week. It didn't help. He doesn't wear hearing aids. Left ear has felt okay. I noted persistent cerumen impaction for which I cleaned. He also had otorrhea coming out of a right tympanic membrane perforation. I placed him on ofloxacin.    No more ear drainage. Hearing still maybe isn't as good as he thinks it should be. He has postnasal drainage at night. He has grey mucous, not discolored. I had him try atrovent for the nose. He returns and reports atrovent helps postnasal drainage at night. Early morning still has some issue with it. No ear pain, drainage. Hearing seems okay. Left ear has been worse ear for years.     Past Medical History -   Patient Active Problem List   Diagnosis     Essential hypertension     Pure hypercholesterolemia     Impotence of organic origin       Current Medications -   Current Outpatient Medications:      albuterol (PROAIR HFA) 108 (90 Base) MCG/ACT inhaler, Inhale 2 puffs into the lungs every 4 hours as needed for shortness of breath / dyspnea or wheezing, Disp: 18 g, Rfl: 1     aspirin 81 MG tablet, Take 1 tablet by mouth daily., Disp: , Rfl:      atorvastatin (LIPITOR) 10 MG tablet, Take 1 tablet (10 mg) by mouth daily, Disp: 90 tablet, Rfl:  3     cholecalciferol (VITAMIN D3) 1000 UNIT capsule, Take 1 capsule by mouth daily., Disp: , Rfl:      fluticasone (FLONASE) 50 MCG/ACT spray, Spray 2 sprays into both nostrils At Bedtime, Disp: 1 Bottle, Rfl: 1     hydrOXYzine (ATARAX) 25 MG tablet, Take one po bid prn anxiety; watch for drowsiness, Disp: 20 tablet, Rfl: 0     ipratropium (ATROVENT) 0.06 % nasal spray, Spray 2 sprays into both nostrils 4 times daily as needed for rhinitis, Disp: 15 mL, Rfl: 3     lisinopril (ZESTRIL) 10 MG tablet, Take 1 tablet (10 mg) by mouth daily, Disp: 90 tablet, Rfl: 3     sildenafil (REVATIO) 20 MG tablet, Take 5 tablets (100 mg) by mouth daily Please call clinic to schedule appointment at [phone]. Thank you., Disp: 60 tablet, Rfl: 4     sildenafil (VIAGRA) 100 MG tablet, Take 1 tablet (100 mg) by mouth daily as needed, Disp: 20 tablet, Rfl: 11    Allergies -   Allergies   Allergen Reactions     Acetaminophen      Other reaction(s): Muscle Aches/Weakness     Etodolac Unknown     Percocet [Oxycodone-Acetaminophen] Nausea     Other reaction(s): Muscle Weakness     Seasonal Allergies Cough     Sinus drainage, watery eyes       Social History -   Social History     Socioeconomic History     Marital status:      Spouse name: Not on file     Number of children: Not on file     Years of education: Not on file     Highest education level: Not on file   Occupational History     Not on file   Tobacco Use     Smoking status: Never Smoker     Smokeless tobacco: Never Used   Substance and Sexual Activity     Alcohol use: Yes     Comment: 1 day     Drug use: Not on file     Sexual activity: Not on file   Other Topics Concern     Not on file   Social History Narrative     Not on file     Social Determinants of Health     Financial Resource Strain:      Difficulty of Paying Living Expenses:    Food Insecurity:      Worried About Running Out of Food in the Last Year:      Ran Out of Food in the Last Year:    Transportation  Needs:      Lack of Transportation (Medical):      Lack of Transportation (Non-Medical):    Physical Activity:      Days of Exercise per Week:      Minutes of Exercise per Session:    Stress:      Feeling of Stress :    Social Connections:      Frequency of Communication with Friends and Family:      Frequency of Social Gatherings with Friends and Family:      Attends Anglican Services:      Active Member of Clubs or Organizations:      Attends Club or Organization Meetings:      Marital Status:    Intimate Partner Violence:      Fear of Current or Ex-Partner:      Emotionally Abused:      Physically Abused:      Sexually Abused:        Family History -   Family History   Problem Relation Age of Onset     Glaucoma No family hx of      Macular Degeneration No family hx of        Review of Systems - As per HPI and PMHx, otherwise 7 system review of the head and neck negative.    Physical Exam  BP (!) 142/88   Pulse 75   Resp 16   SpO2 95%   General - The patient is nontoxic, in no distress.  Alert and oriented to person and place, answers questions and cooperates with examination appropriately.   Voice and Breathing - The patient was breathing comfortably without the use of accessory muscles. There was no wheezing, stridor, or stertor.  The patients voice was clear and strong.  Ears - clean canal. He has a 1 mm posterior superior tympanic membrane perforation, but no otorrhea. It is smaller than before. Ear is dry. No granulation or cholesteatoma. Left ear canal, tympanic membrane and middle ear are normal.  Nose - septum midline.  Turbinates normal size.  No polyps or pus.  Clear rhinorrhea.    Audiogram - bilateral down-sloping sensorineural hearing loss, left ear slightly worse. Type B tymp right ear. Type A tymp left ear.    Assessment and Plan - Felice Blood is a 80 year old male who returns to me today to check right ear tympanic membrane perforation. He describes an episode in July where he was using  Debrox and trying to flush the right ear.  He had sudden pain and hearing loss.  He does have a history of right tympanic membrane perforation in the past.  I think more than likely the Debrox entered the middle ear space and caused significant pain.  He developed otorrhea.  I had him use ofloxacin.  The infection is gone and he has a persistent right tympanic membrane perforation. Fortunately the tympanic membrane perforation is getting small and may close.  I recommend he keep this dry.  Right ear hearing seems back to baseline. He has sensorineural hearing loss bilateral and should consider hearing aids. Recheck hearing in 1 year.    He has postnasal drainage and excess phlegm especially when he lays down at night he can continue Atrovent.    Camron Vigil MD  Otolaryngology  [facility]        Again, thank you for allowing me to participate in the care of your patient.        Sincerely,        Camron Vigil MD

## 2021-09-21 NOTE — PROGRESS NOTES
AUDIOLOGY REPORT:    Patient was referred from ENT by Dr. Vigil for audiology evaluation. The patient was seen on 7/20/2021 for a tympanic membrane perforation in the right ear, but he could not be tested due to drainage from the ear. The patient reports a history of tympanic membrane perforations in the past, as well as having his eardrums lanced for a mastoid infection when he was 6 years old. The patient reports sinus surgery but no other ear surgery. The patient reports a history of loud noise exposure from service in the [employer]. He had no major concerns with hearing prior to the decrease in the right ear, other than possible high frequency hearing loss. He reports that he does not have ear pain or pressure. He notes that his right ear is the better hearing ear.    Testing:    Otoscopy:   Otoscopic exam indicates ears are clear of cerumen bilaterally     Tympanograms:    RIGHT: large ear canal volume consistent with tympanic membrane perforation     LEFT:   hypercompliant eardrum mobility    Reflexes (reported by stimulus ear):  RIGHT: Ipsilateral is absent at frequencies tested  RIGHT: Contralateral is absent at frequencies tested  LEFT:   Ipsilateral is absent at frequencies tested  LEFT:   Contralateral is absent at frequencies tested    Thresholds:   Pure Tone Thresholds assessed using conventional audiometry with good reliability from 250-8000 Hz bilaterally using insert earphones and circumaural headphones     RIGHT:  normal hearing sensitivity through 2000 Hz sloping to mild to moderately severe essentially sensorineural hearing loss    LEFT:    normal hearing sensitivity through 500 Hz sloping to mild to severe essentially sensorineural hearing loss    Speech Reception Threshold:    RIGHT: 25 dB HL    LEFT:   25 dB HL  Results are in agreement with pure tone average.     Word Recognition Score:     RIGHT: 100% at 65 dB HL using NU-6 recorded word list.    LEFT:   96% at 70 dB HL using NU-6 recorded  word list.    Discussed results with the patient.     Patient was returned to ENT for follow up.     Kassandra Cooper, CCC-A  Licensed Audiologist #[id number]  9/21/2021

## 2021-09-27 ENCOUNTER — ANCILLARY PROCEDURE (OUTPATIENT)
Dept: GENERAL RADIOLOGY | Facility: CLINIC | Age: 81
End: 2021-09-27
Attending: FAMILY MEDICINE
Payer: MEDICARE

## 2021-09-27 ENCOUNTER — OFFICE VISIT (OUTPATIENT)
Dept: ORTHOPEDICS | Facility: CLINIC | Age: 81
End: 2021-09-27
Payer: MEDICARE

## 2021-09-27 VITALS — BODY MASS INDEX: 22.66 KG/M2 | WEIGHT: 153 LBS | HEIGHT: 69 IN

## 2021-09-27 DIAGNOSIS — S39.012A LUMBAR STRAIN, INITIAL ENCOUNTER: ICD-10-CM

## 2021-09-27 DIAGNOSIS — M51.369 LUMBAR DEGENERATIVE DISC DISEASE: Primary | ICD-10-CM

## 2021-09-27 DIAGNOSIS — M54.50 LOW BACK PAIN: ICD-10-CM

## 2021-09-27 DIAGNOSIS — M47.816 SPONDYLOSIS OF LUMBAR REGION WITHOUT MYELOPATHY OR RADICULOPATHY: ICD-10-CM

## 2021-09-27 PROCEDURE — 99203 OFFICE O/P NEW LOW 30 MIN: CPT | Performed by: FAMILY MEDICINE

## 2021-09-27 PROCEDURE — 72100 X-RAY EXAM L-S SPINE 2/3 VWS: CPT | Performed by: RADIOLOGY

## 2021-09-27 ASSESSMENT — MIFFLIN-ST. JEOR: SCORE: 1394.38

## 2021-09-27 NOTE — LETTER
"  9/27/2021      RE: Felice Blood  [address]  [facility] [address]       Sports Medicine Clinic Visit    PCP: Anthony Maddox    Felice Blood is a 80 year old male who is seen  as self referral presenting with low back pain     Injury: 9/24/21 - [place] and [place]. Mentions he was also picking up things along the trail, sticks and rocks. Had sat down in a soft chair for a while to play a game. As he went to stand up, had significant low back pain.  The pain stays centralized to the low back.  It is not painful when he simply sitting in a chair but it bothers him when he makes the positions to move from the chair to stand.  At those times he can feel some discomfort in the bilateral anterior thighs.        Location of Pain:   Duration of Pain: 3 day(s)  Rating of Pain: 10/10 at worst  Pain is better with: lumbar support, ibuprofen, laying supine  Pain is worse with: transitioning from resting to standing  Additional Features: shooting pain down the anterior portion of his thighs  Treatment so far consists of: Ibuprofen and laying supine  Prior History of related problems: has had low back pain In the past and had most of his Sx had resolved.    Ht 1.753 m (5' 9\")   Wt 69.4 kg (153 lb)   BMI 22.59 kg/m       PMH:  Past Medical History:   Diagnosis Date     Cobblestone retinal degeneration      Impotence of organic origin      Nonsenile cataract      Pure hypercholesterolemia      Unspecified essential hypertension      Vitreous detachment of both eyes      Past Surgical History:  Appendectomy   Cataract IOL, right (5/1/17)  Phacoemulsification with standard intraocular lens implant, right (5/1/17)  Tonsillectomy    Allergies:  Etodolac   Percocet (nausea, muscle weakness)    Active problem list:  Patient Active Problem List   Diagnosis     Essential hypertension     Pure hypercholesterolemia     Impotence of organic origin       FH:  Family History   Problem Relation Age of Onset "     Glaucoma No family hx of      Macular Degeneration No family hx of        SH:  Social History     Socioeconomic History     Marital status:      Spouse name: Not on file     Number of children: Not on file     Years of education: Not on file     Highest education level: Not on file   Occupational History     Not on file   Tobacco Use     Smoking status: Never Smoker     Smokeless tobacco: Never Used   Substance and Sexual Activity     Alcohol use: Yes     Comment: 1 day     Drug use: Not on file     Sexual activity: Not on file   Other Topics Concern     Not on file   Social History Narrative     Not on file     Social Determinants of Health     Financial Resource Strain:      Difficulty of Paying Living Expenses:    Food Insecurity:      Worried About Running Out of Food in the Last Year:      Ran Out of Food in the Last Year:    Transportation Needs:      Lack of Transportation (Medical):      Lack of Transportation (Non-Medical):    Physical Activity:      Days of Exercise per Week:      Minutes of Exercise per Session:    Stress:      Feeling of Stress :    Social Connections:      Frequency of Communication with Friends and Family:      Frequency of Social Gatherings with Friends and Family:      Attends Sabianism Services:      Active Member of Clubs or Organizations:      Attends Club or Organization Meetings:      Marital Status:    Intimate Partner Violence:      Fear of Current or Ex-Partner:      Emotionally Abused:      Physically Abused:      Sexually Abused:        MEDS:  See EMR, reviewed  ALL:  See EMR, reviewed    REVIEW OF SYSTEMS:  CONSTITUTIONAL:NEGATIVE for fever, chills, change in weight  INTEGUMENTARY/SKIN: NEGATIVE for worrisome rashes, moles or lesions  EYES: NEGATIVE for vision changes or irritation  ENT/MOUTH: NEGATIVE for ear, mouth and throat problems  RESP:NEGATIVE for significant cough or SOB  BREAST: NEGATIVE for masses, tenderness or discharge  CV: NEGATIVE for chest pain,  palpitations or peripheral edema  GI: NEGATIVE for nausea, abdominal pain, heartburn, or change in bowel habits  :NEGATIVE for frequency, dysuria, or hematuria  :NEGATIVE for frequency, dysuria, or hematuria  NEURO: NEGATIVE for weakness, dizziness or paresthesias  ENDOCRINE: NEGATIVE for temperature intolerance, skin/hair changes  HEME/ALLERGY/IMMUNE: NEGATIVE for bleeding problems  PSYCHIATRIC: NEGATIVE for changes in mood or affect        Exam: XR LUMBAR SPINE 2-3 VIEWS, 10/29/2018 2:23 PM     Indication: ; Low back pain     Comparison: None available     Findings:   AP and lateral views of the lumbar spine. 5 lumbar type vertebral  bodies are assumed for the purposes of this dictation. No vertebral  body height loss. No significant spondylolisthesis. Moderate to severe  intervertebral disc space narrowing at L4-5 and L5-S1 with associated  endplate sclerosis and spurring. Moderate lower lumbar predominant  facet arthropathy.     Degenerative changes in the hip joints, right greater than left.     Vascular calcifications. Nonobstructive bowel gas pattern.                                                                      Impression:   1. Moderate degenerative changes in the lumbar spine, greatest at L4-5  and L5-S1.  2. Degenerative change in the hip joints, right greater than left.     AILEEN KNENEY, DO          Objective: The skin overlying the low back is normal with no rashes.  He is nontender directly over the posterior spinous processes or the SI joints.  Extension of the back is limited by some discomfort.  Flexion of the back is better tolerated.  Straight leg raise is negative bilaterally.  No range of motion of the bilateral hips.  Lower extremity strength to flexion extension hip, knee, ankle including toe strength and foot evertor strength are 5-5 symmetrically bilaterally.  Appropriate in conversation and affect.  Sensation is intact in the bilateral lower extremities.    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  Lumbar strain.    Plan: We will avoid muscle relaxants and narcotics because of his history of intolerance and his advanced age.  Tylenol is the safest pain medicine.  Ibuprofen as tolerated.  Heating pad.  Some gentle stretches were given for the back.  He had a similar scenario occur in 2018 and improved with the help of physical therapy.  Physical therapy referral placed.  He will look for improvements in the next 3 weeks and follow-up if this is not the case.      Dino Alba MD

## 2021-09-27 NOTE — PROGRESS NOTES
"Sports Medicine Clinic Visit    PCP: Anthony Maddox NILS Blood is a 80 year old male who is seen  as self referral presenting with low back pain     Injury: 9/24/21 - was [place] and went hiking. Mentions he was also picking up things along the trail, sticks and rocks. Had sat down in a soft chair for a while to play a game. As he went to stand up, had significant low back pain.  The pain stays centralized to the low back.  It is not painful when he simply sitting in a chair but it bothers him when he makes the positions to move from the chair to stand.  At those times he can feel some discomfort in the bilateral anterior thighs.        Location of Pain:   Duration of Pain: 3 day(s)  Rating of Pain: 10/10 at worst  Pain is better with: lumbar support, ibuprofen, laying supine  Pain is worse with: transitioning from resting to standing  Additional Features: shooting pain down the anterior portion of his thighs  Treatment so far consists of: Ibuprofen and laying supine  Prior History of related problems: has had low back pain In the past and had most of his Sx had resolved.    Ht 1.753 m (5' 9\")   Wt 69.4 kg (153 lb)   BMI 22.59 kg/m       PMH:  Past Medical History:   Diagnosis Date     Cobblestone retinal degeneration      Impotence of organic origin      Nonsenile cataract      Pure hypercholesterolemia      Unspecified essential hypertension      Vitreous detachment of both eyes      Past Surgical History:  Appendectomy   Cataract IOL, right (5/1/17)  Phacoemulsification with standard intraocular lens implant, right (5/1/17)  Tonsillectomy    Allergies:  Etodolac   Percocet (nausea, muscle weakness)    Active problem list:  Patient Active Problem List   Diagnosis     Essential hypertension     Pure hypercholesterolemia     Impotence of organic origin       FH:  Family History   Problem Relation Age of Onset     Glaucoma No family hx of      Macular Degeneration No family hx of        SH:  Social " History     Socioeconomic History     Marital status:      Spouse name: Not on file     Number of children: Not on file     Years of education: Not on file     Highest education level: Not on file   Occupational History     Not on file   Tobacco Use     Smoking status: Never Smoker     Smokeless tobacco: Never Used   Substance and Sexual Activity     Alcohol use: Yes     Comment: 1 day     Drug use: Not on file     Sexual activity: Not on file   Other Topics Concern     Not on file   Social History Narrative     Not on file     Social Determinants of Health     Financial Resource Strain:      Difficulty of Paying Living Expenses:    Food Insecurity:      Worried About Running Out of Food in the Last Year:      Ran Out of Food in the Last Year:    Transportation Needs:      Lack of Transportation (Medical):      Lack of Transportation (Non-Medical):    Physical Activity:      Days of Exercise per Week:      Minutes of Exercise per Session:    Stress:      Feeling of Stress :    Social Connections:      Frequency of Communication with Friends and Family:      Frequency of Social Gatherings with Friends and Family:      Attends Adventism Services:      Active Member of Clubs or Organizations:      Attends Club or Organization Meetings:      Marital Status:    Intimate Partner Violence:      Fear of Current or Ex-Partner:      Emotionally Abused:      Physically Abused:      Sexually Abused:        MEDS:  See EMR, reviewed  ALL:  See EMR, reviewed    REVIEW OF SYSTEMS:  CONSTITUTIONAL:NEGATIVE for fever, chills, change in weight  INTEGUMENTARY/SKIN: NEGATIVE for worrisome rashes, moles or lesions  EYES: NEGATIVE for vision changes or irritation  ENT/MOUTH: NEGATIVE for ear, mouth and throat problems  RESP:NEGATIVE for significant cough or SOB  BREAST: NEGATIVE for masses, tenderness or discharge  CV: NEGATIVE for chest pain, palpitations or peripheral edema  GI: NEGATIVE for nausea, abdominal pain, heartburn, or  change in bowel habits  :NEGATIVE for frequency, dysuria, or hematuria  :NEGATIVE for frequency, dysuria, or hematuria  NEURO: NEGATIVE for weakness, dizziness or paresthesias  ENDOCRINE: NEGATIVE for temperature intolerance, skin/hair changes  HEME/ALLERGY/IMMUNE: NEGATIVE for bleeding problems  PSYCHIATRIC: NEGATIVE for changes in mood or affect        Exam: XR LUMBAR SPINE 2-3 VIEWS, 10/29/2018 2:23 PM     Indication: ; Low back pain     Comparison: None available     Findings:   AP and lateral views of the lumbar spine. 5 lumbar type vertebral  bodies are assumed for the purposes of this dictation. No vertebral  body height loss. No significant spondylolisthesis. Moderate to severe  intervertebral disc space narrowing at L4-5 and L5-S1 with associated  endplate sclerosis and spurring. Moderate lower lumbar predominant  facet arthropathy.     Degenerative changes in the hip joints, right greater than left.     Vascular calcifications. Nonobstructive bowel gas pattern.                                                                      Impression:   1. Moderate degenerative changes in the lumbar spine, greatest at L4-5  and L5-S1.  2. Degenerative change in the hip joints, right greater than left.     AILEEN KENNEY, DO          Objective: The skin overlying the low back is normal with no rashes.  He is nontender directly over the posterior spinous processes or the SI joints.  Extension of the back is limited by some discomfort.  Flexion of the back is better tolerated.  Straight leg raise is negative bilaterally.  No range of motion of the bilateral hips.  Lower extremity strength to flexion extension hip, knee, ankle including toe strength and foot evertor strength are 5-5 symmetrically bilaterally.  Appropriate in conversation and affect.  Sensation is intact in the bilateral lower extremities.    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  Lumbar strain.    Plan: We will avoid muscle relaxants and narcotics because of his history of intolerance and his advanced age.  Tylenol is the safest pain medicine.  Ibuprofen as tolerated.  Heating pad.  Some gentle stretches were given for the back.  He had a similar scenario occur in 2018 and improved with the help of physical therapy.  Physical therapy referral placed.  He will look for improvements in the next 3 weeks and follow-up if this is not the case.

## 2021-09-28 ENCOUNTER — THERAPY VISIT (OUTPATIENT)
Dept: PHYSICAL THERAPY | Facility: CLINIC | Age: 81
End: 2021-09-28
Payer: MEDICARE

## 2021-09-28 DIAGNOSIS — M54.41 ACUTE BILATERAL LOW BACK PAIN WITH BILATERAL SCIATICA: ICD-10-CM

## 2021-09-28 DIAGNOSIS — M54.42 ACUTE BILATERAL LOW BACK PAIN WITH BILATERAL SCIATICA: ICD-10-CM

## 2021-09-28 DIAGNOSIS — S39.012A LUMBAR STRAIN, INITIAL ENCOUNTER: ICD-10-CM

## 2021-09-28 DIAGNOSIS — M47.816 SPONDYLOSIS OF LUMBAR REGION WITHOUT MYELOPATHY OR RADICULOPATHY: ICD-10-CM

## 2021-09-28 DIAGNOSIS — M51.369 LUMBAR DEGENERATIVE DISC DISEASE: ICD-10-CM

## 2021-09-28 PROCEDURE — 97161 PT EVAL LOW COMPLEX 20 MIN: CPT | Mod: GP | Performed by: PHYSICAL THERAPIST

## 2021-09-28 PROCEDURE — 97110 THERAPEUTIC EXERCISES: CPT | Mod: GP | Performed by: PHYSICAL THERAPIST

## 2021-09-28 PROCEDURE — 97112 NEUROMUSCULAR REEDUCATION: CPT | Mod: GP | Performed by: PHYSICAL THERAPIST

## 2021-09-28 NOTE — LETTER
DEPARTMENT OF HEALTH AND HUMAN SERVICES  [facility]    PLAN/UPDATED PLAN OF PROGRESS FOR OUTPATIENT REHABILITATION          PATIENTS NAME:  Felice Blood     : 1940    PROVIDER NUMBER:    [id number]    HICN:  [id number]     PROVIDER NAME: [facility]    MEDICAL RECORD NUMBER: [id number]     START OF CARE DATE:  SOC Date: 21   TYPE:  PT    PRIMARY/TREATMENT DIAGNOSIS: (Pertinent Medical Diagnosis)     Lumbar degenerative disc disease  Spondylosis of lumbar region without myelopathy or radiculopathy  Lumbar strain, initial encounter  Acute bilateral low back pain with bilateral sciatica    VISITS FROM START OF CARE:  Rxs Used: 1     Physical Therapy Initial Evaluation    Subjective:  Felice Blood is a 80 year old male with a lumbar spine condition.    The condition occurred due to unknown cause.  The condition occurred with insidious onset at home/cabin.  This is a new condition.  Mechanism/History of injury/symptoms:  21 patient developed bilateral low back pain into both of his thighs after sitting in a soft chair for a prolonged period after going for a hike which included picking up sticks and rocks as he hiked.  The pain is located bilateral lumbar, bilateral leg anterior thigh into latearl ankle and the quality of pain is reported as sharp, shooting.  The degree of pain is reported as 3-9/10. The timing of pain/symptoms is constant, not dependent on time of day. Associated symptoms include: loss of motion, stiffness  Symptoms are exacerbated by: bending, sitting, standing from a chair, prolonged standing.  Symptoms are relieved by: walking, ibuprofen.  Since onset symptoms are unchanged.  Special tests for this condition include: x-ray.  Previous treatments for this condition include: PT in 2018 for similar symptoms, some stretches given at MD's office.  General health as reported by patient is good.  Pertinent  medical history includes: high blood pressure.  Medical allergies include: percocet.  Other surgeries include: nonw.  Current medications:  High blood pressure    Current occupation: retired.  Patient's home/work tasks include: computer work, driving. .  Potential barriers to physical therapy: none.  Red flags: none.  Previous level of function: no back or leg pain with sit to stand transfers, prolonged sitting, prolonged standing or walking  Current functional restrictions:  Low back pain and leg pain limiting ability to stand or sit for prolonged periods, limiting walking tolerance and impacting ability to stand from chair    Oswestry Score: 30 %                 Objective:  LUMBAR:    Posture: guarded and rigid posture  Posture Correction: better when supported in lumbar  Relevant Lateral Shift: no    Neurological:    Motor Deficit: myotomes grossly WNL  Sensory Deficit, Reflexes: WNL    Dural Signs:   L R   Slump + for pain in right +   SLR + +       AROM: (Major, Moderate, Minimal or Nil loss)  Movement Loss Wilson Mod Min Nil Pain   Flexion x x   Hands to knees, LBP   Extension  x x  Leg pain at end range   Side Gliding L   x x Right leg pain   Side Gliding R   x x Right leg pain     Repeated movement testing:   (During: produces, abolishes, increases, decreases, no effect, centralizing, peripheralizing; After: better, worse, no better, no worse, no effect, centralized, peripheralized)    Pre-test Symptoms Standing: pain in lumbar, and in B legs   Symptoms During Symptoms After ROM increased ROM decreased No Effect   FIS increase worse      Rep FIS        EIS increase No worse      Rep EIS increase No worse flexion     Pre-test Symptoms Lying: pain in lumbar, R leg    Symptoms During Symptoms After ROM increased ROM decreased No Effect   TALIA decrease No effect      Rep TALIA decrease increased      EIL decrease No effect      Rep EIL increase Worse, peripheralized      If required Pre-test Symptoms: pain in lumbar  and R leg   Symptoms During Symptoms After ROM increased ROM decreased No Effect   SGIS - L        Rep SGIS - L        SGIS - R increase No worse      Rep SGIS - R increase Worse, peripheralze        Provisional Classification: likely posterior lumbar derangement but unable to tolerate much extension due to irritated nerve root    Principle of Management: neutral sitting and lying, gentle extension in standing    Assessment/Plan:    Patient is a 80 year old male with lumbar complaints.    Patient has the following significant findings with corresponding treatment plan.                Diagnosis 1:  Low back pain with bilateral radiculopathy    Pain -  hot/cold therapy, US, electric stimulation, mechanical traction, manual therapy, self management, education, directional preference exercise and home program  Decreased ROM/flexibility - manual therapy, therapeutic exercise and home program  Decreased strength - therapeutic exercise, therapeutic activities and home program  Impaired gait - gait training and home program  Decreased function - therapeutic activities and home program    Therapy Evaluation Codes:   1) History comprised of:   Personal factors that impact the plan of care:      None.    Comorbidity factors that impact the plan of care are:      High blood pressure.     Medications impacting care: High blood pressure.  2) Examination of Body Systems comprised of:   Body structures and functions that impact the plan of care:      Lumbar spine.   Activity limitations that impact the plan of care are:      Bathing, Bending, Cooking, Driving, Dressing, Lifting, Reading/Computer work, Sitting, Standing and Sleeping.  3) Clinical presentation characteristics are:   Stable/Uncomplicated.  4) Decision-Making    Low complexity using standardized patient assessment instrument and/or measureable assessment of functional outcome.    Cumulative Therapy Evaluation is: Low complexity.  Previous and current functional  "limitations:  (See Goal Flow Sheet for this information)    Short term and Long term goals: (See Goal Flow Sheet for this information)   Communication ability:  Patient appears to be able to clearly communicate and understand verbal and written communication and follow directions correctly.  Treatment Explanation - The following has been discussed with the patient:   RX ordered/plan of care  Anticipated outcomes  Possible risks and side effects  This patient would benefit from PT intervention to resume normal activities.   Rehab potential is good.    Frequency:  1 X week, once daily  Duration:  for 8 weeks  Discharge Plan:  Achieve all LTG.  Independent in home treatment program.  Reach maximal therapeutic benefit.    Caregiver Signature/Credentials _____________________________ Date ________       Treating Provider: Emil Schaffer, DPT, SCS     I have reviewed and certified the need for these services and plan of treatment while under my care.        PHYSICIAN'S SIGNATURE:   _________________________________________  Date___________   Dino Alba MD    Certification period:  Beginning of Cert date period: 09/28/21 to  End of Cert period date: 12/26/21     Functional Level Progress Report: Please see attached \"Goal Flow sheet for Functional level.\"    ____X____ Continue Services or       ________ DC Services                Service dates: From  SOC Date: 09/28/21 date to present                         "

## 2021-09-28 NOTE — PROGRESS NOTES
Physical Therapy Initial Evaluation  Subjective:  Felice Blood is a 80 year old male with a lumbar spine condition.    The condition occurred due to unknown cause.  The condition occurred with insidious onset at home/cabin.  This is a new condition.    Mechanism/History of injury/symptoms:  9/24/21 patient developed bilateral low back pain into both of his thighs after sitting in a soft chair for a prolonged period after going for a hike which included picking up sticks and rocks as he hiked.  The pain is located bilateral lumbar, bilateral leg anterior thigh into latearl ankle and the quality of pain is reported as sharp, shooting.  The degree of pain is reported as 3-9/10. The timing of pain/symptoms is constant, not dependent on time of day. Associated symptoms include: loss of motion, stiffness    Symptoms are exacerbated by: bending, sitting, standing from a chair, prolonged standing.  Symptoms are relieved by: walking, ibuprofen.  Since onset symptoms are unchanged.    Special tests for this condition include: x-ray.  Previous treatments for this condition include: PT in 2018 for similar symptoms, some stretches given at MD's office.    General health as reported by patient is good.  Pertinent medical history includes: high blood pressure.  Medical allergies include: percocet.  Other surgeries include: nonw.  Current medications:  High blood pressure    Current occupation: retired.  Patient's home/work tasks include: computer work, driving. .    Potential barriers to physical therapy: none.  Red flags: none.    Previous level of function: no back or leg pain with sit to stand transfers, prolonged sitting, prolonged standing or walking  Current functional restrictions:  Low back pain and leg pain limiting ability to stand or sit for prolonged periods, limiting walking tolerance and impacting ability to stand from chair    HPI  Oswestry Score: 30 %                 Objective:  LUMBAR:    Posture: guarded  and rigid posture  Posture Correction: better when supported in lumbar  Relevant Lateral Shift: no    Neurological:    Motor Deficit: myotomes grossly WNL  Sensory Deficit, Reflexes: WNL    Dural Signs:   L R   Slump + for pain in right +   SLR + +       AROM: (Major, Moderate, Minimal or Nil loss)  Movement Loss Wilson Mod Min Nil Pain   Flexion x x   Hands to knees, LBP   Extension  x x  Leg pain at end range   Side Gliding L   x x Right leg pain   Side Gliding R   x x Right leg pain     Repeated movement testing:   (During: produces, abolishes, increases, decreases, no effect, centralizing, peripheralizing; After: better, worse, no better, no worse, no effect, centralized, peripheralized)    Pre-test Symptoms Standing: pain in lumbar, and in B legs   Symptoms During Symptoms After ROM increased ROM decreased No Effect   FIS increase worse      Rep FIS        EIS increase No worse      Rep EIS increase No worse flexion     Pre-test Symptoms Lying: pain in lumbar, R leg    Symptoms During Symptoms After ROM increased ROM decreased No Effect   TALIA decrease No effect      Rep TALIA decrease increased      EIL decrease No effect      Rep EIL increase Worse, peripheralized      If required Pre-test Symptoms: pain in lumbar and R leg   Symptoms During Symptoms After ROM increased ROM decreased No Effect   SGIS - L        Rep SGIS - L        SGIS - R increase No worse      Rep SGIS - R increase Worse, peripheralze          Provisional Classification: likely posterior lumbar derangement but unable to tolerate much extension due to irritated nerve root  Principle of Management: neutral sitting and lying, gentle extension in standing      System    Physical Exam    General     ROS    Assessment/Plan:    Patient is a 80 year old male with lumbar complaints.    Patient has the following significant findings with corresponding treatment plan.                Diagnosis 1:  Low back pain with bilateral radiculopathy    Pain -   hot/cold therapy, US, electric stimulation, mechanical traction, manual therapy, self management, education, directional preference exercise and home program  Decreased ROM/flexibility - manual therapy, therapeutic exercise and home program  Decreased strength - therapeutic exercise, therapeutic activities and home program  Impaired gait - gait training and home program  Decreased function - therapeutic activities and home program    Therapy Evaluation Codes:   1) History comprised of:   Personal factors that impact the plan of care:      None.    Comorbidity factors that impact the plan of care are:      High blood pressure.     Medications impacting care: High blood pressure.  2) Examination of Body Systems comprised of:   Body structures and functions that impact the plan of care:      Lumbar spine.   Activity limitations that impact the plan of care are:      Bathing, Bending, Cooking, Driving, Dressing, Lifting, Reading/Computer work, Sitting, Standing and Sleeping.  3) Clinical presentation characteristics are:   Stable/Uncomplicated.  4) Decision-Making    Low complexity using standardized patient assessment instrument and/or measureable assessment of functional outcome.  Cumulative Therapy Evaluation is: Low complexity.    Previous and current functional limitations:  (See Goal Flow Sheet for this information)    Short term and Long term goals: (See Goal Flow Sheet for this information)     Communication ability:  Patient appears to be able to clearly communicate and understand verbal and written communication and follow directions correctly.  Treatment Explanation - The following has been discussed with the patient:   RX ordered/plan of care  Anticipated outcomes  Possible risks and side effects  This patient would benefit from PT intervention to resume normal activities.   Rehab potential is good.    Frequency:  1 X week, once daily  Duration:  for 8 weeks  Discharge Plan:  Achieve all LTG.  Independent in  home treatment program.  Reach maximal therapeutic benefit.    Please refer to the daily flowsheet for treatment today, total treatment time and time spent performing 1:1 timed codes.

## 2021-09-28 NOTE — LETTER
DEPARTMENT OF HEALTH AND HUMAN SERVICES  [facility]    PLAN/UPDATED PLAN OF PROGRESS FOR OUTPATIENT REHABILITATION          PATIENTS NAME:  Felice Blood     : 1940    PROVIDER NUMBER:    [id number]    HICN:  [id number]     PROVIDER NAME: [facility] [place] [facility]    MEDICAL RECORD NUMBER: [id number]     START OF CARE DATE:  SOC Date: 21   TYPE:  PT    PRIMARY/TREATMENT DIAGNOSIS: (Pertinent Medical Diagnosis)     Lumbar degenerative disc disease  Spondylosis of lumbar region without myelopathy or radiculopathy  Lumbar strain, initial encounter  Acute bilateral low back pain with bilateral sciatica    VISITS FROM START OF CARE:  Rxs Used: 1     Physical Therapy Initial Evaluation    Subjective:  Felice Blood is a 80 year old male with a lumbar spine condition.    The condition occurred due to unknown cause.  The condition occurred with insidious onset at home/cabin.  This is a new condition.  Mechanism/History of injury/symptoms:  21 patient developed bilateral low back pain into both of his thighs after sitting in a soft chair for a prolonged period after going for a hike which included picking up sticks and rocks as he hiked.  The pain is located bilateral lumbar, bilateral leg anterior thigh into latearl ankle and the quality of pain is reported as sharp, shooting.  The degree of pain is reported as 3-9/10. The timing of pain/symptoms is constant, not dependent on time of day. Associated symptoms include: loss of motion, stiffness    Symptoms are exacerbated by: bending, sitting, standing from a chair, prolonged standing.  Symptoms are relieved by: walking, ibuprofen.  Since onset symptoms are unchanged.  Special tests for this condition include: x-ray.  Previous treatments for this condition include: PT in 2018 for similar symptoms, some stretches given at MD's office.  General health as reported by patient is good.   Pertinent medical history includes: high blood pressure.  Medical allergies include: percocet.  Other surgeries include: nonw.  Current medications:  High blood pressure  Current occupation: retired.  Patient's home/work tasks include: computer work, driving. .  Potential barriers to physical therapy: none.  Red flags: none.  Previous level of function: no back or leg pain with sit to stand transfers, prolonged sitting, prolonged standing or walking  Current functional restrictions:  Low back pain and leg pain limiting ability to stand or sit for prolonged periods, limiting walking tolerance and impacting ability to stand from chair    HPI  Oswestry Score: 30 %                 Objective:  LUMBAR:    Posture: guarded and rigid posture  Posture Correction: better when supported in lumbar  Relevant Lateral Shift: no    Neurological:  Motor Deficit: myotomes grossly WNL  Sensory Deficit, Reflexes: WNL    Dural Signs:   L R   Slump + for pain in right +   SLR + +     AROM: (Major, Moderate, Minimal or Nil loss)  Movement Loss Wilson Mod Min Nil Pain   Flexion x x   Hands to knees, LBP   Extension  x x  Leg pain at end range   Side Gliding L   x x Right leg pain   Side Gliding R   x x Right leg pain     Repeated movement testing:   (During: produces, abolishes, increases, decreases, no effect, centralizing, peripheralizing; After: better, worse, no better, no worse, no effect, centralized, peripheralized)    Pre-test Symptoms Standing: pain in lumbar, and in B legs   Symptoms During Symptoms After ROM increased ROM decreased No Effect   FIS increase worse      Rep FIS        EIS increase No worse      Rep EIS increase No worse flexion     Pre-test Symptoms Lying: pain in lumbar, R leg    Symptoms During Symptoms After ROM increased ROM decreased No Effect   TALIA decrease No effect      Rep TALIA decrease increased      EIL decrease No effect      Rep EIL increase Worse, peripheralized      If required Pre-test Symptoms: pain in  lumbar and R leg   Symptoms During Symptoms After ROM increased ROM decreased No Effect   SGIS - L        Rep SGIS - L        SGIS - R increase No worse      Rep SGIS - R increase Worse, peripheralze        Provisional Classification: likely posterior lumbar derangement but unable to tolerate much extension due to irritated nerve root    Principle of Management: neutral sitting and lying, gentle extension in standing    Assessment/Plan:    Patient is a 80 year old male with lumbar complaints.    Patient has the following significant findings with corresponding treatment plan.                Diagnosis 1:  Low back pain with bilateral radiculopathy    Pain -  hot/cold therapy, US, electric stimulation, mechanical traction, manual therapy, self management, education, directional preference exercise and home program  Decreased ROM/flexibility - manual therapy, therapeutic exercise and home program  Decreased strength - therapeutic exercise, therapeutic activities and home program  Impaired gait - gait training and home program  Decreased function - therapeutic activities and home program    Therapy Evaluation Codes:   1) History comprised of:   Personal factors that impact the plan of care:      None.    Comorbidity factors that impact the plan of care are:      High blood pressure.     Medications impacting care: High blood pressure.  2) Examination of Body Systems comprised of:   Body structures and functions that impact the plan of care:      Lumbar spine.   Activity limitations that impact the plan of care are:      Bathing, Bending, Cooking, Driving, Dressing, Lifting, Reading/Computer work, Sitting, Standing and Sleeping.  3) Clinical presentation characteristics are:   Stable/Uncomplicated.  4) Decision-Making    Low complexity using standardized patient assessment instrument and/or measureable assessment of functional outcome.    Cumulative Therapy Evaluation is: Low complexity.  Previous and current functional  "limitations:  (See Goal Flow Sheet for this information)    Short term and Long term goals: (See Goal Flow Sheet for this information)   Communication ability:  Patient appears to be able to clearly communicate and understand verbal and written communication and follow directions correctly.  Treatment Explanation - The following has been discussed with the patient:   RX ordered/plan of care  Anticipated outcomes  Possible risks and side effects  This patient would benefit from PT intervention to resume normal activities.   Rehab potential is good.    Frequency:  1 X week, once daily  Duration:  for 89 weeks  Discharge Plan:  Achieve all LTG.  Independent in home treatment program.  Reach maximal therapeutic benefit.    Caregiver Signature/Credentials _____________________________ Date ________       Treating Provider: Emil Schaffer, DPT, SCS     I have reviewed and certified the need for these services and plan of treatment while under my care.        PHYSICIAN'S SIGNATURE:   _________________________________________  Date___________   Dino Alba MD    Certification period:  Beginning of Cert date period: 09/28/21 to  End of Cert period date: 12/26/21     Functional Level Progress Report: Please see attached \"Goal Flow sheet for Functional level.\"    ____X____ Continue Services or       ________ DC Services                Service dates: From  SOC Date: 09/28/21 date to present                         "

## 2021-09-30 DIAGNOSIS — N52.9 ERECTILE DYSFUNCTION, UNSPECIFIED ERECTILE DYSFUNCTION TYPE: ICD-10-CM

## 2021-10-01 NOTE — TELEPHONE ENCOUNTER
Sildenafil Citrate Oral Tablet 20 MG      Take 5 tablets (100 mg) by mouth daily   Last Written Prescription Date:  6-18-21  Last Fill Quantity: 60,   # refills: 4  Last Office Visit : 9-1-21  Future Office visit:  none    Routing refill request to provider for review/approval because:  Directions and disp amt do not match    ? Directions, increase disp amt (current 60 tab = 12 day)

## 2021-10-05 RX ORDER — SILDENAFIL CITRATE 20 MG/1
TABLET ORAL
Qty: 60 TABLET | Refills: 5 | Status: SHIPPED | OUTPATIENT
Start: 2021-10-05 | End: 2022-04-08

## 2021-10-06 ENCOUNTER — THERAPY VISIT (OUTPATIENT)
Dept: PHYSICAL THERAPY | Facility: CLINIC | Age: 81
End: 2021-10-06
Payer: MEDICARE

## 2021-10-06 DIAGNOSIS — M54.41 ACUTE BILATERAL LOW BACK PAIN WITH BILATERAL SCIATICA: ICD-10-CM

## 2021-10-06 DIAGNOSIS — M54.42 ACUTE BILATERAL LOW BACK PAIN WITH BILATERAL SCIATICA: ICD-10-CM

## 2021-10-06 PROCEDURE — 97110 THERAPEUTIC EXERCISES: CPT | Mod: GP | Performed by: PHYSICAL THERAPIST

## 2021-10-06 PROCEDURE — 97530 THERAPEUTIC ACTIVITIES: CPT | Mod: GP | Performed by: PHYSICAL THERAPIST

## 2021-10-13 ENCOUNTER — THERAPY VISIT (OUTPATIENT)
Dept: PHYSICAL THERAPY | Facility: CLINIC | Age: 81
End: 2021-10-13
Payer: MEDICARE

## 2021-10-13 DIAGNOSIS — M54.42 ACUTE BILATERAL LOW BACK PAIN WITH BILATERAL SCIATICA: ICD-10-CM

## 2021-10-13 DIAGNOSIS — M54.41 ACUTE BILATERAL LOW BACK PAIN WITH BILATERAL SCIATICA: ICD-10-CM

## 2021-10-13 PROCEDURE — 97112 NEUROMUSCULAR REEDUCATION: CPT | Mod: GP | Performed by: PHYSICAL THERAPIST

## 2021-10-13 PROCEDURE — 97110 THERAPEUTIC EXERCISES: CPT | Mod: GP | Performed by: PHYSICAL THERAPIST

## 2021-10-26 ENCOUNTER — THERAPY VISIT (OUTPATIENT)
Dept: PHYSICAL THERAPY | Facility: CLINIC | Age: 81
End: 2021-10-26
Payer: MEDICARE

## 2021-10-26 DIAGNOSIS — M54.41 ACUTE BILATERAL LOW BACK PAIN WITH BILATERAL SCIATICA: ICD-10-CM

## 2021-10-26 DIAGNOSIS — M54.42 ACUTE BILATERAL LOW BACK PAIN WITH BILATERAL SCIATICA: ICD-10-CM

## 2021-10-26 PROCEDURE — 97110 THERAPEUTIC EXERCISES: CPT | Mod: GP | Performed by: PHYSICAL THERAPIST

## 2021-11-01 ENCOUNTER — TELEPHONE (OUTPATIENT)
Dept: ORTHOPEDICS | Facility: CLINIC | Age: 81
End: 2021-11-01

## 2021-11-01 NOTE — TELEPHONE ENCOUNTER
Pt has been experiencing flare ups where his R leg will seize up and he will end up on the floor.    PT is uncertain as to what to focus on primarily, but Pt mentions the PT is good overall.    I tried to help him be seen with Dr. Alba sooner, but it seems that Dr. Alba is full this week and is out of office next week.    I placed him on cancellation list in case someone falls off the schedule.    He thanked me for the call.     - Paras Fofana, ATC

## 2021-11-01 NOTE — TELEPHONE ENCOUNTER
[facility] Call Center    Phone Message    May a detailed message be left on voicemail: no     Reason for Call: Other: Patient left voicemail 10-30 @ 5:15pm, still having back problems would like a c/b     Action Taken: Message routed to:  [facility] ([facility]    Travel Screening: Not Applicable

## 2021-11-03 ENCOUNTER — THERAPY VISIT (OUTPATIENT)
Dept: PHYSICAL THERAPY | Facility: CLINIC | Age: 81
End: 2021-11-03
Payer: MEDICARE

## 2021-11-03 DIAGNOSIS — M54.42 ACUTE BILATERAL LOW BACK PAIN WITH BILATERAL SCIATICA: ICD-10-CM

## 2021-11-03 DIAGNOSIS — M54.41 ACUTE BILATERAL LOW BACK PAIN WITH BILATERAL SCIATICA: ICD-10-CM

## 2021-11-03 PROCEDURE — 97530 THERAPEUTIC ACTIVITIES: CPT | Mod: GP | Performed by: PHYSICAL THERAPIST

## 2021-11-03 PROCEDURE — 97110 THERAPEUTIC EXERCISES: CPT | Mod: GP | Performed by: PHYSICAL THERAPIST

## 2021-11-03 NOTE — PROGRESS NOTES
Subjective:  HPI  Physical Exam  Oswestry Score: 24 %                 Objective:  System    Physical Exam    General     ROS    Assessment/Plan:    PROGRESS  REPORT    Progress reporting period is from 9/28/21 to 11/3/21.       SUBJECTIVE  Subjective changes noted by patient: Felice reports he has had 3 episodes of severe right leg pain over the past 7-8 days.  One episode happened when he was lying on his bed and turned to his left to reach for something.  The second episode happened when he stood up from a chair after eating lunch. This episode was bad enough to drop him to the floor and it took him close to 30 minutes to be able to get back up.  The most recent episode happened this morning while he was lying on his back in bed with his upper body propped up to read.  He has not been able to identify any consistent cause of these severe episodes. He notes he does his exercises every morning and feels they have helped in a lot of ways as he feels mostly good aside from the flare ups.  When the pain is present he uses ice and pressure on his right lower back/buttock to try to relieve it.  Between episodes he has some mild soreness in his right leg but overall is doing better between these episodes.    Current pain level is 3/10 (flare ups 9/10).     Previous pain level was  9/10.   Changes in function:  Yes, see above.  Adverse reaction to treatment or activity: None    OBJECTIVE  Changes noted in objective findings:  Yes  Lumbar flexion in standing until hands reach lower 1/3 of tibia with some pain in right posterior thigh returning to neutral.   Repeated flexion in standing does not change symptoms.    Extension in standing demos mild limitation with pain in right leg.    Repeated extension in standing causes increasing bilateral posterior leg pain.    Bilateral side flexion WNL.    Repeated flexion in supine does not increase pain or change symptoms overall.    Prone lying and prone on elbows he feels minimal  pain.    Prone press up extension gradually increases pain and results in feeling of weakness in bilateral legs upon standing.    Neural tension tests in right LE positive.     ASSESSMENT/PLAN  Updated problem list and treatment plan: Diagnosis 1:  Low back pain with right-sided radiculopathy    Pain -  hot/cold therapy, US, electric stimulation, mechanical traction, manual therapy, self management, education, directional preference exercise and home program  Decreased ROM/flexibility - manual therapy, therapeutic exercise and home program  Decreased strength - therapeutic exercise, therapeutic activities and home program  Decreased function - therapeutic activities and home program  STG/LTGs have been met or progress has been made towards goals:  Yes, progress made but continued severe shooting pain.  Assessment of Progress: The patient's condition is improving.  However, continues to have episodes of severe radiating pain.  Self Management Plans:  Patient has been instructed in a home treatment program.  Patient  has been instructed in self management of symptoms.    Felice continues to require the following intervention to meet STG and LTG's:  PT    Recommendations:  This patient would benefit from further evaluation to determine if further imaging or testing is needed to determine source of intermittent severe shooting pain and whether continued PT vs other treatment is most appropriate.    Please refer to the daily flowsheet for treatment today, total treatment time and time spent performing 1:1 timed codes.

## 2021-11-03 NOTE — Clinical Note
Dr. Alba,    Please see my PT progress note for Felice in Epic for today's date of service (11/3/21).  He is improved as far as his daily activities and overall pain, however, he continues to have intermittent severe pain in his right back and leg.  He has 2-3 episodes per week but there is not a consistent mechanism so I'm having a hard time identifying the source of the pain and how to resolve it.  I'd be interested to hear what your thoughts are on whether he needs more imaging (only x-rays to this point) or should continue with PT.  Thanks.    Emil Schaffer, DPT, [facility]

## 2021-11-03 NOTE — LETTER
[facility]  [address]  [place] [address]  [phone]    2021    Re: Felice Blood   :   1940  MRN:  [id number]   REFERRING PHYSICIAN:   Dino [facility]    Date of Initial Evaluation:  21  Visits:  Rxs Used: 5  Reason for Referral:  Acute bilateral low back pain with bilateral sciatica    EVALUATION SUMMARY    Subjective:  HPI  Physical Exam  Oswestry Score: 24 %                 PROGRESS  REPORT  Progress reporting period is from 21 to 11/3/21.       SUBJECTIVE  Subjective changes noted by patient: Felice reports he has had 3 episodes of severe right leg pain over the past 7-8 days.  One episode happened when he was lying on his bed and turned to his left to reach for something.  The second episode happened when he stood up from a chair after eating lunch. This episode was bad enough to drop him to the floor and it took him close to 30 minutes to be able to get back up.  The most recent episode happened this morning while he was lying on his back in bed with his upper body propped up to read.  He has not been able to identify any consistent cause of these severe episodes. He notes he does his exercises every morning and feels they have helped in a lot of ways as he feels mostly good aside from the flare ups.  When the pain is present he uses ice and pressure on his right lower back/buttock to try to relieve it.  Between episodes he has some mild soreness in his right leg but overall is doing better between these episodes.    Current pain level is 3/10 (flare ups 9/10).     Previous pain level was  9/10.   Changes in function:  Yes, see above.  Adverse reaction to treatment or activity: None    OBJECTIVE  Changes noted in objective findings:  Yes  Lumbar flexion in standing until hands reach lower 1/3 of tibia with some pain in right posterior thigh returning to neutral.    Repeated flexion in standing does not change symptoms.    Extension in standing demos mild limitation with pain in right leg.    Repeated extension in standing causes increasing bilateral posterior leg pain.    Bilateral side flexion WNL.    Repeated flexion in supine does not increase pain or change symptoms overall.    Prone lying and prone on elbows he feels minimal pain.    Prone press up extension gradually increases pain and results in feeling of weakness in bilateral legs upon standing.    Neural tension tests in right LE positive.     ASSESSMENT/PLAN  Updated problem list and treatment plan: Diagnosis 1:  Low back pain with right-sided radiculopathy    Pain -  hot/cold therapy, US, electric stimulation, mechanical traction, manual therapy, self management, education, directional preference exercise and home program  Decreased ROM/flexibility - manual therapy, therapeutic exercise and home program  Decreased strength - therapeutic exercise, therapeutic activities and home program  Decreased function - therapeutic activities and home program  STG/LTGs have been met or progress has been made towards goals:  Yes, progress made but continued severe shooting pain.  Assessment of Progress: The patient's condition is improving.  However, continues to have episodes of severe radiating pain.  Self Management Plans:  Patient has been instructed in a home treatment program.  Patient  has been instructed in self management of symptoms.    Felice continues to require the following intervention to meet STG and LTG's:  PT    Recommendations:  This patient would benefit from further evaluation to determine if further imaging or testing is needed to determine source of intermittent severe shooting pain and whether continued PT vs other treatment is most appropriate.    Thank you for your referral.    INQUIRIES  Therapist: Emil Schaffer, PT, DPT, SCS  [facility]  [address]  [place] [address]  Phone: [phone]  Fax: [phone]

## 2021-11-15 ENCOUNTER — ANCILLARY PROCEDURE (OUTPATIENT)
Dept: GENERAL RADIOLOGY | Facility: CLINIC | Age: 81
End: 2021-11-15
Attending: FAMILY MEDICINE
Payer: MEDICARE

## 2021-11-15 ENCOUNTER — OFFICE VISIT (OUTPATIENT)
Dept: ORTHOPEDICS | Facility: CLINIC | Age: 81
End: 2021-11-15
Payer: MEDICARE

## 2021-11-15 VITALS — WEIGHT: 153 LBS | HEIGHT: 69 IN | BODY MASS INDEX: 22.66 KG/M2

## 2021-11-15 DIAGNOSIS — M51.369 LUMBAR DEGENERATIVE DISC DISEASE: Primary | ICD-10-CM

## 2021-11-15 DIAGNOSIS — M54.50 LOW BACK PAIN: ICD-10-CM

## 2021-11-15 DIAGNOSIS — M47.816 SPONDYLOSIS OF LUMBAR REGION WITHOUT MYELOPATHY OR RADICULOPATHY: ICD-10-CM

## 2021-11-15 DIAGNOSIS — M51.369 LUMBAR DEGENERATIVE DISC DISEASE: ICD-10-CM

## 2021-11-15 DIAGNOSIS — M54.10 RADICULAR PAIN OF BOTH LOWER EXTREMITIES: ICD-10-CM

## 2021-11-15 DIAGNOSIS — S39.012A LUMBAR STRAIN, INITIAL ENCOUNTER: ICD-10-CM

## 2021-11-15 PROCEDURE — 72200 X-RAY EXAM SI JOINTS: CPT | Performed by: RADIOLOGY

## 2021-11-15 PROCEDURE — 99213 OFFICE O/P EST LOW 20 MIN: CPT | Performed by: FAMILY MEDICINE

## 2021-11-15 ASSESSMENT — MIFFLIN-ST. JEOR: SCORE: 1389.38

## 2021-11-15 NOTE — LETTER
"  11/15/2021      RE: Felice Blood  [address] [facility] [address]       November 15, 2021: Felice Blood is a 81 year old male who is seen in f/u up for follow-up of his low back pain.  He has been seeing physical therapy consistently.  He has been able to tolerate the exercises.  However he tells me that his problem is not improved.  Overall, he indicates that towards the end of the day he will have aching discomfort that he feels in his centralized low back, bilateral thighs, bilateral shins and bilateral feet.  If he goes for a walk around the grocery store he will end up with aching pain in the low back and pain in the bilateral thighs, shins, bilateral feet.    Pt notes right hip and leg \"freezes up\": he has had 3 episodes of severe right leg pain since the last visit.  One episode happened when he was lying on his bed and turned to his left to reach.  The second episode happened when he stood up from a chair after eating lunch. This episode was bad enough to drop him to the floor with pain.    He had one of the episodes associated with low back pain that he felt into the right thigh, right shin and right foot that lasted for about 3 hours.  It finally seemed to improve after he laid in bed and rotated his leg.  No numbness or tingling.  No loss of bowel or bladder control.  He has been able to tolerate PT exercises, and finds them helpful overall.  When the pain is present he uses ice and pressure on his right lower back/buttock to try to relieve it.         Saw PT for his low back in 2018, is currently seeing PT for his low back      We went over x-rays today of his bilateral SI joints.  He has no significant SI joint DJD.  Mild bilateral hip DJD.  Lumbar spine DJD noted.  No obvious bony pelvis lesions.      2 views lumbar spine radiographs 9/27/2021 4:09 PM     History: AP LAT; Low back pain     Comparison: 10/29/2018     Findings:     Standing  AP and lateral  views of the lumbar " spine were obtained.     5  lumbar type vertebral bodies are assumed for the purpose of this  dictation.     There is no acute osseous abnormality.       There is multilevel degenerative changes of the lumbar spine, most  pronounced at L4/L5 and L5-S1 . There is also lower lumbar predominant  facet arthropathy. There is trace anterolisthesis of L3 on L4 and L2  on L3.       On the flexion/extension views, anterolisthesis of the L3 on L4 shows  no evidence of motion.     Vascular calcifications. The visualized bowel gas pattern is  non-obstructive.                                                                      Impression:  1.  No acute osseous abnormality.  2.  Multilevel degenerative changes most pronounced at L4-L5 and  L5-S1, largely unchanged from prior.     JUTTA ELLERMANN, MD     PMH:  Past Medical History:   Diagnosis Date     Cobblestone retinal degeneration      Impotence of organic origin      Nonsenile cataract      Pure hypercholesterolemia      Unspecified essential hypertension      Vitreous detachment of both eyes        Active problem list:  Patient Active Problem List   Diagnosis     Essential hypertension     Pure hypercholesterolemia     Impotence of organic origin     Acute bilateral low back pain with bilateral sciatica     Past Surgical History:  Appendectomy   Cataract IOL, right (5/1/17)  Phacoemulsification with standard intraocular lens implant, right (5/1/17)  Tonsillectomy     Allergies:  Etodolac   Percocet (nausea, muscle weakness)    FH:  Family History   Problem Relation Age of Onset     Glaucoma No family hx of      Macular Degeneration No family hx of        SH:  Social History     Socioeconomic History     Marital status:      Spouse name: Not on file     Number of children: Not on file     Years of education: Not on file     Highest education level: Not on file   Occupational History     Not on file   Tobacco Use     Smoking status: Never Smoker     Smokeless tobacco:  Never Used   Substance and Sexual Activity     Alcohol use: Yes     Comment: 1 day     Drug use: Not on file     Sexual activity: Not on file   Other Topics Concern     Not on file   Social History Narrative     Not on file     Social Determinants of Health     Financial Resource Strain: Not on file   Food Insecurity: Not on file   Transportation Needs: Not on file   Physical Activity: Not on file   Stress: Not on file   Social Connections: Not on file   Intimate Partner Violence: Not on file   Housing Stability: Not on file       MEDS:  See EMR, reviewed  ALL:  See EMR, reviewed    REVIEW OF SYSTEMS:  CONSTITUTIONAL:NEGATIVE for fever, chills, change in weight  INTEGUMENTARY/SKIN: NEGATIVE for worrisome rashes, moles or lesions  EYES: NEGATIVE for vision changes or irritation  ENT/MOUTH: NEGATIVE for ear, mouth and throat problems  RESP:NEGATIVE for significant cough or SOB  BREAST: NEGATIVE for masses, tenderness or discharge  CV: NEGATIVE for chest pain, palpitations or peripheral edema  GI: NEGATIVE for nausea, abdominal pain, heartburn, or change in bowel habits  :NEGATIVE for frequency, dysuria, or hematuria  :NEGATIVE for frequency, dysuria, or hematuria  NEURO: NEGATIVE for weakness, dizziness or paresthesias  ENDOCRINE: NEGATIVE for temperature intolerance, skin/hair changes  HEME/ALLERGY/IMMUNE: NEGATIVE for bleeding problems  PSYCHIATRIC: NEGATIVE for changes in mood or affect        Objective: MENA test is negative.  Bilateral straight leg raise negative.  Strength is intact 5 out of 5 at the bilateral hip, knee, ankle and foot with no weakness noted.  No numbness or tingling in the lower extremity.  Peripheral pulses strong and symmetrical.  Appropriate in conversation affect.    Assessment bilateral radicular leg pain.  Lumbar spine DJD    Plan: An MRI of the lumbar spine is pending to rule out significant impingement or stenosis.  He will continue with physical therapy for now.  Follow-up for a  face-to-face visit after the MRI to discuss options.      Dino Alba MD

## 2021-11-15 NOTE — PROGRESS NOTES
"November 15, 2021: Felice Blood is a 81 year old male who is seen in f/u up for follow-up of his low back pain.  He has been seeing physical therapy consistently.  He has been able to tolerate the exercises.  However he tells me that his problem is not improved.  Overall, he indicates that towards the end of the day he will have aching discomfort that he feels in his centralized low back, bilateral thighs, bilateral shins and bilateral feet.  If he goes for a walk around the grocery store he will end up with aching pain in the low back and pain in the bilateral thighs, shins, bilateral feet.    Pt notes right hip and leg \"freezes up\": he has had 3 episodes of severe right leg pain since the last visit.  One episode happened when he was lying on his bed and turned to his left to reach.  The second episode happened when he stood up from a chair after eating lunch. This episode was bad enough to drop him to the floor with pain.    He had one of the episodes associated with low back pain that he felt into the right thigh, right shin and right foot that lasted for about 3 hours.  It finally seemed to improve after he laid in bed and rotated his leg.  No numbness or tingling.  No loss of bowel or bladder control.  He has been able to tolerate PT exercises, and finds them helpful overall.  When the pain is present he uses ice and pressure on his right lower back/buttock to try to relieve it.         Saw PT for his low back in 2018, is currently seeing PT for his low back      We went over x-rays today of his bilateral SI joints.  He has no significant SI joint DJD.  Mild bilateral hip DJD.  Lumbar spine DJD noted.  No obvious bony pelvis lesions.      2 views lumbar spine radiographs 9/27/2021 4:09 PM     History: AP LAT; Low back pain     Comparison: 10/29/2018     Findings:     Standing  AP and lateral  views of the lumbar spine were obtained.     5  lumbar type vertebral bodies are assumed for the purpose of " this  dictation.     There is no acute osseous abnormality.       There is multilevel degenerative changes of the lumbar spine, most  pronounced at L4/L5 and L5-S1 . There is also lower lumbar predominant  facet arthropathy. There is trace anterolisthesis of L3 on L4 and L2  on L3.       On the flexion/extension views, anterolisthesis of the L3 on L4 shows  no evidence of motion.     Vascular calcifications. The visualized bowel gas pattern is  non-obstructive.                                                                      Impression:  1.  No acute osseous abnormality.  2.  Multilevel degenerative changes most pronounced at L4-L5 and  L5-S1, largely unchanged from prior.     JUTTA ELLERMANN, MD     PMH:  Past Medical History:   Diagnosis Date     Cobblestone retinal degeneration      Impotence of organic origin      Nonsenile cataract      Pure hypercholesterolemia      Unspecified essential hypertension      Vitreous detachment of both eyes        Active problem list:  Patient Active Problem List   Diagnosis     Essential hypertension     Pure hypercholesterolemia     Impotence of organic origin     Acute bilateral low back pain with bilateral sciatica     Past Surgical History:  Appendectomy   Cataract IOL, right (5/1/17)  Phacoemulsification with standard intraocular lens implant, right (5/1/17)  Tonsillectomy     Allergies:  Etodolac   Percocet (nausea, muscle weakness)    FH:  Family History   Problem Relation Age of Onset     Glaucoma No family hx of      Macular Degeneration No family hx of        SH:  Social History     Socioeconomic History     Marital status:      Spouse name: Not on file     Number of children: Not on file     Years of education: Not on file     Highest education level: Not on file   Occupational History     Not on file   Tobacco Use     Smoking status: Never Smoker     Smokeless tobacco: Never Used   Substance and Sexual Activity     Alcohol use: Yes     Comment: 1 day      Drug use: Not on file     Sexual activity: Not on file   Other Topics Concern     Not on file   Social History Narrative     Not on file     Social Determinants of Health     Financial Resource Strain: Not on file   Food Insecurity: Not on file   Transportation Needs: Not on file   Physical Activity: Not on file   Stress: Not on file   Social Connections: Not on file   Intimate Partner Violence: Not on file   Housing Stability: Not on file       MEDS:  See EMR, reviewed  ALL:  See EMR, reviewed    REVIEW OF SYSTEMS:  CONSTITUTIONAL:NEGATIVE for fever, chills, change in weight  INTEGUMENTARY/SKIN: NEGATIVE for worrisome rashes, moles or lesions  EYES: NEGATIVE for vision changes or irritation  ENT/MOUTH: NEGATIVE for ear, mouth and throat problems  RESP:NEGATIVE for significant cough or SOB  BREAST: NEGATIVE for masses, tenderness or discharge  CV: NEGATIVE for chest pain, palpitations or peripheral edema  GI: NEGATIVE for nausea, abdominal pain, heartburn, or change in bowel habits  :NEGATIVE for frequency, dysuria, or hematuria  :NEGATIVE for frequency, dysuria, or hematuria  NEURO: NEGATIVE for weakness, dizziness or paresthesias  ENDOCRINE: NEGATIVE for temperature intolerance, skin/hair changes  HEME/ALLERGY/IMMUNE: NEGATIVE for bleeding problems  PSYCHIATRIC: NEGATIVE for changes in mood or affect        Objective: MENA test is negative.  Bilateral straight leg raise negative.  Strength is intact 5 out of 5 at the bilateral hip, knee, ankle and foot with no weakness noted.  No numbness or tingling in the lower extremity.  Peripheral pulses strong and symmetrical.  Appropriate in conversation affect.    Assessment bilateral radicular leg pain.  Lumbar spine DJD    Plan: An MRI of the lumbar spine is pending to rule out significant impingement or stenosis.  He will continue with physical therapy for now.  Follow-up for a face-to-face visit after the MRI to discuss options.

## 2021-11-17 ENCOUNTER — ANCILLARY PROCEDURE (OUTPATIENT)
Dept: MRI IMAGING | Facility: CLINIC | Age: 81
End: 2021-11-17
Attending: FAMILY MEDICINE
Payer: MEDICARE

## 2021-11-17 DIAGNOSIS — M51.369 LUMBAR DEGENERATIVE DISC DISEASE: ICD-10-CM

## 2021-11-17 DIAGNOSIS — M54.10 RADICULAR PAIN OF BOTH LOWER EXTREMITIES: ICD-10-CM

## 2021-11-17 PROCEDURE — 72148 MRI LUMBAR SPINE W/O DYE: CPT | Mod: GC | Performed by: RADIOLOGY

## 2021-11-18 ENCOUNTER — THERAPY VISIT (OUTPATIENT)
Dept: PHYSICAL THERAPY | Facility: CLINIC | Age: 81
End: 2021-11-18
Payer: MEDICARE

## 2021-11-18 DIAGNOSIS — M54.42 ACUTE BILATERAL LOW BACK PAIN WITH BILATERAL SCIATICA: ICD-10-CM

## 2021-11-18 DIAGNOSIS — M54.41 ACUTE BILATERAL LOW BACK PAIN WITH BILATERAL SCIATICA: ICD-10-CM

## 2021-11-18 PROCEDURE — 97110 THERAPEUTIC EXERCISES: CPT | Mod: GP | Performed by: PHYSICAL THERAPIST

## 2021-11-22 ENCOUNTER — OFFICE VISIT (OUTPATIENT)
Dept: ORTHOPEDICS | Facility: CLINIC | Age: 81
End: 2021-11-22
Payer: MEDICARE

## 2021-11-22 VITALS — BODY MASS INDEX: 22.66 KG/M2 | HEIGHT: 69 IN | WEIGHT: 153 LBS

## 2021-11-22 DIAGNOSIS — M51.369 LUMBAR DEGENERATIVE DISC DISEASE: Primary | ICD-10-CM

## 2021-11-22 DIAGNOSIS — M48.062 SPINAL STENOSIS OF LUMBAR REGION WITH NEUROGENIC CLAUDICATION: ICD-10-CM

## 2021-11-22 PROCEDURE — 99212 OFFICE O/P EST SF 10 MIN: CPT | Performed by: FAMILY MEDICINE

## 2021-11-22 ASSESSMENT — MIFFLIN-ST. JEOR: SCORE: 1389.38

## 2021-11-22 NOTE — PROGRESS NOTES
S: fup MRI results.  follow-up of his low back pain.  He has been seeing physical therapy consistently.    He has noted some improvement since the last visit.  He is less likely to have radicular discomfort in the legs.  He will still have some centralized back discomfort towards the end of the day and uses either a heating pad or cool pack for this.  He tends to take ibuprofen.  He is planning on trying to switch this to Tylenol and see if this is tolerated and helpful.  He is doing his exercises at home faithfully.      Overall, he indicates that towards the end of the day he will have aching discomfort that he feels in his centralized low back, bilateral thighs, bilateral shins and bilateral feet.  If he goes for a walk around the grocery store he will end up with aching pain in the low back and pain in the bilateral thighs, shins, bilateral feet.      MR LUMBAR SPINE W/O CONTRAST 11/17/2021 11:54 AM     Provided History: Lumbar degenerative disc disease; Radicular pain of  both lower extremities     ICD-10: Lumbar degenerative disc disease; Radicular pain of both lower  extremities     Comparison: Lumbar radiograph 9/27/2021.     Technique: Sagittal T1-weighted, sagittal STIR, 3D volumetric axial  and sagittal reconstructed T2-weighted images of the lumbar spine were  obtained without intravenous contrast.      Findings: Counting down from C2, there are 5 lumbar-type vertebra The  tip of the conus medullaris is at L1.  Grade 1 anterolisthesis of L2  over L3.  There is mild L1-L2 disc height narrowing and moderate disc  height narrowing at L4-5 and L5-S1. Loss of intradiscal T2  hyperintense signal throughout the lumbar spine, most pronounced at  L4-S1 Degenerative marrow changes seen at the adjacent endplates of  T11-T12, inferior endplate of L1 and the L4-5 vertebral bodies.     On a level by level basis:     T12-L1: Left greater than right facet arthropathy. No neural foraminal  narrowing or spinal canal  stenosis.     L1-2: Left eccentric circumferential disc bulge. Facet arthropathy  bilaterally. Ligamentum flavum thickening. Mild right neural foraminal  stenosis. Left neural foramen. Is patent.     L2-3: Right central/subarticular disc extrusion with slight superior  migration narrows the right lateral recess and likely impinges upon  the traversing right L3 nerve root.. Severe bilateral facet  arthropathy. Ligamentum flavum thickening. There is unroofing of the  disc due to anterolisthesis of L2 over L3. Moderate right and mild  left neural foraminal narrowing. Moderate spinal canal stenosis.     L3-4: Circumferential disc bulge with bilateral facet arthropathy.  Narrowing of both lateral recesses abutting the descending left and  right L4 nerve roots. Mild bilateral neural foraminal narrowing. No  spinal canal stenosis.     L4-5: Left eccentric circumferential disc bulge. Bilateral facet  arthropathy. Narrowing of both lateral recesses with a probable  impingement of the descending L5 nerve roots bilaterally. Mild spinal  canal stenosis. Mild left neural foraminal stenosis. Right neural  foramen is patent..     L5-S1: Circumferential disc bulge with bilateral facet arthropathy.  Moderate bilateral neural foraminal narrowing. No spinal canal  stenosis.      Paraspinous tissues are within normal limits.                                                                      Impression: Multilevel lumbar spondylosis with significant findings of  moderate spinal canal stenosis at L2-3 with a subarticular disc  extrusion and moderate bilateral neural foraminal narrowing at L5-S1.  Further degenerative disease as described above.     I have personally reviewed the examination and initial interpretation  and I agree with the findings.     WALTER LI MD               [facility]:  Past Medical History:   Diagnosis Date     Cobblestone retinal degeneration      Impotence of organic origin      Nonsenile cataract      Pure  hypercholesterolemia      Unspecified essential hypertension      Vitreous detachment of both eyes        Active problem list:  Patient Active Problem List   Diagnosis     Essential hypertension     Pure hypercholesterolemia     Impotence of organic origin     Acute bilateral low back pain with bilateral sciatica       FH:  Family History   Problem Relation Age of Onset     Glaucoma No family hx of      Macular Degeneration No family hx of        SH:  Social History     Socioeconomic History     Marital status:      Spouse name: Not on file     Number of children: Not on file     Years of education: Not on file     Highest education level: Not on file   Occupational History     Not on file   Tobacco Use     Smoking status: Never Smoker     Smokeless tobacco: Never Used   Substance and Sexual Activity     Alcohol use: Yes     Comment: 1 day     Drug use: Not on file     Sexual activity: Not on file   Other Topics Concern     Not on file   Social History Narrative     Not on file     Social Determinants of Health     Financial Resource Strain: Not on file   Food Insecurity: Not on file   Transportation Needs: Not on file   Physical Activity: Not on file   Stress: Not on file   Social Connections: Not on file   Intimate Partner Violence: Not on file   Housing Stability: Not on file       MEDS:  See EMR, reviewed  ALL:  See EMR, reviewed    REVIEW OF SYSTEMS:  CONSTITUTIONAL:NEGATIVE for fever, chills, change in weight  INTEGUMENTARY/SKIN: NEGATIVE for worrisome rashes, moles or lesions  EYES: NEGATIVE for vision changes or irritation  ENT/MOUTH: NEGATIVE for ear, mouth and throat problems  RESP:NEGATIVE for significant cough or SOB  BREAST: NEGATIVE for masses, tenderness or discharge  CV: NEGATIVE for chest pain, palpitations or peripheral edema  GI: NEGATIVE for nausea, abdominal pain, heartburn, or change in bowel habits  :NEGATIVE for frequency, dysuria, or hematuria  :NEGATIVE for frequency, dysuria, or  hematuria  NEURO: NEGATIVE for weakness, dizziness or paresthesias  ENDOCRINE: NEGATIVE for temperature intolerance, skin/hair changes  HEME/ALLERGY/IMMUNE: NEGATIVE for bleeding problems  PSYCHIATRIC: NEGATIVE for changes in mood or affect    Objective strength is intact of the bilateral lower extremities at hip, knee, ankle and foot.  Sensation is intact distally.  Straight leg raise negative.  Appropriate in conversation affect.    Went over his MRI in detail including moderate central stenosis at L2-L3 and severe facet arthropathy at that level.  Also moderate bilateral foraminal narrowing at L5-S1 due to facet arthropathy and disc bulge.        Assessment lumbar degenerative disc and joint disease with lumbar spinal stenosis    Plan: He declines epidural injection for now.  He will continue with physical therapy.  We will have a follow-up visit in 1 month.

## 2021-11-22 NOTE — LETTER
11/22/2021      RE: Felice WRAY Caitie  [address] [facility] [address]       S: fup MRI results.  follow-up of his low back pain.  He has been seeing physical therapy consistently.    He has noted some improvement since the last visit.  He is less likely to have radicular discomfort in the legs.  He will still have some centralized back discomfort towards the end of the day and uses either a heating pad or cool pack for this.  He tends to take ibuprofen.  He is planning on trying to switch this to Tylenol and see if this is tolerated and helpful.  He is doing his exercises at home faithfully.      Overall, he indicates that towards the end of the day he will have aching discomfort that he feels in his centralized low back, bilateral thighs, bilateral shins and bilateral feet.  If he goes for a walk around the grocery store he will end up with aching pain in the low back and pain in the bilateral thighs, shins, bilateral feet.      MR LUMBAR SPINE W/O CONTRAST 11/17/2021 11:54 AM     Provided History: Lumbar degenerative disc disease; Radicular pain of  both lower extremities     ICD-10: Lumbar degenerative disc disease; Radicular pain of both lower  extremities     Comparison: Lumbar radiograph 9/27/2021.     Technique: Sagittal T1-weighted, sagittal STIR, 3D volumetric axial  and sagittal reconstructed T2-weighted images of the lumbar spine were  obtained without intravenous contrast.      Findings: Counting down from C2, there are 5 lumbar-type vertebra The  tip of the conus medullaris is at L1.  Grade 1 anterolisthesis of L2  over L3.  There is mild L1-L2 disc height narrowing and moderate disc  height narrowing at L4-5 and L5-S1. Loss of intradiscal T2  hyperintense signal throughout the lumbar spine, most pronounced at  L4-S1 Degenerative marrow changes seen at the adjacent endplates of  T11-T12, inferior endplate of L1 and the L4-5 vertebral bodies.     On a level by level basis:     T12-L1:  Left greater than right facet arthropathy. No neural foraminal  narrowing or spinal canal stenosis.     L1-2: Left eccentric circumferential disc bulge. Facet arthropathy  bilaterally. Ligamentum flavum thickening. Mild right neural foraminal  stenosis. Left neural foramen. Is patent.     L2-3: Right central/subarticular disc extrusion with slight superior  migration narrows the right lateral recess and likely impinges upon  the traversing right L3 nerve root.. Severe bilateral facet  arthropathy. Ligamentum flavum thickening. There is unroofing of the  disc due to anterolisthesis of L2 over L3. Moderate right and mild  left neural foraminal narrowing. Moderate spinal canal stenosis.     L3-4: Circumferential disc bulge with bilateral facet arthropathy.  Narrowing of both lateral recesses abutting the descending left and  right L4 nerve roots. Mild bilateral neural foraminal narrowing. No  spinal canal stenosis.     L4-5: Left eccentric circumferential disc bulge. Bilateral facet  arthropathy. Narrowing of both lateral recesses with a probable  impingement of the descending L5 nerve roots bilaterally. Mild spinal  canal stenosis. Mild left neural foraminal stenosis. Right neural  foramen is patent..     L5-S1: Circumferential disc bulge with bilateral facet arthropathy.  Moderate bilateral neural foraminal narrowing. No spinal canal  stenosis.      Paraspinous tissues are within normal limits.                                                                      Impression: Multilevel lumbar spondylosis with significant findings of  moderate spinal canal stenosis at L2-3 with a subarticular disc  extrusion and moderate bilateral neural foraminal narrowing at L5-S1.  Further degenerative disease as described above.     I have personally reviewed the examination and initial interpretation  and I agree with the findings.     WALTER LI MD               [facility]:  Past Medical History:   Diagnosis Date     [facility]  retinal degeneration      Impotence of organic origin      Nonsenile cataract      Pure hypercholesterolemia      Unspecified essential hypertension      Vitreous detachment of both eyes        Active problem list:  Patient Active Problem List   Diagnosis     Essential hypertension     Pure hypercholesterolemia     Impotence of organic origin     Acute bilateral low back pain with bilateral sciatica       FH:  Family History   Problem Relation Age of Onset     Glaucoma No family hx of      Macular Degeneration No family hx of        SH:  Social History     Socioeconomic History     Marital status:      Spouse name: Not on file     Number of children: Not on file     Years of education: Not on file     Highest education level: Not on file   Occupational History     Not on file   Tobacco Use     Smoking status: Never Smoker     Smokeless tobacco: Never Used   Substance and Sexual Activity     Alcohol use: Yes     Comment: 1 day     Drug use: Not on file     Sexual activity: Not on file   Other Topics Concern     Not on file   Social History Narrative     Not on file     Social Determinants of Health     Financial Resource Strain: Not on file   Food Insecurity: Not on file   Transportation Needs: Not on file   Physical Activity: Not on file   Stress: Not on file   Social Connections: Not on file   Intimate Partner Violence: Not on file   Housing Stability: Not on file       MEDS:  See EMR, reviewed  ALL:  See EMR, reviewed    REVIEW OF SYSTEMS:  CONSTITUTIONAL:NEGATIVE for fever, chills, change in weight  INTEGUMENTARY/SKIN: NEGATIVE for worrisome rashes, moles or lesions  EYES: NEGATIVE for vision changes or irritation  ENT/MOUTH: NEGATIVE for ear, mouth and throat problems  RESP:NEGATIVE for significant cough or SOB  BREAST: NEGATIVE for masses, tenderness or discharge  CV: NEGATIVE for chest pain, palpitations or peripheral edema  GI: NEGATIVE for nausea, abdominal pain, heartburn, or change in bowel  habits  :NEGATIVE for frequency, dysuria, or hematuria  :NEGATIVE for frequency, dysuria, or hematuria  NEURO: NEGATIVE for weakness, dizziness or paresthesias  ENDOCRINE: NEGATIVE for temperature intolerance, skin/hair changes  HEME/ALLERGY/IMMUNE: NEGATIVE for bleeding problems  PSYCHIATRIC: NEGATIVE for changes in mood or affect    Objective strength is intact of the bilateral lower extremities at hip, knee, ankle and foot.  Sensation is intact distally.  Straight leg raise negative.  Appropriate in conversation affect.    Went over his MRI in detail including moderate central stenosis at L2-L3 and severe facet arthropathy at that level.  Also moderate bilateral foraminal narrowing at L5-S1 due to facet arthropathy and disc bulge.        Assessment lumbar degenerative disc and joint disease with lumbar spinal stenosis    Plan: He declines epidural injection for now.  He will continue with physical therapy.  We will have a follow-up visit in 1 month.        Dino Alba MD

## 2021-11-24 NOTE — PATIENT INSTRUCTIONS
[facility]: [phone]     [facility] Medication Refill Request Information:  * Please contact your pharmacy regarding ANY request for medication refills.  ** [facility] Prescription Fax = [phone]  * Please allow 3 business days for routine medication refills.  * Please allow 5 business days for controlled substance medication refills.     [facility] Test Result notification information:  *You will be notified with in 7-10 days of your appointment day regarding the results of your test.  If you are on MyChart you will be notified as soon as the provider has reviewed the results and signed off on them.   [General Appearance - Well Developed] : well developed [General Appearance - Well Nourished] : well nourished [Normal Appearance] : normal appearance [Well Groomed] : well groomed [General Appearance - In No Acute Distress] : no acute distress [Abdomen Soft] : soft [Abdomen Tenderness] : non-tender [Costovertebral Angle Tenderness] : no ~M costovertebral angle tenderness [Urethral Meatus] : meatus normal [Penis Abnormality] : normal circumcised penis [Urinary Bladder Findings] : the bladder was normal on palpation [Scrotum] : the scrotum was normal [Testes Tenderness] : no tenderness of the testes [Testes Mass (___cm)] : there were no testicular masses [Prostate Tenderness] : the prostate was not tender [No Prostate Nodules] : no prostate nodules [Prostate Enlarged] : was enlarged [FreeTextEntry1] : KIEL done on May 2021 [] : no respiratory distress [Respiration, Rhythm And Depth] : normal respiratory rhythm and effort [Exaggerated Use Of Accessory Muscles For Inspiration] : no accessory muscle use

## 2021-12-01 ENCOUNTER — THERAPY VISIT (OUTPATIENT)
Dept: PHYSICAL THERAPY | Facility: CLINIC | Age: 81
End: 2021-12-01
Payer: MEDICARE

## 2021-12-01 DIAGNOSIS — M54.41 ACUTE BILATERAL LOW BACK PAIN WITH BILATERAL SCIATICA: ICD-10-CM

## 2021-12-01 DIAGNOSIS — M54.42 ACUTE BILATERAL LOW BACK PAIN WITH BILATERAL SCIATICA: ICD-10-CM

## 2021-12-01 PROCEDURE — 97110 THERAPEUTIC EXERCISES: CPT | Mod: GP | Performed by: PHYSICAL THERAPIST

## 2021-12-01 PROCEDURE — 97530 THERAPEUTIC ACTIVITIES: CPT | Mod: GP | Performed by: PHYSICAL THERAPIST

## 2021-12-03 ENCOUNTER — OFFICE VISIT (OUTPATIENT)
Dept: DERMATOLOGY | Facility: CLINIC | Age: 81
End: 2021-12-03
Payer: MEDICARE

## 2021-12-03 DIAGNOSIS — D22.9 MULTIPLE BENIGN NEVI: ICD-10-CM

## 2021-12-03 DIAGNOSIS — D48.9 NEOPLASM OF UNCERTAIN BEHAVIOR: Primary | ICD-10-CM

## 2021-12-03 DIAGNOSIS — L21.9 SEBORRHEIC DERMATITIS: ICD-10-CM

## 2021-12-03 DIAGNOSIS — L82.0 SEBORRHEIC KERATOSIS, INFLAMED: ICD-10-CM

## 2021-12-03 DIAGNOSIS — L81.4 LENTIGINES: ICD-10-CM

## 2021-12-03 PROCEDURE — 11102 TANGNTL BX SKIN SINGLE LES: CPT | Mod: XS | Performed by: DERMATOLOGY

## 2021-12-03 PROCEDURE — 11103 TANGNTL BX SKIN EA SEP/ADDL: CPT | Mod: XS | Performed by: DERMATOLOGY

## 2021-12-03 PROCEDURE — 88305 TISSUE EXAM BY PATHOLOGIST: CPT | Mod: TC | Performed by: DERMATOLOGY

## 2021-12-03 PROCEDURE — 99213 OFFICE O/P EST LOW 20 MIN: CPT | Mod: 25 | Performed by: DERMATOLOGY

## 2021-12-03 PROCEDURE — 17110 DESTRUCTION B9 LES UP TO 14: CPT | Performed by: DERMATOLOGY

## 2021-12-03 PROCEDURE — 88305 TISSUE EXAM BY PATHOLOGIST: CPT | Mod: 26 | Performed by: PATHOLOGY

## 2021-12-03 RX ORDER — KETOCONAZOLE 20 MG/ML
SHAMPOO TOPICAL
Qty: 120 ML | Refills: 11 | Status: SHIPPED | OUTPATIENT
Start: 2021-12-03 | End: 2024-02-16

## 2021-12-03 NOTE — PATIENT INSTRUCTIONS
Wound Care After a Biopsy    What is a skin biopsy?  A skin biopsy allows the doctor to examine a very small piece of tissue under the microscope to determine the diagnosis and the best treatment for the skin condition. A local anesthetic (numbing medicine)  is injected with a very small needle into the skin area to be tested. A small piece of skin is taken from the area. Sometimes a suture (stitch) is used.     What are the risks of a skin biopsy?  I will experience scar, bleeding, swelling, pain, crusting and redness. I may experience incomplete removal or recurrence. Risks of this procedure are excessive bleeding, bruising, infection, nerve damage, numbness, thick (hypertrophic or keloidal) scar and non-diagnostic biopsy.    How should I care for my wound for the first 24 hours?    Keep the wound dry and covered for 24 hours    If it bleeds, hold direct pressure on the area for 15 minutes. If bleeding does not stop then go to the emergency room    Avoid strenuous exercise the first 1-2 days or as your doctor instructs you    How should I care for the wound after 24 hours?    After 24 hours, remove the bandage    You may bathe or shower as normal    If you had a scalp biopsy, you can shampoo as usual and can use shower water to clean the biopsy site daily    Clean the wound twice a day with gentle soap and water    Do not scrub, be gentle    Apply white petroleum/Vaseline after cleaning the wound with a cotton swab or a clean finger, and keep the site covered with a Bandaid /bandage. Bandages are not necessary with a scalp biopsy    If you are unable to cover the site with a Bandaid /bandage, re-apply ointment 2-3 times a day to keep the site moist. Moisture will help with healing    Avoid strenuous activity for first 1-2 days    Avoid lakes, rivers, pools, and oceans until the stitches are removed or the site is healed    How do I clean my wound?    Wash hands thoroughly with soap or use hand  before all  wound care    Clean the wound with gentle soap and water    Apply white petroleum/Vaseline  to wound after it is clean    Replace the Bandaid /bandage to keep the wound covered for the first few days or as instructed by your doctor    If you had a scalp biopsy, warm shower water to the area on a daily basis should suffice    What should I use to clean my wound?     Cotton-tipped applicators (Qtips )    White petroleum jelly (Vaseline ). Use a clean new container and use Q-tips to apply.    Bandaids   as needed    Gentle soap     How should I care for my wound long term?    Do not get your wound dirty    Keep up with wound care for one week or until the area is healed.    A small scab will form and fall off by itself when the area is completely healed. The area will be red and will become pink in color as it heals. Sun protection is very important for how your scar will turn out. Sunscreen with an SPF 30 or greater is recommended once the area is healed.    If you have stitches, stitches need to be removed in 10-14 days. You may return to our clinic for this or you may have it done locally at your doctor s office.    You should have some soreness but it should be mild and slowly go away over several days. Talk to your doctor about using tylenol for pain,    When should I call my doctor?  If you have increased:     Pain or swelling    Pus or drainage (clear or slightly yellow drainage is ok)    Temperature over 100F    Spreading redness or warmth around wound    When will I hear about my results?  The biopsy results can take 2 weeks to come back.  Your results will automatically release to RoomClip before your provider has even reviewed them.  The clinic will call you with the results, send you a Rare Pink message, or have you schedule a follow-up clinic or phone time to discuss the results.  Contact our clinics if you do not hear from us in 2 weeks.    Who should I call with questions?    [facility],  [place]: [phone]    [facility]: [phone]    For urgent needs outside of business hours call the [facility] at [phone] and ask for the dermatology resident on call

## 2021-12-03 NOTE — NURSING NOTE
Lidocaine-epinephrine 1-1:326742 % injection   6mL once for one use, starting 12/3/2021 ending 12/3/2021,  2mL disp, R-0, injection  Injected by Dee Pedro CMA

## 2021-12-03 NOTE — LETTER
12/3/2021       RE: Felice Blood  [address]  [facility] [address]     Dear Colleague,    Thank you for referring your patient, Felice Blood, to the [facility] [place] at [facility]. Please see a copy of my visit note below.    [facility] Dermatology Note  Encounter Date: Dec 3, 2021  Office Visit     Dermatology Problem List:  # Seborrheic dermatitis.  - Ketoconazole shampoo  # NUBs  - Left medial thigh, ddx BCC   - Left lateral mid back, ddx BCC  - Right upper back, ddx BCC  - Left upper posterior inferior arm, ddx BCC  - Shave biopsy x4 today, see procedure note below  # Lesions to monitor, concerning for BCC - photos obtained 12/3/2021, will recheck next visit  - Left upper posterior arm  - Right upper posterior arm  - Left upper abdomen  - Left lateral cheek x2   ____________________________________________    Assessment & Plan:    # NUBs  - Left medial thigh, ddx BCC   - Left lateral mid back, ddx BCC  - Right upper back, ddx BCC  - Left upper posterior inferior arm, ddx BCC  - Shave biopsy x4 today, see procedure note below    # Lesions to monitor - concerning for BCC. Pending biopsy results of the other 4 NUBs and repeat exam, we may proceed to biopsy these in the future. Will obtain photos for monitoring today.  - Left upper posterior arm  - Right upper posterior arm  - Left upper abdomen  - Left lateral cheek x2   - Photos obtained today    # ISK, left inner arm  Due to bothersome nature, patient requests treatment.  - Cryo today, see procedure note below    # Seborrheic dermatitis.   - Start ketoconazole shampoo 3x weekly    # Multiple benign nevi  # Solar lentigines  - No concerning melanocytic lesions today  - Counseled on ABCDEs of melanoma and sun protection - recommend SPF 30 or higher with frequent application  - Return sooner if noticing changing or symptomatic lesions    #  Seborrheic keratoses   Discussed the natural history and benign nature of this lesion.  - Reassurance provided that no additional treatment is necessary.      Procedures Performed:   - Shave biopsy procedure note, location(s): See above. After discussion of benefits and risks including but not limited to bleeding, infection, scar, incomplete removal, recurrence, and non-diagnostic biopsy, written consent and photographs were obtained. The area was cleaned with isopropyl alcohol. 0.5mL of 1% lidocaine with epinephrine was injected to obtain adequate anesthesia of lesion(s). Shave biopsy at site(s) performed. Hemostasis was achieved with aluminium chloride. Petrolatum ointment and a sterile dressing were applied. The patient tolerated the procedure and no complications were noted. The patient was provided with verbal and written post care instructions.     - Cryotherapy procedure note, location(s): See above. After verbal consent and discussion of risks and benefits including, but not limited to, dyspigmentation/scar, blister, and pain, 1 ISK(s) was(were) treated with 1-2 mm freeze border for 1-2 cycles with liquid nitrogen. Post cryotherapy instructions were provided.      Follow-up: 3 months, sooner if concerns.     Staff and Scribe:     Scribe Disclosure:  I, Gem Ralf, am serving as a scribe to document services personally performed by Silvana Do MD based on data collection and the provider's statements to me.     Provider Disclosure:   The documentation recorded by the scribe accurately reflects the services I personally performed and the decisions made by me.    Silvana Do MD    Department of Dermatology  [facility]: Phone: [phone], Fax: [phone]  12/8/2021     ____________________________________________    CC: Skin Check (Felice is here today for a skin check- no concerns )    HPI:  Mr. Felice WRAY  aCitie is a(n) 81 year old male who presents today as a return patient for FBSE. The patient was last seen in dermatology by Vianey Downs PA-C, on 11/11/19 at which time an inflamed verrucous keratosis on the right upper calf was treated with cryo. Skin examination was otherwise without concerns at that time.     Today, the patient reports spots of concern on his inner arm and thigh. He states the spot on his arm has been irritating but not painful. He notes the spot on his thigh has had some associated discomfort. He denies personal history of skin cancer and is unsure about family history of skin cancer. He notes that he has been staying out of the sun over the last few years and occasionally wears sunscreen. When prompted, he notes he has some itching of the scalp and has tried anti-dandruff shampoos without relief.     Patient is otherwise feeling well, without additional skin concerns.    Labs Reviewed:  N/A    Physical Exam:  Vitals: There were no vitals taken for this visit.  SKIN: Full body skin exam excluding the genitals was performed including face, scalp, neck, ears, chest, back, bilateral arms, hands, bilateral legs, feet, and buttocks.   - Erythema and flaking in scalp  - Left lateral mid back pink shiny macule  - Right upper back pink scaly macule   - Left upper posterior arm 2 shiny pink macules  - Right upper posterior arm pink shiny macule   - Left upper abdomen pink shiny macule   - Left medial thigh pink shiny macule  - Left lateral cheek 2 nonspecific pink macules  - There is a tan to brown waxy stuck on papule with surrounding erythema on the left inner arm.    - Multiple regular brown pigmented macules and papules are identified on the trunk and extremities.   - Scattered brown macules on sun exposed areas.  - There are waxy stuck on tan to brown papules on the trunk and extremities.  - No other lesions of concern on areas examined.     Medications:  Current Outpatient Medications    Medication     albuterol (PROAIR HFA) 108 (90 Base) MCG/ACT inhaler     aspirin 81 MG tablet     atorvastatin (LIPITOR) 10 MG tablet     cholecalciferol (VITAMIN D3) 1000 UNIT capsule     fluticasone (FLONASE) 50 MCG/ACT spray     hydrOXYzine (ATARAX) 25 MG tablet     ipratropium (ATROVENT) 0.06 % nasal spray     lisinopril (ZESTRIL) 10 MG tablet     sildenafil (REVATIO) 20 MG tablet     sildenafil (VIAGRA) 100 MG tablet     No current facility-administered medications for this visit.      Past Medical History:   Patient Active Problem List   Diagnosis     Essential hypertension     Pure hypercholesterolemia     Impotence of organic origin     Acute bilateral low back pain with bilateral sciatica     Past Medical History:   Diagnosis Date     [place] retinal degeneration      Impotence of organic origin      Nonsenile cataract      Pure hypercholesterolemia      Unspecified essential hypertension      Vitreous detachment of both eyes         CC No referring provider defined for this encounter. on close of this encounter.    Dermatology Rooming Note    Felice Blood's goals for this visit include:   Chief Complaint   Patient presents with     Skin Check     Felice is here today for a skin check- no concerns      CHAPARRITA Foster

## 2021-12-03 NOTE — PROGRESS NOTES
[facility] Dermatology Note  Encounter Date: Dec 3, 2021  Office Visit     Dermatology Problem List:  # Seborrheic dermatitis.  - Ketoconazole shampoo  # NUBs  - Left medial thigh, ddx BCC   - Left lateral mid back, ddx BCC  - Right upper back, ddx BCC  - Left upper posterior inferior arm, ddx BCC  - Shave biopsy x4 today, see procedure note below  # Lesions to monitor, concerning for BCC - photos obtained 12/3/2021, will recheck next visit  - Left upper posterior arm  - Right upper posterior arm  - Left upper abdomen  - Left lateral cheek x2   ____________________________________________    Assessment & Plan:    # NUBs  - Left medial thigh, ddx BCC   - Left lateral mid back, ddx BCC  - Right upper back, ddx BCC  - Left upper posterior inferior arm, ddx BCC  - Shave biopsy x4 today, see procedure note below    # Lesions to monitor - concerning for BCC. Pending biopsy results of the other 4 NUBs and repeat exam, we may proceed to biopsy these in the future. Will obtain photos for monitoring today.  - Left upper posterior arm  - Right upper posterior arm  - Left upper abdomen  - Left lateral cheek x2   - Photos obtained today    # ISK, left inner arm  Due to bothersome nature, patient requests treatment.  - Cryo today, see procedure note below    # Seborrheic dermatitis.   - Start ketoconazole shampoo 3x weekly    # Multiple benign nevi  # Solar lentigines  - No concerning melanocytic lesions today  - Counseled on ABCDEs of melanoma and sun protection - recommend SPF 30 or higher with frequent application  - Return sooner if noticing changing or symptomatic lesions    # Seborrheic keratoses   Discussed the natural history and benign nature of this lesion.  - Reassurance provided that no additional treatment is necessary.      Procedures Performed:   - Shave biopsy procedure note, location(s): See above. After discussion of benefits and risks including but not limited to bleeding, infection, scar,  incomplete removal, recurrence, and non-diagnostic biopsy, written consent and photographs were obtained. The area was cleaned with isopropyl alcohol. 0.5mL of 1% lidocaine with epinephrine was injected to obtain adequate anesthesia of lesion(s). Shave biopsy at site(s) performed. Hemostasis was achieved with aluminium chloride. Petrolatum ointment and a sterile dressing were applied. The patient tolerated the procedure and no complications were noted. The patient was provided with verbal and written post care instructions.     - Cryotherapy procedure note, location(s): See above. After verbal consent and discussion of risks and benefits including, but not limited to, dyspigmentation/scar, blister, and pain, 1 ISK(s) was(were) treated with 1-2 mm freeze border for 1-2 cycles with liquid nitrogen. Post cryotherapy instructions were provided.      Follow-up: 3 months, sooner if concerns.     Staff and Scribe:     Scribe Disclosure:  I, Gem Ralf, am serving as a scribe to document services personally performed by Silvana Do MD based on data collection and the provider's statements to me.     Provider Disclosure:   The documentation recorded by the scribe accurately reflects the services I personally performed and the decisions made by me.    Silvana Do MD    Department of Dermatology  [facility]: Phone: [phone], Fax: [phone]  12/8/2021     ____________________________________________    CC: Skin Check (Felice is here today for a skin check- no concerns )    HPI:  Mr. Felice Blood is a(n) 81 year old male who presents today as a return patient for FBSE. The patient was last seen in dermatology by Vianey Downs PA-C, on 11/11/19 at which time an inflamed verrucous keratosis on the right upper calf was treated with cryo. Skin examination was otherwise without concerns at that time.     Today,  the patient reports spots of concern on his inner arm and thigh. He states the spot on his arm has been irritating but not painful. He notes the spot on his thigh has had some associated discomfort. He denies personal history of skin cancer and is unsure about family history of skin cancer. He notes that he has been staying out of the sun over the last few years and occasionally wears sunscreen. When prompted, he notes he has some itching of the scalp and has tried anti-dandruff shampoos without relief.     Patient is otherwise feeling well, without additional skin concerns.    Labs Reviewed:  N/A    Physical Exam:  Vitals: There were no vitals taken for this visit.  SKIN: Full body skin exam excluding the genitals was performed including face, scalp, neck, ears, chest, back, bilateral arms, hands, bilateral legs, feet, and buttocks.   - Erythema and flaking in scalp  - Left lateral mid back pink shiny macule  - Right upper back pink scaly macule   - Left upper posterior arm 2 shiny pink macules  - Right upper posterior arm pink shiny macule   - Left upper abdomen pink shiny macule   - Left medial thigh pink shiny macule  - Left lateral cheek 2 nonspecific pink macules  - There is a tan to brown waxy stuck on papule with surrounding erythema on the left inner arm.    - Multiple regular brown pigmented macules and papules are identified on the trunk and extremities.   - Scattered brown macules on sun exposed areas.  - There are waxy stuck on tan to brown papules on the trunk and extremities.  - No other lesions of concern on areas examined.     Medications:  Current Outpatient Medications   Medication     albuterol (PROAIR HFA) 108 (90 Base) MCG/ACT inhaler     aspirin 81 MG tablet     atorvastatin (LIPITOR) 10 MG tablet     cholecalciferol (VITAMIN D3) 1000 UNIT capsule     fluticasone (FLONASE) 50 MCG/ACT spray     hydrOXYzine (ATARAX) 25 MG tablet     ipratropium (ATROVENT) 0.06 % nasal spray     lisinopril  (ZESTRIL) 10 MG tablet     sildenafil (REVATIO) 20 MG tablet     sildenafil (VIAGRA) 100 MG tablet     No current facility-administered medications for this visit.      Past Medical History:   Patient Active Problem List   Diagnosis     Essential hypertension     Pure hypercholesterolemia     Impotence of organic origin     Acute bilateral low back pain with bilateral sciatica     Past Medical History:   Diagnosis Date     Cobblestone retinal degeneration      Impotence of organic origin      Nonsenile cataract      Pure hypercholesterolemia      Unspecified essential hypertension      Vitreous detachment of both eyes         CC No referring provider defined for this encounter. on close of this encounter.

## 2021-12-03 NOTE — PROGRESS NOTES
Dermatology Rooming Note    Felice Blood's goals for this visit include:   Chief Complaint   Patient presents with     Skin Check     eFlice is here today for a skin check- no concerns      CHAPARRITA Foster

## 2021-12-07 LAB
PATH REPORT.COMMENTS IMP SPEC: NORMAL
PATH REPORT.FINAL DX SPEC: NORMAL
PATH REPORT.GROSS SPEC: NORMAL
PATH REPORT.MICROSCOPIC SPEC OTHER STN: NORMAL
PATH REPORT.RELEVANT HX SPEC: NORMAL

## 2021-12-08 ENCOUNTER — TELEPHONE (OUTPATIENT)
Dept: DERMATOLOGY | Facility: CLINIC | Age: 81
End: 2021-12-08
Payer: MEDICARE

## 2021-12-08 ENCOUNTER — MYC MEDICAL ADVICE (OUTPATIENT)
Dept: DERMATOLOGY | Facility: CLINIC | Age: 81
End: 2021-12-08
Payer: MEDICARE

## 2021-12-08 DIAGNOSIS — C44.92 SCC (SQUAMOUS CELL CARCINOMA): Primary | ICD-10-CM

## 2021-12-08 NOTE — TELEPHONE ENCOUNTER
Called patient and scheduled excision and follow up appointment with Dr. Hi Pedro CMA on 12/8/2021 at 11:51 AM

## 2021-12-08 NOTE — TELEPHONE ENCOUNTER
"Dee Pedro CMA   12/8/2021 11:20 AM CST Back to Top        \"I think that I would prefer surgical removal.  How do I make an appointment and how soon should it be?  Also, should I make an appointment with you in 3 months to re-check other spots?  ...Felice Blood\"     Called patient and scheduled excision and follow up visit.     Dee Pedro CMA on 12/8/2021 at 11:14 AM    Silvana Do MD   12/7/2021  8:13 PM CST         Please let me know what patient decides for treatment.  Please also set up 3 month cryo visit.     Hi Mr. Blood,     I am glad we checked your spots! Overall very good news.     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 Because it is superficial, we have options on how to best treat it. One option would be to send you for surgery, where surgeons would excise the spot and sew the skin back together, leaving a scar. Another would be to use a topical chemotherapy (which is not absorbed into your body - it stays on the skin surface) cream called fluorouracil or Efudex twice a day for 4 weeks - this does cause redness, and potentially crusting and a small amount of pain but heals well in general. Lastly, we could treat with a procedure called ED&C (electrodessication and curettage) which is a burning/scraping procedure that leaves a scar similar to a biopsy site and heals over 4-6 weeks. Please let me know what questions you have and how you would like to proceed.     #2) The spot on the upper back returned as low risk pre-cancer called an actinic keratosis. Thankfully no signs of skin cancer. Sometimes the biopsy itself can take care of these areas but other times there can be some pre-cancer cells left behind that we should treat with liquid nitrogen. I would recommend letting this area heal up and then rechecking it again area in about 3 months. If there's anything left behind, we " can treat it with liquid nitrogen at that time. I will ask our clinic schedulers to reach out to you to make a visit at your convenience.     #3) Both the spots on the lower back and the left thigh actually returned as benign thickenings of the skin with inflammation (called lichenoid keratoses). I wonder if this is actually many of your other spots that I had marked and photographed as well. Some people are just prone to making these and they are totally harmless. But they can certainly trick us into thinking they are skin cancer. What we may want to do is freeze some of the spots with liquid nitrogen at your next visit and then recheck them to make sure they go away. If any stick around, we could biopsy at that time but hopefully if they are more of these lichenoid keratoses, they will just go away :)     Sorry for the long message, please let me know what questions you have and how you would like to treat the squamous cell skin cancer on the arm. And thank you again for your patience during your visit!     Sincerely,  Silvana Do MD   Written by Silvaan Do MD on 12/7/2021  8:13 PM CST  Seen by patient Felice A Caitie on 12/8/2021 10:08 AM

## 2021-12-15 ENCOUNTER — THERAPY VISIT (OUTPATIENT)
Dept: PHYSICAL THERAPY | Facility: CLINIC | Age: 81
End: 2021-12-15
Payer: MEDICARE

## 2021-12-15 DIAGNOSIS — M54.42 ACUTE BILATERAL LOW BACK PAIN WITH BILATERAL SCIATICA: ICD-10-CM

## 2021-12-15 DIAGNOSIS — M54.41 ACUTE BILATERAL LOW BACK PAIN WITH BILATERAL SCIATICA: ICD-10-CM

## 2021-12-15 PROCEDURE — 97110 THERAPEUTIC EXERCISES: CPT | Mod: GP | Performed by: PHYSICAL THERAPIST

## 2021-12-15 PROCEDURE — 97530 THERAPEUTIC ACTIVITIES: CPT | Mod: GP | Performed by: PHYSICAL THERAPIST

## 2021-12-15 NOTE — PROGRESS NOTES
Subjective:  HPI  Physical Exam                    Objective:  System    Physical Exam    General     ROS    Assessment/Plan:    PROGRESS  REPORT    Progress reporting period is from 9/21/21 to 12/15/21.       SUBJECTIVE  Subjective changes noted by patient:   Felice reports he is overall improving. He has not had any episodes of severe pain shooting down his right leg in the past 2 weeks.  There has been a few episodes where he can start to feel it but he prevents it from happening with his exercises.  He continues to have aching in his low back and numbness/tingling in both his calves/shins when he walks more than a few minutes.  This is better when he sits down.  Flexion stretches seem to irritate his back pain but reduce the leg pain.    Current pain level is 1/10.     Previous pain level was  9/10.   Changes in function:  Yes, see above.  Adverse reaction to treatment or activity: None    OBJECTIVE  Changes noted in objective findings:  Yes  Lumbar flexion limited to knees with aching back pain bilaterally.   Extension with mild to moderate limitation and bilateral lower leg tingling.   Supine hamstring stretch improves lumbar flexion ROM to hands reaching distal 1/3 of tibias without aching back pain or leg numbness.    Able to walk on treadmill x5 min at 1.7 mph without leg or back symptoms.     ASSESSMENT/PLAN  Updated problem list and treatment plan: Diagnosis 1:  Low back pain    Pain -  hot/cold therapy, US, manual therapy, self management, education, directional preference exercise and home program  Decreased ROM/flexibility - manual therapy, therapeutic exercise and home program  Decreased strength - therapeutic exercise, therapeutic activities and home program  Impaired gait - gait training and home program  Decreased function - therapeutic activities and home program  STG/LTGs have been met or progress has been made towards goals:  Yes (See Goal flow sheet completed today.)  Assessment of Progress: The  patient's condition is improving.  The patient's condition has potential to improve.  Self Management Plans:  Patient has been instructed in a home treatment program.  Patient  has been instructed in self management of symptoms.    Felice continues to require the following intervention to meet STG and LTG's:  PT    Recommendations:  This patient would benefit from continued therapy.     Frequency:  2 X a month, once daily  Duration:  for 3 months        Please refer to the daily flowsheet for treatment today, total treatment time and time spent performing 1:1 timed codes.

## 2022-01-03 ENCOUNTER — OFFICE VISIT (OUTPATIENT)
Dept: ORTHOPEDICS | Facility: CLINIC | Age: 82
End: 2022-01-03
Payer: MEDICARE

## 2022-01-03 VITALS — WEIGHT: 153 LBS | BODY MASS INDEX: 22.66 KG/M2 | HEIGHT: 69 IN

## 2022-01-03 DIAGNOSIS — M54.31 SCIATICA OF RIGHT SIDE: Primary | ICD-10-CM

## 2022-01-03 DIAGNOSIS — M51.369 LUMBAR DEGENERATIVE DISC DISEASE: ICD-10-CM

## 2022-01-03 DIAGNOSIS — M48.062 SPINAL STENOSIS OF LUMBAR REGION WITH NEUROGENIC CLAUDICATION: ICD-10-CM

## 2022-01-03 PROCEDURE — 99213 OFFICE O/P EST LOW 20 MIN: CPT | Performed by: FAMILY MEDICINE

## 2022-01-03 RX ORDER — CYCLOBENZAPRINE HCL 10 MG
10 TABLET ORAL
Qty: 20 TABLET | Refills: 0 | Status: ON HOLD | OUTPATIENT
Start: 2022-01-03 | End: 2022-04-21

## 2022-01-03 ASSESSMENT — MIFFLIN-ST. JEOR: SCORE: 1389.38

## 2022-01-03 NOTE — LETTER
1/3/2022      RE: Felice Blood  [address]  [facility] [address]       S; fup low back pain.  He has been seeing PT, uses tyleonol/IBU    MRI lumbar spine 11/2021 c/w moderate central stenosis at L2-L3 and severe facet arthropathy at that level. L2-3: Right central/subarticular disc extrusion with slight superior  migration narrows the right lateral recess and likely impinges upon the traversing right L3 nerve root.. Moderate right and mild left neural foraminal narrowing at L2L3   Also moderate bilateral foraminal narrowing at L5-S1 due to facet arthropathy and disc bulge.     Today, the patient reports that he was doing very well with physical therapy and felt about 90% improved. On 12/23/21 he could feel some sharp discomfort in his low back as he was working on his lumbar spine exercises.  He woke up in the following morning and has had worsening pain. Denies any acute trauma, he was getting out of a chair to get on a floor and to do his PT exercises when he had pain. He is currently taking ibuprofen for the pain without significant improvement. He reports he has not been sleeping well since the pain has increased.  He has taken hydroxyzine in the past to help with anxiety and sleep but this has not been helpful.  During the daytime he will take either ibuprofen or Tylenol which does provide some relief.  He had normal kidney function tests a few months ago.  He reports 1-2/10 pain at rest, but gets up to a 8-9/10 with standing.  If he is seated he does not have discomfort.  If he stands and walks he can have shooting discomfort into the right lower extremity that he feels at the right thigh, right shin and sometimes down to the right foot.  No numbness or tingling.      Pt is on no blood thinners.    PMH:  Past Medical History:   Diagnosis Date     Cobblestone retinal degeneration      Impotence of organic origin      Nonsenile cataract      Pure hypercholesterolemia      Unspecified  essential hypertension      Vitreous detachment of both eyes         Past Surgical History:  Appendectomy   Cataract IOL, right (5/1/17)  Phacoemulsification with standard intraocular lens implant, right (5/1/17)  Tonsillectomy     Allergies:  Etodolac   Percocet (nausea, muscle weakness)      Active problem list:  Patient Active Problem List   Diagnosis     Essential hypertension     Pure hypercholesterolemia     Impotence of organic origin     Acute bilateral low back pain with bilateral sciatica       FH:  Family History   Problem Relation Age of Onset     Glaucoma No family hx of      Macular Degeneration No family hx of        SH:  Social History     Socioeconomic History     Marital status:      Spouse name: Not on file     Number of children: Not on file     Years of education: Not on file     Highest education level: Not on file   Occupational History     Not on file   Tobacco Use     Smoking status: Never Smoker     Smokeless tobacco: Never Used   Substance and Sexual Activity     Alcohol use: Yes     Comment: 1 day     Drug use: Not on file     Sexual activity: Not on file   Other Topics Concern     Not on file   Social History Narrative     Not on file     Social Determinants of Health     Financial Resource Strain: Not on file   Food Insecurity: Not on file   Transportation Needs: Not on file   Physical Activity: Not on file   Stress: Not on file   Social Connections: Not on file   Intimate Partner Violence: Not on file   Housing Stability: Not on file       MEDS:  See EMR, reviewed  ALL:  See EMR, reviewed    REVIEW OF SYSTEMS:  CONSTITUTIONAL:NEGATIVE for fever, chills, change in weight  INTEGUMENTARY/SKIN: NEGATIVE for worrisome rashes, moles or lesions  EYES: NEGATIVE for vision changes or irritation  ENT/MOUTH: NEGATIVE for ear, mouth and throat problems  RESP:NEGATIVE for significant cough or SOB  BREAST: NEGATIVE for masses, tenderness or discharge  CV: NEGATIVE for chest pain, palpitations  or peripheral edema  GI: NEGATIVE for nausea, abdominal pain, heartburn, or change in bowel habits  :NEGATIVE for frequency, dysuria, or hematuria  :NEGATIVE for frequency, dysuria, or hematuria  NEURO: NEGATIVE for weakness, dizziness or paresthesias  ENDOCRINE: NEGATIVE for temperature intolerance, skin/hair changes  HEME/ALLERGY/IMMUNE: NEGATIVE for bleeding problems  PSYCHIATRIC: NEGATIVE for changes in mood or affect      Objective: He moves slowly about the exam room.  Extension of the lumbar spine is not associated with discomfort.  Forward flexion is associated with some centralized back pain.  He is nontender directly over the lumbar spine and overlying skin is normal.  Straight leg raise is negative on the right.  He has noted that if pain is shooting down his right leg if he sits in a chair and puts his left leg into a Coleen position that his right-sided radicular leg discomfort will improve.      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  Based on his episodes of radicular pain down to the foot it may make most sense to consider translaminar right L5-S1 epidural steroid injection.  The nature of the procedure was discussed.  He opted to avoid it for now.  He has a set of exercises at home that he can tolerate without bothering his back.  He plans on using Tylenol or ibuprofen for daytime discomfort.  He plans on stopping the hydroxyzine and would like to try Flexeril instead to assist with sleep if needed.  He understand this is a sedative that can be poorly tolerated in elderly populations.  He knows not to mix it with other sedatives including his hydroxyzine.  If he tolerates it poorly can discontinue it.  He has a heating pad and ice pack available.  Follow-up phone conversation about his back in 3 weeks.      Dino Alba MD

## 2022-01-03 NOTE — PROGRESS NOTES
S; fup low back pain.  He has been seeing PT, uses tyleonol/IBU    MRI lumbar spine 11/2021 c/w moderate central stenosis at L2-L3 and severe facet arthropathy at that level. L2-3: Right central/subarticular disc extrusion with slight superior  migration narrows the right lateral recess and likely impinges upon the traversing right L3 nerve root.. Moderate right and mild left neural foraminal narrowing at L2L3   Also moderate bilateral foraminal narrowing at L5-S1 due to facet arthropathy and disc bulge.     Today, the patient reports that he was doing very well with physical therapy and felt about 90% improved. On 12/23/21 he could feel some sharp discomfort in his low back as he was working on his lumbar spine exercises.  He woke up in the following morning and has had worsening pain. Denies any acute trauma, he was getting out of a chair to get on a floor and to do his PT exercises when he had pain. He is currently taking ibuprofen for the pain without significant improvement. He reports he has not been sleeping well since the pain has increased.  He has taken hydroxyzine in the past to help with anxiety and sleep but this has not been helpful.  During the daytime he will take either ibuprofen or Tylenol which does provide some relief.  He had normal kidney function tests a few months ago.  He reports 1-2/10 pain at rest, but gets up to a 8-9/10 with standing.  If he is seated he does not have discomfort.  If he stands and walks he can have shooting discomfort into the right lower extremity that he feels at the right thigh, right shin and sometimes down to the right foot.  No numbness or tingling.      Pt is on no blood thinners.    PMH:  Past Medical History:   Diagnosis Date     Cobblestone retinal degeneration      Impotence of organic origin      Nonsenile cataract      Pure hypercholesterolemia      Unspecified essential hypertension      Vitreous detachment of both eyes         Past Surgical  History:  Appendectomy   Cataract IOL, right (5/1/17)  Phacoemulsification with standard intraocular lens implant, right (5/1/17)  Tonsillectomy     Allergies:  Etodolac   Percocet (nausea, muscle weakness)      Active problem list:  Patient Active Problem List   Diagnosis     Essential hypertension     Pure hypercholesterolemia     Impotence of organic origin     Acute bilateral low back pain with bilateral sciatica       FH:  Family History   Problem Relation Age of Onset     Glaucoma No family hx of      Macular Degeneration No family hx of        SH:  Social History     Socioeconomic History     Marital status:      Spouse name: Not on file     Number of children: Not on file     Years of education: Not on file     Highest education level: Not on file   Occupational History     Not on file   Tobacco Use     Smoking status: Never Smoker     Smokeless tobacco: Never Used   Substance and Sexual Activity     Alcohol use: Yes     Comment: 1 day     Drug use: Not on file     Sexual activity: Not on file   Other Topics Concern     Not on file   Social History Narrative     Not on file     Social Determinants of Health     Financial Resource Strain: Not on file   Food Insecurity: Not on file   Transportation Needs: Not on file   Physical Activity: Not on file   Stress: Not on file   Social Connections: Not on file   Intimate Partner Violence: Not on file   Housing Stability: Not on file       MEDS:  See EMR, reviewed  ALL:  See EMR, reviewed    REVIEW OF SYSTEMS:  CONSTITUTIONAL:NEGATIVE for fever, chills, change in weight  INTEGUMENTARY/SKIN: NEGATIVE for worrisome rashes, moles or lesions  EYES: NEGATIVE for vision changes or irritation  ENT/MOUTH: NEGATIVE for ear, mouth and throat problems  RESP:NEGATIVE for significant cough or SOB  BREAST: NEGATIVE for masses, tenderness or discharge  CV: NEGATIVE for chest pain, palpitations or peripheral edema  GI: NEGATIVE for nausea, abdominal pain, heartburn, or change  in bowel habits  :NEGATIVE for frequency, dysuria, or hematuria  :NEGATIVE for frequency, dysuria, or hematuria  NEURO: NEGATIVE for weakness, dizziness or paresthesias  ENDOCRINE: NEGATIVE for temperature intolerance, skin/hair changes  HEME/ALLERGY/IMMUNE: NEGATIVE for bleeding problems  PSYCHIATRIC: NEGATIVE for changes in mood or affect      Objective: He moves slowly about the exam room.  Extension of the lumbar spine is not associated with discomfort.  Forward flexion is associated with some centralized back pain.  He is nontender directly over the lumbar spine and overlying skin is normal.  Straight leg raise is negative on the right.  He has noted that if pain is shooting down his right leg if he sits in a chair and puts his left leg into a Coleen position that his right-sided radicular leg discomfort will improve.      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  Based on his episodes of radicular pain down to the foot it may make most sense to consider translaminar right L5-S1 epidural steroid injection.  The nature of the procedure was discussed.  He opted to avoid it for now.  He has a set of exercises at home that he can tolerate without bothering his back.  He plans on using Tylenol or ibuprofen for daytime discomfort.  He plans on stopping the hydroxyzine and would like to try Flexeril instead to assist with sleep if needed.  He understand this is a sedative that can be poorly tolerated in elderly populations.  He knows not to mix it with other sedatives including his hydroxyzine.  If he tolerates it poorly can discontinue it.  He has a heating pad and ice pack available.  Follow-up phone conversation about his back in 3 weeks.

## 2022-01-25 ENCOUNTER — VIRTUAL VISIT (OUTPATIENT)
Dept: ORTHOPEDICS | Facility: CLINIC | Age: 82
End: 2022-01-25
Payer: MEDICARE

## 2022-01-25 DIAGNOSIS — M47.26 OSTEOARTHRITIS OF SPINE WITH RADICULOPATHY, LUMBAR REGION: ICD-10-CM

## 2022-01-25 DIAGNOSIS — M54.31 SCIATICA OF RIGHT SIDE: Primary | ICD-10-CM

## 2022-01-25 PROCEDURE — 99441 PR PHYSICIAN TELEPHONE EVALUATION 5-10 MIN: CPT | Mod: 95 | Performed by: FAMILY MEDICINE

## 2022-01-25 NOTE — LETTER
1/25/2022      RE: Felice Blood  [address] [facility] [address]       Felice is a 81 year old who is being evaluated via a billable telephone visit.      What phone number would you like to be contacted at? [phone]    How would you like to obtain your AVS? Dimitri  Phone call duration: 7 minutes      S:  fup lumbar spine.  He has not seen any significant improvement in his symptoms since the last visit.  He will have right-sided greater than left radicular leg discomfort and centralized low back discomfort.  Will be most prominent in the right buttock, right thigh, right knee but at times he will feel it all the way down to the right shin and foot.  Occasionally will have some left-sided discomfort involving the left thigh.  Sometimes he can feel in the bilateral ankles.  Overall the right radicular leg pain is the worst discomfort.    MRI lumbar spine 11/2021 c/w moderate central stenosis at L2-L3 and severe facet arthropathy at that level. L2-3: Right central/subarticular disc extrusion with slight superior  migration narrows the right lateral recess and likely impinges upon the traversing right L3 nerve root.. Moderate right and mild left neural foraminal narrowing at L2L3   Also moderate bilateral foraminal narrowing at L5-S1 due to facet arthropathy and disc bulge.      Today, the patient reports that he was doing very well with physical therapy and felt about 90% improved. On 12/23/21 he could feel some sharp discomfort in his low back as he was working on his lumbar spine exercises.  He woke up in the following morning and has had worsening pain. Denies any acute trauma, he was getting out of a chair to get on a floor and to do his PT exercises when he had pain. He is currently taking ibuprofen for the pain without significant improvement. He reports he has not been sleeping well since the pain has increased.  He has taken hydroxyzine in the past to help with anxiety and sleep but  this has not been helpful.  During the daytime he will take either ibuprofen or Tylenol which does provide some relief.  He had normal kidney function tests a few months ago.  He reports 1-2/10 pain at rest, but gets up to a 8-9/10 with standing.  If he is seated he does not have discomfort.  If he stands and walks he can have shooting discomfort into the right lower extremity that he feels at the right thigh, right shin and sometimes down to the right foot.  No numbness or tingling.       Pt is on no blood thinners.      Assessment lumbar degenerative disc disease with right radicular leg pain.      Plan: He is decided that he would like to try an epidural injection for pain relief.  Based on his symptoms it seems most reasonable to try a translaminar right-sided L5-S1 injection.  Nature the procedure was explained.  A follow-up phone conversation in 1 month.        PMH:  Past Medical History:   Diagnosis Date     Cobblestone retinal degeneration      Impotence of organic origin      Nonsenile cataract      Pure hypercholesterolemia      Unspecified essential hypertension      Vitreous detachment of both eyes        Active problem list:  Patient Active Problem List   Diagnosis     Essential hypertension     Pure hypercholesterolemia     Impotence of organic origin     Acute bilateral low back pain with bilateral sciatica       FH:  Family History   Problem Relation Age of Onset     Glaucoma No family hx of      Macular Degeneration No family hx of        SH:  Social History     Socioeconomic History     Marital status:      Spouse name: Not on file     Number of children: Not on file     Years of education: Not on file     Highest education level: Not on file   Occupational History     Not on file   Tobacco Use     Smoking status: Never Smoker     Smokeless tobacco: Never Used   Substance and Sexual Activity     Alcohol use: Yes     Comment: 1 day     Drug use: Not on file     Sexual activity: Not on file    Other Topics Concern     Not on file   Social History Narrative     Not on file     Social Determinants of Health     Financial Resource Strain: Not on file   Food Insecurity: Not on file   Transportation Needs: Not on file   Physical Activity: Not on file   Stress: Not on file   Social Connections: Not on file   Intimate Partner Violence: Not on file   Housing Stability: Not on file       MEDS:  See EMR, reviewed  ALL:  See EMR, reviewed    REVIEW OF SYSTEMS:  CONSTITUTIONAL:NEGATIVE for fever, chills, change in weight  INTEGUMENTARY/SKIN: NEGATIVE for worrisome rashes, moles or lesions  EYES: NEGATIVE for vision changes or irritation  ENT/MOUTH: NEGATIVE for ear, mouth and throat problems  RESP:NEGATIVE for significant cough or SOB  BREAST: NEGATIVE for masses, tenderness or discharge  CV: NEGATIVE for chest pain, palpitations or peripheral edema  GI: NEGATIVE for nausea, abdominal pain, heartburn, or change in bowel habits  :NEGATIVE for frequency, dysuria, or hematuria  :NEGATIVE for frequency, dysuria, or hematuria  NEURO: NEGATIVE for weakness, dizziness or paresthesias  ENDOCRINE: NEGATIVE for temperature intolerance, skin/hair changes  HEME/ALLERGY/IMMUNE: NEGATIVE for bleeding problems  PSYCHIATRIC: NEGATIVE for changes in mood or affect        Dino Alba MD

## 2022-01-25 NOTE — LETTER
1/25/2022       RE: Felice Blood  [address]  [facility] [address]     Dear Colleague,    Thank you for referring your patient, Felice Blood, to the [facility] [place] at [facility]. Please see a copy of my visit note below.    Felice is a 81 year old who is being evaluated via a billable telephone visit.      What phone number would you like to be contacted at? [phone]    How would you like to obtain your AVS? MyChart  Phone call duration: 7 minutes      S:  fup lumbar spine.  He has not seen any significant improvement in his symptoms since the last visit.  He will have right-sided greater than left radicular leg discomfort and centralized low back discomfort.  Will be most prominent in the right buttock, right thigh, right knee but at times he will feel it all the way down to the right shin and foot.  Occasionally will have some left-sided discomfort involving the left thigh.  Sometimes he can feel in the bilateral ankles.  Overall the right radicular leg pain is the worst discomfort.    MRI lumbar spine 11/2021 c/w moderate central stenosis at L2-L3 and severe facet arthropathy at that level. L2-3: Right central/subarticular disc extrusion with slight superior  migration narrows the right lateral recess and likely impinges upon the traversing right L3 nerve root.. Moderate right and mild left neural foraminal narrowing at L2L3   Also moderate bilateral foraminal narrowing at L5-S1 due to facet arthropathy and disc bulge.      Today, the patient reports that he was doing very well with physical therapy and felt about 90% improved. On 12/23/21 he could feel some sharp discomfort in his low back as he was working on his lumbar spine exercises.  He woke up in the following morning and has had worsening pain. Denies any acute trauma, he was getting out of a chair to get on a floor and to do his PT exercises when  he had pain. He is currently taking ibuprofen for the pain without significant improvement. He reports he has not been sleeping well since the pain has increased.  He has taken hydroxyzine in the past to help with anxiety and sleep but this has not been helpful.  During the daytime he will take either ibuprofen or Tylenol which does provide some relief.  He had normal kidney function tests a few months ago.  He reports 1-2/10 pain at rest, but gets up to a 8-9/10 with standing.  If he is seated he does not have discomfort.  If he stands and walks he can have shooting discomfort into the right lower extremity that he feels at the right thigh, right shin and sometimes down to the right foot.  No numbness or tingling.       Pt is on no blood thinners.      Assessment lumbar degenerative disc disease with right radicular leg pain.      Plan: He is decided that he would like to try an epidural injection for pain relief.  Based on his symptoms it seems most reasonable to try a translaminar right-sided L5-S1 injection.  Nature the procedure was explained.  A follow-up phone conversation in 1 month.        PMH:  Past Medical History:   Diagnosis Date     Cobblestone retinal degeneration      Impotence of organic origin      Nonsenile cataract      Pure hypercholesterolemia      Unspecified essential hypertension      Vitreous detachment of both eyes        Active problem list:  Patient Active Problem List   Diagnosis     Essential hypertension     Pure hypercholesterolemia     Impotence of organic origin     Acute bilateral low back pain with bilateral sciatica       FH:  Family History   Problem Relation Age of Onset     Glaucoma No family hx of      Macular Degeneration No family hx of        SH:  Social History     Socioeconomic History     Marital status:      Spouse name: Not on file     Number of children: Not on file     Years of education: Not on file     Highest education level: Not on file   Occupational  History     Not on file   Tobacco Use     Smoking status: Never Smoker     Smokeless tobacco: Never Used   Substance and Sexual Activity     Alcohol use: Yes     Comment: 1 day     Drug use: Not on file     Sexual activity: Not on file   Other Topics Concern     Not on file   Social History Narrative     Not on file     Social Determinants of Health     Financial Resource Strain: Not on file   Food Insecurity: Not on file   Transportation Needs: Not on file   Physical Activity: Not on file   Stress: Not on file   Social Connections: Not on file   Intimate Partner Violence: Not on file   Housing Stability: Not on file       MEDS:  See EMR, reviewed  ALL:  See EMR, reviewed    REVIEW OF SYSTEMS:  CONSTITUTIONAL:NEGATIVE for fever, chills, change in weight  INTEGUMENTARY/SKIN: NEGATIVE for worrisome rashes, moles or lesions  EYES: NEGATIVE for vision changes or irritation  ENT/MOUTH: NEGATIVE for ear, mouth and throat problems  RESP:NEGATIVE for significant cough or SOB  BREAST: NEGATIVE for masses, tenderness or discharge  CV: NEGATIVE for chest pain, palpitations or peripheral edema  GI: NEGATIVE for nausea, abdominal pain, heartburn, or change in bowel habits  :NEGATIVE for frequency, dysuria, or hematuria  :NEGATIVE for frequency, dysuria, or hematuria  NEURO: NEGATIVE for weakness, dizziness or paresthesias  ENDOCRINE: NEGATIVE for temperature intolerance, skin/hair changes  HEME/ALLERGY/IMMUNE: NEGATIVE for bleeding problems  PSYCHIATRIC: NEGATIVE for changes in mood or affect    Again, thank you for allowing me to participate in the care of your patient.      Sincerely,    Dino Alba MD

## 2022-01-25 NOTE — PROGRESS NOTES
Felice is a 81 year old who is being evaluated via a billable telephone visit.      What phone number would you like to be contacted at? [phone]    How would you like to obtain your AVS? Dimitri  Phone call duration: 7 minutes      S:  fup lumbar spine.  He has not seen any significant improvement in his symptoms since the last visit.  He will have right-sided greater than left radicular leg discomfort and centralized low back discomfort.  Will be most prominent in the right buttock, right thigh, right knee but at times he will feel it all the way down to the right shin and foot.  Occasionally will have some left-sided discomfort involving the left thigh.  Sometimes he can feel in the bilateral ankles.  Overall the right radicular leg pain is the worst discomfort.    MRI lumbar spine 11/2021 c/w moderate central stenosis at L2-L3 and severe facet arthropathy at that level. L2-3: Right central/subarticular disc extrusion with slight superior  migration narrows the right lateral recess and likely impinges upon the traversing right L3 nerve root.. Moderate right and mild left neural foraminal narrowing at L2L3   Also moderate bilateral foraminal narrowing at L5-S1 due to facet arthropathy and disc bulge.      Today, the patient reports that he was doing very well with physical therapy and felt about 90% improved. On 12/23/21 he could feel some sharp discomfort in his low back as he was working on his lumbar spine exercises.  He woke up in the following morning and has had worsening pain. Denies any acute trauma, he was getting out of a chair to get on a floor and to do his PT exercises when he had pain. He is currently taking ibuprofen for the pain without significant improvement. He reports he has not been sleeping well since the pain has increased.  He has taken hydroxyzine in the past to help with anxiety and sleep but this has not been helpful.  During the daytime he will take either ibuprofen or Tylenol which  does provide some relief.  He had normal kidney function tests a few months ago.  He reports 1-2/10 pain at rest, but gets up to a 8-9/10 with standing.  If he is seated he does not have discomfort.  If he stands and walks he can have shooting discomfort into the right lower extremity that he feels at the right thigh, right shin and sometimes down to the right foot.  No numbness or tingling.       Pt is on no blood thinners.      Assessment lumbar degenerative disc disease with right radicular leg pain.      Plan: He is decided that he would like to try an epidural injection for pain relief.  Based on his symptoms it seems most reasonable to try a translaminar right-sided L5-S1 injection.  Nature the procedure was explained.  A follow-up phone conversation in 1 month.        PMH:  Past Medical History:   Diagnosis Date     Cobblestone retinal degeneration      Impotence of organic origin      Nonsenile cataract      Pure hypercholesterolemia      Unspecified essential hypertension      Vitreous detachment of both eyes        Active problem list:  Patient Active Problem List   Diagnosis     Essential hypertension     Pure hypercholesterolemia     Impotence of organic origin     Acute bilateral low back pain with bilateral sciatica       FH:  Family History   Problem Relation Age of Onset     Glaucoma No family hx of      Macular Degeneration No family hx of        SH:  Social History     Socioeconomic History     Marital status:      Spouse name: Not on file     Number of children: Not on file     Years of education: Not on file     Highest education level: Not on file   Occupational History     Not on file   Tobacco Use     Smoking status: Never Smoker     Smokeless tobacco: Never Used   Substance and Sexual Activity     Alcohol use: Yes     Comment: 1 day     Drug use: Not on file     Sexual activity: Not on file   Other Topics Concern     Not on file   Social History Narrative     Not on file     Social  Determinants of Health     Financial Resource Strain: Not on file   Food Insecurity: Not on file   Transportation Needs: Not on file   Physical Activity: Not on file   Stress: Not on file   Social Connections: Not on file   Intimate Partner Violence: Not on file   Housing Stability: Not on file       MEDS:  See EMR, reviewed  ALL:  See EMR, reviewed    REVIEW OF SYSTEMS:  CONSTITUTIONAL:NEGATIVE for fever, chills, change in weight  INTEGUMENTARY/SKIN: NEGATIVE for worrisome rashes, moles or lesions  EYES: NEGATIVE for vision changes or irritation  ENT/MOUTH: NEGATIVE for ear, mouth and throat problems  RESP:NEGATIVE for significant cough or SOB  BREAST: NEGATIVE for masses, tenderness or discharge  CV: NEGATIVE for chest pain, palpitations or peripheral edema  GI: NEGATIVE for nausea, abdominal pain, heartburn, or change in bowel habits  :NEGATIVE for frequency, dysuria, or hematuria  :NEGATIVE for frequency, dysuria, or hematuria  NEURO: NEGATIVE for weakness, dizziness or paresthesias  ENDOCRINE: NEGATIVE for temperature intolerance, skin/hair changes  HEME/ALLERGY/IMMUNE: NEGATIVE for bleeding problems  PSYCHIATRIC: NEGATIVE for changes in mood or affect

## 2022-01-26 ENCOUNTER — DOCUMENTATION ONLY (OUTPATIENT)
Dept: OTHER | Facility: CLINIC | Age: 82
End: 2022-01-26
Payer: MEDICARE

## 2022-02-21 ENCOUNTER — ANCILLARY PROCEDURE (OUTPATIENT)
Dept: GENERAL RADIOLOGY | Facility: CLINIC | Age: 82
End: 2022-02-21
Attending: FAMILY MEDICINE
Payer: MEDICARE

## 2022-02-21 VITALS
TEMPERATURE: 98 F | DIASTOLIC BLOOD PRESSURE: 91 MMHG | SYSTOLIC BLOOD PRESSURE: 148 MMHG | HEART RATE: 82 BPM | RESPIRATION RATE: 18 BRPM | OXYGEN SATURATION: 98 %

## 2022-02-21 DIAGNOSIS — M54.31 SCIATICA OF RIGHT SIDE: ICD-10-CM

## 2022-02-21 DIAGNOSIS — M47.26 OSTEOARTHRITIS OF SPINE WITH RADICULOPATHY, LUMBAR REGION: ICD-10-CM

## 2022-02-21 PROCEDURE — 62323 NJX INTERLAMINAR LMBR/SAC: CPT | Performed by: RADIOLOGY

## 2022-02-21 RX ORDER — METHYLPREDNISOLONE ACETATE 80 MG/ML
80 INJECTION, SUSPENSION INTRA-ARTICULAR; INTRALESIONAL; INTRAMUSCULAR; SOFT TISSUE ONCE
Status: COMPLETED | OUTPATIENT
Start: 2022-02-21 | End: 2022-02-21

## 2022-02-21 RX ORDER — LIDOCAINE HYDROCHLORIDE 10 MG/ML
30 INJECTION, SOLUTION EPIDURAL; INFILTRATION; INTRACAUDAL; PERINEURAL ONCE
Status: COMPLETED | OUTPATIENT
Start: 2022-02-21 | End: 2022-02-21

## 2022-02-21 RX ORDER — IOPAMIDOL 408 MG/ML
20 INJECTION, SOLUTION INTRATHECAL ONCE
Status: COMPLETED | OUTPATIENT
Start: 2022-02-21 | End: 2022-02-21

## 2022-02-21 RX ORDER — BUPIVACAINE HYDROCHLORIDE 5 MG/ML
10 INJECTION, SOLUTION EPIDURAL; INTRACAUDAL ONCE
Status: COMPLETED | OUTPATIENT
Start: 2022-02-21 | End: 2022-02-21

## 2022-02-21 RX ADMIN — BUPIVACAINE HYDROCHLORIDE 10 MG: 5 INJECTION, SOLUTION EPIDURAL; INTRACAUDAL at 10:15

## 2022-02-21 RX ADMIN — METHYLPREDNISOLONE ACETATE 80 MG: 80 INJECTION, SUSPENSION INTRA-ARTICULAR; INTRALESIONAL; INTRAMUSCULAR; SOFT TISSUE at 10:15

## 2022-02-21 RX ADMIN — IOPAMIDOL 5 ML: 408 INJECTION, SOLUTION INTRATHECAL at 10:15

## 2022-02-21 RX ADMIN — LIDOCAINE HYDROCHLORIDE 5 ML: 10 INJECTION, SOLUTION EPIDURAL; INFILTRATION; INTRACAUDAL; PERINEURAL at 10:15

## 2022-03-08 ENCOUNTER — OFFICE VISIT (OUTPATIENT)
Dept: DERMATOLOGY | Facility: CLINIC | Age: 82
End: 2022-03-08
Payer: MEDICARE

## 2022-03-08 DIAGNOSIS — L98.9 SKIN LESION: ICD-10-CM

## 2022-03-08 DIAGNOSIS — L57.0 ACTINIC KERATOSIS: ICD-10-CM

## 2022-03-08 DIAGNOSIS — D04.62 SQUAMOUS CELL CARCINOMA IN SITU (SCCIS) OF SKIN OF LEFT UPPER ARM: ICD-10-CM

## 2022-03-08 DIAGNOSIS — L85.3 XEROSIS CUTIS: Primary | ICD-10-CM

## 2022-03-08 PROCEDURE — 99213 OFFICE O/P EST LOW 20 MIN: CPT | Mod: 25 | Performed by: DERMATOLOGY

## 2022-03-08 PROCEDURE — 17003 DESTRUCT PREMALG LES 2-14: CPT | Performed by: DERMATOLOGY

## 2022-03-08 PROCEDURE — 17000 DESTRUCT PREMALG LESION: CPT | Performed by: DERMATOLOGY

## 2022-03-08 ASSESSMENT — PAIN SCALES - GENERAL: PAINLEVEL: NO PAIN (0)

## 2022-03-08 NOTE — LETTER
3/8/2022       RE: Felice Blood  [address] [facility] [address]     Dear Colleague,    Thank you for referring your patient, Felice Blood, to the [facility] [place] at [facility]. Please see a copy of my visit note below.    [facility] Dermatology Note  Encounter Date: Mar 8, 2022  Office Visit    Dermatology Problem List:  #. Seborrheic dermatitis.  - Ketoconazole shampoo  #. 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 AKs  - HAK, R upper back, s/p shave bx 12/3/21  # Xerosis cutis  - Current tx: daily moisturizer  # Benign bx:  - Lichenoid keratosis, left lateral mid back, s/p shave bx 12/2021  - Lichenoid keratosis, left medial thigh, s/p shave bx 12/2021  ____________________________________________    Assessment & Plan:    # Biopsy proven SCCis, left upper posterior inferior arm, scheduled excision 4/4/2022  - Pending excision  - Photos obtained to quynh location today as prior photo also included a spot to monitor which has resolved    # Actinic keratosis, left lateral mid back x2   - cryotherapy, see procedure note below    # Lesions monitored: Left upper superior posterior arm, Right upper posterior arm, Left upper abdomen, and Left lateral cheek x2.   Many prior NUBs, appear resolved today. Given prior biopsies with BLKs on 12/3/2021, suspect they could be this. Continue to monitor.     # Pruritus secondary to xerosis  Discussed that the itchiness is likely due to dry skin.   - Advised daily moisturization with bland emollient.   - Provided samples and specific recommendations in the AVS.     Procedures Performed:   - Cryotherapy procedure note, location(s): left lateral mid back x2. After verbal consent and discussion of risks and benefits including, but not limited to, dyspigmentation/scar, blister, and pain, 2 lesion(s) was(were) treated with 1-2 mm freeze  border for 1-2 cycles with liquid nitrogen. Post cryotherapy instructions were provided.    Follow-up: 3 month(s) in-person, or earlier for new or changing lesions    Staff and Scribe:     Provider Disclosure:   The documentation recorded by the scribe accurately reflects the services I personally performed and the decisions made by me.    Silvana Do MD    Department of Dermatology  [facility]: Phone: [phone], Fax: [phone]  3/8/2022       Scribe Disclosure:  ISophia, am serving as a scribe to document services personally performed by Silvana Do MD at this visit, based upon the provider's statements to me. All documentation has been reviewed by the aforementioned provider prior to being entered into the official medical record.     Sophia DUBOSE, a scribe, prepared the chart for today's encounter.   ____________________________________________    CC: Derm Problem (follow up on SKs)    HPI:  Mr. Felice Blood is a(n) 81 year old male who presents today as a return patient for a cryotherapy follow-up. He was last seen in Dermatology on 12/3/2021 by me. At that time, several lesions were marked for monitoring on the left upper posterior arm, right upper posterior arm, left upper abdomen, and left lateral cheek x2. He underwent cryotherapy to treat one ISK on the left inner arm. He was started on ketoconazole shampoo to treat seborrheic dermatitis. He underwent four shave biopsies at the left medial thigh, left lateral mid back, right upper back, and left upper posterior inferior arm. 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 He has surgery scheduled to treat his biopsy proven SCC. He is unsure if the lesions being monitored have resolved. Patient is otherwise feeling well, without additional skin concerns.    Labs Reviewed:  N/A    Physical Exam:  Vitals: There were no vitals taken for this visit.  SKIN: Focused examination of back, arms, left thigh, abdomen, and face was performed.  - Biopsy proven SCCis at the left upper posterior inferior arm  - There are two erythematous macules with overlying adherent scale on the left lateral mid back x2.  - Scattered excoriations on upper back healed biopsy at left lateral mid back with adjacent shiny macule.   - Resolved lesions at the left upper superior posterior arm, right upper posterior arm, left upper abdomen, and left lateral cheek x2.   - No other lesions of concern on areas examined.     Medications:  Current Outpatient Medications   Medication     albuterol (PROAIR HFA) 108 (90 Base) MCG/ACT inhaler     aspirin 81 MG tablet     atorvastatin (LIPITOR) 10 MG tablet     cholecalciferol (VITAMIN D3) 1000 UNIT capsule     cyclobenzaprine (FLEXERIL) 10 MG tablet     fluticasone (FLONASE) 50 MCG/ACT spray     hydrOXYzine (ATARAX) 25 MG tablet     ipratropium (ATROVENT) 0.06 % nasal spray     ketoconazole (NIZORAL) 2 % external shampoo     lisinopril (ZESTRIL) 10 MG tablet     sildenafil (REVATIO) 20 MG tablet     sildenafil (VIAGRA) 100 MG tablet     No current facility-administered medications for this visit.      Past Medical History:   Patient Active Problem List   Diagnosis     Essential hypertension     Pure hypercholesterolemia     Impotence of organic origin     Acute bilateral low back pain with bilateral sciatica     Past Medical History:   Diagnosis Date     Cobblestone retinal degeneration      Impotence of organic origin      Nonsenile cataract      Pure hypercholesterolemia      Unspecified essential hypertension      Vitreous  detachment of both eyes

## 2022-03-08 NOTE — NURSING NOTE
Dermatology Rooming Note    Felice Blood's goals for this visit include:   Chief Complaint   Patient presents with     Derm Problem     follow up on Lisa Pedro CMA on 3/8/2022 at 8:44 AM

## 2022-03-08 NOTE — PROGRESS NOTES
[facility] Dermatology Note  Encounter Date: Mar 8, 2022  Office Visit    Dermatology Problem List:  #. Seborrheic dermatitis.  - Ketoconazole shampoo  #. 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 AKs  - HAK, R upper back, s/p shave bx 12/3/21  # Xerosis cutis  - Current tx: daily moisturizer  # Benign bx:  - Lichenoid keratosis, left lateral mid back, s/p shave bx 12/2021  - Lichenoid keratosis, left medial thigh, s/p shave bx 12/2021  ____________________________________________    Assessment & Plan:    # Biopsy proven SCCis, left upper posterior inferior arm, scheduled excision 4/4/2022  - Pending excision  - Photos obtained to quynh location today as prior photo also included a spot to monitor which has resolved    # Actinic keratosis, left lateral mid back x2   - cryotherapy, see procedure note below    # Lesions monitored: Left upper superior posterior arm, Right upper posterior arm, Left upper abdomen, and Left lateral cheek x2.   Many prior NUBs, appear resolved today. Given prior biopsies with BLKs on 12/3/2021, suspect they could be this. Continue to monitor.     # Pruritus secondary to xerosis  Discussed that the itchiness is likely due to dry skin.   - Advised daily moisturization with bland emollient.   - Provided samples and specific recommendations in the AVS.     Procedures Performed:   - Cryotherapy procedure note, location(s): left lateral mid back x2. After verbal consent and discussion of risks and benefits including, but not limited to, dyspigmentation/scar, blister, and pain, 2 lesion(s) was(were) treated with 1-2 mm freeze border for 1-2 cycles with liquid nitrogen. Post cryotherapy instructions were provided.    Follow-up: 3 month(s) in-person, or earlier for new or changing lesions    Staff and Scribe:     Provider Disclosure:   The documentation recorded by the scribe accurately reflects the services I personally performed and the decisions  made by me.    Silvana Do MD    Department of Dermatology  [facility]: Phone: [phone], Fax: [phone]  3/8/2022       Scribe Disclosure:  Sophia DUBOSE, am serving as a scribe to document services personally performed by Silvana Do MD at this visit, based upon the provider's statements to me. All documentation has been reviewed by the aforementioned provider prior to being entered into the official medical record.     Sophia DUBOSE, a scribe, prepared the chart for today's encounter.   ____________________________________________    CC: Derm Problem (follow up on SKs)    HPI:  Mr. Felice Blood is a(n) 81 year old male who presents today as a return patient for a cryotherapy follow-up. He was last seen in Dermatology on 12/3/2021 by me. At that time, several lesions were marked for monitoring on the left upper posterior arm, right upper posterior arm, left upper abdomen, and left lateral cheek x2. He underwent cryotherapy to treat one ISK on the left inner arm. He was started on ketoconazole shampoo to treat seborrheic dermatitis. He underwent four shave biopsies at the left medial thigh, left lateral mid back, right upper back, and left upper posterior inferior arm. 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 He has surgery scheduled to treat his biopsy proven SCC. He is unsure if the lesions being monitored have resolved. Patient is otherwise feeling well, without additional skin concerns.    Labs Reviewed:  N/A    Physical Exam:  Vitals: There were no vitals taken for this  visit.  SKIN: Focused examination of back, arms, left thigh, abdomen, and face was performed.  - Biopsy proven SCCis at the left upper posterior inferior arm  - There are two erythematous macules with overlying adherent scale on the left lateral mid back x2.  - Scattered excoriations on upper back healed biopsy at left lateral mid back with adjacent shiny macule.   - Resolved lesions at the left upper superior posterior arm, right upper posterior arm, left upper abdomen, and left lateral cheek x2.   - No other lesions of concern on areas examined.     Medications:  Current Outpatient Medications   Medication     albuterol (PROAIR HFA) 108 (90 Base) MCG/ACT inhaler     aspirin 81 MG tablet     atorvastatin (LIPITOR) 10 MG tablet     cholecalciferol (VITAMIN D3) 1000 UNIT capsule     cyclobenzaprine (FLEXERIL) 10 MG tablet     fluticasone (FLONASE) 50 MCG/ACT spray     hydrOXYzine (ATARAX) 25 MG tablet     ipratropium (ATROVENT) 0.06 % nasal spray     ketoconazole (NIZORAL) 2 % external shampoo     lisinopril (ZESTRIL) 10 MG tablet     sildenafil (REVATIO) 20 MG tablet     sildenafil (VIAGRA) 100 MG tablet     No current facility-administered medications for this visit.      Past Medical History:   Patient Active Problem List   Diagnosis     Essential hypertension     Pure hypercholesterolemia     Impotence of organic origin     Acute bilateral low back pain with bilateral sciatica     Past Medical History:   Diagnosis Date     Cobblestone retinal degeneration      Impotence of organic origin      Nonsenile cataract      Pure hypercholesterolemia      Unspecified essential hypertension      Vitreous detachment of both eyes

## 2022-03-08 NOTE — PATIENT INSTRUCTIONS
Gentle Skin Care    Below is a list of products our providers recommend for gentle skin care.  Moisturizers:    Lighter; Exederm Intensive Moisture Cream, Cetaphil Cream, CeraVe, Aveeno Positively radiant and Vanicream Light     Thicker; Aquaphor Ointment, Vaseline, Petroleum Jelly, Eucerin Original Healing Cream and Vanicream, CeraVe Healing Ointment, Aquaphor Body Spray    Avoid Lotions (too thin)  Mild Cleansers:    Dove- Fragrance Free bar or wash    CeraVe     Vanicream Cleansing bar    Cetaphil Cleanser     Aquaphor 2 in1 Gentle Wash and Shampoo    Dove Baby wash    Exederm Body wash       Laundry Products:      All Free and Clear    Cheer Free    Generic Brands are okay as long as they are  Fragrance Free      Avoid fabric softeners  and dryer sheets   Sunscreens: SPF 30 or greater       Sunscreens that contain Zinc Oxide and/or Titanium Dioxide should be applied, these are physical blockers. One or both of these should be listed in the  Active Ingredients     Any other listed ingredients under the active ingredients would be a chemically based sunscreen which might be irritating.    Spray sunscreens should be avoided because these are typically chemical sunscreens.      Shampoo and Conditioners:    Free and Clear by Vanicream    Aquaphor 2 in 1 Gentle Wash and Shampoo   Oils:    Mineral Oil     Emu Oil     For some patients: Coconut (raw, unrefined, organic) and Sunflower seed oil              Generic Products are an okay substitute, but make sure they are fragrance free.  *Reading the product ingredients list is very important  *Avoid product that have fragrance added to them.   *Organic does not mean  fragrance free.  In fact patients with sensitive skin can become quite irritated by some organic products.     1. Daily bathing is recommended. Make sure you are applying a good moisturizer after bathing every time.  2. Use Moisturizing creams at least twice daily to the whole body. Your provider may  recommend a lighter or heavier moisturizer based on your child s severity and that time of year it is.  3. Creams are more moisturizing than lotions.       Care Plan:  1. Keep bathing and showering short, less than 15 minutes   2. Always use lukewarm warm when possible. AVOID HOT or COLD water  3. DO NOT use bubble bath  4. Limit the use of soaps. Focus on the skin folds, face, armpits, groin and feet towards the end of the bath  5. Do NOT vigorously scrub when you cleanse the skin  6. After bathing, PAT your skin lightly with a towel. DO NOT rub or scrub when drying  7. ALWAYS apply a moisturizer immediately after bathing. This helps to  lock in  the moisture. * IF YOU WERE PRESCRIBED A TOPICAL MEDICATION, APPLY YOUR MEDICATION FIRST THEN COVER WITH YOUR DAILY MOISTURIZER  8. Reapply moisturizing agents at least twice daily to your whole body    Other helpful tips:    Do not use products such as powders, perfumes, or colognes on your skin    Diffusers can be harsh on sensitive skin, use with caution if you or your child has sensitive skin     Avoid saunas and steam baths. This temperature is too HOT    Avoid tight or  scratchy  clothing such as wool    Always wash new clothing before wearing them for the first time    Sometimes a humidifier or vaporizer can be used at night can help the dry skin. Remember to keep these items clean to avoid mold growth.

## 2022-03-10 DIAGNOSIS — R05.9 COUGH: ICD-10-CM

## 2022-03-13 NOTE — TELEPHONE ENCOUNTER
albuterol (PROAIR HFA) 108 (90 Base) MCG/ACT inhaler   Last Written Prescription Date:  4/22/19  Last Fill Quantity: 18g,   # refills: 1  Last Office Visit : 9/1/21  Future Office visit:  None      Routing refill request to provider for review/approval because:  gap in med rf.

## 2022-03-15 RX ORDER — ALBUTEROL SULFATE 90 UG/1
2 AEROSOL, METERED RESPIRATORY (INHALATION) EVERY 4 HOURS PRN
Qty: 18 G | Refills: 0 | Status: SHIPPED | OUTPATIENT
Start: 2022-03-15 | End: 2023-09-14

## 2022-03-21 ENCOUNTER — TELEPHONE (OUTPATIENT)
Dept: ORTHOPEDICS | Facility: CLINIC | Age: 82
End: 2022-03-21

## 2022-03-21 ENCOUNTER — VIRTUAL VISIT (OUTPATIENT)
Dept: ORTHOPEDICS | Facility: CLINIC | Age: 82
End: 2022-03-21
Payer: MEDICARE

## 2022-03-21 DIAGNOSIS — G89.29 CHRONIC MIDLINE LOW BACK PAIN, UNSPECIFIED WHETHER SCIATICA PRESENT: Primary | ICD-10-CM

## 2022-03-21 DIAGNOSIS — M54.50 CHRONIC MIDLINE LOW BACK PAIN, UNSPECIFIED WHETHER SCIATICA PRESENT: Primary | ICD-10-CM

## 2022-03-21 DIAGNOSIS — M79.604 BILATERAL LEG PAIN: ICD-10-CM

## 2022-03-21 DIAGNOSIS — I73.9 CLAUDICATION (H): ICD-10-CM

## 2022-03-21 DIAGNOSIS — M79.605 BILATERAL LEG PAIN: ICD-10-CM

## 2022-03-21 PROCEDURE — 99441 PR PHYSICIAN TELEPHONE EVALUATION 5-10 MIN: CPT | Mod: 95 | Performed by: FAMILY MEDICINE

## 2022-03-21 NOTE — LETTER
3/21/2022      RE: Felice Blood  [address] [facility] [address]           Felice is a 81 year old who is being evaluated via a billable telephone visit.      What phone number would you like to be contacted at? [phone]    How would you like to obtain your AVS? MyChart  Phone call duration: 10 minutes    S; fup low back pain, IVORY. S/p  right L5-S1 interlaminar epidural steroid  Injection 2/221/22. Has seen PT.  Uses IBU/tylenol.    Patient does not feel that he is improved since the last visit.  He describes that if he stands at the sink doing dishes for longer than 10 minutes he will have aching discomfort in his low back and aching discomfort in his bilateral legs, especially at the calves.  He is used up getting a certain number of steps in each day for walking but he describes not being able to walk for more than 2 blocks without bilateral leg discomfort, especially at the calves.  He does not feel that the recent lumbar epidural injection helped very much.  It may have given some mild relief.    H/O right-sided greater than left radicular leg discomfort and centralized low back discomfort.  Will be most prominent in the right buttock, right thigh, right knee but at times he will feel it all the way down to the right shin and foot.  Occasionally will have some left-sided discomfort involving the left thigh.  Sometimes he can feel in the bilateral ankles.  Overall the right radicular leg pain is the worst discomfort.     MRI lumbar spine 11/2021 c/w moderate central stenosis at L2-L3 and severe facet arthropathy at that level. L2-3: Right central/subarticular disc extrusion with slight superior  migration narrows the right lateral recess and likely impinges upon the traversing right L3 nerve root.. Moderate right and mild left neural foraminal narrowing at L2L3   Also moderate bilateral foraminal narrowing at L5-S1 due to facet arthropathy and disc bulge.           O:  GENERAL: healthy,  alert, without distress  RESP: No audible wheeze, cough.  No increased work of breathing.    NEURO:  Mentation and speech appropriate for age.  PSYCH: mentation appears normal, concentration appears appropriate, judgement and insight intact.    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  Lower extremity claudication    Plan: He has had no imaging studies of his abdomen.  Abdominal ultrasound is pending to rule out abdominal aortic aneurysm.  He will have arterial Doppler studies of the lower extremity and exercise CALVIN.  Follow-up phone conversation after these results return.  If there is no signs of arterial obstruction he may benefit from discussing his low back with an orthopedic spine specialist.      PMH:  Past Medical History:   Diagnosis Date     Cobblestone retinal degeneration      Impotence of organic origin      Nonsenile cataract      Pure hypercholesterolemia      Unspecified essential hypertension      Vitreous detachment of both eyes        Active problem list:  Patient Active Problem List   Diagnosis     Essential hypertension     Pure hypercholesterolemia     Impotence of organic origin     Acute bilateral low back pain with bilateral sciatica       FH:  Family History   Problem Relation Age of Onset     Glaucoma No family hx of      Macular Degeneration No family hx of        SH:  Social History     Socioeconomic History     Marital status:      Spouse name: Not on file     Number of children: Not on file     Years of education: Not on file     Highest education level: Not on file   Occupational History     Not on file   Tobacco Use     Smoking status: Never Smoker     Smokeless tobacco: Never Used   Substance and Sexual Activity     Alcohol use: Yes     Comment: 1 day     Drug use: Not on file     Sexual activity: Not on file   Other Topics Concern     Not on file   Social History Narrative     Not on file     Social Determinants of Health     Financial Resource Strain:  Not on file   Food Insecurity: Not on file   Transportation Needs: Not on file   Physical Activity: Not on file   Stress: Not on file   Social Connections: Not on file   Intimate Partner Violence: Not on file   Housing Stability: Not on file       MEDS:  See EMR, reviewed  ALL:  See EMR, reviewed    REVIEW OF SYSTEMS:  CONSTITUTIONAL:NEGATIVE for fever, chills, change in weight  INTEGUMENTARY/SKIN: NEGATIVE for worrisome rashes, moles or lesions  EYES: NEGATIVE for vision changes or irritation  ENT/MOUTH: NEGATIVE for ear, mouth and throat problems  RESP:NEGATIVE for significant cough or SOB  BREAST: NEGATIVE for masses, tenderness or discharge  CV: NEGATIVE for chest pain, palpitations or peripheral edema  GI: NEGATIVE for nausea, abdominal pain, heartburn, or change in bowel habits  :NEGATIVE for frequency, dysuria, or hematuria  :NEGATIVE for frequency, dysuria, or hematuria  NEURO: NEGATIVE for weakness, dizziness or paresthesias  ENDOCRINE: NEGATIVE for temperature intolerance, skin/hair changes  HEME/ALLERGY/IMMUNE: NEGATIVE for bleeding problems  PSYCHIATRIC: NEGATIVE for changes in mood or affect                  Dino Alba MD

## 2022-03-21 NOTE — LETTER
3/21/2022       RE: Felice Blood  [address]  [facility] [address]     Dear Colleague,    Thank you for referring your patient, Felice Blood, to the [facility] [place] at [facility]. Please see a copy of my visit note below.        Felice is a 81 year old who is being evaluated via a billable telephone visit.      What phone number would you like to be contacted at? [phone]    How would you like to obtain your AVS? MyChart  Phone call duration: 10 minutes    S; fup low back pain, IVORY. S/p  right L5-S1 interlaminar epidural steroid  Injection 2/221/22. Has seen PT.  Uses IBU/tylenol.    Patient does not feel that he is improved since the last visit.  He describes that if he stands at the sink doing dishes for longer than 10 minutes he will have aching discomfort in his low back and aching discomfort in his bilateral legs, especially at the calves.  He is used up getting a certain number of steps in each day for walking but he describes not being able to walk for more than 2 blocks without bilateral leg discomfort, especially at the calves.  He does not feel that the recent lumbar epidural injection helped very much.  It may have given some mild relief.    H/O right-sided greater than left radicular leg discomfort and centralized low back discomfort.  Will be most prominent in the right buttock, right thigh, right knee but at times he will feel it all the way down to the right shin and foot.  Occasionally will have some left-sided discomfort involving the left thigh.  Sometimes he can feel in the bilateral ankles.  Overall the right radicular leg pain is the worst discomfort.     MRI lumbar spine 11/2021 c/w moderate central stenosis at L2-L3 and severe facet arthropathy at that level. L2-3: Right central/subarticular disc extrusion with slight superior  migration narrows the right lateral recess and likely  impinges upon the traversing right L3 nerve root.. Moderate right and mild left neural foraminal narrowing at L2L3   Also moderate bilateral foraminal narrowing at L5-S1 due to facet arthropathy and disc bulge.           O:  GENERAL: healthy, alert, without distress  RESP: No audible wheeze, cough.  No increased work of breathing.    NEURO:  Mentation and speech appropriate for age.  PSYCH: mentation appears normal, concentration appears appropriate, judgement and insight intact.    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  Lower extremity claudication    Plan: He has had no imaging studies of his abdomen.  Abdominal ultrasound is pending to rule out abdominal aortic aneurysm.  He will have arterial Doppler studies of the lower extremity and exercise CALVIN.  Follow-up phone conversation after these results return.  If there is no signs of arterial obstruction he may benefit from discussing his low back with an orthopedic spine specialist.      PMH:  Past Medical History:   Diagnosis Date     Cobblestone retinal degeneration      Impotence of organic origin      Nonsenile cataract      Pure hypercholesterolemia      Unspecified essential hypertension      Vitreous detachment of both eyes        Active problem list:  Patient Active Problem List   Diagnosis     Essential hypertension     Pure hypercholesterolemia     Impotence of organic origin     Acute bilateral low back pain with bilateral sciatica       FH:  Family History   Problem Relation Age of Onset     Glaucoma No family hx of      Macular Degeneration No family hx of        SH:  Social History     Socioeconomic History     Marital status:      Spouse name: Not on file     Number of children: Not on file     Years of education: Not on file     Highest education level: Not on file   Occupational History     Not on file   Tobacco Use     Smoking status: Never Smoker     Smokeless tobacco: Never Used   Substance and Sexual Activity      Alcohol use: Yes     Comment: 1 day     Drug use: Not on file     Sexual activity: Not on file   Other Topics Concern     Not on file   Social History Narrative     Not on file     Social Determinants of Health     Financial Resource Strain: Not on file   Food Insecurity: Not on file   Transportation Needs: Not on file   Physical Activity: Not on file   Stress: Not on file   Social Connections: Not on file   Intimate Partner Violence: Not on file   Housing Stability: Not on file       MEDS:  See EMR, reviewed  ALL:  See EMR, reviewed    REVIEW OF SYSTEMS:  CONSTITUTIONAL:NEGATIVE for fever, chills, change in weight  INTEGUMENTARY/SKIN: NEGATIVE for worrisome rashes, moles or lesions  EYES: NEGATIVE for vision changes or irritation  ENT/MOUTH: NEGATIVE for ear, mouth and throat problems  RESP:NEGATIVE for significant cough or SOB  BREAST: NEGATIVE for masses, tenderness or discharge  CV: NEGATIVE for chest pain, palpitations or peripheral edema  GI: NEGATIVE for nausea, abdominal pain, heartburn, or change in bowel habits  :NEGATIVE for frequency, dysuria, or hematuria  :NEGATIVE for frequency, dysuria, or hematuria  NEURO: NEGATIVE for weakness, dizziness or paresthesias  ENDOCRINE: NEGATIVE for temperature intolerance, skin/hair changes  HEME/ALLERGY/IMMUNE: NEGATIVE for bleeding problems  PSYCHIATRIC: NEGATIVE for changes in mood or affect        Again, thank you for allowing me to participate in the care of your patient.      Sincerely,    Dino Alba MD

## 2022-03-21 NOTE — PROGRESS NOTES
Felice is a 81 year old who is being evaluated via a billable telephone visit.      What phone number would you like to be contacted at? [phone]    How would you like to obtain your AVS? Dimitri  Phone call duration: 10 minutes    S; fup low back pain, IVORY. S/p  right L5-S1 interlaminar epidural steroid  Injection 2/221/22. Has seen PT.  Uses IBU/tylenol.    Patient does not feel that he is improved since the last visit.  He describes that if he stands at the sink doing dishes for longer than 10 minutes he will have aching discomfort in his low back and aching discomfort in his bilateral legs, especially at the calves.  He is used up getting a certain number of steps in each day for walking but he describes not being able to walk for more than 2 blocks without bilateral leg discomfort, especially at the calves.  He does not feel that the recent lumbar epidural injection helped very much.  It may have given some mild relief.    H/O right-sided greater than left radicular leg discomfort and centralized low back discomfort.  Will be most prominent in the right buttock, right thigh, right knee but at times he will feel it all the way down to the right shin and foot.  Occasionally will have some left-sided discomfort involving the left thigh.  Sometimes he can feel in the bilateral ankles.  Overall the right radicular leg pain is the worst discomfort.     MRI lumbar spine 11/2021 c/w moderate central stenosis at L2-L3 and severe facet arthropathy at that level. L2-3: Right central/subarticular disc extrusion with slight superior  migration narrows the right lateral recess and likely impinges upon the traversing right L3 nerve root.. Moderate right and mild left neural foraminal narrowing at L2L3   Also moderate bilateral foraminal narrowing at L5-S1 due to facet arthropathy and disc bulge.           O:  GENERAL: healthy, alert, without distress  RESP: No audible wheeze, cough.  No increased work of breathing.     NEURO:  Mentation and speech appropriate for age.  PSYCH: mentation appears normal, concentration appears appropriate, judgement and insight intact.    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  Lower extremity claudication    Plan: He has had no imaging studies of his abdomen.  Abdominal ultrasound is pending to rule out abdominal aortic aneurysm.  He will have arterial Doppler studies of the lower extremity and exercise CALVIN.  Follow-up phone conversation after these results return.  If there is no signs of arterial obstruction he may benefit from discussing his low back with an orthopedic spine specialist.      PMH:  Past Medical History:   Diagnosis Date     Cobblestone retinal degeneration      Impotence of organic origin      Nonsenile cataract      Pure hypercholesterolemia      Unspecified essential hypertension      Vitreous detachment of both eyes        Active problem list:  Patient Active Problem List   Diagnosis     Essential hypertension     Pure hypercholesterolemia     Impotence of organic origin     Acute bilateral low back pain with bilateral sciatica       FH:  Family History   Problem Relation Age of Onset     Glaucoma No family hx of      Macular Degeneration No family hx of        SH:  Social History     Socioeconomic History     Marital status:      Spouse name: Not on file     Number of children: Not on file     Years of education: Not on file     Highest education level: Not on file   Occupational History     Not on file   Tobacco Use     Smoking status: Never Smoker     Smokeless tobacco: Never Used   Substance and Sexual Activity     Alcohol use: Yes     Comment: 1 day     Drug use: Not on file     Sexual activity: Not on file   Other Topics Concern     Not on file   Social History Narrative     Not on file     Social Determinants of Health     Financial Resource Strain: Not on file   Food Insecurity: Not on file   Transportation Needs: Not on file   Physical  Activity: Not on file   Stress: Not on file   Social Connections: Not on file   Intimate Partner Violence: Not on file   Housing Stability: Not on file       MEDS:  See EMR, reviewed  ALL:  See EMR, reviewed    REVIEW OF SYSTEMS:  CONSTITUTIONAL:NEGATIVE for fever, chills, change in weight  INTEGUMENTARY/SKIN: NEGATIVE for worrisome rashes, moles or lesions  EYES: NEGATIVE for vision changes or irritation  ENT/MOUTH: NEGATIVE for ear, mouth and throat problems  RESP:NEGATIVE for significant cough or SOB  BREAST: NEGATIVE for masses, tenderness or discharge  CV: NEGATIVE for chest pain, palpitations or peripheral edema  GI: NEGATIVE for nausea, abdominal pain, heartburn, or change in bowel habits  :NEGATIVE for frequency, dysuria, or hematuria  :NEGATIVE for frequency, dysuria, or hematuria  NEURO: NEGATIVE for weakness, dizziness or paresthesias  ENDOCRINE: NEGATIVE for temperature intolerance, skin/hair changes  HEME/ALLERGY/IMMUNE: NEGATIVE for bleeding problems  PSYCHIATRIC: NEGATIVE for changes in mood or affect

## 2022-03-28 ENCOUNTER — ANCILLARY PROCEDURE (OUTPATIENT)
Dept: ULTRASOUND IMAGING | Facility: CLINIC | Age: 82
End: 2022-03-28
Attending: FAMILY MEDICINE
Payer: MEDICARE

## 2022-03-28 DIAGNOSIS — M79.605 BILATERAL LEG PAIN: ICD-10-CM

## 2022-03-28 DIAGNOSIS — M54.50 CHRONIC MIDLINE LOW BACK PAIN, UNSPECIFIED WHETHER SCIATICA PRESENT: ICD-10-CM

## 2022-03-28 DIAGNOSIS — I73.9 CLAUDICATION (H): ICD-10-CM

## 2022-03-28 DIAGNOSIS — G89.29 CHRONIC MIDLINE LOW BACK PAIN, UNSPECIFIED WHETHER SCIATICA PRESENT: ICD-10-CM

## 2022-03-28 DIAGNOSIS — M79.604 BILATERAL LEG PAIN: ICD-10-CM

## 2022-03-28 PROCEDURE — 93924 LWR XTR VASC STDY BILAT: CPT | Performed by: RADIOLOGY

## 2022-03-28 PROCEDURE — 76775 US EXAM ABDO BACK WALL LIM: CPT | Performed by: RADIOLOGY

## 2022-03-28 PROCEDURE — 93925 LOWER EXTREMITY STUDY: CPT | Performed by: RADIOLOGY

## 2022-03-30 ENCOUNTER — OFFICE VISIT (OUTPATIENT)
Dept: ORTHOPEDICS | Facility: CLINIC | Age: 82
End: 2022-03-30
Payer: MEDICARE

## 2022-03-30 ENCOUNTER — ANCILLARY PROCEDURE (OUTPATIENT)
Dept: GENERAL RADIOLOGY | Facility: CLINIC | Age: 82
End: 2022-03-30
Attending: FAMILY MEDICINE
Payer: MEDICARE

## 2022-03-30 DIAGNOSIS — M48.062 SPINAL STENOSIS OF LUMBAR REGION WITH NEUROGENIC CLAUDICATION: Primary | ICD-10-CM

## 2022-03-30 PROCEDURE — 72100 X-RAY EXAM L-S SPINE 2/3 VWS: CPT | Performed by: RADIOLOGY

## 2022-03-30 PROCEDURE — 99213 OFFICE O/P EST LOW 20 MIN: CPT | Performed by: FAMILY MEDICINE

## 2022-03-30 NOTE — PROGRESS NOTES
S:  fup low back pain     IVORY. S/p  right L5-S1 interlaminar epidural steroid Injection 2/221/22 did not provide any significant relief.. Has seen PT without improvement..  Uses IBU/tylenol.  Vascular studies of the lower extremities including CALVIN, exercise arterial ultrasound, normal.  No abdominal aortic ultrasound.  SI joint x-rays reveal no significant DJD or sacroiliitis.  No significant degenerative disease at the hips.  Normal colonoscopy 2016.    Patient does not feel that he is improved since the last visit.  He describes that if he stands at the sink doing dishes for longer than 10 minutes he will have aching discomfort in his low back and aching discomfort in his bilateral legs, especially at the calves.  He is used up getting a certain number of steps in each day for walking but he describes not being able to walk for more than 2 blocks without bilateral leg discomfort, especially at the calves.  He does not feel that the recent lumbar epidural injection helped very much.  It may have given some mild relief.     H/O right-sided greater than left radicular leg discomfort and centralized low back discomfort.  Will be most prominent in the right buttock, right thigh, right knee but at times he will feel it all the way down to the right shin and foot.  Occasionally will have some left-sided discomfort involving the left thigh.  Sometimes he can feel in the bilateral ankles.  Overall the right radicular leg pain is the worst discomfort.      We went over updated x-rays of his back compared to 5 months ago and I do not see an intercurrent compression fracture.  No significant change between the x-rays.  Multilevel degenerative change.    IMPRESSION:   1. RIGHT LEG:       A. Resting CALVIN is normal, 1.18.       B. Exercise study: Negative     2. LEFT LEG:       A. Resting CALVIN is normal, 1.13.       B. Exercise study: Negative      IMPRESSION:  1. No abdominal aortic aneurysm demonstrated.      MRI lumbar  spine:  Impression: Multilevel lumbar spondylosis with significant findings of  moderate spinal canal stenosis at L2-3 with a subarticular disc  extrusion and moderate bilateral neural foraminal narrowing at L5-S1.  Further degenerative disease as described above.    SI joint xray:  Impression:  1. No radiographic evidence of sacroiliitis.  2. Mild degenerative change of bilateral hips, greater on the right.  2. Degenerative changes of visualized lower lumbar spine.      Right L5-S1 interlaminar epidural steroid injection 2/21/2022.        PMH:  Past Medical History:   Diagnosis Date     Cobblestone retinal degeneration      Impotence of organic origin      Nonsenile cataract      Pure hypercholesterolemia      Unspecified essential hypertension      Vitreous detachment of both eyes        Active problem list:  Patient Active Problem List   Diagnosis     Essential hypertension     Pure hypercholesterolemia     Impotence of organic origin     Acute bilateral low back pain with bilateral sciatica       FH:  Family History   Problem Relation Age of Onset     Glaucoma No family hx of      Macular Degeneration No family hx of        SH:  Social History     Socioeconomic History     Marital status:      Spouse name: Not on file     Number of children: Not on file     Years of education: Not on file     Highest education level: Not on file   Occupational History     Not on file   Tobacco Use     Smoking status: Never Smoker     Smokeless tobacco: Never Used   Substance and Sexual Activity     Alcohol use: Yes     Comment: 1 day     Drug use: Not on file     Sexual activity: Not on file   Other Topics Concern     Not on file   Social History Narrative     Not on file     Social Determinants of Health     Financial Resource Strain: Not on file   Food Insecurity: Not on file   Transportation Needs: Not on file   Physical Activity: Not on file   Stress: Not on file   Social Connections: Not on file   Intimate Partner  Violence: Not on file   Housing Stability: Not on file       MEDS:  See EMR, reviewed  ALL:  See EMR, reviewed    REVIEW OF SYSTEMS:  CONSTITUTIONAL:NEGATIVE for fever, chills, change in weight  INTEGUMENTARY/SKIN: NEGATIVE for worrisome rashes, moles or lesions  EYES: NEGATIVE for vision changes or irritation  ENT/MOUTH: NEGATIVE for ear, mouth and throat problems  RESP:NEGATIVE for significant cough or SOB  BREAST: NEGATIVE for masses, tenderness or discharge  CV: NEGATIVE for chest pain, palpitations or peripheral edema  GI: NEGATIVE for nausea, abdominal pain, heartburn, or change in bowel habits  :NEGATIVE for frequency, dysuria, or hematuria  :NEGATIVE for frequency, dysuria, or hematuria  NEURO: NEGATIVE for weakness, dizziness or paresthesias  ENDOCRINE: NEGATIVE for temperature intolerance, skin/hair changes  HEME/ALLERGY/IMMUNE: NEGATIVE for bleeding problems  PSYCHIATRIC: NEGATIVE for changes in mood or affect        Objective: Straight leg raise is negative strength is intact at hip, knee, ankle and foot.  Appropriate in conversation and affect.    Assessment: Multilevel degenerative disc and joint disease lumbar spine with spinal stenosis    Plan: Despite physical therapy, epidural injection, ibuprofen, have not been able to improve his low back discomfort and bilateral leg discomfort over the last 6 months.  We went over his vascular studies which were normal.  Abdominal ultrasound normal.  I will have him consult with orthopedic spine specialist.  Perhaps a different epidural injection would be helpful.  Perhaps an additional lumbar MRI may be necessary.

## 2022-03-30 NOTE — LETTER
3/30/2022      RE: Felice Blood  [address]  [facility] [address]       S:  fup low back pain     IVORY. S/p  right L5-S1 interlaminar epidural steroid Injection 2/221/22 did not provide any significant relief.. Has seen PT without improvement..  Uses IBU/tylenol.  Vascular studies of the lower extremities including CALVIN, exercise arterial ultrasound, normal.  No abdominal aortic ultrasound.  SI joint x-rays reveal no significant DJD or sacroiliitis.  No significant degenerative disease at the hips.  Normal colonoscopy 2016.    Patient does not feel that he is improved since the last visit.  He describes that if he stands at the sink doing dishes for longer than 10 minutes he will have aching discomfort in his low back and aching discomfort in his bilateral legs, especially at the calves.  He is used up getting a certain number of steps in each day for walking but he describes not being able to walk for more than 2 blocks without bilateral leg discomfort, especially at the calves.  He does not feel that the recent lumbar epidural injection helped very much.  It may have given some mild relief.     H/O right-sided greater than left radicular leg discomfort and centralized low back discomfort.  Will be most prominent in the right buttock, right thigh, right knee but at times he will feel it all the way down to the right shin and foot.  Occasionally will have some left-sided discomfort involving the left thigh.  Sometimes he can feel in the bilateral ankles.  Overall the right radicular leg pain is the worst discomfort.      We went over updated x-rays of his back compared to 5 months ago and I do not see an intercurrent compression fracture.  No significant change between the x-rays.  Multilevel degenerative change.    IMPRESSION:   1. RIGHT LEG:       A. Resting CALVIN is normal, 1.18.       B. Exercise study: Negative     2. LEFT LEG:       A. Resting CALVIN is normal, 1.13.       B. Exercise study:  Negative      IMPRESSION:  1. No abdominal aortic aneurysm demonstrated.      MRI lumbar spine:  Impression: Multilevel lumbar spondylosis with significant findings of  moderate spinal canal stenosis at L2-3 with a subarticular disc  extrusion and moderate bilateral neural foraminal narrowing at L5-S1.  Further degenerative disease as described above.    SI joint xray:  Impression:  1. No radiographic evidence of sacroiliitis.  2. Mild degenerative change of bilateral hips, greater on the right.  2. Degenerative changes of visualized lower lumbar spine.      Right L5-S1 interlaminar epidural steroid injection 2/21/2022.        PMH:  Past Medical History:   Diagnosis Date     Cobblestone retinal degeneration      Impotence of organic origin      Nonsenile cataract      Pure hypercholesterolemia      Unspecified essential hypertension      Vitreous detachment of both eyes        Active problem list:  Patient Active Problem List   Diagnosis     Essential hypertension     Pure hypercholesterolemia     Impotence of organic origin     Acute bilateral low back pain with bilateral sciatica       FH:  Family History   Problem Relation Age of Onset     Glaucoma No family hx of      Macular Degeneration No family hx of        SH:  Social History     Socioeconomic History     Marital status:      Spouse name: Not on file     Number of children: Not on file     Years of education: Not on file     Highest education level: Not on file   Occupational History     Not on file   Tobacco Use     Smoking status: Never Smoker     Smokeless tobacco: Never Used   Substance and Sexual Activity     Alcohol use: Yes     Comment: 1 day     Drug use: Not on file     Sexual activity: Not on file   Other Topics Concern     Not on file   Social History Narrative     Not on file     Social Determinants of Health     Financial Resource Strain: Not on file   Food Insecurity: Not on file   Transportation Needs: Not on file   Physical Activity:  Not on file   Stress: Not on file   Social Connections: Not on file   Intimate Partner Violence: Not on file   Housing Stability: Not on file       MEDS:  See EMR, reviewed  ALL:  See EMR, reviewed    REVIEW OF SYSTEMS:  CONSTITUTIONAL:NEGATIVE for fever, chills, change in weight  INTEGUMENTARY/SKIN: NEGATIVE for worrisome rashes, moles or lesions  EYES: NEGATIVE for vision changes or irritation  ENT/MOUTH: NEGATIVE for ear, mouth and throat problems  RESP:NEGATIVE for significant cough or SOB  BREAST: NEGATIVE for masses, tenderness or discharge  CV: NEGATIVE for chest pain, palpitations or peripheral edema  GI: NEGATIVE for nausea, abdominal pain, heartburn, or change in bowel habits  :NEGATIVE for frequency, dysuria, or hematuria  :NEGATIVE for frequency, dysuria, or hematuria  NEURO: NEGATIVE for weakness, dizziness or paresthesias  ENDOCRINE: NEGATIVE for temperature intolerance, skin/hair changes  HEME/ALLERGY/IMMUNE: NEGATIVE for bleeding problems  PSYCHIATRIC: NEGATIVE for changes in mood or affect        Objective: Straight leg raise is negative strength is intact at hip, knee, ankle and foot.  Appropriate in conversation and affect.    Assessment: Multilevel degenerative disc and joint disease lumbar spine with spinal stenosis    Plan: Despite physical therapy, epidural injection, ibuprofen, have not been able to improve his low back discomfort and bilateral leg discomfort over the last 6 months.  We went over his vascular studies which were normal.  Abdominal ultrasound normal.  I will have him consult with orthopedic spine specialist.  Perhaps a different epidural injection would be helpful.  Perhaps an additional lumbar MRI may be necessary.        Dino Alba MD

## 2022-03-31 NOTE — TELEPHONE ENCOUNTER
DIAGNOSIS: Lumbar Spine pain   APPOINTMENT DATE: 4/5/22   NOTES STATUS DETAILS   OFFICE NOTE from referring provider Internal  Ov/referral 3/30/22- Dino Alba MD   OV 3/21/22, 1/25/22, 1/3/22, 11/22/21, 11/15/21, ov 9/27/21   OFFICE NOTE from other specialist Internal    LABS     CBC/DIFF Internal CBC done 6/10/19   INJECTIONS DONE IN RADIOLOGY Internal XR Lumbar Epidural Injection done 2/21/22   MRI Internal MR Lumbar Spine done 11/17/21   XRAYS (IMAGES & REPORTS) Internal XR Lumbar Spine done 3/30/22, XR Sacroiliac Joint 11/15/21

## 2022-04-04 ENCOUNTER — OFFICE VISIT (OUTPATIENT)
Dept: DERMATOLOGY | Facility: CLINIC | Age: 82
End: 2022-04-04
Attending: DERMATOLOGY
Payer: MEDICARE

## 2022-04-04 VITALS — DIASTOLIC BLOOD PRESSURE: 83 MMHG | HEART RATE: 82 BPM | SYSTOLIC BLOOD PRESSURE: 132 MMHG

## 2022-04-04 DIAGNOSIS — M54.50 LUMBAR PAIN: Primary | ICD-10-CM

## 2022-04-04 DIAGNOSIS — C44.629 SCC (SQUAMOUS CELL CARCINOMA), ARM, LEFT: Primary | ICD-10-CM

## 2022-04-04 DIAGNOSIS — C44.92 SCC (SQUAMOUS CELL CARCINOMA): ICD-10-CM

## 2022-04-04 PROCEDURE — 12032 INTMD RPR S/A/T/EXT 2.6-7.5: CPT | Performed by: DERMATOLOGY

## 2022-04-04 PROCEDURE — 88305 TISSUE EXAM BY PATHOLOGIST: CPT | Mod: 26 | Performed by: DERMATOLOGY

## 2022-04-04 PROCEDURE — 11602 EXC TR-EXT MAL+MARG 1.1-2 CM: CPT | Performed by: DERMATOLOGY

## 2022-04-04 PROCEDURE — 88305 TISSUE EXAM BY PATHOLOGIST: CPT | Mod: TC | Performed by: DERMATOLOGY

## 2022-04-04 ASSESSMENT — PAIN SCALES - GENERAL: PAINLEVEL: NO PAIN (0)

## 2022-04-04 NOTE — LETTER
4/4/2022       RE: Felice Blood  [address] [facility] [address]     Dear Colleague,    Thank you for referring your patient, Felice Blood, to the [facility] DERMATOLOGIC SURGERY CLINIC [place] at [facility]. Please see a copy of my visit note below.    [facility] Dermatologic Surgery Excision Note    Case #: 1  Date of Service:  Apr 4, 2022  Surgery: Wide local excision  Staff Surgeon: Bryson Rocha MD  Fellow Surgeon: Severiano Teague MD  Resident Surgeon: None  Nurse: Melanie Silver CMA    Tumor Type: SCCis  Location: Left upper arm  Derm-Path Accession #: [id number]     Skin Lesion Size:  0.4 cm x 0.4 cm  Margin: 4 mm  Excision size: 1.2 cm x 1.2 cm  Level of Defect: Fat  Repair Type: Intermediate linear  Repair Size: 3.5 cm  Suture Material: 4-0 Monocryl; 4-0 Monocryl    INDICATIONS:  The patient was scheduled for wide local excision of the skin lesion documented above. We discussed the principles of treatment and most likely complications including scarring, bleeding, infection, incomplete excision, wound dehiscence, pain, nerve damage, and recurrence. Informed consent was obtained and the patient underwent the procedure as follows:    PROCEDURE:  With the patient in a supine position, the lesion was outlined with the margin documented above.  The lesion and the necessary margin (excised diameter) was measured and documented as above.  An ellipse was designed around the lesion to conform to relaxed skin tension lines in an effort to minimize scarring and deformity.  The lesion and surrounding skin were prepped with chlorhexidine, draped, and anesthetized with lidocaine with epinephrine. Using a #15 blade, the skin was excised along premarked lines.  The level of the defect is documented as above.  Wound margins were undermined to limit functional deformity/impairment of adjacent structures. Bleeding vessels were  controlled with electrocoagulation.  The dermis and subcutaneous tissue were closed with buried vertical mattress sutures using 4-0 Monocryl.  Epidermal approximation was meticulously refined with 4-0 Monocryl subcuticular sutures, resulting in a linear closure with little to no wound tension.  Blood loss was estimated to be less than 5 mL.  The area was coated with petrolatum and covered with a non-adherent dressing followed by gauze and tape. Postoperative instructions were reviewed per protocol.  The patient left alert and fully oriented.    Follow-up for suture removal: Not applicable as only dissolving sutures used    Bryson Rocha MD was immediately available for the entire surgery and was physicially present for the key portions of the procedure.    Severiano Teague MD  Micrographic Surgery and Dermatologic Oncology (MSDO) Fellow    Scribe Disclosure:  I, Romy sHu, am serving as a scribe to document services personally performed by Bryson Rocha MD based on data collection and the provider's statements to me.     Attestation signed by Bryson Rocha MD at 4/4/2022  1:29 PM:    Attending attestation:  I was present for key elements of the procedure and immediately available for all other portions of the procedure.  I have reviewed the note and edited it as necessary.    Bryson Rocha M.D.  Professor  Director of Dermatologic Surgery  Department of Dermatology  [facility]    Dermatology Surgery Clinic  [facility] [address] [address]

## 2022-04-04 NOTE — PROGRESS NOTES
[facility] Dermatologic Surgery Excision Note    Case #: 1  Date of Service:  Apr 4, 2022  Surgery: Wide local excision  Staff Surgeon: Bryson Rocha MD  Fellow Surgeon: Severiano Teague MD  Resident Surgeon: None  Nurse: Melanie Silver CMA    Tumor Type: SCCis  Location: Left upper arm  Derm-Path Accession #: [id number]     Skin Lesion Size:  0.4 cm x 0.4 cm  Margin: 4 mm  Excision size: 1.2 cm x 1.2 cm  Level of Defect: Fat  Repair Type: Intermediate linear  Repair Size: 3.5 cm  Suture Material: 4-0 Monocryl; 4-0 Monocryl    INDICATIONS:  The patient was scheduled for wide local excision of the skin lesion documented above. We discussed the principles of treatment and most likely complications including scarring, bleeding, infection, incomplete excision, wound dehiscence, pain, nerve damage, and recurrence. Informed consent was obtained and the patient underwent the procedure as follows:    PROCEDURE:  With the patient in a supine position, the lesion was outlined with the margin documented above.  The lesion and the necessary margin (excised diameter) was measured and documented as above.  An ellipse was designed around the lesion to conform to relaxed skin tension lines in an effort to minimize scarring and deformity.  The lesion and surrounding skin were prepped with chlorhexidine, draped, and anesthetized with lidocaine with epinephrine. Using a #15 blade, the skin was excised along premarked lines.  The level of the defect is documented as above.  Wound margins were undermined to limit functional deformity/impairment of adjacent structures. Bleeding vessels were controlled with electrocoagulation.  The dermis and subcutaneous tissue were closed with buried vertical mattress sutures using 4-0 Monocryl.  Epidermal approximation was meticulously refined with 4-0 Monocryl subcuticular sutures, resulting in a linear closure with little to no wound tension.  Blood loss was estimated to be less than 5 mL.   The area was coated with petrolatum and covered with a non-adherent dressing followed by gauze and tape. Postoperative instructions were reviewed per protocol.  The patient left alert and fully oriented.    Follow-up for suture removal: Not applicable as only dissolving sutures used    Bryson Rocha MD was immediately available for the entire surgery and was physicially present for the key portions of the procedure.    Severiano Teague MD  Micrographic Surgery and Dermatologic Oncology (MSDO) Fellow    Scribe Disclosure:  I, Romy Hsu, am serving as a scribe to document services personally performed by Bryson Rocha MD based on data collection and the provider's statements to me.

## 2022-04-04 NOTE — PATIENT INSTRUCTIONS
Wound care    I will experience scar (unavoidable when the skin is cut with surgery), and may experience bleeding, swelling, pain, crusting, and redness. I may experience incomplete removal (very uncommon) or recurrence (very uncommon). Risks of the procedure are bleeding, bruising, swelling, infection, nerve damage, and a large wound. These occur very uncommonly but are still theoretically possible.    Caring for your skin after surgery    After your surgery, a pressure bandage will be placed over the area that has stitches. This is important to prevent bleeding. Please follow these instructions over the next 1 to 2 weeks. Following this regimen will help to prevent complications as your wound heals and produce the best possible scar over time.    For the first 48 hours after your surgery:      Leave the pressure dressing on and keep it dry. If it should come loose, you may re-tape it, but do not take it off.    Relax and take it easy. Do not do any vigorous exercise or heavy lifting. This could cause the wound to bleed, sutures to break, and the wound to enlarge.    Post-operative pain is usually mild to moderate. Please see the detailed pain medication instructions at the bottom of this message regarding the use of Tylenol (acetaminophen) and ibuprofen.    Avoid alcohol as this may increase your tendency to bleed.    You may put an ice pack around the bandaged area for 20 minutes at a time as needed multiple times daily. This may help reduce swelling, bruising, and pain. Make sure the ice pack is waterproof so that the pressure bandage doesn t get wet.    If your surgical wound is on the face, try your best to sleep with your head elevated. Either in a recliner or propped up in bed with pillows as this will decrease swelling around the eyes, especially when surgery was on the upper face or mid-face.    You may see a small amount of drainage or blood on your pressure bandage. This is normal. However:  o If  drainage or bleeding continues or saturates the bandage, you will need to apply firm pressure over the bandage with a piece of gauze, a clean towel, or a waterproof ice pack (an ice pack is best) for 20 minutes continuously without letting up the pressure early. If you let up the pressure earlier than 20 minutes, then the timer restarts.   o If after applying continuous pressure for 20 minutes you check and still see active bleeding, then continue for an additional 20 minute period. Waterproof ice packs are best for applying pressure.  o If bleeding still continues after two 20-minute periods, call our office or go to the nearest emergency room.    Remove pressure dressing 48 hours after surgery:      Carefully remove the pressure bandage. If it seems sticky or too difficult to get off, you may need to soak it with room temperature water, such as with wet compresses or in the shower.    After the pressure dressing is removed, you may shower and get the wound wet. However, do not let the forceful stream of the direct water pressure from the shower hit the wound directly. This will interfere with wound healing by knocking of skin cells that are migrating into the wound to heal the wound.    Follow these wound care and dressing change instructions:  o In the shower, wash the surgical site LAST with its own separate clean wash cloth. Wash everything but the surgical site first, THEN wash the surgical site to avoid contamination from other body areas.  o You may allow water to run over the site - just not shooting water on top of the surgical site. Take a clean wash cloth wet with soapy warm water and gently pat the suture site to help remove any crust or drainage that may develop.  o Do not rub or scrub the site  o After the site is clean, pat dry and apply a thin layer of Vaseline ointment over the suture site with a cotton swab or clean finger.   o Cover the suture site with Telfa (non-stick) dressing. You may tape a  "piece of gauze over the Telfa for extra protection if you wish. If you do not have Telfa, then use a clean bandage that is large enough to cover the surgical site without sticky adhesive directly touching the wound or sutures, which would be painful to rip off.  o Continue wound care at least once a day, or twice a day if you are active or around a dirty or contaminated environment for work.  o Continue daily wound care until your surgical site is completely healed. To determine this, around 2 weeks post procedure, you can take a cotton swab with a small amount of hydrogen peroxide and roll it on the suture site. If the site bubbles and turns white, then your wound is still healing and has not completely sealed shut. Please continue wound care until the area no longer bubbles up from the hydrogen peroxide.  o Regarding dissolving stitches: if you have been told your stitches are dissolving (also called \"absorbable sutures\"), they should dissolve in 1-2 weeks. If the absorbable/dissolving stitches do not dissolve by 1-2 weeks, you can use a Q-tip with hydrogen peroxide on it and roll it along the suture line to help the stitches dissolve and come out. Please give us a call if stitches are still present after two weeks. If you do not keep your wound moist at all times, such as with Vaseline or petroleum jelly, while it heals, the dissolving sutures will dry out and not dissolve. They need a moist environment in order to dissolve normally, which is why a coat of Vaseline is important every day.      Follow up is typically a 3-month scar evaluation appointment either in-person in the clinic, or via a telephone visit with you sending in a photo via [employer]. This is unless you have been told to follow up sooner, or if you have concerns and would like to be see sooner for a post-operative issue. Please call or send us a [employer] message with a photo if possible. A [employer] message with a photo is usually the fastest way for " "us to be able to make an assessment of a post-operative issue.        What to expect:      The first couple of days your wound may be tender and may bleed slightly when doing wound care.    There may be swelling and bruising around the wound, especially if it is near the eyes. For surgeries of the upper face or mid-face, \"black eyes\" (bruising within the eye sockets) and swelling around the eyes is very common. For your comfort, you may apply ice or cold compresses to the bruises after your have removed the pressure bandage.    The area around your wound may be numb for several weeks or even months. Nerve endings are typically the slowest thing to regenerate and can take even up to 1 year to completely regenerate. Very, very rarely, patients can report permanent changes in their sensation after surgery.    You may experience periodic sharp pain or mild itching around the wound as it heals.    The suture line will look dark and raised for a while but will lighten and flatten over time. This can take up to 3-6 months.    For post-operative pain control:    If you are able to take acetaminophen (\"Tylenol,\" etc.) and ibuprofen (\"Advil\" or \"Motrin,\" etc.), then you may STAGGER these medications by taking 400 mg of ibuprofen (usually two tabs) every 8 hours and 1,000 mg of acetaminophen (e.g., two tabs of extra-strength Tylenol) every 8 hours.    This means, for example, that you could take the followin,000 mg of Tylenol, followed 4 hours later by 400 mg Ibuprofen, followed 4 hours later with 1,000 mg of Tylenol, followed 4 hours later by 400 mg Ibuprofen, followed 4 hours later with 1,000 mg of Tylenol, and so forth.     Essentially, you can take either 1,000 mg of Tylenol or 400 mg of ibuprofen in alternating fashion EVERY FOUR HOURS.    Do NOT exceed more than 4,000 mg of Tylenol or 3,200 mg of ibuprofen per 24 hours. If you are not able to take Tylenol or ibuprofen as above due to other health issues (or a " physician has told you directly that you are not allowed to take one of them, say due to pre-existing severe liver or kidney issues), then disregard the above directions.    Scientific evidence supports that this combination/schedule of pain medications is just as effective, if not more effective, than taking a narcotic pain medicine.      When to contact us:      You have bleeding that will not stop after applying pressure, including with waterproof ice packs, as above.    You have pain that is not controlled with Tylenol (acetaminophen) and/or ibuprofen.    You have signs or symptoms of an infection, such as:  o Fever over 100 degrees Fahrenheit  o Redness expanding half an inch past the surgery site, or foul-smelling drainage from the wound  o If you have any follow-up questions, or are not sure how to take care of the wound.    Note that [email or web address] is often the fastest way to contact us, and we recommend registering for [employer] if you are able to and have technology access. You can register for Faves by going to [employer] or calling [phone] and asking for help with registering.    Phone numbers:    During business hours (M-F 8:00-4:30 p.m.)  [facility]-  [phone] Option 1 appt. desk  [phone]  Option 3 nurse triage line  ---------------------------------------------------------  Evenings/Weekends/Holidays  Hospital - [phone]   TTY for hearing zegltlys-[phone]  *Ask  to page dermatologist on-call  Emergency [phone]  TTY for hearing impaired- [phone]

## 2022-04-04 NOTE — NURSING NOTE
Chief Complaint   Patient presents with     Derm Problem     Patient is here today for an excision      Melanie SINGH CMA

## 2022-04-05 ENCOUNTER — PRE VISIT (OUTPATIENT)
Dept: ORTHOPEDICS | Facility: CLINIC | Age: 82
End: 2022-04-05

## 2022-04-05 ENCOUNTER — OFFICE VISIT (OUTPATIENT)
Dept: ORTHOPEDICS | Facility: CLINIC | Age: 82
End: 2022-04-05
Payer: MEDICARE

## 2022-04-05 ENCOUNTER — ANCILLARY PROCEDURE (OUTPATIENT)
Dept: GENERAL RADIOLOGY | Facility: CLINIC | Age: 82
End: 2022-04-05
Attending: ORTHOPAEDIC SURGERY
Payer: MEDICARE

## 2022-04-05 VITALS — WEIGHT: 153 LBS | BODY MASS INDEX: 22.66 KG/M2 | HEIGHT: 69 IN

## 2022-04-05 DIAGNOSIS — M48.062 SPINAL STENOSIS OF LUMBAR REGION WITH NEUROGENIC CLAUDICATION: Primary | ICD-10-CM

## 2022-04-05 PROCEDURE — 99204 OFFICE O/P NEW MOD 45 MIN: CPT | Performed by: ORTHOPAEDIC SURGERY

## 2022-04-05 PROCEDURE — 72100 X-RAY EXAM L-S SPINE 2/3 VWS: CPT | Performed by: RADIOLOGY

## 2022-04-05 NOTE — NURSING NOTE
Teaching Flowsheet   Relevant Diagnosis: L2-L3 Decompression  Teaching Topic: Pre op packet     Person(s) involved in teaching:   Patient and wife Pat      Motivation Level:  Asks Questions: Yes  Eager to Learn: Yes  Cooperative: Yes  Receptive (willing/able to accept information): Yes  Any cultural factors/Christianity beliefs that may influence understanding or compliance? No       Patient demonstrates understanding of the following:  Reason for the appointment, diagnosis and treatment plan: Yes  Knowledge of proper use of medications and conditions for which they are ordered (with special attention to potential side effects or drug interactions): Yes  Which situations necessitate calling provider and whom to contact: Yes       Teaching Concerns Addressed: RN discussed all aspects of pre and post surgery with patient and wife. Juli will call patient with surgery date, PAC appt and Covid 19 testing.       Proper use and care of meds (medical equip, care aids, etc.): Yes  Nutritional needs and diet plan: Yes  Pain management techniques: Yes  Wound Care: Yes  How and/when to access community resources: Yes     Instructional Materials Used/Given: Pre op packet and antibacterial soap.     Time spent with patient: 15 minutes.

## 2022-04-05 NOTE — PROGRESS NOTES
Spine Surgery Consultation    REFERRING PHYSICIAN: F=Referred Self   PRIMARY CARE PHYSICIAN: Anthony Maddox           Chief Complaint:   Consult (lumbar pain, has been having lower back pain that starts in his lwoer legs and creeps up radiating until he stars to have back pain, saw Dr. Alba in september. has tried PT and in december/january he started to having more intense pain, has tried an injection but had little relief from it. no previous surgeries on his spine. )      History of Present Illness:  Symptom Profile Including: location of symptoms, onset, severity, exacerbating/alleviating factors, previous treatments:        Felice Blood is a 81 year old male who preents with low back pain across his back and buttock. Reports HNP in 90's, reports intermittent back pain that resolved with PT. Now with 6 months of low back pain. Was walking around when felt sudden onset of back pain. Started PT over next two months. Then noted burning pain in R leg. Pain improved 90%, on 12/23 got up to do exercises and couldn't move do to pain in back and R leg. Was not able to do his normal activities for the next month. Then was able to do exercises again, noted improvement in pain. TFESI on 2/21, minimally improved pain. Has continued to progress exercises, able to do a plank, helps quite a bit. Pain is still present, worsens throughout day. ABIs normal. L4 radicular pain in R.Pain improves with leaning forward. Reports N/T in feet/ankles and then into his calves, where he starts to feel BLE weak. Notes weakness with prolonged standing.  Feels he isn't able to walk for as long as he used to.     Past treatments tried:  - Physical therapy: 10-12/21, improved pain  - Injections: L5-S1 TFESI, allowed him to straighten legs out  - Medications: ibuprofen, tylenol. Didn't like flexeril--made him feel tired. Has not tried gabapentin.   - No previous surgeries          Past Medical History:     Past Medical History:    Diagnosis Date     Cobblestone retinal degeneration      Impotence of organic origin      Nonsenile cataract      Pure hypercholesterolemia      Unspecified essential hypertension      Vitreous detachment of both eyes             Past Surgical History:     Past Surgical History:   Procedure Laterality Date     APPENDECTOMY       CATARACT IOL, RT/LT Right 05/01/2017     PHACOEMULSIFICATION WITH STANDARD INTRAOCULAR LENS IMPLANT Right 5/1/2017    Procedure: PHACOEMULSIFICATION WITH STANDARD INTRAOCULAR LENS IMPLANT;  Right Eye Phacoemulsification with intraocular Lens Implant;  Surgeon: Cmaron Hansen MD;  Location: [facility] OR     TONSILLECTOMY              Social History:     Social History     Tobacco Use     Smoking status: Never Smoker     Smokeless tobacco: Never Used   Substance Use Topics     Alcohol use: Yes     Comment: 1 day            Family History:     Family History   Problem Relation Age of Onset     Glaucoma No family hx of      Macular Degeneration No family hx of             Allergies:     Allergies   Allergen Reactions     Acetaminophen      Other reaction(s): Muscle Aches/Weakness     Etodolac Unknown     Percocet [Oxycodone-Acetaminophen] Nausea     Other reaction(s): Muscle Weakness     Seasonal Allergies Cough     Sinus drainage, watery eyes            Medications:     Current Outpatient Medications   Medication     albuterol (PROAIR HFA/PROVENTIL HFA/VENTOLIN HFA) 108 (90 Base) MCG/ACT inhaler     aspirin 81 MG tablet     atorvastatin (LIPITOR) 10 MG tablet     cholecalciferol (VITAMIN D3) 1000 UNIT capsule     cyclobenzaprine (FLEXERIL) 10 MG tablet     fluticasone (FLONASE) 50 MCG/ACT spray     hydrOXYzine (ATARAX) 25 MG tablet     ipratropium (ATROVENT) 0.06 % nasal spray     ketoconazole (NIZORAL) 2 % external shampoo     lisinopril (ZESTRIL) 10 MG tablet     sildenafil (REVATIO) 20 MG tablet     sildenafil (VIAGRA) 100 MG tablet     No current facility-administered medications for this  "visit.             Review of Systems:     A 10 point ROS was performed and reviewed. Specific responses to these questions are noted at the end of the document.         Physical Exam:   Vitals: Ht 1.753 m (5' 9\")   Wt 69.4 kg (153 lb)   BMI 22.59 kg/m    Constitutional: awake, alert, cooperative, no apparent distress, appears stated age.    Eyes: The sclera are white.  Ears, Nose, Throat: The trachea is midline.  Psychiatric: The patient has a normal affect.  Respiratory: breathing non-labored  Cardiovascular: The extremities are warm and perfused.  Skin: no obvious rashes or lesions.  Musculoskeletal, Neurologic, and Spine:        Lumbar Spine:    Appearance - No gross stepoffs or deformities    Motor -     L2-3: Hip flexion 5/5 R and 5/5 L strength          L3/4:  Knee extension R 5/5 and L 5/5 strength         L4/5:  Foot dorsiflexion R 5/5 L 5/5 and       EHL dorsiflexion R 4/5 L 4/5 strength         S1:  Plantarflexion/Peroneal Muscles  R 5/5 and L 5/5 strength    Sensation: intact to light touch L3-S1 distribution BLE. Reports difference in sensation between BLE but is unable to articulate what exactly is different.       Neurologic:      REFLEXES Left Right   Patella 2+ 2+   Ankle jerk 1+ 1+   Clonus 0 beats 0 beats   - SLR BLE    Alignment:  Patient stands with a neutral standing sagittal balance.         Imaging:   We ordered and independently reviewed new radiographs at this clinic visit. The results were discussed with the patient.  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 He has tried and failed conservative management and reports that his symptoms are significantly impacting his quality of life. Discussed options including medications, therapy, injections, and " surgery. He would like to proceed with surgery and it is likely that he would benefit greatly from a minimally invasive laminectomy from L2-L3.      Risks of this surgery include risk of infection, risk of dural tear resulting in CSF leak which might result in headaches, or possible need for lumbar drain, or possible revision surgery in the setting of a persistent leak. Risk of seroma or hematoma requiring revision surgery. Possible nerve root injury resulting in numbness weakness or paralysis into the leg. Possible radiculitis which could result in similar symptoms or could result in significant neurogenic type pain into the leg. Risk of incomplete decompression which might require revision surgery in the future.  Risk of pars fracture or postoperative instability requiring conversion to a fusion in the future. Risk of adjacent segment problems requiring surgery in the future. Risk of incomplete relief of symptoms possibly requiring revision surgery in the future. There is a risk of blood clots in the legs or the lungs.  Furthermore, although rare, there are risks of major vessel or major organ injury from the surgery, and risks of the anesthetic including stroke heart attack and death. Patient expressed an understanding of the risks and would like to proceed with surgery.        Plan:  1. Minimally invasive L2-L3 laminectomy with Dr. Misha Corrigan MD  Orthopedic Surgery Resident    Patient was seen and examined with Dr. Cabral who independently evaluated the patient and agrees with the assessment and exam.     Respectfully,  Wolfgang Cabral MD  Spine Surgery  [facility]    Attending MD (Dr. Wolfgang Cabral) :  I reviewed and verified the history and physical exam of the patient and discussed the patient's management with the other clinical providers involved in this patient's care including any involved residents or physicians assistants. I reviewed the above note and agree  with the documented findings and plan of care, which were communicated to the patient.      Wolfgang Cabral MD

## 2022-04-05 NOTE — NURSING NOTE
"Reason For Visit:   Chief Complaint   Patient presents with     Consult     lumbar pain, has been having lower back pain that starts in his lwoer legs and creeps up radiating until he stars to have back pain, saw Dr. Alab in september. has tried PT and in december/january he started to having more intense pain, has tried an injection but had little relief from it. no previous surgeries on his spine.        Primary MD: Anthony Maddox  Ref. MD: Self     ?  No  Occupation retired .  Currently working? No.  Work status?  Retired.  Date of injury: September 2021  Type of injury: chronic .  Date of surgery: none   Type of surgery: none .  Smoker: No  Request smoking cessation information: No    Ht 1.753 m (5' 9\")   Wt 69.4 kg (153 lb)   BMI 22.59 kg/m           Oswestry (NADIYA) Questionnaire    OSWESTRY DISABILITY INDEX 11/3/2021   Count 10   Sum 12   Oswestry Score (%) 24   Some recent data might be hidden            Neck Disability Index (NDI) Questionnaire    No flowsheet data found.                Promis 10 Assessment    No flowsheet data found.             Mike Lawrence, ATC  "

## 2022-04-05 NOTE — LETTER
4/5/2022         RE: Felice Blood  [address]  [place] [facility] [address]        Dear Colleague,    Thank you for referring your patient, Felice Blood, to the [facility] [place]. Please see a copy of my visit note below.    Spine Surgery Consultation    REFERRING PHYSICIAN: F=Referred Self   PRIMARY CARE PHYSICIAN: Anthony Maddox           Chief Complaint:   Consult (lumbar pain, has been having lower back pain that starts in his lwoer legs and creeps up radiating until he stars to have back pain, saw Dr. Alba in september. has tried PT and in december/january he started to having more intense pain, has tried an injection but had little relief from it. no previous surgeries on his spine. )      History of Present Illness:  Symptom Profile Including: location of symptoms, onset, severity, exacerbating/alleviating factors, previous treatments:        Felice Blood is a 81 year old male who preents with low back pain across his back and buttock. Reports HNP in 90's, reports intermittent back pain that resolved with PT. Now with 6 months of low back pain. Was walking around when felt sudden onset of back pain. Started PT over next two months. Then noted burning pain in R leg. Pain improved 90%, on 12/23 got up to do exercises and couldn't move do to pain in back and R leg. Was not able to do his normal activities for the next month. Then was able to do exercises again, noted improvement in pain. TFESI on 2/21, minimally improved pain. Has continued to progress exercises, able to do a plank, helps quite a bit. Pain is still present, worsens throughout day. ABIs normal. L4 radicular pain in R.Pain improves with leaning forward. Reports N/T in feet/ankles and then into his calves, where he starts to feel BLE weak. Notes weakness with prolonged standing.  Feels he isn't able to walk for as long as he used to.     Past treatments tried:  - Physical therapy:  10-12/21, improved pain  - Injections: L5-S1 TFESI, allowed him to straighten legs out  - Medications: ibuprofen, tylenol. Didn't like flexeril--made him feel tired. Has not tried gabapentin.   - No previous surgeries          Past Medical History:     Past Medical History:   Diagnosis Date     Cobblestone retinal degeneration      Impotence of organic origin      Nonsenile cataract      Pure hypercholesterolemia      Unspecified essential hypertension      Vitreous detachment of both eyes             Past Surgical History:     Past Surgical History:   Procedure Laterality Date     APPENDECTOMY       CATARACT IOL, RT/LT Right 05/01/2017     PHACOEMULSIFICATION WITH STANDARD INTRAOCULAR LENS IMPLANT Right 5/1/2017    Procedure: PHACOEMULSIFICATION WITH STANDARD INTRAOCULAR LENS IMPLANT;  Right Eye Phacoemulsification with intraocular Lens Implant;  Surgeon: Camron Hansen MD;  Location: [facility] OR     TONSILLECTOMY              Social History:     Social History     Tobacco Use     Smoking status: Never Smoker     Smokeless tobacco: Never Used   Substance Use Topics     Alcohol use: Yes     Comment: 1 day            Family History:     Family History   Problem Relation Age of Onset     Glaucoma No family hx of      Macular Degeneration No family hx of             Allergies:     Allergies   Allergen Reactions     Acetaminophen      Other reaction(s): Muscle Aches/Weakness     Etodolac Unknown     Percocet [Oxycodone-Acetaminophen] Nausea     Other reaction(s): Muscle Weakness     Seasonal Allergies Cough     Sinus drainage, watery eyes            Medications:     Current Outpatient Medications   Medication     albuterol (PROAIR HFA/PROVENTIL HFA/VENTOLIN HFA) 108 (90 Base) MCG/ACT inhaler     aspirin 81 MG tablet     atorvastatin (LIPITOR) 10 MG tablet     cholecalciferol (VITAMIN D3) 1000 UNIT capsule     cyclobenzaprine (FLEXERIL) 10 MG tablet     fluticasone (FLONASE) 50 MCG/ACT spray     hydrOXYzine (ATARAX) 25  "MG tablet     ipratropium (ATROVENT) 0.06 % nasal spray     ketoconazole (NIZORAL) 2 % external shampoo     lisinopril (ZESTRIL) 10 MG tablet     sildenafil (REVATIO) 20 MG tablet     sildenafil (VIAGRA) 100 MG tablet     No current facility-administered medications for this visit.             Review of Systems:     A 10 point ROS was performed and reviewed. Specific responses to these questions are noted at the end of the document.         Physical Exam:   Vitals: Ht 1.753 m (5' 9\")   Wt 69.4 kg (153 lb)   BMI 22.59 kg/m    Constitutional: awake, alert, cooperative, no apparent distress, appears stated age.    Eyes: The sclera are white.  Ears, Nose, Throat: The trachea is midline.  Psychiatric: The patient has a normal affect.  Respiratory: breathing non-labored  Cardiovascular: The extremities are warm and perfused.  Skin: no obvious rashes or lesions.  Musculoskeletal, Neurologic, and Spine:        Lumbar Spine:    Appearance - No gross stepoffs or deformities    Motor -     L2-3: Hip flexion 5/5 R and 5/5 L strength          L3/4:  Knee extension R 5/5 and L 5/5 strength         L4/5:  Foot dorsiflexion R 5/5 L 5/5 and       EHL dorsiflexion R 4/5 L 4/5 strength         S1:  Plantarflexion/Peroneal Muscles  R 5/5 and L 5/5 strength    Sensation: intact to light touch L3-S1 distribution BLE. Reports difference in sensation between BLE but is unable to articulate what exactly is different.       Neurologic:      REFLEXES Left Right   Patella 2+ 2+   Ankle jerk 1+ 1+   Clonus 0 beats 0 beats   - SLR BLE    Alignment:  Patient stands with a neutral standing sagittal balance.         Imaging:   We ordered and independently reviewed new radiographs at this clinic visit. The results were discussed with the patient.  Findings include:    MRI L spine 11/17/21: multilevel lumbar spondylosis with stenosis at L2-3, bilateral neural foraminal narrowing at L5-S1    XR L spine 4/5/22: multilevel spondylosis              " Assessment and Plan:   Assessment:  81 year old male with L2-3 lumbar stenosis. He has tried and failed conservative management and reports that his symptoms are significantly impacting his quality of life. Discussed options including medications, therapy, injections, and surgery. He would like to proceed with surgery and it is likely that he would benefit greatly from a minimally invasive laminectomy from L2-L3.      Risks of this surgery include risk of infection, risk of dural tear resulting in CSF leak which might result in headaches, or possible need for lumbar drain, or possible revision surgery in the setting of a persistent leak. Risk of seroma or hematoma requiring revision surgery. Possible nerve root injury resulting in numbness weakness or paralysis into the leg. Possible radiculitis which could result in similar symptoms or could result in significant neurogenic type pain into the leg. Risk of incomplete decompression which might require revision surgery in the future.  Risk of pars fracture or postoperative instability requiring conversion to a fusion in the future. Risk of adjacent segment problems requiring surgery in the future. Risk of incomplete relief of symptoms possibly requiring revision surgery in the future. There is a risk of blood clots in the legs or the lungs.  Furthermore, although rare, there are risks of major vessel or major organ injury from the surgery, and risks of the anesthetic including stroke heart attack and death. Patient expressed an understanding of the risks and would like to proceed with surgery.        Plan:  1. Minimally invasive L2-L3 laminectomy with Dr. Misha Corrigan MD  Orthopedic Surgery Resident    Patient was seen and examined with Dr. Cabral who independently evaluated the patient and agrees with the assessment and exam.     Respectfully,  Wolfgang Cabral MD  Spine Surgery  [facility]    Attending MD (Dr. Wolfgang Cabral)  :  I reviewed and verified the history and physical exam of the patient and discussed the patient's management with the other clinical providers involved in this patient's care including any involved residents or physicians assistants. I reviewed the above note and agree with the documented findings and plan of care, which were communicated to the patient.      Wolfgang Cabral MD

## 2022-04-06 DIAGNOSIS — N52.9 ERECTILE DYSFUNCTION, UNSPECIFIED ERECTILE DYSFUNCTION TYPE: ICD-10-CM

## 2022-04-06 DIAGNOSIS — Z11.59 ENCOUNTER FOR SCREENING FOR OTHER VIRAL DISEASES: Primary | ICD-10-CM

## 2022-04-06 LAB
PATH REPORT.COMMENTS IMP SPEC: NORMAL
PATH REPORT.COMMENTS IMP SPEC: NORMAL
PATH REPORT.FINAL DX SPEC: NORMAL
PATH REPORT.GROSS SPEC: NORMAL
PATH REPORT.MICROSCOPIC SPEC OTHER STN: NORMAL
PATH REPORT.RELEVANT HX SPEC: NORMAL

## 2022-04-06 NOTE — TELEPHONE ENCOUNTER
FUTURE VISIT INFORMATION      SURGERY INFORMATION:    Date: 22    [facility]    Surgeon:  Wolfgang Cabral MD    Anesthesia Type:  General    Procedure: Lumbar 2 to 3 decompression and discectomy    Consult: ov 22    RECORDS REQUESTED FROM:        Primary Care Provider: Anthony Maddox MD- [facility]    Pertinent Medical History: hypertension    Most recent EKG+ Tracin/26/15    Most recent ECHO: 5/26/15    Most recent Cardiac Stress Test: 6/2/15

## 2022-04-08 RX ORDER — SILDENAFIL CITRATE 20 MG/1
TABLET ORAL
Qty: 60 TABLET | Refills: 4 | Status: SHIPPED | OUTPATIENT
Start: 2022-04-08 | End: 2022-09-04

## 2022-04-12 ENCOUNTER — OFFICE VISIT (OUTPATIENT)
Dept: OPHTHALMOLOGY | Facility: CLINIC | Age: 82
End: 2022-04-12
Attending: OPHTHALMOLOGY
Payer: MEDICARE

## 2022-04-12 DIAGNOSIS — H52.4 MYOPIA OF BOTH EYES WITH ASTIGMATISM AND PRESBYOPIA: ICD-10-CM

## 2022-04-12 DIAGNOSIS — H43.21 ASTEROID HYALITIS OF RIGHT EYE: ICD-10-CM

## 2022-04-12 DIAGNOSIS — H52.203 MYOPIA OF BOTH EYES WITH ASTIGMATISM AND PRESBYOPIA: ICD-10-CM

## 2022-04-12 DIAGNOSIS — H52.13 MYOPIA OF BOTH EYES WITH ASTIGMATISM AND PRESBYOPIA: ICD-10-CM

## 2022-04-12 DIAGNOSIS — Z96.1 PSEUDOPHAKIA OF BOTH EYES: Primary | ICD-10-CM

## 2022-04-12 PROCEDURE — 92014 COMPRE OPH EXAM EST PT 1/>: CPT | Performed by: OPHTHALMOLOGY

## 2022-04-12 PROCEDURE — 92015 DETERMINE REFRACTIVE STATE: CPT | Mod: GY

## 2022-04-12 PROCEDURE — G0463 HOSPITAL OUTPT CLINIC VISIT: HCPCS | Mod: 25

## 2022-04-12 ASSESSMENT — REFRACTION_WEARINGRX
OS_SPHERE: -2.00
OD_ADD: +2.75
OD_SPHERE: -2.00
OS_CYLINDER: +0.50
OS_AXIS: 011
SPECS_TYPE: PAL
OS_ADD: +2.75
OD_CYLINDER: +1.00
OD_AXIS: 002

## 2022-04-12 ASSESSMENT — TONOMETRY
OS_IOP_MMHG: 15
IOP_METHOD: TONOPEN
OD_IOP_MMHG: 17

## 2022-04-12 ASSESSMENT — SLIT LAMP EXAM - LIDS: COMMENTS: MILD MGD

## 2022-04-12 ASSESSMENT — REFRACTION_MANIFEST
OD_SPHERE: -2.25
OS_AXIS: 025
OS_SPHERE: -2.00
OD_ADD: +2.75
OD_AXIS: 165
OS_ADD: +2.75
OD_CYLINDER: +1.00
OS_CYLINDER: +0.50

## 2022-04-12 ASSESSMENT — VISUAL ACUITY
OS_CC: 20/25
CORRECTION_TYPE: GLASSES
OS_CC+: -2
METHOD: SNELLEN - LINEAR
OD_CC+: +1
OD_CC: 20/25

## 2022-04-12 ASSESSMENT — CONF VISUAL FIELD
OS_NORMAL: 1
OD_NORMAL: 1
METHOD: COUNTING FINGERS

## 2022-04-12 ASSESSMENT — CUP TO DISC RATIO
OD_RATIO: 0.3
OS_RATIO: 0.3

## 2022-04-12 NOTE — NURSING NOTE
Chief Complaints and History of Present Illnesses   Patient presents with     COMPREHENSIVE EYE EXAM     Patient states vision is good each eye with glasses. Denies changes in the last year.     KANWAL Holly 8:54 AM 04/12/2022       Chief Complaint(s) and History of Present Illness(es)     COMPREHENSIVE EYE EXAM     Laterality: both eyes    Onset: years ago    Associated symptoms: floaters (occasional, not new).  Negative for dryness, eye pain, redness, tearing, headache, flashes and itching    Treatments tried: glasses    Pain scale: 0/10    Comments: Patient states vision is good each eye with glasses. Denies changes in the last year.     KANWAL Holly 8:54 AM 04/12/2022

## 2022-04-12 NOTE — PROGRESS NOTES
Chief Complaint(s) and History of Present Illness(es)     COMPREHENSIVE EYE EXAM     Laterality: both eyes    Onset: years ago    Associated symptoms: floaters (occasional, not new).  Negative for   dryness, eye pain, redness, tearing, headache, flashes and itching    Treatments tried: glasses    Pain scale: 0/10    Comments: Patient states vision is good each eye with glasses. Denies   changes in the last year.     Sandra Karimi, KANWAL 8:54 AM 04/12/2022              Review of systems for the eyes was negative other than the pertinent positives/negatives listed in the HPI.      Assessment & Plan      Felice Blood is a 81 year old male with the following diagnoses:   1. Pseudophakia of both eyes    2. Asteroid hyalitis of right eye    3. Myopia of both eyes with astigmatism and presbyopia         Here for annual dilated fundus exam  Overall doing well  Dilated fundus exam looks stable both eyes     posterior capsular opacity (PCO) left eye not visually significant   Refractive options reviewed  Refraction given       Patient disposition:   Return in about 1 year (around 4/12/2023) for DFE.           Attending Physician Attestation:  Complete documentation of historical and exam elements from today's encounter can be found in the full encounter summary report (not reduplicated in this progress note).  I personally obtained the chief complaint(s) and history of present illness.  I confirmed and edited as necessary the review of systems, past medical/surgical history, family history, social history, and examination findings as documented by others; and I examined the patient myself.  I personally reviewed the relevant tests, images, and reports as documented above.  I formulated and edited as necessary the assessment and plan and discussed the findings and management plan with the patient and family. . - Camron Hansen MD

## 2022-04-14 ENCOUNTER — VIRTUAL VISIT (OUTPATIENT)
Dept: SURGERY | Facility: CLINIC | Age: 82
End: 2022-04-14
Payer: MEDICARE

## 2022-04-14 ENCOUNTER — PRE VISIT (OUTPATIENT)
Dept: SURGERY | Facility: CLINIC | Age: 82
End: 2022-04-14

## 2022-04-14 ENCOUNTER — ANESTHESIA EVENT (OUTPATIENT)
Dept: SURGERY | Facility: CLINIC | Age: 82
DRG: 520 | End: 2022-04-14
Payer: MEDICARE

## 2022-04-14 DIAGNOSIS — Z01.818 PREOP EXAMINATION: Primary | ICD-10-CM

## 2022-04-14 DIAGNOSIS — M48.062 SPINAL STENOSIS OF LUMBAR REGION WITH NEUROGENIC CLAUDICATION: ICD-10-CM

## 2022-04-14 PROCEDURE — 99214 OFFICE O/P EST MOD 30 MIN: CPT | Mod: 95 | Performed by: PHYSICIAN ASSISTANT

## 2022-04-14 RX ORDER — LIDOCAINE 40 MG/G
CREAM TOPICAL
Status: CANCELLED | OUTPATIENT
Start: 2022-04-14

## 2022-04-14 RX ORDER — SODIUM CHLORIDE, SODIUM LACTATE, POTASSIUM CHLORIDE, CALCIUM CHLORIDE 600; 310; 30; 20 MG/100ML; MG/100ML; MG/100ML; MG/100ML
INJECTION, SOLUTION INTRAVENOUS CONTINUOUS
Status: CANCELLED | OUTPATIENT
Start: 2022-04-14

## 2022-04-14 ASSESSMENT — LIFESTYLE VARIABLES: TOBACCO_USE: 0

## 2022-04-14 ASSESSMENT — PAIN SCALES - GENERAL: PAINLEVEL: NO PAIN (0)

## 2022-04-14 NOTE — PROGRESS NOTES
Felice is a 81 year old who is being evaluated via a billable video visit.      How would you like to obtain your AVS? MyChart  If the video visit is dropped, the invitation should be resent by: Text to cell phone: [phone]       HPI         Review of Systems         Objective    Vitals - Patient Reported  Pain Score: No Pain (0)        Physical Exam

## 2022-04-14 NOTE — H&P
Pre-Operative H & P     CC:  Preoperative exam to assess for increased cardiopulmonary risk while undergoing surgery and anesthesia.    Date of Encounter: 4/14/2022  Primary Care Physician:  Anthony Maddox     Reason for visit:   Encounter Diagnoses   Name Primary?     Preop examination Yes     Spinal stenosis of lumbar region with neurogenic claudication        HPI  Felice Blood is a 81 year old male who presents for pre-operative H & P in preparation for  Procedure Information     Case: [id number] Date/Time: 04/21/22 1050    Procedure: Lumbar 2 to 3 decompression and discectomy (N/A Spine)    Anesthesia type: General    Diagnosis: Spinal stenosis of lumbar region with neurogenic claudication [M48.062]    Pre-op diagnosis: Spinal stenosis of lumbar region with neurogenic claudication [M48.062]    Location:  OR  /  OR    Providers: Wolfgang Cabral MD          Felice is a pleasant 81-year-old male with past medical history significant for hypertension, dyslipidemia, seasonal allergies, and ongoing low back pain with neurogenic claudication symptoms.  Patient has had low back pain approximately for the past 6 months.  He has been participating in physical therapy and also underwent corticosteroid injection without substantial relief.  He does continue with home exercises.  He denies any cardiopulmonary symptoms or any complications with anesthesia in the past.    History is obtained from the patient and chart review    Hx of abnormal bleeding or anti-platelet use: ASA 81mg      Past Medical History  Past Medical History:   Diagnosis Date     Cobblestone retinal degeneration      Impotence of organic origin      Nonsenile cataract      Pure hypercholesterolemia      Unspecified essential hypertension      Vitreous detachment of both eyes        Past Surgical History  Past Surgical History:   Procedure Laterality Date     APPENDECTOMY       CATARACT IOL, RT/LT Right 05/01/2017      PHACOEMULSIFICATION WITH STANDARD INTRAOCULAR LENS IMPLANT Right 5/1/2017    Procedure: PHACOEMULSIFICATION WITH STANDARD INTRAOCULAR LENS IMPLANT;  Right Eye Phacoemulsification with intraocular Lens Implant;  Surgeon: Camron Hansen MD;  Location: [facility] OR     TONSILLECTOMY         Prior to Admission Medications  Current Outpatient Medications   Medication Sig Dispense Refill     albuterol (PROAIR HFA/PROVENTIL HFA/VENTOLIN HFA) 108 (90 Base) MCG/ACT inhaler Inhale 2 puffs into the lungs every 4 hours as needed for shortness of breath / dyspnea or wheezing 18 g 0     aspirin 81 MG tablet Take 1 tablet by mouth every morning       atorvastatin (LIPITOR) 10 MG tablet Take 1 tablet (10 mg) by mouth daily (Patient taking differently: Take 10 mg by mouth every evening) 90 tablet 3     Calcium-Magnesium-Zinc 333-133-5 MG TABS per tablet Take 1 tablet by mouth every morning       cholecalciferol (VITAMIN D3) 25 mcg (1000 units) capsule Take 1 capsule by mouth every morning       fluticasone (FLONASE) 50 MCG/ACT spray Spray 2 sprays into both nostrils At Bedtime (Patient taking differently: Spray 2 sprays into both nostrils as needed) 1 Bottle 1     hydrOXYzine (ATARAX) 25 MG tablet Take one po bid prn anxiety; watch for drowsiness (Patient taking differently: every 4 hours as needed Take one po bid prn anxiety; watch for drowsiness) 20 tablet 0     ipratropium (ATROVENT) 0.06 % nasal spray Spray 2 sprays into both nostrils 4 times daily as needed for rhinitis 15 mL 3     ketoconazole (NIZORAL) 2 % external shampoo Massage into wet scalp, let sit 3-5 min, then rinse. Do this 3x weekly. (Patient taking differently: daily as needed Massage into wet scalp, let sit 3-5 min, then rinse. Do this 3x weekly.) 120 mL 11     lisinopril (ZESTRIL) 10 MG tablet Take 1 tablet (10 mg) by mouth daily (Patient taking differently: Take 10 mg by mouth every evening) 90 tablet 3     sildenafil (REVATIO) 20 MG tablet Take 5 tablets (100  mg) by mouth daily as needed. (Patient taking differently: as needed Take 5 tablets (100 mg) by mouth daily as needed.) 60 tablet 4     cyclobenzaprine (FLEXERIL) 10 MG tablet Take 1 tablet (10 mg) by mouth nightly as needed for muscle spasms (Patient not taking: Reported on 4/14/2022) 20 tablet 0     sildenafil (VIAGRA) 100 MG tablet Take 1 tablet (100 mg) by mouth daily as needed 20 tablet 11       Allergies  Allergies   Allergen Reactions     Acetaminophen      Other reaction(s): Muscle Aches/Weakness     Etodolac Unknown     Percocet [Oxycodone-Acetaminophen] Nausea     Other reaction(s): Muscle Weakness     Seasonal Allergies Cough     Sinus drainage, watery eyes       Social History  Social History     Socioeconomic History     Marital status:      Spouse name: Not on file     Number of children: Not on file     Years of education: Not on file     Highest education level: Not on file   Occupational History     Not on file   Tobacco Use     Smoking status: Never Smoker     Smokeless tobacco: Never Used   Substance and Sexual Activity     Alcohol use: Yes     Comment: 1 day     Drug use: Not on file     Sexual activity: Not on file   Other Topics Concern     Not on file   Social History Narrative     Not on file     Social Determinants of Health     Financial Resource Strain: Not on file   Food Insecurity: Not on file   Transportation Needs: Not on file   Physical Activity: Not on file   Stress: Not on file   Social Connections: Not on file   Intimate Partner Violence: Not on file   Housing Stability: Not on file       Family History  Family History   Problem Relation Age of Onset     Glaucoma No family hx of      Macular Degeneration No family hx of        Review of Systems  The complete review of systems is negative other than noted in the HPI or here.     Anesthesia Evaluation   Pt has had prior anesthetic.     No history of anesthetic complications       ROS/MED HX  ENT/Pulmonary:     (+) MIRIAM risk  factors, snores loudly, hypertension, allergic rhinitis,  (-) tobacco use, asthma and recent URI   Neurologic:       Cardiovascular:     (+) Dyslipidemia hypertension-----Previous cardiac testing   Echo: Date: 6/2015 Results:  Interpretation Summary  Global and regional left ventricular function is normal with an EF of 55-60%.  Global right ventricular function is normal.  Pulmonary artery systolic pressure is normal.  The inferior vena cava is normal.  No pericardial effusion is present.  ______________________________________________________________________________           Left Ventricle  Global and regional left ventricular function is normal with an EF of 55-60%.  Left ventricular size is normal. Left ventricular wall thickness is normal.  Normal left ventricular filling for age.     Right Ventricle  The right ventricle is normal size. Global right ventricular function is  normal.  Atria  Both atria appear normal.     Mitral Valve  The mitral valve is normal.     Aortic Valve  Mild aortic valve sclerosis is present.     Tricuspid Valve  The tricuspid valve is normal. Trace to mild tricuspid insufficiency is  present. Pulmonary artery systolic pressure is normal. The right ventricular  systolic pressure is approximated at 22.8 mmHg plus the right atrial  pressure.     Pulmonic Valve  The pulmonic valve is normal.     Vessels  The aorta root is normal. The inferior vena cava is normal.  Pericardium  No pericardial effusion is present.  Stress Test: Date: 5/2015 Results:  Interpretation Summary  Normal resting LV function with EF of approximately 60-65%; normal response  to exercise with increase to approximately 70-75%. No stress induced regional  wall motion abnormalities. No ECG evidence of ischemia. No subjective  evidence of ischemia. Normal functional capacity.  ECG Reviewed: Date: Results:    Cath: Date: Results:      METS/Exercise Tolerance:  Comment: <4 due to back pain   Hematologic:  - neg hematologic   ROS  (-) history of blood clots and history of blood transfusion   Musculoskeletal:       GI/Hepatic:  - neg GI/hepatic ROS  (-) GERD   Renal/Genitourinary:  - neg Renal ROS     Endo:  - neg endo ROS     Psychiatric/Substance Use:  - neg psychiatric ROS     Infectious Disease:  - neg infectious disease ROS     Malignancy: Comment: Left arm  (+) Malignancy, History of Skin.Skin CA status post Surgery.        Other:  - neg other ROS          Virtual visit -  No vitals were obtained    Physical Exam  Constitutional: Awake, alert, cooperative, no apparent distress, and appears stated age.  HENT: Normocephalic  Respiratory: non labored breathing   Neurologic: Awake, alert, oriented to name, place and time.   Neuropsychiatric: Calm, cooperative. Normal affect.        Prior Labs/Diagnostic Studies   All labs and imaging personally reviewed     Labs 4/19    EKG/ stress test - if available please see in ROS above       The patient's records and results personally reviewed by this provider.     Outside records reviewed from: Care Everywhere      Assessment      Felice Blood is a 81 year old male seen as a PAC referral for risk assessment and optimization for anesthesia.    Plan/Recommendations  Pt will be optimized for the proposed procedure.  See below for details on the assessment, risk, and preoperative recommendations    NEUROLOGY  - No history of TIA, CVA or seizure  - Post Op delirium risk factors:  Age    ENT  - unable to assess  Mallampati: Unable to assess  TM: Unable to assess    CARDIAC  - HTN, lisinopril at HS.  HLD continue Lipitor and hold ASA 81mg 7 days prior to DOS  - METS (Metabolic Equivalents), currently less than 4 METS but due to back issues.  Prior to worsening of his back symptoms he was walking regularly without any exertional symptoms.  He still is ambulatory and cooks and cleans.  He denies any chest pain, pressure, SOB, ANN, palpitations, orthopnea, edema, PND.  - negative stress test 2015  "and echo with EF 55-60% in 2015 as well.  These were done for a possible syncopal episode.  No further syncope reported.    -  RCRI 0.4% risk of cardiac complication after non cardiac surgery      PULMONARY    MIRIAM Medium Risk            Total Score: 4    MIRIAM: Snores loudly    MIRIAM: Hypertension    MIRIAM: Over 50 ys old    MIRIAM: Male      - h/o seasonal allergies with PND for which he uses albuterol inhaler.  No h/o asthma    - Tobacco History      History   Smoking Status     Never Smoker   Smokeless Tobacco     Never Used       GI    PONV Medium Risk  Total Score: 2           1 AN PONV: Patient is not a current smoker    1 AN PONV: Intended Post Op Opioids          ENDOCRINE    - BMI: Estimated body mass index is 22.59 kg/m  as calculated from the following:    Height as of 4/5/22: 1.753 m (5' 9\").    Weight as of 4/5/22: 69.4 kg (153 lb).  Healthy Weight (BMI 18.5-24.9)  - No history of Diabetes Mellitus    HEME  VTE Medium Risk 1.8%            Total Score: 7    VTE: Greater than 59 yrs old    VTE: Male    VTE: Family Hx of VTE      - No history of abnormal bleeding or antiplatelet use.          The patient is optimized for their procedure. AVS with information on surgery time/arrival time, meds and NPO status given by nursing staff. No further diagnostic testing indicated.    Please refer to the physical examination documented by the anesthesiologist in the anesthesia record on the day of surgery.    Video-Visit Details    Type of service:  Video Visit    Patient verbally consented to video service today: YES    Video Start Time: 0943  Video End Time (time video stopped): 0952    Originating Location (pt. Location): Home    Distant Location (provider location):  home     Mode of Communication:  Video Conference via [employer]  On the day of service:     Prep time: 12 minutes  Visit time: 9 minutes  Documentation time: 14 minutes  ------------------------------------------  Total time: 37 minutes      Lelia Bolden" BONIFACIO  Preoperative Assessment Center  [facility]  Phone: [phone]  Fax: [phone]

## 2022-04-19 ENCOUNTER — LAB (OUTPATIENT)
Dept: LAB | Facility: CLINIC | Age: 82
End: 2022-04-19
Payer: MEDICARE

## 2022-04-19 DIAGNOSIS — M48.062 SPINAL STENOSIS OF LUMBAR REGION WITH NEUROGENIC CLAUDICATION: ICD-10-CM

## 2022-04-19 DIAGNOSIS — Z11.59 ENCOUNTER FOR SCREENING FOR OTHER VIRAL DISEASES: ICD-10-CM

## 2022-04-19 DIAGNOSIS — Z01.818 PREOP EXAMINATION: ICD-10-CM

## 2022-04-19 LAB
ANTIBODY SCREEN: NEGATIVE
ERYTHROCYTE [DISTWIDTH] IN BLOOD BY AUTOMATED COUNT: 12.8 % (ref 10–15)
HCT VFR BLD AUTO: 43.6 % (ref 40–53)
HGB BLD-MCNC: 15.6 G/DL (ref 13.3–17.7)
MCH RBC QN AUTO: 33.2 PG (ref 26.5–33)
MCHC RBC AUTO-ENTMCNC: 35.8 G/DL (ref 31.5–36.5)
MCV RBC AUTO: 93 FL (ref 78–100)
PLATELET # BLD AUTO: 228 10E3/UL (ref 150–450)
RBC # BLD AUTO: 4.7 10E6/UL (ref 4.4–5.9)
SPECIMEN EXPIRATION DATE: NORMAL
WBC # BLD AUTO: 7.2 10E3/UL (ref 4–11)

## 2022-04-19 PROCEDURE — U0003 INFECTIOUS AGENT DETECTION BY NUCLEIC ACID (DNA OR RNA); SEVERE ACUTE RESPIRATORY SYNDROME CORONAVIRUS 2 (SARS-COV-2) (CORONAVIRUS DISEASE [COVID-19]), AMPLIFIED PROBE TECHNIQUE, MAKING USE OF HIGH THROUGHPUT TECHNOLOGIES AS DESCRIBED BY CMS-2020-01-R: HCPCS

## 2022-04-19 PROCEDURE — 85027 COMPLETE CBC AUTOMATED: CPT

## 2022-04-19 PROCEDURE — 36415 COLL VENOUS BLD VENIPUNCTURE: CPT

## 2022-04-19 PROCEDURE — 80048 BASIC METABOLIC PNL TOTAL CA: CPT

## 2022-04-19 PROCEDURE — 86850 RBC ANTIBODY SCREEN: CPT

## 2022-04-19 PROCEDURE — U0005 INFEC AGEN DETEC AMPLI PROBE: HCPCS

## 2022-04-20 LAB
ANION GAP SERPL CALCULATED.3IONS-SCNC: 6 MMOL/L (ref 3–14)
BUN SERPL-MCNC: 9 MG/DL (ref 7–30)
CALCIUM SERPL-MCNC: 9.3 MG/DL (ref 8.5–10.1)
CHLORIDE BLD-SCNC: 104 MMOL/L (ref 94–109)
CO2 SERPL-SCNC: 24 MMOL/L (ref 20–32)
CREAT SERPL-MCNC: 0.64 MG/DL (ref 0.66–1.25)
GFR SERPL CREATININE-BSD FRML MDRD: >90 ML/MIN/1.73M2
GLUCOSE BLD-MCNC: 100 MG/DL (ref 70–99)
POTASSIUM BLD-SCNC: 3.9 MMOL/L (ref 3.4–5.3)
SARS-COV-2 RNA RESP QL NAA+PROBE: NEGATIVE
SODIUM SERPL-SCNC: 134 MMOL/L (ref 133–144)

## 2022-04-21 ENCOUNTER — APPOINTMENT (OUTPATIENT)
Dept: GENERAL RADIOLOGY | Facility: CLINIC | Age: 82
DRG: 520 | End: 2022-04-21
Attending: ORTHOPAEDIC SURGERY
Payer: MEDICARE

## 2022-04-21 ENCOUNTER — ANESTHESIA (OUTPATIENT)
Dept: SURGERY | Facility: CLINIC | Age: 82
DRG: 520 | End: 2022-04-21
Payer: MEDICARE

## 2022-04-21 ENCOUNTER — HOSPITAL ENCOUNTER (INPATIENT)
Facility: CLINIC | Age: 82
LOS: 1 days | Discharge: HOME | DRG: 520 | End: 2022-04-22
Attending: ORTHOPAEDIC SURGERY | Admitting: ORTHOPAEDIC SURGERY
Payer: MEDICARE

## 2022-04-21 DIAGNOSIS — Z98.890 STATUS POST LUMBAR SPINE OPERATIVE PROCEDURE FOR DECOMPRESSION OF SPINAL CORD: Primary | ICD-10-CM

## 2022-04-21 LAB
ABO/RH(D): NORMAL
ANTIBODY SCREEN: NEGATIVE
GLUCOSE BLDC GLUCOMTR-MCNC: 107 MG/DL (ref 70–99)
SPECIMEN EXPIRATION DATE: NORMAL

## 2022-04-21 PROCEDURE — 370N000017 HC ANESTHESIA TECHNICAL FEE, PER MIN: Performed by: ORTHOPAEDIC SURGERY

## 2022-04-21 PROCEDURE — 250N000011 HC RX IP 250 OP 636: Performed by: STUDENT IN AN ORGANIZED HEALTH CARE EDUCATION/TRAINING PROGRAM

## 2022-04-21 PROCEDURE — 99232 SBSQ HOSP IP/OBS MODERATE 35: CPT | Performed by: NURSE PRACTITIONER

## 2022-04-21 PROCEDURE — 250N000013 HC RX MED GY IP 250 OP 250 PS 637: Performed by: NURSE PRACTITIONER

## 2022-04-21 PROCEDURE — 36415 COLL VENOUS BLD VENIPUNCTURE: CPT | Performed by: PHYSICIAN ASSISTANT

## 2022-04-21 PROCEDURE — 250N000009 HC RX 250: Performed by: STUDENT IN AN ORGANIZED HEALTH CARE EDUCATION/TRAINING PROGRAM

## 2022-04-21 PROCEDURE — 250N000013 HC RX MED GY IP 250 OP 250 PS 637: Performed by: PHYSICIAN ASSISTANT

## 2022-04-21 PROCEDURE — 710N000010 HC RECOVERY PHASE 1, LEVEL 2, PER MIN: Performed by: ORTHOPAEDIC SURGERY

## 2022-04-21 PROCEDURE — 999N000063 XR CROSSTABLE LATERAL LUMBAR SPINE PORTABLE

## 2022-04-21 PROCEDURE — 250N000025 HC SEVOFLURANE, PER MIN: Performed by: ORTHOPAEDIC SURGERY

## 2022-04-21 PROCEDURE — 250N000011 HC RX IP 250 OP 636: Performed by: PHYSICIAN ASSISTANT

## 2022-04-21 PROCEDURE — 250N000009 HC RX 250: Performed by: ORTHOPAEDIC SURGERY

## 2022-04-21 PROCEDURE — 0ST20ZZ RESECTION OF LUMBAR VERTEBRAL DISC, OPEN APPROACH: ICD-10-PCS | Performed by: ORTHOPAEDIC SURGERY

## 2022-04-21 PROCEDURE — 72020 X-RAY EXAM OF SPINE 1 VIEW: CPT | Mod: 26 | Performed by: RADIOLOGY

## 2022-04-21 PROCEDURE — 63048 LAM FACETEC &FORAMOT EA ADDL: CPT | Mod: GC | Performed by: ORTHOPAEDIC SURGERY

## 2022-04-21 PROCEDURE — 63047 LAM FACETEC & FORAMOT LUMBAR: CPT | Mod: GC | Performed by: ORTHOPAEDIC SURGERY

## 2022-04-21 PROCEDURE — 250N000013 HC RX MED GY IP 250 OP 250 PS 637

## 2022-04-21 PROCEDURE — 86901 BLOOD TYPING SEROLOGIC RH(D): CPT | Performed by: PHYSICIAN ASSISTANT

## 2022-04-21 PROCEDURE — 999N000141 HC STATISTIC PRE-PROCEDURE NURSING ASSESSMENT: Performed by: ORTHOPAEDIC SURGERY

## 2022-04-21 PROCEDURE — 120N000002 HC R&B MED SURG/OB UMMC

## 2022-04-21 PROCEDURE — 272N000004 HC RX 272: Performed by: ORTHOPAEDIC SURGERY

## 2022-04-21 PROCEDURE — 01NB0ZZ RELEASE LUMBAR NERVE, OPEN APPROACH: ICD-10-PCS | Performed by: ORTHOPAEDIC SURGERY

## 2022-04-21 PROCEDURE — 272N000001 HC OR GENERAL SUPPLY STERILE: Performed by: ORTHOPAEDIC SURGERY

## 2022-04-21 PROCEDURE — 360N000077 HC SURGERY LEVEL 4, PER MIN: Performed by: ORTHOPAEDIC SURGERY

## 2022-04-21 PROCEDURE — 250N000013 HC RX MED GY IP 250 OP 250 PS 637: Performed by: ANESTHESIOLOGY

## 2022-04-21 PROCEDURE — 250N000011 HC RX IP 250 OP 636

## 2022-04-21 PROCEDURE — 258N000003 HC RX IP 258 OP 636

## 2022-04-21 PROCEDURE — 82962 GLUCOSE BLOOD TEST: CPT

## 2022-04-21 PROCEDURE — 258N000003 HC RX IP 258 OP 636: Performed by: STUDENT IN AN ORGANIZED HEALTH CARE EDUCATION/TRAINING PROGRAM

## 2022-04-21 PROCEDURE — 250N000013 HC RX MED GY IP 250 OP 250 PS 637: Performed by: STUDENT IN AN ORGANIZED HEALTH CARE EDUCATION/TRAINING PROGRAM

## 2022-04-21 RX ORDER — BUPIVACAINE HYDROCHLORIDE AND EPINEPHRINE 2.5; 5 MG/ML; UG/ML
INJECTION, SOLUTION INFILTRATION; PERINEURAL PRN
Status: DISCONTINUED | OUTPATIENT
Start: 2022-04-21 | End: 2022-04-21 | Stop reason: HOSPADM

## 2022-04-21 RX ORDER — CEFAZOLIN SODIUM/WATER 2 G/20 ML
2 SYRINGE (ML) INTRAVENOUS SEE ADMIN INSTRUCTIONS
Status: DISCONTINUED | OUTPATIENT
Start: 2022-04-21 | End: 2022-04-21 | Stop reason: HOSPADM

## 2022-04-21 RX ORDER — HYDROMORPHONE HCL IN WATER/PF 6 MG/30 ML
.1-.2 PATIENT CONTROLLED ANALGESIA SYRINGE INTRAVENOUS
Status: DISCONTINUED | OUTPATIENT
Start: 2022-04-21 | End: 2022-04-22

## 2022-04-21 RX ORDER — MEPERIDINE HYDROCHLORIDE 25 MG/ML
12.5 INJECTION INTRAMUSCULAR; INTRAVENOUS; SUBCUTANEOUS
Status: DISCONTINUED | OUTPATIENT
Start: 2022-04-21 | End: 2022-04-21 | Stop reason: HOSPADM

## 2022-04-21 RX ORDER — PROPOFOL 10 MG/ML
INJECTION, EMULSION INTRAVENOUS PRN
Status: DISCONTINUED | OUTPATIENT
Start: 2022-04-21 | End: 2022-04-21

## 2022-04-21 RX ORDER — IPRATROPIUM BROMIDE 42 UG/1
2 SPRAY, METERED NASAL 4 TIMES DAILY PRN
Status: DISCONTINUED | OUTPATIENT
Start: 2022-04-21 | End: 2022-04-22 | Stop reason: HOSPADM

## 2022-04-21 RX ORDER — PROCHLORPERAZINE MALEATE 5 MG
5 TABLET ORAL EVERY 6 HOURS PRN
Status: DISCONTINUED | OUTPATIENT
Start: 2022-04-21 | End: 2022-04-22 | Stop reason: HOSPADM

## 2022-04-21 RX ORDER — ACETAMINOPHEN 325 MG/1
975 TABLET ORAL ONCE
Status: COMPLETED | OUTPATIENT
Start: 2022-04-21 | End: 2022-04-21

## 2022-04-21 RX ORDER — FENTANYL CITRATE 50 UG/ML
25 INJECTION, SOLUTION INTRAMUSCULAR; INTRAVENOUS EVERY 5 MIN PRN
Status: DISCONTINUED | OUTPATIENT
Start: 2022-04-21 | End: 2022-04-21 | Stop reason: HOSPADM

## 2022-04-21 RX ORDER — DEXAMETHASONE SODIUM PHOSPHATE 4 MG/ML
INJECTION, SOLUTION INTRA-ARTICULAR; INTRALESIONAL; INTRAMUSCULAR; INTRAVENOUS; SOFT TISSUE PRN
Status: DISCONTINUED | OUTPATIENT
Start: 2022-04-21 | End: 2022-04-21

## 2022-04-21 RX ORDER — BISACODYL 10 MG
10 SUPPOSITORY, RECTAL RECTAL DAILY PRN
Status: DISCONTINUED | OUTPATIENT
Start: 2022-04-21 | End: 2022-04-22 | Stop reason: HOSPADM

## 2022-04-21 RX ORDER — LISINOPRIL 10 MG/1
10 TABLET ORAL EVERY EVENING
Status: DISCONTINUED | OUTPATIENT
Start: 2022-04-21 | End: 2022-04-22 | Stop reason: HOSPADM

## 2022-04-21 RX ORDER — METHOCARBAMOL 500 MG/1
500 TABLET, FILM COATED ORAL 4 TIMES DAILY PRN
Status: DISCONTINUED | OUTPATIENT
Start: 2022-04-21 | End: 2022-04-22 | Stop reason: HOSPADM

## 2022-04-21 RX ORDER — ALBUTEROL SULFATE 90 UG/1
2 AEROSOL, METERED RESPIRATORY (INHALATION) EVERY 4 HOURS PRN
Status: DISCONTINUED | OUTPATIENT
Start: 2022-04-21 | End: 2022-04-22 | Stop reason: HOSPADM

## 2022-04-21 RX ORDER — ACETAMINOPHEN 325 MG/1
650 TABLET ORAL EVERY 4 HOURS PRN
Status: DISCONTINUED | OUTPATIENT
Start: 2022-04-24 | End: 2022-04-22 | Stop reason: HOSPADM

## 2022-04-21 RX ORDER — CEFAZOLIN SODIUM/WATER 2 G/20 ML
2 SYRINGE (ML) INTRAVENOUS
Status: COMPLETED | OUTPATIENT
Start: 2022-04-21 | End: 2022-04-21

## 2022-04-21 RX ORDER — DEXTROSE MONOHYDRATE, SODIUM CHLORIDE, AND POTASSIUM CHLORIDE 50; 1.49; 4.5 G/1000ML; G/1000ML; G/1000ML
INJECTION, SOLUTION INTRAVENOUS CONTINUOUS
Status: DISCONTINUED | OUTPATIENT
Start: 2022-04-21 | End: 2022-04-22 | Stop reason: HOSPADM

## 2022-04-21 RX ORDER — FAMOTIDINE 20 MG/1
20 TABLET, FILM COATED ORAL 2 TIMES DAILY
Status: DISCONTINUED | OUTPATIENT
Start: 2022-04-21 | End: 2022-04-22 | Stop reason: HOSPADM

## 2022-04-21 RX ORDER — ATORVASTATIN CALCIUM 10 MG/1
10 TABLET, FILM COATED ORAL EVERY EVENING
Status: DISCONTINUED | OUTPATIENT
Start: 2022-04-21 | End: 2022-04-22 | Stop reason: HOSPADM

## 2022-04-21 RX ORDER — AMOXICILLIN 250 MG
2 CAPSULE ORAL 2 TIMES DAILY
Status: DISCONTINUED | OUTPATIENT
Start: 2022-04-21 | End: 2022-04-22 | Stop reason: HOSPADM

## 2022-04-21 RX ORDER — SODIUM CHLORIDE, SODIUM LACTATE, POTASSIUM CHLORIDE, CALCIUM CHLORIDE 600; 310; 30; 20 MG/100ML; MG/100ML; MG/100ML; MG/100ML
INJECTION, SOLUTION INTRAVENOUS CONTINUOUS PRN
Status: DISCONTINUED | OUTPATIENT
Start: 2022-04-21 | End: 2022-04-21

## 2022-04-21 RX ORDER — FENTANYL CITRATE 50 UG/ML
INJECTION, SOLUTION INTRAMUSCULAR; INTRAVENOUS PRN
Status: DISCONTINUED | OUTPATIENT
Start: 2022-04-21 | End: 2022-04-21

## 2022-04-21 RX ORDER — ACETAMINOPHEN 325 MG/1
650 TABLET ORAL EVERY 8 HOURS
Status: DISCONTINUED | OUTPATIENT
Start: 2022-04-21 | End: 2022-04-22 | Stop reason: HOSPADM

## 2022-04-21 RX ORDER — NALOXONE HYDROCHLORIDE 0.4 MG/ML
0.2 INJECTION, SOLUTION INTRAMUSCULAR; INTRAVENOUS; SUBCUTANEOUS
Status: DISCONTINUED | OUTPATIENT
Start: 2022-04-21 | End: 2022-04-22 | Stop reason: HOSPADM

## 2022-04-21 RX ORDER — ONDANSETRON 2 MG/ML
4 INJECTION INTRAMUSCULAR; INTRAVENOUS EVERY 30 MIN PRN
Status: DISCONTINUED | OUTPATIENT
Start: 2022-04-21 | End: 2022-04-21 | Stop reason: HOSPADM

## 2022-04-21 RX ORDER — CEFAZOLIN SODIUM 1 G/3ML
1 INJECTION, POWDER, FOR SOLUTION INTRAMUSCULAR; INTRAVENOUS EVERY 8 HOURS
Status: COMPLETED | OUTPATIENT
Start: 2022-04-21 | End: 2022-04-22

## 2022-04-21 RX ORDER — NALOXONE HYDROCHLORIDE 0.4 MG/ML
0.4 INJECTION, SOLUTION INTRAMUSCULAR; INTRAVENOUS; SUBCUTANEOUS
Status: DISCONTINUED | OUTPATIENT
Start: 2022-04-21 | End: 2022-04-22 | Stop reason: HOSPADM

## 2022-04-21 RX ORDER — SODIUM CHLORIDE, SODIUM LACTATE, POTASSIUM CHLORIDE, CALCIUM CHLORIDE 600; 310; 30; 20 MG/100ML; MG/100ML; MG/100ML; MG/100ML
INJECTION, SOLUTION INTRAVENOUS CONTINUOUS
Status: DISCONTINUED | OUTPATIENT
Start: 2022-04-21 | End: 2022-04-21 | Stop reason: HOSPADM

## 2022-04-21 RX ORDER — LIDOCAINE 40 MG/G
CREAM TOPICAL
Status: DISCONTINUED | OUTPATIENT
Start: 2022-04-21 | End: 2022-04-21 | Stop reason: HOSPADM

## 2022-04-21 RX ORDER — ONDANSETRON 4 MG/1
4 TABLET, ORALLY DISINTEGRATING ORAL EVERY 30 MIN PRN
Status: DISCONTINUED | OUTPATIENT
Start: 2022-04-21 | End: 2022-04-21 | Stop reason: HOSPADM

## 2022-04-21 RX ORDER — ACETAMINOPHEN 325 MG/1
325-650 TABLET ORAL EVERY 6 HOURS PRN
Status: ON HOLD | COMMUNITY
End: 2022-04-22

## 2022-04-21 RX ORDER — HYDROMORPHONE HCL IN WATER/PF 6 MG/30 ML
0.2 PATIENT CONTROLLED ANALGESIA SYRINGE INTRAVENOUS EVERY 5 MIN PRN
Status: DISCONTINUED | OUTPATIENT
Start: 2022-04-21 | End: 2022-04-21 | Stop reason: HOSPADM

## 2022-04-21 RX ORDER — OXYCODONE HYDROCHLORIDE 5 MG/1
5 TABLET ORAL EVERY 4 HOURS PRN
Status: DISCONTINUED | OUTPATIENT
Start: 2022-04-21 | End: 2022-04-21 | Stop reason: HOSPADM

## 2022-04-21 RX ORDER — ONDANSETRON 4 MG/1
4 TABLET, ORALLY DISINTEGRATING ORAL EVERY 6 HOURS PRN
Status: DISCONTINUED | OUTPATIENT
Start: 2022-04-21 | End: 2022-04-22 | Stop reason: HOSPADM

## 2022-04-21 RX ORDER — LABETALOL HYDROCHLORIDE 5 MG/ML
10 INJECTION, SOLUTION INTRAVENOUS
Status: DISCONTINUED | OUTPATIENT
Start: 2022-04-21 | End: 2022-04-21 | Stop reason: HOSPADM

## 2022-04-21 RX ORDER — ONDANSETRON 2 MG/ML
4 INJECTION INTRAMUSCULAR; INTRAVENOUS EVERY 6 HOURS PRN
Status: DISCONTINUED | OUTPATIENT
Start: 2022-04-21 | End: 2022-04-22 | Stop reason: HOSPADM

## 2022-04-21 RX ORDER — ONDANSETRON 2 MG/ML
INJECTION INTRAMUSCULAR; INTRAVENOUS PRN
Status: DISCONTINUED | OUTPATIENT
Start: 2022-04-21 | End: 2022-04-21

## 2022-04-21 RX ORDER — FENTANYL CITRATE 50 UG/ML
25 INJECTION, SOLUTION INTRAMUSCULAR; INTRAVENOUS
Status: DISCONTINUED | OUTPATIENT
Start: 2022-04-21 | End: 2022-04-21 | Stop reason: HOSPADM

## 2022-04-21 RX ORDER — POLYETHYLENE GLYCOL 3350 17 G/17G
17 POWDER, FOR SOLUTION ORAL DAILY
Status: DISCONTINUED | OUTPATIENT
Start: 2022-04-21 | End: 2022-04-22 | Stop reason: HOSPADM

## 2022-04-21 RX ORDER — LIDOCAINE HYDROCHLORIDE 20 MG/ML
INJECTION, SOLUTION INFILTRATION; PERINEURAL PRN
Status: DISCONTINUED | OUTPATIENT
Start: 2022-04-21 | End: 2022-04-21

## 2022-04-21 RX ORDER — LIDOCAINE 40 MG/G
CREAM TOPICAL
Status: DISCONTINUED | OUTPATIENT
Start: 2022-04-21 | End: 2022-04-22 | Stop reason: HOSPADM

## 2022-04-21 RX ORDER — OXYCODONE HYDROCHLORIDE 5 MG/1
5-10 TABLET ORAL
Status: DISCONTINUED | OUTPATIENT
Start: 2022-04-21 | End: 2022-04-22 | Stop reason: HOSPADM

## 2022-04-21 RX ORDER — GABAPENTIN 100 MG/1
100 CAPSULE ORAL 3 TIMES DAILY
Status: DISCONTINUED | OUTPATIENT
Start: 2022-04-21 | End: 2022-04-22 | Stop reason: HOSPADM

## 2022-04-21 RX ORDER — GABAPENTIN 100 MG/1
100 CAPSULE ORAL
Status: COMPLETED | OUTPATIENT
Start: 2022-04-21 | End: 2022-04-21

## 2022-04-21 RX ORDER — MAGNESIUM HYDROXIDE 1200 MG/15ML
LIQUID ORAL PRN
Status: DISCONTINUED | OUTPATIENT
Start: 2022-04-21 | End: 2022-04-21 | Stop reason: HOSPADM

## 2022-04-21 RX ORDER — SODIUM CHLORIDE, SODIUM LACTATE, POTASSIUM CHLORIDE, CALCIUM CHLORIDE 600; 310; 30; 20 MG/100ML; MG/100ML; MG/100ML; MG/100ML
INJECTION, SOLUTION INTRAVENOUS CONTINUOUS
Status: DISCONTINUED | OUTPATIENT
Start: 2022-04-21 | End: 2022-04-21

## 2022-04-21 RX ADMIN — ROCURONIUM BROMIDE 20 MG: 50 INJECTION, SOLUTION INTRAVENOUS at 11:01

## 2022-04-21 RX ADMIN — PHENYLEPHRINE HYDROCHLORIDE 100 MCG: 10 INJECTION INTRAVENOUS at 11:19

## 2022-04-21 RX ADMIN — SODIUM CHLORIDE, POTASSIUM CHLORIDE, SODIUM LACTATE AND CALCIUM CHLORIDE: 600; 310; 30; 20 INJECTION, SOLUTION INTRAVENOUS at 10:30

## 2022-04-21 RX ADMIN — PHENYLEPHRINE HYDROCHLORIDE 100 MCG: 10 INJECTION INTRAVENOUS at 10:53

## 2022-04-21 RX ADMIN — ACETAMINOPHEN 650 MG: 325 TABLET ORAL at 16:47

## 2022-04-21 RX ADMIN — PROPOFOL 100 MG: 10 INJECTION, EMULSION INTRAVENOUS at 10:32

## 2022-04-21 RX ADMIN — GABAPENTIN 100 MG: 100 CAPSULE ORAL at 08:37

## 2022-04-21 RX ADMIN — CEFAZOLIN 1 G: 1 INJECTION, POWDER, FOR SOLUTION INTRAMUSCULAR; INTRAVENOUS at 18:32

## 2022-04-21 RX ADMIN — GABAPENTIN 100 MG: 100 CAPSULE ORAL at 20:42

## 2022-04-21 RX ADMIN — LISINOPRIL 10 MG: 10 TABLET ORAL at 20:42

## 2022-04-21 RX ADMIN — POTASSIUM CHLORIDE, DEXTROSE MONOHYDRATE AND SODIUM CHLORIDE: 150; 5; 450 INJECTION, SOLUTION INTRAVENOUS at 16:47

## 2022-04-21 RX ADMIN — ROCURONIUM BROMIDE 50 MG: 50 INJECTION, SOLUTION INTRAVENOUS at 10:32

## 2022-04-21 RX ADMIN — ACETAMINOPHEN 975 MG: 325 TABLET ORAL at 08:37

## 2022-04-21 RX ADMIN — ATORVASTATIN CALCIUM 10 MG: 10 TABLET, FILM COATED ORAL at 20:43

## 2022-04-21 RX ADMIN — PHENYLEPHRINE HYDROCHLORIDE 100 MCG: 10 INJECTION INTRAVENOUS at 10:32

## 2022-04-21 RX ADMIN — LIDOCAINE HYDROCHLORIDE 100 MG: 20 INJECTION, SOLUTION INFILTRATION; PERINEURAL at 10:32

## 2022-04-21 RX ADMIN — ONDANSETRON 4 MG: 2 INJECTION INTRAMUSCULAR; INTRAVENOUS at 11:33

## 2022-04-21 RX ADMIN — DEXAMETHASONE SODIUM PHOSPHATE 8 MG: 4 INJECTION, SOLUTION INTRAMUSCULAR; INTRAVENOUS at 11:04

## 2022-04-21 RX ADMIN — FENTANYL CITRATE 50 MCG: 50 INJECTION, SOLUTION INTRAMUSCULAR; INTRAVENOUS at 11:12

## 2022-04-21 RX ADMIN — FENTANYL CITRATE 100 MCG: 50 INJECTION, SOLUTION INTRAMUSCULAR; INTRAVENOUS at 10:32

## 2022-04-21 RX ADMIN — Medication 2 G: at 10:40

## 2022-04-21 RX ADMIN — SUGAMMADEX 200 MG: 100 INJECTION, SOLUTION INTRAVENOUS at 12:02

## 2022-04-21 RX ADMIN — FENTANYL CITRATE 50 MCG: 50 INJECTION, SOLUTION INTRAMUSCULAR; INTRAVENOUS at 12:07

## 2022-04-21 RX ADMIN — OXYCODONE HYDROCHLORIDE 5 MG: 5 TABLET ORAL at 13:36

## 2022-04-21 RX ADMIN — FAMOTIDINE 20 MG: 20 TABLET ORAL at 20:43

## 2022-04-21 ASSESSMENT — ACTIVITIES OF DAILY LIVING (ADL)
ADLS_ACUITY_SCORE: 13
ADLS_ACUITY_SCORE: 14
VISION_MANAGEMENT: GLASSES
ADLS_ACUITY_SCORE: 5
CONCENTRATING,_REMEMBERING_OR_MAKING_DECISIONS_DIFFICULTY: NO
ADLS_ACUITY_SCORE: 13
ADLS_ACUITY_SCORE: 13
CHANGE_IN_FUNCTIONAL_STATUS_SINCE_ONSET_OF_CURRENT_ILLNESS/INJURY: NO
DOING_ERRANDS_INDEPENDENTLY_DIFFICULTY: NO
DRESSING/BATHING_DIFFICULTY: NO
ADLS_ACUITY_SCORE: 5
ADLS_ACUITY_SCORE: 5
WEAR_GLASSES_OR_BLIND: YES
ADLS_ACUITY_SCORE: 14
ADLS_ACUITY_SCORE: 14
FALL_HISTORY_WITHIN_LAST_SIX_MONTHS: NO
ADLS_ACUITY_SCORE: 13
TOILETING_ISSUES: NO
WALKING_OR_CLIMBING_STAIRS_DIFFICULTY: NO
DIFFICULTY_EATING/SWALLOWING: NO
ADLS_ACUITY_SCORE: 13

## 2022-04-21 ASSESSMENT — LIFESTYLE VARIABLES: TOBACCO_USE: 0

## 2022-04-21 NOTE — ANESTHESIA CARE TRANSFER NOTE
Patient: Felice Blood    Procedure: Procedure(s):  Lumbar 2 to 3 decompression and discectomy       Diagnosis: Spinal stenosis of lumbar region with neurogenic claudication [M48.062]  Diagnosis Additional Information: No value filed.    Anesthesia Type:   General     Note:    Oropharynx: oropharynx clear of all foreign objects  Level of Consciousness: drowsy  Oxygen Supplementation: face mask  Level of Supplemental Oxygen (L/min / FiO2): 4  Independent Airway: airway patency satisfactory and stable  Dentition: dentition unchanged  Vital Signs Stable: post-procedure vital signs reviewed and stable  Report to RN Given: handoff report given  Patient transferred to: PACU    Handoff Report: Identifed the Patient, Identified the Reponsible Provider, Reviewed the pertinent medical history, Discussed the surgical course, Reviewed Intra-OP anesthesia mangement and issues during anesthesia, Set expectations for post-procedure period and Allowed opportunity for questions and acknowledgement of understanding      Vitals:  Vitals Value Taken Time   /92 04/21/22 1210   Temp     Pulse 80 04/21/22 1214   Resp 34 04/21/22 1214   SpO2 98 % 04/21/22 1214   Vitals shown include unvalidated device data.    Electronically Signed By: Jah Pepper MD  April 21, 2022  12:15 PM

## 2022-04-21 NOTE — PHARMACY-ADMISSION MEDICATION HISTORY
Admission Medication History Completed by Pharmacy    See [facility] Admission Navigator for allergy information, preferred outpatient pharmacy, prior to admission medications and immunization status.     Medication History Sources:     Patient    Medication fill history    Changes made to PTA medication list (reason):    Added: None    Deleted: cyclobenzaprine - not taking anymore per pt    Changed: None    Additional Information:    None    Prior to Admission medications    Medication Sig Last Dose Taking? Auth Provider   acetaminophen (TYLENOL) 325 MG tablet Take 325-650 mg by mouth every 6 hours as needed for mild pain 4/20/2022 at 2000 Yes Reported, Patient   albuterol (PROAIR HFA/PROVENTIL HFA/VENTOLIN HFA) 108 (90 Base) MCG/ACT inhaler Inhale 2 puffs into the lungs every 4 hours as needed for shortness of breath / dyspnea or wheezing 4/21/2022 at 0530 Yes Anthony Maddox MD   aspirin 81 MG tablet Take 1 tablet by mouth every morning Past Week at Unknown time Yes Reported, Patient   atorvastatin (LIPITOR) 10 MG tablet Take 1 tablet (10 mg) by mouth daily  Patient taking differently: Take 10 mg by mouth every evening 4/19/2022 at Unknown time Yes Anthony Maddox MD   Calcium-Magnesium-Zinc 333-133-5 MG TABS per tablet Take 1 tablet by mouth every morning Past Week at Unknown time Yes Reported, Patient   cholecalciferol (VITAMIN D3) 25 mcg (1000 units) capsule Take 1 capsule by mouth every morning Past Week at Unknown time Yes Reported, Patient   fluticasone (FLONASE) 50 MCG/ACT spray Spray 2 sprays into both nostrils At Bedtime  Patient taking differently: Spray 2 sprays into both nostrils as needed More than a month at Unknown time Yes Laila Martinez MD   hydrOXYzine (ATARAX) 25 MG tablet Take one po bid prn anxiety; watch for drowsiness  Patient taking differently: every 4 hours as needed Take one po bid prn anxiety; watch for drowsiness More than a month at Unknown time Yes Anthony Maddox  MD DORI   ipratropium (ATROVENT) 0.06 % nasal spray Spray 2 sprays into both nostrils 4 times daily as needed for rhinitis 4/21/2022 at 0530 Yes Camron Vigil MD   ketoconazole (NIZORAL) 2 % external shampoo Massage into wet scalp, let sit 3-5 min, then rinse. Do this 3x weekly.  Patient taking differently: daily as needed Massage into wet scalp, let sit 3-5 min, then rinse. Do this 3x weekly. Past Week at Unknown time Yes Silvana Do MD   lisinopril (ZESTRIL) 10 MG tablet Take 1 tablet (10 mg) by mouth daily  Patient taking differently: Take 10 mg by mouth every evening 4/19/2022 at Unknown time Yes Anthony Maddox MD   sildenafil (REVATIO) 20 MG tablet Take 5 tablets (100 mg) by mouth daily as needed.  Patient taking differently: as needed Take 5 tablets (100 mg) by mouth daily as needed.  Yes Anthony Maddox MD   sildenafil (VIAGRA) 100 MG tablet Take 1 tablet (100 mg) by mouth daily as needed   Anthony Maddox MD       Date completed: 04/21/22    Medication history completed by: Devika Goldstein [facility]

## 2022-04-21 NOTE — PROGRESS NOTES
Brief Orthopedic Note    Patient seen at bedside. Endorses back pain. Denies new numbness, tingling.   .   Spine:  - Dressings c/d/i  - Drain patent with serosanguinous output  - DP pulses 2+ bilaterally, feet wwp bilaterally     Lumbar Spine:    Motor -     L2-3: Hip flexion R 5/5  And L 5/5 strength          L3/4:  Knee extension R 5/5 and L 5/5 strength         L4/5:  Foot dorsiflexion R 5/5 L 5/5 and       EHL dorsiflexion R 5/5 L 5/5 strength         S1:  Plantarflexion/Peroneal Muscles  R 5/5 and L 5/5 strength    Sensation: intact to light touch L3-S1 distribution BLE    Continue plan per brief op note.     Bree Corrigan MD  Orthopedic Resident

## 2022-04-21 NOTE — PROGRESS NOTES
PACU to Inpatient Nursing Handoff    Patient Felice Blood is a 81 year old male who speaks English.   Procedure Procedure(s):  Lumbar 2 to 3 decompression and discectomy   Surgeon(s) Primary: Wolfgang Cbaral MD     Allergies   Allergen Reactions     Etodolac Unknown     Percocet [Oxycodone-Acetaminophen] Nausea     Other reaction(s): Muscle Weakness     Seasonal Allergies Cough     Sinus drainage, watery eyes       Isolation  No active isolations     Past Medical History   has a past medical history of Cobblestone retinal degeneration, Impotence of organic origin, Nonsenile cataract, Pure hypercholesterolemia, Unspecified essential hypertension, and Vitreous detachment of both eyes.    Anesthesia General   Dermatome Level     Preop Meds acetaminophen (Tylenol) - time given: 0837  gabapentin (Neurontin) - time given: 0837   Nerve block Not applicable   Intraop Meds dexamethasone (Decadron)  fentanyl (Sublimaze): 200 mcg total  ondansetron (Zofran): last given at 1133   Local Meds Yes   Antibiotics cefazolin (Ancef) - last given at 1040     Pain Patient Currently in Pain: yes   PACU meds  Not applicable   PCA / epidural No   Capnography Respiratory Monitoring (EtCO2): 30 mmHg   Telemetry ECG Rhythm: Sinus rhythm;First degree AV block   Inpatient Telemetry Monitor Ordered? No        Labs Glucose Lab Results   Component Value Date     04/21/2022     04/19/2022    GLC 96 04/07/2021       Hgb Lab Results   Component Value Date    HGB 15.6 04/19/2022    HGB 15.6 06/10/2019       INR No results found for: INR   PACU Imaging Not applicable     Wound/Incision Incision/Surgical Site 05/01/17 Right Eye (Active)   Number of days: 1816       Incision/Surgical Site 04/21/22 Back (Active)   Incision Assessment UTV 04/21/22 1230   Sulma-Incision Assessment UTV 04/21/22 1210   Closure Liquid bandage;Sutures 04/21/22 1138   Incision Drainage Amount None 04/21/22 1230   Dressing Intervention Clean, dry,  intact 04/21/22 1230   Number of days: 0      CMS        Equipment ice pack   Other LDA       IV Access Peripheral IV 04/21/22 Right Lower forearm (Active)   Site Assessment [facility] 04/21/22 1210   Line Status Infusing 04/21/22 1210   Phlebitis Scale 0-->no symptoms 04/21/22 1210   Infiltration Scale 0 04/21/22 1210   Number of days: 0       Peripheral IV 04/21/22 Left;Dorsal Hand (Active)   Site Assessment [facility] 04/21/22 1210   Line Status Saline locked 04/21/22 1210   Phlebitis Scale 0-->no symptoms 04/21/22 1210   Infiltration Scale 0 04/21/22 1210   Number of days: 0       Peripheral IV 05/01/17 Right Hand (Active)   Number of days: 1816      Blood Products Not applicable EBL 10 mL   Intake/Output Date 04/21/22 0700 - 04/22/22 0659   Shift 0497-5031 8903-7284 1743-8305 24 Hour Total   INTAKE   I.V. 300   300   Shift Total(mL/kg) 300(4.27)   300(4.27)   OUTPUT   Blood 10   10   Shift Total(mL/kg) 10(0.14)   10(0.14)   Weight (kg) 70.3 70.3 70.3 70.3      Drains / [place] Closed/Suction Drain [employer] 10 [place]ian (Active)   Site Description [facility] 04/21/22 1230   Dressing Status Normal: Clean, Dry & Intact 04/21/22 1230   Drainage Appearance Bloody/Bright Red 04/21/22 1230   Number of days: 0      Time of void PreOp Void Prior to Procedure: 0800 (04/21/22 0819)    PostOp      Diapered? No   Bladder Scan     PO    ice chips     Vitals    B/P: (!) 154/84  T: 97.5  F (36.4  C)    Temp src: Axillary  P:  Pulse: 85 (04/21/22 1230)          R: 16  O2:  SpO2: 96 %    O2 Device: None (Room air) (04/21/22 0743)    Oxygen Delivery: 6 LPM (04/21/22 1210)         Family/support present wife reba and son [place]ber[place] [facility] [place]   Patient belongings     Patient transported on cart   DC meds/scripts (obs/outpt) Not applicable   Inpatient Pain Meds Released? Yes       Special needs/considerations None   Tasks needing completion None       Nicki L. Anjel, RN  ASCOM [id number]

## 2022-04-21 NOTE — ANESTHESIA PROCEDURE NOTES
Airway       Patient location during procedure: OR       Procedure Start/Stop Times: 4/21/2022 10:35 AM  Staff -        Anesthesiologist:  Eriberto Summers MD       Resident/Fellow: Jah Pepper MD       Performed By: resident  Consent for Airway        Urgency: elective  Indications and Patient Condition       Indications for airway management: mai-procedural       Induction type:intravenous       Mask difficulty assessment: 1 - vent by mask    Final Airway Details       Final airway type: endotracheal airway       Successful airway: ETT - single  Endotracheal Airway Details        ETT size (mm): 7.5       Cuffed: yes       Successful intubation technique: direct laryngoscopy       DL Blade Type: MAC 4       Grade View of Cords: 1       Adjucts: stylet       Position: Right       Measured from: lips       Secured at (cm): 24       Bite block used: None    Post intubation assessment        Number of attempts at approach: 1       Secured with: pink tape       Ease of procedure: easy       Dentition: Intact and Unchanged    Medication(s) Administered   Medication Administration Time: 4/21/2022 10:35 AM

## 2022-04-21 NOTE — OP NOTE
DATE OF SURGERY: 4/21/2022    PREOPERATIVE DIAGNOSIS: Spinal stenosis of lumbar region with neurogenic claudication           POSTOPERATIVE DIAGNOSIS: Same    PROCEDURES:  1. L2 and L3 laminectomy and partial medial facetectomies and foraminotomies for decompression of the L2 and L3 nerve roots.    PRIMARY SURGEON: Wolfgang Cabral MD    FIRST ASSISTANT: PIA GironY1    ANESTHESIA: General Endotracheal    COMPLICATIONS:  None.    SPECIMENS: None.    ESTIMATED BLOOD LOSS: 10 mL    INDICATIONS:                          Felice Blood is a 81 year old male who elected surgical treatment, and understood the indications for this surgery, as well as its risks, benefits, and alternatives as documented in the pre-operative H&P.  Specifically, we reviewed the risks and benefits of the surgery in detail. The risks include, but are not limited to, the general risks associated with anesthesia, including death, pulmonary embolism, DVT, stroke, myocardial infarction, pneumonia, and urinary tract infection. Additional risks specific to the surgery include the risk of infection, dural tear with resultant CSF leak which might necessitate placement of a drain or revision surgery or could result in headaches, nerve injury resulting in weakness or paralysis, risk of adjacent segment disease, the risks of vascular injury, need for revision surgery in the future due to one of the above issues, or risk of incomplete symptom relief. Felice Blood understands the risks of the surgery and wishes to proceed.  No Guarantees were given.       DESCRIPTION OF PROCEDURE:           Felice Blood was taken to the operating room, where the Anesthesiology Service induced satisfactory general anesthesia. Ancef was given IV.  Venous thromboembolic prophylaxis was performed with sequential devices. The patient was placed prone on an open OSI frame with the abdomen hanging free and all bony prominences well padded.  The low back was  then prepped and draped in its entirety in the usual sterile fashion.  We then held a multidisciplinary time out in which we verified the patient, procedure, antibiotics, and operative plan.  All team members were in agreement.    Digital Radiography was brought into the sterile field to obtain a true lateral view and needles were placed to quynh the intended point of incision.  We made a midline incision and a bilateral subperiosteal exposure was performed of the L2 through L3 spinous processes and medial lamina.  A needle was placed into the medial facet joint at the caudal most level and a final image was then obtained to verify our position.     The decompression was performed in the same fashion at each level sequentially including the L2 and L3 levels which resulted in the decompression of the L2 and L3 nerve roots.      For each level, the spinous process was removed with a rongeur. The dorsal cortex of the lamina was then thinned with the rongeur.  We palpated the lateral border of the pars to verify the planned width of the decompression.  I used a cautery to quynh out the planned limits of the resection to provide a visual reference.  A 4mm round bur was then used to remove the remaining dorsal cortical and cancellous layers.  Eventually, the ligamentum flavum was uncovered by the bur in the midline.  The proximal origin of the ligamentum flavum and epidural fat were identified. A curette was used to split and elevate the flavum in the midline, exposing the epidural fat underneath.  Kerrison punches were then used to resect the flavum down to the caudad laminar edge.  At this point the central decompression was complete, and I turned my attention to the partial medial facetectomy.  A 5mm strait osteotome was used to resect the remaining overhanging medial facet.  Where necessary the resection was completed with a kerrison.  This was continued laterally until the medial wall of the pedicle was readily  palpable.  I then completed the foraminotomies.  A amrik was used to trace out the edge of the pedicle.  I then introduced a 2mm kerrison into the foramen below the pedicle, keeping the shoe of the kerrison facing the nerve root.  Using multiple bites in this way, I was able to remove the remaining facet capsule and osteophytes that were compressing the exiting nerve root.  In the same fashion, I was able to reach out into the foramen above the pedicle and remove any compression on the exiting nerve root above. This process was performed sequentially at each of the levels noted.       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 I mobilized the right traversing root and incised into this and removed it with a nerve hook and pituitary.      The wound was then thoroughly irrigated.  Hemostasis was achieved.  A hemovac drain was placed.  The wound was closed in layers with vicryl suture, followed by monocryl and dermabond for the skin.  A sterile dressing was applied. The patient was turned supine, extubated, and returned to the recovery area in stable condition.      I was present and scrubbed for the critical portions of the procedure including the exposure and decompression.    Wolfgang Cabral MD

## 2022-04-21 NOTE — ANESTHESIA POSTPROCEDURE EVALUATION
Patient: Felice Blood    Procedure: Procedure(s):  Lumbar 2 to 3 decompression and discectomy       Anesthesia Type:  General    Note:  Disposition: Inpatient   Postop Pain Control: Uneventful            Sign Out: Well controlled pain   PONV: No   Neuro/Psych: Uneventful            Sign Out: Acceptable/Baseline neuro status   Airway/Respiratory: Uneventful            Sign Out: Acceptable/Baseline resp. status; O2 supplementation               Oxygen: Nasal Cannula   CV/Hemodynamics: Uneventful            Sign Out: Acceptable CV status; No obvious hypovolemia; No obvious fluid overload   Other NRE: NONE   DID A NON-ROUTINE EVENT OCCUR? No           Last vitals:  Vitals Value Taken Time   /93 04/21/22 1347   Temp 36.3  C (97.3  F) 04/21/22 1315   Pulse 87 04/21/22 1402   Resp 17 04/21/22 1402   SpO2 95 % 04/21/22 1402   Vitals shown include unvalidated device data.    Electronically Signed By: Eriberto Summers MD  April 21, 2022  2:02 PM

## 2022-04-21 NOTE — ANESTHESIA PREPROCEDURE EVALUATION
Anesthesia Pre-Procedure Evaluation    Patient: Felice Blood   MRN: [id number] : 1940        Procedure : Procedure(s):  Lumbar 2 to 3 decompression and discectomy          Past Medical History:   Diagnosis Date     Cobblestone retinal degeneration      Impotence of organic origin      Nonsenile cataract      Pure hypercholesterolemia      Unspecified essential hypertension      Vitreous detachment of both eyes       Past Surgical History:   Procedure Laterality Date     APPENDECTOMY       CATARACT IOL, RT/LT Right 2017     PHACOEMULSIFICATION WITH STANDARD INTRAOCULAR LENS IMPLANT Right 2017    Procedure: PHACOEMULSIFICATION WITH STANDARD INTRAOCULAR LENS IMPLANT;  Right Eye Phacoemulsification with intraocular Lens Implant;  Surgeon: Camron Hansen MD;  Location: [facility] OR     TONSILLECTOMY        Allergies   Allergen Reactions     Acetaminophen      Other reaction(s): Muscle Aches/Weakness     Etodolac Unknown     Percocet [Oxycodone-Acetaminophen] Nausea     Other reaction(s): Muscle Weakness     Seasonal Allergies Cough     Sinus drainage, watery eyes      Social History     Tobacco Use     Smoking status: Never Smoker     Smokeless tobacco: Never Used   Substance Use Topics     Alcohol use: Yes     Comment: 1 day      Wt Readings from Last 1 Encounters:   22 69.4 kg (153 lb)        Anesthesia Evaluation   Pt has had prior anesthetic.     No history of anesthetic complications       ROS/MED HX  ENT/Pulmonary:     (+) MIRIAM risk factors, snores loudly, hypertension, allergic rhinitis,  (-) tobacco use, asthma and recent URI   Neurologic:       Cardiovascular:     (+) Dyslipidemia hypertension-----Previous cardiac testing   Echo: Date: 2015 Results:  Interpretation Summary  Global and regional left ventricular function is normal with an EF of 55-60%.  Global right ventricular function is normal.  Pulmonary artery systolic pressure is normal.  The inferior vena cava is normal.  No  pericardial effusion is present.  ______________________________________________________________________________           Left Ventricle  Global and regional left ventricular function is normal with an EF of 55-60%.  Left ventricular size is normal. Left ventricular wall thickness is normal.  Normal left ventricular filling for age.     Right Ventricle  The right ventricle is normal size. Global right ventricular function is  normal.  Atria  Both atria appear normal.     Mitral Valve  The mitral valve is normal.     Aortic Valve  Mild aortic valve sclerosis is present.     Tricuspid Valve  The tricuspid valve is normal. Trace to mild tricuspid insufficiency is  present. Pulmonary artery systolic pressure is normal. The right ventricular  systolic pressure is approximated at 22.8 mmHg plus the right atrial  pressure.     Pulmonic Valve  The pulmonic valve is normal.     Vessels  The aorta root is normal. The inferior vena cava is normal.  Pericardium  No pericardial effusion is present.  Stress Test: Date: 5/2015 Results:  Interpretation Summary  Normal resting LV function with EF of approximately 60-65%; normal response  to exercise with increase to approximately 70-75%. No stress induced regional  wall motion abnormalities. No ECG evidence of ischemia. No subjective  evidence of ischemia. Normal functional capacity.  ECG Reviewed: Date: Results:    Cath: Date: Results:   (-) murmur   METS/Exercise Tolerance:  Comment: <4 due to back pain   Hematologic:  - neg hematologic  ROS  (-) history of blood clots and history of blood transfusion   Musculoskeletal:       GI/Hepatic:  - neg GI/hepatic ROS  (-) GERD   Renal/Genitourinary:  - neg Renal ROS     Endo:  - neg endo ROS     Psychiatric/Substance Use:  - neg psychiatric ROS     Infectious Disease:  - neg infectious disease ROS     Malignancy: Comment: Left arm  (+) Malignancy, History of Skin.Skin CA status post Surgery.        Other:  - neg other ROS           Physical Exam    Airway        Mallampati: II   TM distance: > 3 FB   Neck ROM: full   Mouth opening: > 3 cm    Respiratory Devices and Support         Dental  no notable dental history         Cardiovascular          Rhythm and rate: regular and normal (-) no murmur    Pulmonary   pulmonary exam normal        breath sounds clear to auscultation           OUTSIDE LABS:  CBC:   Lab Results   Component Value Date    WBC 7.2 04/19/2022    WBC 6.7 06/10/2019    HGB 15.6 04/19/2022    HGB 15.6 06/10/2019    HCT 43.6 04/19/2022    HCT 44.8 06/10/2019     04/19/2022     06/10/2019     BMP:   Lab Results   Component Value Date     04/19/2022     04/07/2021    POTASSIUM 3.9 04/19/2022    POTASSIUM 3.9 04/07/2021    CHLORIDE 104 04/19/2022    CHLORIDE 106 04/07/2021    CO2 24 04/19/2022    CO2 25 04/07/2021    BUN 9 04/19/2022    BUN 10 04/07/2021    CR 0.64 (L) 04/19/2022    CR 0.77 04/07/2021     (H) 04/19/2022    GLC 96 04/07/2021     COAGS: No results found for: PTT, INR, FIBR  POC: No results found for: BGM, HCG, HCGS  HEPATIC:   Lab Results   Component Value Date    ALBUMIN 3.9 04/07/2021    PROTTOTAL 7.1 04/07/2021    ALT 27 04/07/2021    AST 15 04/07/2021    ALKPHOS 48 04/07/2021    BILITOTAL 1.0 04/07/2021     OTHER:   Lab Results   Component Value Date    CODY 9.3 04/19/2022    TSH 3.91 12/12/2003       Anesthesia Plan    ASA Status:  3   NPO Status:  NPO Appropriate    Anesthesia Type: General.     - Airway: ETT   Induction: Intravenous.      Techniques and Equipment:     - Lines/Monitors: 2nd IV     - Drips/Meds: Phenylephrine     Consents    Anesthesia Plan(s) and associated risks, benefits, and realistic alternatives discussed. Questions answered and patient/representative(s) expressed understanding.    - Discussed:     - Discussed with:  Patient      - Specific Concerns: sore throat, post op pain/nausea, dental damage, airway reactivity in setting of asthma,  rare major  complications.     - Extended Intubation/Ventilatory Support Discussed: No.      - Patient is DNR/DNI Status: No    Use of blood products discussed: Yes.     - Discussed with: Patient.     - Consented: consented to blood products            Reason for refusal: other.     Postoperative Care    Pain management: Multi-modal analgesia, IV analgesics, Oral pain medications.   PONV prophylaxis: Ondansetron (or other 5HT-3), Dexamethasone or Solumedrol     Comments:           H&P reviewed: Unable to attach H&P to encounter due to EHR limitations. H&P Update: appropriate H&P reviewed, patient examined. No interval changes since H&P (within 30 days).         Jah Pepper MD

## 2022-04-21 NOTE — BRIEF OP NOTE
Brief Operative Note    Preop Dx:   Spinal stenosis of lumbar region with neurogenic claudication [M48.062]  Post op Dx:   Same  Procedure:    Procedure(s):  Lumbar 2 to 3 decompression and discectomy  Surgeon:     Wolfgang Cabral MD  Assistants:    Bree Corrigan MD   Anesthesia:   General  EBL:    10 mL  Total IV Fluids:  See Anesthesia Record  Specimens:   None  Findings:   See Operative Dictation      Assessment and Plan: Felice Blood is a 81 year old male with PMH including HT, neurogenic claudication now s/p L2 and L3 laminectomy and partial medial facetectomies and foraminotomies on 4/21/2022 with Dr. Cabral.     Ortho ([facility]) Primary  Activity: Up with assist until independent No excessive bending or twisting. No lifting >10 lbs x 6 weeks.   Weight bearing status: WBAT.  Pain management:   Transition from IV to PO narcotics as tolerated.   Antibiotics: Antibiotics so long as drain remains in place  Diet: Begin with clear fluids and progress diet as tolerated  DVT prophylaxis: SCDs only. No chemical DVT ppx needed.  Imaging: XR Upright Lumbar XR- ordered.  Labs: Hgb POD 1  Bracing/Splinting: None  Dressings: Keep dressings c/d/i x 7 days  Drains: HV, deep. Document output per shift, will be discontinued at Orthopedic Surgery discretion.  Wynne catheter: None  Physical Therapy/Occupational Therapy: Eval and treat  Cultures: none.    Consults: Hospitalist  Follow-up: Clinic with Dr. Cabral in 6 weeks with repeat x-rays.   Disposition: Pending progress with therapies, pain control on orals, and medical stability, anticipate discharge to home on POD #1-2.      Bree Corrigan MD  Orthopaedic Surgery Resident

## 2022-04-21 NOTE — CONSULTS
[facility]  Consult Note - Hospitalist Service  Date of Admission:  4/21/2022  Consult Requested by: Dr. Corrigan  Reason for Consult: postoperative co-management    Assessment & Plan   Felice Blood is a 81 year old male admitted on 4/21/2022. He  has a past medical history of Cobblestone retinal degeneration, hypercholesterolemia, essential hypertension, squamous cell carcinoma of left arm, Vitreous detachment of both eyes, and chronic back pain with neurogenic claudication. He is admitted to the hospital after L2-3 decompression and discectomy with Dr. Cabral.    #spinal stenosis s/p L2-3 decompression and discectomy  POD #0.     -postoperative care orders per primary team    -ancef x 24 hours    -anticoagulation: none    -acute pain management: acetaminophen, gabapentin, hydromorphone and oxycodone prn    #HTN    -resume PTA lisinopril 10mg (pt takes in evening)    #HLD    -resume PTA atorvastatin     The patient's care was discussed with the Patient.    ARGELIA [facility]  Securely message with the Vocera Web Console (learn more here)  Text page via [facility] Paging/Directory       Hospitalist Service    Clinically Significant Risk Factors Present on Admission                # Platelet Defect: home medication list includes an antiplatelet medication       ______________________________________________________________________    Chief Complaint   Back pain    History is obtained from the patient and electronic health record    History of Present Illness   Felice Blood is a 81 year old male who has past medical history as outlined above and below who is admitted to the hospital after L2-3 decompression and discectomy with Dr. Cabral. The patient is seen postoperatively in his hospital room. Currently, patient is resting in his room. He denies chest pain, shortness of breath, postoperative  nausea, and vomiting. He states that he feels well and that pain is currently controlled. Looking forward to getting up out of bed soon. He does not have any questions for me at this point.    Review of Systems   The 5 point Review of Systems is negative other than noted in the HPI or here.     Past Medical History    I have reviewed this patient's medical history and updated it with pertinent information if needed.   Past Medical History:   Diagnosis Date     Cobblestone retinal degeneration      Impotence of organic origin      Nonsenile cataract      Pure hypercholesterolemia      Unspecified essential hypertension      Vitreous detachment of both eyes      Past Surgical History   I have reviewed this patient's surgical history and updated it with pertinent information if needed.  Past Surgical History:   Procedure Laterality Date     APPENDECTOMY       CATARACT IOL, RT/LT Right 05/01/2017     PHACOEMULSIFICATION WITH STANDARD INTRAOCULAR LENS IMPLANT Right 5/1/2017    Procedure: PHACOEMULSIFICATION WITH STANDARD INTRAOCULAR LENS IMPLANT;  Right Eye Phacoemulsification with intraocular Lens Implant;  Surgeon: Camron Hansen MD;  Location: [facility] OR     TONSILLECTOMY         Social History   I have reviewed this patient's social history and updated it with pertinent information if needed.  Social History     Tobacco Use     Smoking status: Never Smoker     Smokeless tobacco: Never Used   Substance Use Topics     Alcohol use: Yes     Comment: 1 day     Medications   I have reviewed this patient's current medications    Allergies   Allergies   Allergen Reactions     Etodolac Unknown     Percocet [Oxycodone-Acetaminophen] Nausea     Other reaction(s): Muscle Weakness     Seasonal Allergies Cough     Sinus drainage, watery eyes       Physical Exam   Vital Signs: Temp: (!) 95.8  F (35.4  C) Temp src: Axillary BP: (!) 159/91 Pulse: 80   Resp: 16 SpO2: 91 % O2 Device: None (Room air) Oxygen Delivery: 6 LPM  Weight:  154 lbs 15.73 oz    General Appearance: alert, awake, sitting up in bed  Eyes: pupils are equal and round  HEENT: MMM  Respiratory: LS CTAB  Cardiovascular: s1, s2, RRR  GI: + BS, NT, ND  Skin: c/d/i  Musculoskeletal: BOYD  Neurologic: oriented, no focal findings on exam    Data   I personally reviewed no images or EKG's today.

## 2022-04-21 NOTE — PLAN OF CARE
VS: VSS, pt denied CP or SOP.   O2: Room air sat. > 90% on CAPNO monitor.    Output: Voided x one using urinal adequate amount.   Last BM: 04/21/22   Activity: Up walked on the noriega and up to bathroom with walker, gait belt and assist of one.   Skin: Intact except surgical incision.    Pain: Denied pain.    Neuro: CMS and neuro intact.    Dressing: CDI.    Diet: Regular tolerating okay.    LDA: Right hand IV SL and left hand iv infusing.    Equipment: IV pole, PCD, CAPNO, walker and personal belongings.    Plan: TBD.   Additional Info: Pt arrived on [facility] about 14:30 pm, pt oriented to room and call light, pt resting comfortable.

## 2022-04-22 ENCOUNTER — DOCUMENTATION ONLY (OUTPATIENT)
Dept: INTENSIVE CARE | Facility: CLINIC | Age: 82
End: 2022-04-22
Payer: MEDICARE

## 2022-04-22 ENCOUNTER — APPOINTMENT (OUTPATIENT)
Dept: PHYSICAL THERAPY | Facility: CLINIC | Age: 82
DRG: 520 | End: 2022-04-22
Attending: ORTHOPAEDIC SURGERY
Payer: MEDICARE

## 2022-04-22 VITALS
DIASTOLIC BLOOD PRESSURE: 64 MMHG | OXYGEN SATURATION: 90 % | HEIGHT: 69 IN | RESPIRATION RATE: 16 BRPM | WEIGHT: 154.98 LBS | HEART RATE: 85 BPM | TEMPERATURE: 96.1 F | SYSTOLIC BLOOD PRESSURE: 131 MMHG | BODY MASS INDEX: 22.96 KG/M2

## 2022-04-22 LAB
ANION GAP SERPL CALCULATED.3IONS-SCNC: 9 MMOL/L (ref 3–14)
BASOPHILS # BLD AUTO: 0 10E3/UL (ref 0–0.2)
BASOPHILS NFR BLD AUTO: 0 %
BUN SERPL-MCNC: 11 MG/DL (ref 7–30)
CALCIUM SERPL-MCNC: 9.2 MG/DL (ref 8.5–10.1)
CHLORIDE BLD-SCNC: 107 MMOL/L (ref 94–109)
CO2 SERPL-SCNC: 21 MMOL/L (ref 20–32)
CREAT SERPL-MCNC: 0.72 MG/DL (ref 0.66–1.25)
EOSINOPHIL # BLD AUTO: 0 10E3/UL (ref 0–0.7)
EOSINOPHIL NFR BLD AUTO: 0 %
ERYTHROCYTE [DISTWIDTH] IN BLOOD BY AUTOMATED COUNT: 12.8 % (ref 10–15)
GFR SERPL CREATININE-BSD FRML MDRD: >90 ML/MIN/1.73M2
GLUCOSE BLD-MCNC: 104 MG/DL (ref 70–99)
HCT VFR BLD AUTO: 41.7 % (ref 40–53)
HGB BLD-MCNC: 14.6 G/DL (ref 13.3–17.7)
IMM GRANULOCYTES # BLD: 0.1 10E3/UL
IMM GRANULOCYTES NFR BLD: 1 %
LYMPHOCYTES # BLD AUTO: 1.7 10E3/UL (ref 0.8–5.3)
LYMPHOCYTES NFR BLD AUTO: 12 %
MCH RBC QN AUTO: 33.4 PG (ref 26.5–33)
MCHC RBC AUTO-ENTMCNC: 35 G/DL (ref 31.5–36.5)
MCV RBC AUTO: 95 FL (ref 78–100)
MONOCYTES # BLD AUTO: 0.8 10E3/UL (ref 0–1.3)
MONOCYTES NFR BLD AUTO: 6 %
NEUTROPHILS # BLD AUTO: 11.1 10E3/UL (ref 1.6–8.3)
NEUTROPHILS NFR BLD AUTO: 81 %
NRBC # BLD AUTO: 0 10E3/UL
NRBC BLD AUTO-RTO: 0 /100
PLATELET # BLD AUTO: 221 10E3/UL (ref 150–450)
POTASSIUM BLD-SCNC: 4.3 MMOL/L (ref 3.4–5.3)
RBC # BLD AUTO: 4.37 10E6/UL (ref 4.4–5.9)
SODIUM SERPL-SCNC: 137 MMOL/L (ref 133–144)
WBC # BLD AUTO: 13.7 10E3/UL (ref 4–11)

## 2022-04-22 PROCEDURE — G0378 HOSPITAL OBSERVATION PER HR: HCPCS

## 2022-04-22 PROCEDURE — 85025 COMPLETE CBC W/AUTO DIFF WBC: CPT

## 2022-04-22 PROCEDURE — 36415 COLL VENOUS BLD VENIPUNCTURE: CPT

## 2022-04-22 PROCEDURE — 97116 GAIT TRAINING THERAPY: CPT | Mod: GP | Performed by: PHYSICAL THERAPIST

## 2022-04-22 PROCEDURE — 99225 PR SUBSEQUENT OBSERVATION CARE,LEVEL II: CPT | Performed by: INTERNAL MEDICINE

## 2022-04-22 PROCEDURE — 80048 BASIC METABOLIC PNL TOTAL CA: CPT

## 2022-04-22 PROCEDURE — 999N000111 HC STATISTIC OT IP EVAL DEFER

## 2022-04-22 PROCEDURE — 250N000011 HC RX IP 250 OP 636

## 2022-04-22 PROCEDURE — 250N000013 HC RX MED GY IP 250 OP 250 PS 637

## 2022-04-22 PROCEDURE — 97161 PT EVAL LOW COMPLEX 20 MIN: CPT | Mod: GP | Performed by: PHYSICAL THERAPIST

## 2022-04-22 RX ORDER — OXYCODONE HYDROCHLORIDE 5 MG/1
5-10 TABLET ORAL EVERY 6 HOURS PRN
Qty: 28 TABLET | Refills: 0 | Status: SHIPPED | OUTPATIENT
Start: 2022-04-22

## 2022-04-22 RX ORDER — ACETAMINOPHEN 325 MG/1
650 TABLET ORAL EVERY 4 HOURS PRN
Qty: 100 TABLET | Refills: 0 | Status: SHIPPED | OUTPATIENT
Start: 2022-04-22

## 2022-04-22 RX ORDER — AMOXICILLIN 250 MG
1-2 CAPSULE ORAL 2 TIMES DAILY
Qty: 30 TABLET | Refills: 0 | Status: SHIPPED | OUTPATIENT
Start: 2022-04-22

## 2022-04-22 RX ORDER — POLYETHYLENE GLYCOL 3350 17 G/17G
1 POWDER, FOR SOLUTION ORAL DAILY
Qty: 7 PACKET | Refills: 0 | Status: SHIPPED | OUTPATIENT
Start: 2022-04-22

## 2022-04-22 RX ORDER — HYDROXYZINE HYDROCHLORIDE 10 MG/1
10 TABLET, FILM COATED ORAL EVERY 6 HOURS PRN
Qty: 30 TABLET | Refills: 0 | Status: SHIPPED | OUTPATIENT
Start: 2022-04-22 | End: 2023-09-14

## 2022-04-22 RX ORDER — GABAPENTIN 100 MG/1
100 CAPSULE ORAL 3 TIMES DAILY
Qty: 6 CAPSULE | Refills: 0 | Status: SHIPPED | OUTPATIENT
Start: 2022-04-22 | End: 2022-04-24

## 2022-04-22 RX ORDER — ONDANSETRON 4 MG/1
4 TABLET, ORALLY DISINTEGRATING ORAL EVERY 8 HOURS PRN
Qty: 10 TABLET | Refills: 0 | Status: SHIPPED | OUTPATIENT
Start: 2022-04-22

## 2022-04-22 RX ADMIN — ACETAMINOPHEN 650 MG: 325 TABLET ORAL at 08:02

## 2022-04-22 RX ADMIN — FAMOTIDINE 20 MG: 20 TABLET ORAL at 08:02

## 2022-04-22 RX ADMIN — GABAPENTIN 100 MG: 100 CAPSULE ORAL at 08:02

## 2022-04-22 RX ADMIN — POLYETHYLENE GLYCOL 3350 17 G: 17 POWDER, FOR SOLUTION ORAL at 08:03

## 2022-04-22 RX ADMIN — CEFAZOLIN 1 G: 1 INJECTION, POWDER, FOR SOLUTION INTRAMUSCULAR; INTRAVENOUS at 02:33

## 2022-04-22 RX ADMIN — ACETAMINOPHEN 650 MG: 325 TABLET ORAL at 00:20

## 2022-04-22 ASSESSMENT — ACTIVITIES OF DAILY LIVING (ADL)
ADLS_ACUITY_SCORE: 5

## 2022-04-22 NOTE — PROGRESS NOTES
Care Management Discharge Note    Discharge Date: 04/22/2022       Discharge Disposition:  Home    Discharge Services:  NA    Discharge DME:      Discharge Transportation: family or friend will provide    Handoff Referral Completed: Yes    Additional Information:  Anticipate discharge to home. No home care or outpatient therapies indicated at this time. RNCC available as needed.    Carlene Kenny RN, BSN  Care Coordinator, [facility]  Phone [phone]  Pager [phone]

## 2022-04-22 NOTE — PLAN OF CARE
VS: Stable.   O2: RA, capno on.   Output: Voiding using urinal, retention, PVRs in 200's.   Last BM: 4/21.   Activity: Ax1 with walker and gait belt, WBAT.   Skin: Incision, drain.   Pain: Minimal pain, pt reported soreness in his lower back but refused repositioning throughout the shift, scheduled tylenol given.   Neuro: A/Ox4. CMS intact.   Dressing: CDI.   Diet: Regular.   LDA: PIV right lower forearm SL. PIV left hand infusing TKO. HV with bloody/red output.   Equipment: IV pole, capno, urinal, walker, gait belt, IS, PCDs, call light within reach.   Plan: Continue to monitor.   Additional Info:       Patient vital signs are at baseline: Yes  Patient able to ambulate as they were prior to admission or with assist devices provided by therapies during their stay:  Yes  Patient MUST void prior to discharge:  Yes Voiding but retaining  Patient able to tolerate oral intake:  Yes  Pain has adequate pain control using Oral analgesics:  Yes

## 2022-04-22 NOTE — UTILIZATION REVIEW
"Admission Status; Secondary Review Determination    Under the authority of the Utilization Management Committee, the utilization review process indicated a secondary review on the above patient. The review outcome is based on review of the medical records, discussions with staff, and applying clinical experience noted on the date of the review.    (x) Observation Status Appropriate - This patient does not meet hospital inpatient criteria and is placed in observation status. If this patient's primary payer is Medicare and was admitted as an inpatient, Condition Code 44 should be used and patient status changed to \"observation\".    RATIONALE FOR DETERMINATION: 81 year old male with PMH including HT, neurogenic claudication now s/p L2 and L3 laminectomy and partial medial facetectomies and foraminotomies on 4/21/2022.    Vital signs have been stable without fever.      Labs unremarkable beyond mildly elevated WBC count.    Discharge is anticipated later today.      The severity of illness, intensity of service provided, expected LOS and risk for adverse outcome make the care appropriate for further observation; however, doesn't meet criteria for hospital inpatient admission. Dr Corrigan notified of this determination via text page today.    This document was produced using voice recognition software.  The information on this document is developed by the utilization review team in order for the business office to ensure compliance. This only denotes the appropriateness of proper admission status and does not reflect the quality of care rendered.  The definitions of Inpatient Status and Observation Status used in making the determination above are those provided in the CMS Coverage Manual, Chapter 1 and Chapter 6, section 70.4.    Sincerely,    Rubén Chavez MD  Utilization Review  Physician Advisor  [facility]      "

## 2022-04-22 NOTE — PROGRESS NOTES
[facility]    Medicine Progress Note - Hospitalist Service, GOLD TEAM 17    Date of Admission:  4/21/2022    Assessment & Plan          81 year old male admitted on 4/21/2022. He  has a past medical history of [place] retinal degeneration, hypercholesterolemia, essential hypertension, squamous cell carcinoma of left arm, Vitreous detachment of both eyes, and chronic back pain with neurogenic claudication. He is admitted to the hospital after L2-3 decompression and discectomy with Dr. Cabral.     #spinal stenosis s/p L2-3 decompression and discectomy  POD #1.     -postoperative care orders per primary team    -received ancef     -anticoagulation: none, defer to Ortho.     -acute pain management: acetaminophen, gabapentin, hydromorphone and oxycodone prn     #HTN    -resume PTA lisinopril 10mg. BP stable.      #HLD    -resume PTA atorvastatin        Diet: Advance Diet as Tolerated: Regular Diet Adult  Discharge Instruction - Regular Diet Adult    DVT Prophylaxis: Defer to primary service  Wynne Catheter: Not present  Central Lines: None  Cardiac Monitoring: None  Code Status: Full Code         The patient's care was discussed with the Patient.    Ezequiel Dobson MD  Hospitalist Service, GOLD TEAM 17  [facility]  Securely message with the Vocera Web Console (learn more here)  Text page via [facility] Paging/Directory   Please see signed in provider for up to date coverage information    ______________________________________________________________________    Interval History     No acute events overnight.   He was pleasant.   No chest pain or palpitations.   No shortness of breath or cough.   No nausea, vomit or abdominal pain.   No fever or chills.     Data reviewed today: I reviewed all medications, new labs and imaging results over the last 24 hours. I personally reviewed no images or EKG's today.    Physical Exam    Vital Signs: Temp: (!) 96.1  F (35.6  C) Temp src: Oral BP: 131/64 Pulse: 85   Resp: 16 SpO2: 90 % O2 Device: None (Room air) Oxygen Delivery: 6 LPM  Weight: 154 lbs 15.73 oz  General Appearance: Alert, sitting on a chair, room air, no acute distress.   Respiratory: Normal respiratory effort, clear lungs.   Cardiovascular: RRR, no murmur.   GI: Abdomen soft, + BS, no tenderness to palpation.   Skin: No rash.   Other: Mood stable.     Data   Recent Labs   Lab 04/22/22  0701 04/21/22  0749 04/19/22  1017   WBC 13.7*  --  7.2   HGB 14.6  --  15.6   MCV 95  --  93     --  228     --  134   POTASSIUM 4.3  --  3.9   CHLORIDE 107  --  104   CO2 21  --  24   BUN 11  --  9   CR 0.72  --  0.64*   ANIONGAP 9  --  6   CODY 9.2  --  9.3   * 107* 100*     No results found for this or any previous visit (from the past 24 hour(s)).  Medications     dextrose 5% and 0.45% NaCl + KCl 20 mEq/L 85 mL/hr at 04/21/22 2151       acetaminophen  650 mg Oral Q8H     atorvastatin  10 mg Oral QPM     famotidine  20 mg Oral BID    Or     famotidine  20 mg Intravenous BID     gabapentin  100 mg Oral TID     lisinopril  10 mg Oral QPM     polyethylene glycol  17 g Oral Daily     senna-docusate  2 tablet Oral BID     sodium chloride (PF)  3 mL Intracatheter Q8H

## 2022-04-22 NOTE — PLAN OF CARE
"Goal Outcome Evaluation:                    VS: /64   Pulse 85   Temp (!) 96.1  F (35.6  C) (Oral)   Resp 16   Ht 1.753 m (5' 9.02\")   Wt 70.3 kg (154 lb 15.7 oz)   SpO2 90%   BMI 22.88 kg/m     O2: >90% on RA, CAPNO off per pt request.   Output: Voiding using urinal, retention resolved   Last BM: 4/21.   Activity: Ax1 with walker and gait belt, WBAT.   Skin: Incision, drain.   Pain: Minimal pain, pt reported soreness in his lower back which was  resolved with Tylenol.    Neuro: A/Ox4. CMS intact.   Dressing: CDI.   Diet: Regular.   LDA: PIV right lower forearm SL. PIV left hand SL. HV removed.    Equipment: IV pole, capno, urinal, walker, gait belt, IS, PCDs, call light within reach.   Plan: Continue to monitor.   Additional Info:     Pt. discharged at 1500 via family  to home. Pt. was accompanied by family, and left with personal belongings. Prior to discharge, PIV was removed. Pt. received complete discharge paperwork and all medications as filled by discharge pharmacy. Pt. was given times of last dose for all discharge medications in writing on discharge medication sheets.  Discharge teaching included medication, pain management, activity restrictions, dressing changes, and signs and symptoms of infection. Pt. to follow up with ortho as scheduled. Pt. had no further questions at the time of discharge and no unmet needs were identified.  "

## 2022-04-22 NOTE — PROGRESS NOTES
04/22/22 0946   Quick Adds   Type of Visit Initial PT Evaluation   Living Environment   People in Home spouse;child(unruly), adult   Current Living Arrangements house   Home Accessibility stairs within home   Number of Stairs, Within Home, Primary greater than 10 stairs   Stair Railings, Within Home, Primary railings on both sides of stairs   Transportation Anticipated family or friend will provide   Self-Care   Usual Activity Tolerance moderate   Current Activity Tolerance fair   Regular Exercise Yes   Activity/Exercise Type walking   Exercise Amount/Frequency daily   Equipment Currently Used at Home walker, rolling   Fall history within last six months no   General Information   Onset of Illness/Injury or Date of Surgery 04/21/22   Referring Physician Dr VALERIA Cabral MD   Patient/Family Therapy Goals Statement (PT) PLOF   Pertinent History of Current Problem (include personal factors and/or comorbidities that impact the POC) Pt is an active 81 year old male s/p Lumbar decompression   Existing Precautions/Restrictions spinal   General Observations IND with mobility   Cognition   Affect/Mental Status (Cognition) WNL   Pain Assessment   Patient Currently in Pain Yes, see Vital Sign flowsheet   Integumentary/Edema   Integumentary/Edema no deficits were identifed   Posture    Posture Forward head position;Protracted shoulders   Strength (Manual Muscle Testing)   Strength Comments at baseline for mobility   Bed Mobility   Comment, (Bed Mobility) IND with bed mob   Transfers   Comment, (Transfers) IND with transfers   Gait/Stairs (Locomotion)   [place] Level (Gait) modified independence   Distance in Feet (Required for LE Total Joints) 10   Pattern (Gait) step-through   Comment, (Gait/Stairs) IND with stairs reciprocally using both handrails   Balance   Balance no deficits were identified   Sensory Examination   Sensory Perception patient reports no sensory changes   Coordination   Coordination no deficits were  identified   Muscle Tone   Muscle Tone no deficits were identified   Clinical Impression   Criteria for Skilled Therapeutic Intervention Evaluation only   PT Diagnosis (PT) weakness   Influenced by the following impairments pain   Clinical Presentation (PT Evaluation Complexity) Stable/Uncomplicated   Clinical Presentation Rationale per clinical judgement   Clinical Decision Making (Complexity) low complexity   Risk & Benefits of therapy have been explained evaluation/treatment results reviewed;care plan/treatment goals reviewed;risks/benefits reviewed;participants voiced agreement with care plan;participants included;patient   PT Discharge Planning   PT Discharge Recommendation (DC Rec) home with assist   PT Rationale for DC Rec Pt IND with ombiltiy   PT Brief overview of current status Pt IND with mobility   Plan of Care Review   Plan of Care Reviewed With patient   Total Evaluation Time   Total Evaluation Time (Minutes) 10   Physical Therapy Goals   PT Frequency One time eval and treatment only   PT Predicted Duration/Target Date for Goal Attainment 04/22/22   Psychosocial Support   Trust Relationship/Rapport care explained;emotional support provided;empathic listening provided;questions answered;questions encouraged;reassurance provided

## 2022-04-22 NOTE — PROGRESS NOTES
Orthopaedic Surgery Progress Note 04/21/2022    S: No acute events overnight.  Pain well controlled on PO meds. Denies numbness or tingling in the BLE. Denies CP/SOB. Was OOB, reports improvement in BLE numbness. Tolerating oral diet. -BM, +Flatus. Voiding spontaneously.     O:  Temp: (!) 95.8  F (35.4  C) Temp src: Axillary BP: (!) 157/87 Pulse: 87   Resp: 21 SpO2: 90 % O2 Device: None (Room air) Oxygen Delivery: 6 LPM    Exam:  Gen: No acute distress, resting comfortably in bed.  Resp: Non-labored breathing  MSK:  Spine:  - Dressings c/d/i  - Drain patent with serosanguinous output  - DP pulses 2+ bilaterally, feet wwp bilaterally     Lumbar Spine:    Motor -     L2-3: Hip flexion R 5/5  And L 5/5 strength          L3/4:  Knee extension R 5/5 and L 5/5 strength         L4/5:  Foot dorsiflexion R 5/5 L 5/5 and       EHL dorsiflexion R 5/5 L 5/5 strength         S1:  Plantarflexion/Peroneal Muscles  R 5/5 and L 5/5 strength    Sensation: intact to light touch L3-S1 distribution BLE        Drain output: 120 ml yest, 45 last shift    Recent Labs   Lab 04/19/22  1017   WBC 7.2   HGB 15.6        No results found for: SED    Assessment and Plan: Felice Blood is a 81 year old male with PMH including HT, neurogenic claudication now s/p L2 and L3 laminectomy and partial medial facetectomies and foraminotomies on 4/21/2022 with Dr. Cabral.     Plan for today:   - Pain control on PO  - PT/OT, encourage mobilization  - Will follow drain output this AM  - Will likely be able to discharge later today pending mobilization, therapy, and drain output. Remove drain prior to discharge.     Ortho ([facility]) Primary  Activity: Up with assist until independent No excessive bending or twisting. No lifting >10 lbs x 6 weeks.   Weight bearing status: WBAT.  Pain management:   Transition from IV to PO narcotics as tolerated.   Antibiotics: Antibiotics so long as drain remains in place  Diet: Begin with clear fluids and progress  diet as tolerated  DVT prophylaxis: SCDs only. No chemical DVT ppx needed.  Imaging: None  Labs: Hgb POD 1  Bracing/Splinting: None  Dressings: Keep dressings c/d/i x 7 days  Drains: HV, deep. Document output per shift, will be discontinued at Orthopedic Surgery discretion.  Wynne catheter: None  Physical Therapy/Occupational Therapy: Eval and treat  Cultures: none.    Consults: Hospitalist  Follow-up: Clinic with Dr. Cabral in 6 weeks with repeat x-rays.   Disposition: Pending progress with therapies, pain control on orals, and medical stability, anticipate discharge to home on POD #1-2.      Future Appointments   Date Time Provider Department Center   4/22/2022 10:15 AM Héctor Díaz, PT [facility] [place]   4/22/2022  7:00 PM UR OT WAITLIST [facility] [place]   6/7/2022 11:00 AM Wolfgang Cabral MD [facility]   6/8/2022  9:30 AM Silvana Do MD [facility]   4/13/2023  8:45 AM Camron Hansen MD [facility]       Patient discussed with Dr. Cabral.        Bree Corrigan MD  Orthopedic Surgery PGY-1

## 2022-04-22 NOTE — PROGRESS NOTES
Prior Authorization **APPROVED**    Authorization Effective Date: 1/22/2022  Authorization Expiration Date: 4/22/2023  Medication: Hydroxyzine 10mg tabs **APPROVED**  Approved Dose/Quantity: n/a  Reference #: CoverMyMeds Key: [id number] - [place] Case ID: [id number]   Insurance Company: Medicare Blue RX - Phone [phone] Fax [phone]  Expected CoPay: $5.33     CoPay Card Available: No    Foundation Assistance Needed: n/a  Which Pharmacy is filling the prescription (Not needed for infusion/clinic administered): [facility] [place], [place] - [address]  Pharmacy Notified: Yes  Patient Notified: Yes  Comments:  HRM (high risk medication) for elderly patients per [place] Criteria List--PA required. Requires acknowledgement that the prescriber has assessed risk versus benefit in using this High Risk Medication (HRM). Discharge pharmacy sent patient with supply while auth is pending. *Retroactively Billed*          Krystal Barnes CPhT  [place]charge Pharmacy Liaison  Pronouns: She/Her/Hers    [facility]  [address]  [address], [place] [address]   [email or web address]  [email or web address]   Phone: [phone]  Pager: [phone]  Fax: [phone]

## 2022-04-22 NOTE — PROGRESS NOTES
Prior Authorization **APPROVED**    Authorization Effective Date: 1/22/2022  Authorization Expiration Date: 4/22/2023  Medication: Ondansetron 4mg ODT **APPROVED**  Approved Dose/Quantity: n/a  Reference #: CoverMyMeds Key: [id number] - [place] Case ID: [id number]   Insurance Company: Medicare Blue RX - Phone [phone] Fax [phone]  Expected CoPay: $3.66     CoPay Card Available: No    Foundation Assistance Needed: n/a  Which Pharmacy is filling the prescription (Not needed for infusion/clinic administered): [facility] [place], [place] - [address]  Pharmacy Notified: Yes  Patient Notified: Yes  Comments:  Medicare B versus D determination.          Krystal Barnes CPhT  [place] Discharge Pharmacy Liaison  Pronouns: She/Her/Hers    [facility]  [address] [facility]  [address] [address]   [email or web address]  [email or web address]   Phone: [phone]  Pager: [phone]  Fax: [phone]

## 2022-04-22 NOTE — PLAN OF CARE
I  Physical Therapy Discharge Summary    Reason for therapy discharge:    Discharged to home.    Progress towards therapy goal(s). See goals on Care Plan in [facility] electronic health record for goal details.  Goals met    Therapy recommendation(s):    No further therapy is recommended.

## 2022-04-22 NOTE — PLAN OF CARE
[facility]      OUTPATIENT PHYSICAL THERAPY EVALUATION  PLAN OF TREATMENT FOR OUTPATIENT REHABILITATION  (COMPLETE FOR INITIAL CLAIMS ONLY)  Patient's Last Name, First Name, M.I.  YOB: 1940  Felice Blood                        Provider's Name  [facility] Medical Record No.  [id number]                               Onset Date:  04/21/22   Start of Care Date:         Type:     _X_PT   ___OT   ___SLP Medical Diagnosis:                           PT Diagnosis:  weakness   Visits from SOC:  1   _________________________________________________________________________________  Plan of Treatment/Functional Goals    Planned Interventions:       Goals: See Physical Therapy Goals on Care Plan in [facility] electronic health record.    Therapy Frequency: One time eval and treatment only  Predicted Duration of Therapy Intervention: 04/22/22  _________________________________________________________________________________    I CERTIFY THE NEED FOR THESE SERVICES FURNISHED UNDER        THIS PLAN OF TREATMENT AND WHILE UNDER MY CARE     (Physician co-signature of this document indicates review and certification of the therapy plan).               ,      Referring Physician: Dr VALERIA Cabral MD            Initial Assessment        See Physical Therapy evaluation dated   in Epic electronic health record.

## 2022-04-22 NOTE — DISCHARGE SUMMARY
ORTHOPEDIC SURGERY DISCHARGE SUMMARY     Date of Admission: 4/21/2022  Date of Discharge: 4/22/2022  Disposition: Home  Staff Physician: Wolfgang Cabral  Primary Care Provider: Anthony Maddox    DISCHARGE DIAGNOSIS:  Spinal stenosis of lumbar region with neurogenic claudication [M48.062]    PROCEDURES: Procedure(s):  Lumbar 2 to 3 decompression and discectomy on 4/21/2022    BRIEF HISTORY:  This is a 81 year old patient who has been followed in clinic. Please refer to that documentation for full details. Briefly, the patient has a PMH including HT, neurogenic claudication. The patient has failed conservative treatment of symptoms and desires more definitive intervention. Therefore, after reviewing non-operative and operative options including the risks and benefits associated with each, the patient elected to proceed with the above stated procedure.     HOSPITAL COURSE:    The patient was admitted following the above listed procedures for pain control and rehabilitation. Felice Blood did well post-operatively. Medicine was consulted post operatively to aid in management of medical co-morbidities. The patient received routine nursing cares and at the time of discharge was medically stable. Vital signs were stable throughout admission. The patient is tolerating a regular diet and is voiding spontaneously. All PT/OT goals have been met for safe mobility. Pain is now controlled on oral medications which will be available on discharge. Stool softeners have been used while taking pain medications to help prevent constipation. Felice Blood is deemed medically safe to discharge.     Antibiotics:  Ancef given periop and postop.  DVT prophylaxis: none  PT Progress:  Has met PT/OT goals for safe mobility.   Pain Meds:  Weaned off all IV pain meds by discharge.  Inpatient Events:  No significant postoperative events or complications.     PHYSICAL EXAM:    Gen: No acute distress, resting comfortably in  bed.  Resp: Non-labored breathing  MSK:  Spine:  - Dressings c/d/i  - Drain patent with serosanguinous output  - DP pulses 2+ bilaterally, feet wwp bilaterally                 Lumbar Spine:                          Motor -                           L2-3: Hip flexion R 5/5  And L 5/5 strength                           L3/4:  Knee extension R 5/5 and L 5/5 strength                          L4/5:  Foot dorsiflexion R 5/5 L 5/5 and                                       EHL dorsiflexion R 5/5 L 5/5 strength                          S1:  Plantarflexion/Peroneal Muscles  R 5/5 and L 5/5 strength                          Sensation: intact to light touch L3-S1 distribution BLE    FOLLOWUP:    Follow up with Dr. Cabral at 6 weeks postoperatively.    Future Appointments   Date Time Provider Department Center   6/7/2022 11:00 AM Wolfgang Cabral MD [facility]   6/8/2022  9:30 AM Silvana Do MD [facility]   4/13/2023  8:45 AM Camron Hansen MD [facility]       Orthopedic Surgery appointments are at the [facility] ([address] [place] [address]). Call [phone] to schedule a follow-up appointment at this location with your provider.     PLANNED DISCHARGE ORDERS:      Current Discharge Medication List      START taking these medications    Details   gabapentin (NEURONTIN) 100 MG capsule Take 1 capsule (100 mg) by mouth 3 times daily for 2 days  Qty: 6 capsule, Refills: 0    Associated Diagnoses: Status post lumbar spine operative procedure for decompression of spinal cord      ondansetron (ZOFRAN-ODT) 4 MG ODT tab Take 1 tablet (4 mg) by mouth every 8 hours as needed for nausea Dissolve ON the tongue.  Qty: 10 tablet, Refills: 0    Associated Diagnoses: Status post lumbar spine operative procedure for decompression of spinal cord      oxyCODONE (ROXICODONE) 5 MG tablet Take 1-2 tablets (5-10 mg) by mouth every 6 hours as needed for severe pain (Moderate to  Severe)  Qty: 28 tablet, Refills: 0    Associated Diagnoses: Status post lumbar spine operative procedure for decompression of spinal cord      polyethylene glycol (MIRALAX) 17 g packet Take 17 g by mouth daily  Qty: 7 packet, Refills: 0    Associated Diagnoses: Status post lumbar spine operative procedure for decompression of spinal cord      senna-docusate (SENOKOT-S/PERICOLACE) 8.6-50 MG tablet Take 1-2 tablets by mouth 2 times daily Take while on oral narcotics to prevent or treat constipation.  Qty: 30 tablet, Refills: 0    Comments: While taking narcotics  Associated Diagnoses: Status post lumbar spine operative procedure for decompression of spinal cord         CONTINUE these medications which have CHANGED    Details   acetaminophen (TYLENOL) 325 MG tablet Take 2 tablets (650 mg) by mouth every 4 hours as needed for other (mild pain)  Qty: 100 tablet, Refills: 0    Associated Diagnoses: Status post lumbar spine operative procedure for decompression of spinal cord      hydrOXYzine (ATARAX) 10 MG tablet Take 1 tablet (10 mg) by mouth every 6 hours as needed for itching or anxiety (with pain, moderate pain)  Qty: 30 tablet, Refills: 0    Associated Diagnoses: Status post lumbar spine operative procedure for decompression of spinal cord         CONTINUE these medications which have NOT CHANGED    Details   albuterol (PROAIR HFA/PROVENTIL HFA/VENTOLIN HFA) 108 (90 Base) MCG/ACT inhaler Inhale 2 puffs into the lungs every 4 hours as needed for shortness of breath / dyspnea or wheezing  Qty: 18 g, Refills: 0    Associated Diagnoses: Cough      aspirin 81 MG tablet Take 1 tablet by mouth every morning      atorvastatin (LIPITOR) 10 MG tablet Take 1 tablet (10 mg) by mouth daily  Qty: 90 tablet, Refills: 3    Associated Diagnoses: Pure hypercholesterolemia; Screen for colon cancer; Impotence of organic origin; Essential hypertension; Bilateral impacted cerumen      Calcium-Magnesium-Zinc 333-133-5 MG TABS per tablet  "Take 1 tablet by mouth every morning      cholecalciferol (VITAMIN D3) 25 mcg (1000 units) capsule Take 1 capsule by mouth every morning      fluticasone (FLONASE) 50 MCG/ACT spray Spray 2 sprays into both nostrils At Bedtime  Qty: 1 Bottle, Refills: 1    Associated Diagnoses: Persistent cough for 3 weeks or longer; Bibasilar crackles      ipratropium (ATROVENT) 0.06 % nasal spray Spray 2 sprays into both nostrils 4 times daily as needed for rhinitis  Qty: 15 mL, Refills: 3    Associated Diagnoses: PND (post-nasal drip)      ketoconazole (NIZORAL) 2 % external shampoo Massage into wet scalp, let sit 3-5 min, then rinse. Do this 3x weekly.  Qty: 120 mL, Refills: 11    Associated Diagnoses: Seborrheic dermatitis      lisinopril (ZESTRIL) 10 MG tablet Take 1 tablet (10 mg) by mouth daily  Qty: 90 tablet, Refills: 3    Associated Diagnoses: Pure hypercholesterolemia; Screen for colon cancer; Impotence of organic origin; Essential hypertension; Bilateral impacted cerumen      sildenafil (REVATIO) 20 MG tablet Take 5 tablets (100 mg) by mouth daily as needed.  Qty: 60 tablet, Refills: 4    Associated Diagnoses: Erectile dysfunction, unspecified erectile dysfunction type      sildenafil (VIAGRA) 100 MG tablet Take 1 tablet (100 mg) by mouth daily as needed  Qty: 20 tablet, Refills: 11    Associated Diagnoses: Erectile dysfunction, unspecified erectile dysfunction type               Discharge Procedure Orders   Reason for your hospital stay   Order Comments: Lumbar decompression     When to call - Contact Surgeon Team   Order Comments: You may experience symptoms that require follow-up before your scheduled appointment. Refer to the \"Stoplight Tool\" for instructions on when to contact your Surgeon Team if you are concerned about pain control, blood clots, constipation, or if you are unable to urinate.     When to call - Reach out to Urgent Care   Order Comments: If you are not able to reach your Surgeon Team and you need " immediate care, go to the Orthopedic Walk-in Clinic or Urgent Care at your Surgeon's office.  Do NOT go to the Emergency Room unless you have shortness of breath, chest pain, or other signs of a medical emergency.     When to call - Reasons to Call 911   Order Comments: Call 911 immediately if you experience sudden-onset chest pain, arm weakness/numbness, slurred speech, or shortness of breath     Discharge Instruction - Breathing exercises   Order Comments: Perform breathing exercises using your Incentive Spirometer 10 times per hour while awake for 2 weeks.     Symptoms - Fever Management   Order Comments: A low grade fever can be expected after surgery.  Use acetaminophen (TYLENOL) as needed for fever management.  Contact your Surgeon Team if you have a fever greater than 101.5 F, chills, and/or night sweats.     Symptoms - Constipation management   Order Comments: Constipation (hard, dry bowel movements) is expected after surgery due to the combination of being less active, the anesthetic, and the opioid pain medication.  You can do the following to help reduce constipation:  ~  FLUIDS:  Drink clear liquids (water or Gatorade), or juice (apple/prune).  ~  DIET:  Eat a fiber rich diet.    ~  ACTIVITY:  Get up and move around several times a day.  Increase your activity as you are able.  MEDICATIONS:  Reduce the risk of constipation by starting medications before you are constipated.  You can take Miralax   (1 packet as directed) and/or a stool softener (Senokot 1-2 tablets 1-2 times a day).  If you already have constipation and these medications are not working, you can get magnesium citrate and use as directed.  If you continue to have constipation you can try an over the counter suppository or enema.  Call your Surgeon Team if it has been greater than 3 days since your last bowel movement.     Symptoms - Reduced Urine Output   Order Comments: Changes in the amount of fluids you drank before and after surgery  may result in problems urinating.  It is important to stay well-hydrated after surgery and drink plenty of water. If it has been greater than 8 hours since you have urinated despite drinking plenty of water, call your Surgeon Team.     Activity - Exercises to prevent blood clots   Order Comments: Unless otherwise directed by your Surgeon team, perform the following exercises at least three times per day for the first four weeks after surgery to prevent blood clots in your legs: 1) Point and flex your feet (Ankle Pumps), 2) Move your ankle around in big circles, 3) Wiggle your toes, 4) Walk, even for short distances, several times a day, will help decrease the risk of blood clots.     Order Specific Question Answer Comments   Is discharge order? Yes      Comfort and Pain Management - Pain after Surgery   Order Comments: Pain after surgery is normal and expected.  You will have some amount of pain for several weeks after surgery.  Your pain will improve with time.  There are several things you can do to help reduce your pain including: rest, ice, elevation, and using pain medications as needed. Contact your Surgeon Team if you have pain that persists or worsens after surgery despite rest, ice, elevation, and taking your medication(s) as prescribed. Contact your Surgeon Team if you have new numbness, tingling, or weakness in your operative extremity.     Comfort and Pain Management - Cold therapy   Order Comments: Ice can be used to control swelling and discomfort after surgery. Place a thin towel over your operative site and apply the ice pack overtop. Leave ice pack in place for 20 minutes, then remove for 20 minutes. Repeat this 20 minutes on/20 minutes off routine as often as tolerated.     Medication Instructions - Acetaminophen (TYLENOL) Instructions   Order Comments: You were discharged with acetaminophen (TYLENOL) for pain management after surgery. Acetaminophen most effectively manages pain symptoms when it is  taken on a schedule without missing doses (every four, six, or eight hours). Your Provider will prescribe a safe daily dose between 3000 - 4000 mg.  Do NOT exceed this daily dose. Most patients use acetaminophen for pain control for the first four weeks after surgery.  You can wean from this medication as your pain decreases.     Medication Instructions - Opioids - Tapering Instructions   Order Comments: In the first three days following surgery, your symptoms may warrant use of the narcotic pain medication every four to six hours as prescribed. This is normal. As your pain symptoms improve, focus your efforts on decreasing (tapering) use of narcotic medications. The most successful tapering strategy is to first, decrease the number of tablets you take every 4-6 hours to the minimum prescribed. Then, increase the amount of time between doses.  For example:  First, taper to   or 1 tablet every 4-6 hours.  Then, taper to   or 1 tablet every 6-8 hours.  Then, taper to   or 1 tablet every 8-10 hours.  Then, taper to   or 1 tablet every 10-12 hours.  Then, taper to   or 1 tablet at bedtime.  The bedtime dose can help with comfort during sleep and is typically the last dose to be discontinued after surgery.     Follow Up Care   Order Comments: Follow-up with your Surgeon Team in 6 week. Call if concerns of wound prior to 6 week follow up.     Medication instructions - No pharmacologic VTE prophylaxis prescribed   Order Comments: Your Surgeon did not prescribe medication for anticoagulation.     Medication Instructions - Opioid Instructions (1 - 2 tablets Q 4-6 hours, MAX 6 tablets)   Order Comments: You were discharged with an opioid medication (hydromorphone, oxycodone, hydrocodone, or tramadol). This medication should only be taken for breakthrough pain that is not controlled with acetaminophen (TYLENOL). If you rate your pain less than 3 you do not need this medication.  Pain rating 0-3:  You do not need this  medication.  Pain rating 4-6:  Take 1 tablet every 4-6 hours as needed  Pain rating 7-10:  Take 2 tablets every 4-6 hours as needed.  Do not exceed 6 tablets per day     No VTE Prophylaxis     Discharge Instruction - Regular Diet Adult   Order Comments: Return to your pre-surgery diet unless instructed otherwise     Order Specific Question Answer Comments   Is discharge order? Yes      Assign Questionnaire Series to Patient       Orthopedic surgery staff for this patient is Dr. Cabral. Discussed.    --  Bree Corrigan MD  Orthopedic Surgery PGY-1

## 2022-04-22 NOTE — PROGRESS NOTES
Prior Authorization **INITIATED**    Medication: Hydroxyzine 10mg tabs   Insurance Company: [employer] - Phone [phone] Fax [phone]  Pharmacy Filling the Rx: [facility] - [place], [place] - [address]  Filling Pharmacy Phone: [phone]  Filling Pharmacy Fax: [phone]  Start Date: 4/22/2022  Reference #: CoverMyMeds Key: [id number] - [place] Case ID: [id number]  Comments:  HRM (high risk medication) for elderly patients per [employer] Criteria List--[place] required. Requires acknowledgement that the prescriber has assessed risk versus benefit in using this High Risk Medication (HRM). Discharge pharmacy sent patient with supply while auth is pending. Will retroactively bill once determination received.      Krystal Barnes CPhT  [facility] Pharm[facility] Liaison  Pronouns: She/Her/Hers    [facility]  [address]  [address] [address]   [email or web address]  [email or web address]   Phone: [phone]  Pager: [phone]  Fax: [phone]

## 2022-05-24 DIAGNOSIS — M48.062 SPINAL STENOSIS OF LUMBAR REGION WITH NEUROGENIC CLAUDICATION: ICD-10-CM

## 2022-05-24 DIAGNOSIS — M54.50 LUMBAR PAIN: Primary | ICD-10-CM

## 2022-06-02 ENCOUNTER — OFFICE VISIT (OUTPATIENT)
Dept: INTERNAL MEDICINE | Facility: CLINIC | Age: 82
End: 2022-06-02
Payer: MEDICARE

## 2022-06-02 VITALS
DIASTOLIC BLOOD PRESSURE: 84 MMHG | OXYGEN SATURATION: 97 % | BODY MASS INDEX: 22.39 KG/M2 | SYSTOLIC BLOOD PRESSURE: 148 MMHG | TEMPERATURE: 97.8 F | HEART RATE: 84 BPM | HEIGHT: 70 IN | WEIGHT: 156.4 LBS | RESPIRATION RATE: 16 BRPM

## 2022-06-02 DIAGNOSIS — R09.82 POST-NASAL DRIP: Primary | ICD-10-CM

## 2022-06-02 PROCEDURE — 99213 OFFICE O/P EST LOW 20 MIN: CPT | Mod: GC

## 2022-06-02 NOTE — PROGRESS NOTES
[facility]  Internal Medicine    Chief Complaint   Patient presents with     Sinus Problem     Pt comes into clinic to discuss sinus infection, has been going on for over a month       Primary Care Provider: Anthony Maddox MD    Subjective:    HPI:  Felice Blood is a/an 81 year old male with a past medical history as noted below, who presents today with above chief complaint. The patient reports over the last 2-3 months he has been having mucus draining in the back of his throat which is made worse with sleeping and lying flat. He denies any infection prior to the onset of his symptoms. He also reports an associated productive cough of gray and yellow sputum. He is also having rhinorrhea once in a while. He has a history of seasonal allergies which typically occur in the early spring. He has tried using flonase, decongestant, inhaler, sinus rinses, hydroxyzine, mucinex and cough syrup with no relief. He states his symptoms feel different than prior sinus infections and allergies. He states he has never had symptoms like this in the past. Of note, the patient did have COVID and a back surgery in the time frame of his symptoms although his symptoms started before these. He also reports he has an upcoming ENT appointment at the end of this month. He denies any chest pain, dyspnea, fevers, abdominal pain, nausea, vomiting, weight changes, weakness, vision changes, issues with pooping or issues with urination. He has no further concerns or questions at this time.     Review of systems:  See HPI    Patient Active Problem List   Diagnosis     Essential hypertension     Pure hypercholesterolemia     Impotence of organic origin     Acute bilateral low back pain with bilateral sciatica     Status post lumbar spine operative procedure for decompression of spinal cord     Past Medical History:   Diagnosis Date     Cobblestone retinal degeneration      Impotence of organic origin      Nonsenile cataract       Pure hypercholesterolemia      Unspecified essential hypertension      Vitreous detachment of both eyes      Past Surgical History:   Procedure Laterality Date     APPENDECTOMY       CATARACT IOL, RT/LT Right 05/01/2017     DECOMPRESSION LUMBAR ONE LEVEL N/A 4/21/2022    Procedure: Lumbar 2 to 3 decompression and discectomy;  Surgeon: Wolfgang Cabral MD;  Location: [facility] OR     PHACOEMULSIFICATION WITH STANDARD INTRAOCULAR LENS IMPLANT Right 5/1/2017    Procedure: PHACOEMULSIFICATION WITH STANDARD INTRAOCULAR LENS IMPLANT;  Right Eye Phacoemulsification with intraocular Lens Implant;  Surgeon: Camron Hansen MD;  Location: [facility] OR     TONSILLECTOMY       Current Outpatient Medications   Medication Sig Dispense Refill     acetaminophen (TYLENOL) 325 MG tablet Take 2 tablets (650 mg) by mouth every 4 hours as needed for other (mild pain) 100 tablet 0     albuterol (PROAIR HFA/PROVENTIL HFA/VENTOLIN HFA) 108 (90 Base) MCG/ACT inhaler Inhale 2 puffs into the lungs every 4 hours as needed for shortness of breath / dyspnea or wheezing 18 g 0     aspirin 81 MG tablet Take 1 tablet by mouth every morning       atorvastatin (LIPITOR) 10 MG tablet Take 1 tablet (10 mg) by mouth daily (Patient taking differently: Take 10 mg by mouth every evening) 90 tablet 3     Calcium-Magnesium-Zinc 333-133-5 MG TABS per tablet Take 1 tablet by mouth every morning       cholecalciferol (VITAMIN D3) 25 mcg (1000 units) capsule Take 1 capsule by mouth every morning       fluticasone (FLONASE) 50 MCG/ACT spray Spray 2 sprays into both nostrils At Bedtime (Patient taking differently: Spray 2 sprays into both nostrils as needed) 1 Bottle 1     hydrOXYzine (ATARAX) 10 MG tablet Take 1 tablet (10 mg) by mouth every 6 hours as needed for itching or anxiety (with pain, moderate pain) 30 tablet 0     ipratropium (ATROVENT) 0.06 % nasal spray Spray 2 sprays into both nostrils 4 times daily as needed for rhinitis 15 mL 3     ketoconazole  (NIZORAL) 2 % external shampoo Massage into wet scalp, let sit 3-5 min, then rinse. Do this 3x weekly. (Patient taking differently: daily as needed Massage into wet scalp, let sit 3-5 min, then rinse. Do this 3x weekly.) 120 mL 11     lisinopril (ZESTRIL) 10 MG tablet Take 1 tablet (10 mg) by mouth daily (Patient taking differently: Take 10 mg by mouth every evening) 90 tablet 3     ondansetron (ZOFRAN-ODT) 4 MG ODT tab Take 1 tablet (4 mg) by mouth every 8 hours as needed for nausea Dissolve ON the tongue. 10 tablet 0     oxyCODONE (ROXICODONE) 5 MG tablet Take 1-2 tablets (5-10 mg) by mouth every 6 hours as needed for severe pain (Moderate to Severe) 28 tablet 0     polyethylene glycol (MIRALAX) 17 g packet Take 17 g by mouth daily 7 packet 0     senna-docusate (SENOKOT-S/PERICOLACE) 8.6-50 MG tablet Take 1-2 tablets by mouth 2 times daily Take while on oral narcotics to prevent or treat constipation. 30 tablet 0     sildenafil (REVATIO) 20 MG tablet Take 5 tablets (100 mg) by mouth daily as needed. (Patient taking differently: as needed Take 5 tablets (100 mg) by mouth daily as needed.) 60 tablet 4     sildenafil (VIAGRA) 100 MG tablet Take 1 tablet (100 mg) by mouth daily as needed 20 tablet 11     gabapentin (NEURONTIN) 100 MG capsule Take 1 capsule (100 mg) by mouth 3 times daily for 2 days 6 capsule 0     Allergies   Allergen Reactions     Etodolac Unknown     Percocet [Oxycodone-Acetaminophen] Nausea     Other reaction(s): Muscle Weakness     Seasonal Allergies Cough     Sinus drainage, watery eyes     Social History     Tobacco Use     Smoking status: Never Smoker     Smokeless tobacco: Never Used   Substance Use Topics     Alcohol use: Yes     Comment: 1 day     Family History   Problem Relation Age of Onset     Glaucoma No family hx of      Macular Degeneration No family hx of        Objective:  BP Readings from Last 6 Encounters:   06/02/22 (!) 148/84   04/22/22 131/64   04/04/22 132/83   02/21/22 (!)  "148/91   09/21/21 (!) 142/88   09/01/21 (!) 155/90     Wt Readings from Last 5 Encounters:   06/02/22 70.9 kg (156 lb 6.4 oz)   04/21/22 70.3 kg (154 lb 15.7 oz)   04/05/22 69.4 kg (153 lb)   01/03/22 69.4 kg (153 lb)   11/22/21 69.4 kg (153 lb)     Estimated body mass index is 22.44 kg/m  as calculated from the following:    Height as of this encounter: 1.778 m (5' 10\").    Weight as of this encounter: 70.9 kg (156 lb 6.4 oz).  BP (!) 148/84 (BP Location: Right arm, Patient Position: Sitting, Cuff Size: Adult Regular)   Pulse 84   Temp 97.8  F (36.6  C) (Oral)   Resp 16   Ht 1.778 m (5' 10\")   Wt 70.9 kg (156 lb 6.4 oz)   SpO2 97%   BMI 22.44 kg/m      Physical Exam:    General: Alert and oriented without distress  HEENT: Normocephalic/atraumatic. No cervical lymphadenopathy, nasal polyps or pharyngeal erythema. Difficult to visualize tympanic membrane given ear canal anatomy. EOMI.   Respiratory: CTAB without increased respiratory effort or accessory muscle use  Cardiovascular: Difficult to auscultate. No overt murmurs, rubs or gallop.   Skin: Healing surgical site over lower back back with erythema, no tenderness to palpation or surrounding fluctuance. Otherwise, no overt lesions, bruises, rashes or bleeding on exposed skin surfaces.  Neuro: CN II-XII grossly intact.    Assessment and Plan:    Felice was seen today for sinus problem.    Diagnoses and all orders for this visit:    Post-nasal drip  Patient presented with a complaint of 2-3 months of mucus draining down the back of his throat which is made worse with lying down and sleeping consistent with post-nasal drip. He has a history of seasonal allergies and sinusitis but he states this episode feels different. He has tried numerous treatment strategies which have no worked. Given the lack of fever, systemic symptoms or chronicity of symptoms this likely represents post nasal drip from allergic rhinitis. The patient reports he has an upcoming " appointment with ENT at the end of this month.  - Try a different second generation antihistamine such as fexofenadine or cetirizine  - Try a different intranasal steroid spray such as nasacort or nasonex  - Discuss symptoms with ENT at upcoming appointment if symptoms do not improve.      Patient seen and case dicussed with Dr. Berman who agrees with the above assessment and plan    William Barrientos, DO  PGY-1, Internal Medicine  [facility]      Patients: if you have questions or concerns about this progress note, please discuss them with the provider at a future office visit.

## 2022-06-02 NOTE — PATIENT INSTRUCTIONS
You were seen for symptoms consistent with post-nasal drip. We recommend trying a different nasal spray such as nasacort or nasonex and a different anti-histamine such as fexofenadine or cetirizine. In addition, keep your upcoming ENT appointment and discuss your symptoms with them if things do not improve. Please let us know right away if you develop worsening symptoms, fever, facial pain or vision changes.     To make a follow up with Dr. Barrientos in about 1-2 months, please call [phone]   [phone]

## 2022-06-02 NOTE — NURSING NOTE
Felice Blood is a 81 year old male patient that presents today in clinic for the following:    Chief Complaint   Patient presents with     Sinus Problem     Pt comes into clinic to discuss sinus infection, has been going on for over a month     The patient's allergies and medications were reviewed as noted. A set of vitals were recorded as noted without incident. The patient does not have any other questions for the provider.    Cass Moulton, EMT at 8:31 AM on 6/2/2022

## 2022-06-07 ENCOUNTER — OFFICE VISIT (OUTPATIENT)
Dept: ORTHOPEDICS | Facility: CLINIC | Age: 82
End: 2022-06-07
Payer: MEDICARE

## 2022-06-07 VITALS — WEIGHT: 156 LBS | BODY MASS INDEX: 22.33 KG/M2 | HEIGHT: 70 IN

## 2022-06-07 DIAGNOSIS — M48.062 SPINAL STENOSIS OF LUMBAR REGION WITH NEUROGENIC CLAUDICATION: Primary | ICD-10-CM

## 2022-06-07 PROCEDURE — 99024 POSTOP FOLLOW-UP VISIT: CPT | Performed by: ORTHOPAEDIC SURGERY

## 2022-06-07 NOTE — LETTER
6/7/2022         RE: Felice Blood  [address]  [facility] [address]        Dear Colleague,    Thank you for referring your patient, Felice Blood, to the [facility] [place]. Please see a copy of my visit note below.    Spine Surgery Return Clinic Visit      Chief Complaint:   Surgical Followup of the Lower Back (6 week POP DOS 4/21/22 Lumbar 2 to 3 decompression and discectomy has been having a little protursion on his lumbar spine since the surgery. )      Interval HPI:  Symptom Profile Including: location of symptoms, onset, severity, exacerbating/alleviating factors, previous treatments:        Felice Blood is a 81 year old male who returns 6 weeks status post decompression.  He says he is doing really well.  No back pain or leg symptoms.  He had a little bit of a bump around the incision he wanted to ask about.  Otherwise very happy with the surgery.            Past Medical History:     Past Medical History:   Diagnosis Date     Cobblestone retinal degeneration      Impotence of organic origin      Nonsenile cataract      Pure hypercholesterolemia      Unspecified essential hypertension      Vitreous detachment of both eyes             Past Surgical History:     Past Surgical History:   Procedure Laterality Date     APPENDECTOMY       CATARACT IOL, RT/LT Right 05/01/2017     DECOMPRESSION LUMBAR ONE LEVEL N/A 4/21/2022    Procedure: Lumbar 2 to 3 decompression and discectomy;  Surgeon: Wolfgang Cabral MD;  Location: [facility] OR     PHACOEMULSIFICATION WITH STANDARD INTRAOCULAR LENS IMPLANT Right 5/1/2017    Procedure: PHACOEMULSIFICATION WITH STANDARD INTRAOCULAR LENS IMPLANT;  Right Eye Phacoemulsification with intraocular Lens Implant;  Surgeon: Camron Hansen MD;  Location: [facility] OR     TONSILLECTOMY              Social History:     Social History     Tobacco Use     Smoking status: Never Smoker     Smokeless tobacco: Never Used  "  Substance Use Topics     Alcohol use: Yes     Comment: 1 day            Family History:     Family History   Problem Relation Age of Onset     Glaucoma No family hx of      Macular Degeneration No family hx of             Allergies:     Allergies   Allergen Reactions     Etodolac Unknown     Percocet [Oxycodone-Acetaminophen] Nausea     Other reaction(s): Muscle Weakness     Seasonal Allergies Cough     Sinus drainage, watery eyes            Medications:     Current Outpatient Medications   Medication     acetaminophen (TYLENOL) 325 MG tablet     albuterol (PROAIR HFA/PROVENTIL HFA/VENTOLIN HFA) 108 (90 Base) MCG/ACT inhaler     aspirin 81 MG tablet     atorvastatin (LIPITOR) 10 MG tablet     Calcium-Magnesium-Zinc 333-133-5 MG TABS per tablet     cholecalciferol (VITAMIN D3) 25 mcg (1000 units) capsule     fluticasone (FLONASE) 50 MCG/ACT spray     gabapentin (NEURONTIN) 100 MG capsule     hydrOXYzine (ATARAX) 10 MG tablet     ipratropium (ATROVENT) 0.06 % nasal spray     ketoconazole (NIZORAL) 2 % external shampoo     lisinopril (ZESTRIL) 10 MG tablet     ondansetron (ZOFRAN-ODT) 4 MG ODT tab     oxyCODONE (ROXICODONE) 5 MG tablet     polyethylene glycol (MIRALAX) 17 g packet     senna-docusate (SENOKOT-S/PERICOLACE) 8.6-50 MG tablet     sildenafil (REVATIO) 20 MG tablet     sildenafil (VIAGRA) 100 MG tablet     No current facility-administered medications for this visit.             Review of Systems:   A focused musculoskeletal and neurologic ROS was performed with pertinent positives and negatives noted in the HPI.  Additional systems were also reviewed and are documented at the bottom of the note.         Physical Exam:   Vitals: Ht 1.778 m (5' 10\")   Wt 70.8 kg (156 lb)   BMI 22.38 kg/m    Musculoskeletal, Neurologic, and Spine:          Lumbar Spine:    Appearance - No gross stepoffs or deformities    Motor -     L2-3: Hip flexion 5/5 R and 5/5 L strength          L3/4:  Knee extension R 5/5 and L 5/5 " strength         L4/5:  Foot dorsiflexion R 5/5 L 5/5 and       EHL dorsiflexion R 5/5 L 5/5 strength         S1:  Plantarflexion/Peroneal Muscles  R 5/5 and L 5/5 strength    Sensation: intact to light touch L3-S1 distribution BLE        There is a little scab over the distal aspect of the incision, no surrounding erythema or drainage.         Imaging:   We ordered and independently reviewed new radiographs at this clinic visit. The results were discussed with the patient. Findings include:     Stable alignment and postoperative changes       Assessment and Plan:     81 year old male with excellent outcome after lumbar decompression, advance activities as tolerated, use commonsense restrictions for the lumbar spine, follow-up as needed, I think that little bump over the incision will heal very well in the future           Respectfully,  Wolfgang Cabral MD  Spine Surgery  [facility]

## 2022-06-07 NOTE — PROGRESS NOTES
Spine Surgery Return Clinic Visit      Chief Complaint:   Surgical Followup of the Lower Back (6 week POP DOS 4/21/22 Lumbar 2 to 3 decompression and discectomy has been having a little protursion on his lumbar spine since the surgery. )      Interval HPI:  Symptom Profile Including: location of symptoms, onset, severity, exacerbating/alleviating factors, previous treatments:        Felice Blood is a 81 year old male who returns 6 weeks status post decompression.  He says he is doing really well.  No back pain or leg symptoms.  He had a little bit of a bump around the incision he wanted to ask about.  Otherwise very happy with the surgery.            Past Medical History:     Past Medical History:   Diagnosis Date     Cobblestone retinal degeneration      Impotence of organic origin      Nonsenile cataract      Pure hypercholesterolemia      Unspecified essential hypertension      Vitreous detachment of both eyes             Past Surgical History:     Past Surgical History:   Procedure Laterality Date     APPENDECTOMY       CATARACT IOL, RT/LT Right 05/01/2017     DECOMPRESSION LUMBAR ONE LEVEL N/A 4/21/2022    Procedure: Lumbar 2 to 3 decompression and discectomy;  Surgeon: Wolfgang Cabral MD;  [facility]: [facility] OR     PHACOEMULSIFICATION WITH STANDARD INTRAOCULAR LENS IMPLANT Right 5/1/2017    Procedure: PHACOEMULSIFICATION WITH STANDARD INTRAOCULAR LENS IMPLANT;  Right Eye Phacoemulsification with intraocular Lens Implant;  Surgeon: Camron Hansen MD;  [facility]: [facility] OR     TONSILLECTOMY              Social History:     Social History     Tobacco Use     Smoking status: Never Smoker     Smokeless tobacco: Never Used   Substance Use Topics     Alcohol use: Yes     Comment: 1 day            Family History:     Family History   Problem Relation Age of Onset     Glaucoma No family hx of      Macular Degeneration No family hx of             Allergies:     Allergies   Allergen Reactions     Etodolac  "Unknown     Percocet [Oxycodone-Acetaminophen] Nausea     Other reaction(s): Muscle Weakness     Seasonal Allergies Cough     Sinus drainage, watery eyes            Medications:     Current Outpatient Medications   Medication     acetaminophen (TYLENOL) 325 MG tablet     albuterol (PROAIR HFA/PROVENTIL HFA/VENTOLIN HFA) 108 (90 Base) MCG/ACT inhaler     aspirin 81 MG tablet     atorvastatin (LIPITOR) 10 MG tablet     Calcium-Magnesium-Zinc 333-133-5 MG TABS per tablet     cholecalciferol (VITAMIN D3) 25 mcg (1000 units) capsule     fluticasone (FLONASE) 50 MCG/ACT spray     gabapentin (NEURONTIN) 100 MG capsule     hydrOXYzine (ATARAX) 10 MG tablet     ipratropium (ATROVENT) 0.06 % nasal spray     ketoconazole (NIZORAL) 2 % external shampoo     lisinopril (ZESTRIL) 10 MG tablet     ondansetron (ZOFRAN-ODT) 4 MG ODT tab     oxyCODONE (ROXICODONE) 5 MG tablet     polyethylene glycol (MIRALAX) 17 g packet     senna-docusate (SENOKOT-S/PERICOLACE) 8.6-50 MG tablet     sildenafil (REVATIO) 20 MG tablet     sildenafil (VIAGRA) 100 MG tablet     No current facility-administered medications for this visit.             Review of Systems:   A focused musculoskeletal and neurologic ROS was performed with pertinent positives and negatives noted in the HPI.  Additional systems were also reviewed and are documented at the bottom of the note.         Physical Exam:   Vitals: Ht 1.778 m (5' 10\")   Wt 70.8 kg (156 lb)   BMI 22.38 kg/m    Musculoskeletal, Neurologic, and Spine:          Lumbar Spine:    Appearance - No gross stepoffs or deformities    Motor -     L2-3: Hip flexion 5/5 R and 5/5 L strength          L3/4:  Knee extension R 5/5 and L 5/5 strength         L4/5:  Foot dorsiflexion R 5/5 L 5/5 and       EHL dorsiflexion R 5/5 L 5/5 strength         S1:  Plantarflexion/Peroneal Muscles  R 5/5 and L 5/5 strength    Sensation: intact to light touch L3-S1 distribution BLE        There is a little scab over the distal aspect " of the incision, no surrounding erythema or drainage.         Imaging:   We ordered and independently reviewed new radiographs at this clinic visit. The results were discussed with the patient. Findings include:     Stable alignment and postoperative changes       Assessment and Plan:     81 year old male with excellent outcome after lumbar decompression, advance activities as tolerated, use commonsense restrictions for the lumbar spine, follow-up as needed, I think that little bump over the incision will heal very well in the future           Respectfully,  Wolfgang Cabral MD  Spine Surgery  [facility]

## 2022-06-08 ENCOUNTER — OFFICE VISIT (OUTPATIENT)
Dept: DERMATOLOGY | Facility: CLINIC | Age: 82
End: 2022-06-08
Payer: MEDICARE

## 2022-06-08 DIAGNOSIS — L81.4 LENTIGINES: ICD-10-CM

## 2022-06-08 DIAGNOSIS — Z85.828 HISTORY OF NONMELANOMA SKIN CANCER: Primary | ICD-10-CM

## 2022-06-08 DIAGNOSIS — L82.1 SEBORRHEIC KERATOSIS: ICD-10-CM

## 2022-06-08 PROCEDURE — 99212 OFFICE O/P EST SF 10 MIN: CPT | Performed by: DERMATOLOGY

## 2022-06-08 ASSESSMENT — PAIN SCALES - GENERAL: PAINLEVEL: NO PAIN (0)

## 2022-06-08 NOTE — LETTER
6/8/2022       RE: Felice Blood  [address] [facility] [address]     Dear Colleague,    Thank you for referring your patient, Felice Blood, to the [facility] [place] [facility]. Please see a copy of my visit note below.    [facility] Dermatology Note  Encounter Date: Jun 8, 2022  Office Visit     Dermatology Problem List:  # Seborrheic dermatitis.  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 No further intervention needed.     # History of SCCis, left upper posterior inferior arm, s/p excision 4/4/2022. Well healed today.  - Plan for FBSC in 3 months    # Seborrheic keratoses. Solar lentigines.   Discussed the natural history and benign nature of this lesion. Reassurance provided that no additional treatment is necessary.     Procedures Performed:   None    Follow-up: 3 month(s) for FBSC, sooner if concerns.     Staff and Scribe:     Scribe Disclosure:  I, Romy Hsu, am serving as a scribe to document services personally performed by Silvana Do MD based on data collection and the provider's statements to me.     Provider Disclosure:   The documentation recorded by the scribe accurately reflects the services I personally performed and the decisions made by me.    Silvana Do MD    Department of Dermatology  [facility]: Phone: [phone], Fax: [phone]  6/8/2022      ____________________________________________    CC: Derm Problem (Follow up on cryo treated spots)    HPI:  Mr. Felice Blood is a(n) 81 year old male who presents today as a return patient for follow-up. Last seen by Dr. Rocha on 4/4/22 at which point he underwent excision of a SCCis lesion on the L upper arm.     Today, the patient reports no particular lesions of concern. Patient is otherwise feeling well, without additional skin concerns.    Labs Reviewed:  N/A    Physical Exam:  Vitals: There were no vitals taken for this visit.  SKIN: Focused examination of the back and arms was performed.  - Well healed scar on the L upper arm at site of prior SCCis. No evidence of recurrence.   - Resolved AKs on the L lateral mid back.   - Scattered brown macules on sun exposed areas and back.   - There are waxy stuck on tan to brown papules on the trunk and extremities.   - No other lesions of concern on areas examined.     Medications:  Current Outpatient Medications   Medication     acetaminophen (TYLENOL) 325 MG tablet     albuterol (PROAIR HFA/PROVENTIL HFA/VENTOLIN HFA) 108 (90 Base) MCG/ACT inhaler     aspirin 81 MG tablet     atorvastatin (LIPITOR) 10 MG tablet     Calcium-Magnesium-Zinc 333-133-5 MG TABS per tablet     cholecalciferol (VITAMIN D3) 25 mcg (1000 units) capsule     fluticasone (FLONASE) 50 MCG/ACT spray     hydrOXYzine (ATARAX) 10 MG tablet     ipratropium (ATROVENT) 0.06 % nasal spray     ketoconazole (NIZORAL) 2 % external shampoo     lisinopril (ZESTRIL) 10 MG tablet     ondansetron (ZOFRAN-ODT) 4 MG ODT tab     oxyCODONE (ROXICODONE) 5 MG tablet     polyethylene glycol (MIRALAX) 17 g packet     senna-docusate (SENOKOT-S/PERICOLACE) 8.6-50 MG tablet     sildenafil (REVATIO) 20 MG tablet     sildenafil (VIAGRA) 100 MG tablet     gabapentin (NEURONTIN) 100 MG capsule     No current facility-administered medications for this visit.      Past Medical History:   Patient Active Problem List    Diagnosis     Essential hypertension     Pure hypercholesterolemia     Impotence of organic origin     Acute bilateral low back pain with bilateral sciatica     Status post lumbar spine operative procedure for decompression of spinal cord     Past Medical History:   Diagnosis Date     Cobblestone retinal degeneration      Impotence of organic origin      Nonsenile cataract      Pure hypercholesterolemia      Unspecified essential hypertension      Vitreous detachment of both eyes         CC Referred Self, MD  No address on file on close of this encounter.

## 2022-06-08 NOTE — PROGRESS NOTES
[facility] Dermatology Note  Encounter Date: Jun 8, 2022  Office Visit     Dermatology Problem List:  # Seborrheic dermatitis.  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 No further intervention needed.     # History of SCCis, left upper posterior inferior arm, s/p excision 4/4/2022. Well healed today.  - Plan for FBSC in 3 months    # Seborrheic keratoses. Solar lentigines.   Discussed the natural history and benign nature of this lesion. Reassurance provided that no additional treatment is necessary.     Procedures Performed:   None    Follow-up: 3 month(s) for FBSC, sooner if concerns.     Staff and Scribe:     Scribe Disclosure:  I, Romy Hsu, am serving as a scribe to document services personally performed by Silvana Do MD based on data collection and the provider's statements to me.     Provider Disclosure:   The documentation recorded by the scribe accurately reflects the services I personally performed and the decisions made by me.    Silvana Do MD    Department of Dermatology  [facility]: Phone: [phone], Fax: [phone]  6/8/2022     ____________________________________________    CC: Derm Problem (Follow up on cryo treated spots)    HPI:  Mr. Felice Blood is a(n) 81 year old male who presents today as a return patient for follow-up. Last seen by Dr. Rocha on 4/4/22 at which point he underwent excision of a SCCis lesion on the L upper arm.     Today, the patient  reports no particular lesions of concern. Patient is otherwise feeling well, without additional skin concerns.    Labs Reviewed:  N/A    Physical Exam:  Vitals: There were no vitals taken for this visit.  SKIN: Focused examination of the back and arms was performed.  - Well healed scar on the L upper arm at site of prior SCCis. No evidence of recurrence.   - Resolved AKs on the L lateral mid back.   - Scattered brown macules on sun exposed areas and back.   - There are waxy stuck on tan to brown papules on the trunk and extremities.   - No other lesions of concern on areas examined.     Medications:  Current Outpatient Medications   Medication     acetaminophen (TYLENOL) 325 MG tablet     albuterol (PROAIR HFA/PROVENTIL HFA/VENTOLIN HFA) 108 (90 Base) MCG/ACT inhaler     aspirin 81 MG tablet     atorvastatin (LIPITOR) 10 MG tablet     Calcium-Magnesium-Zinc 333-133-5 MG TABS per tablet     cholecalciferol (VITAMIN D3) 25 mcg (1000 units) capsule     fluticasone (FLONASE) 50 MCG/ACT spray     hydrOXYzine (ATARAX) 10 MG tablet     ipratropium (ATROVENT) 0.06 % nasal spray     ketoconazole (NIZORAL) 2 % external shampoo     lisinopril (ZESTRIL) 10 MG tablet     ondansetron (ZOFRAN-ODT) 4 MG ODT tab     oxyCODONE (ROXICODONE) 5 MG tablet     polyethylene glycol (MIRALAX) 17 g packet     senna-docusate (SENOKOT-S/PERICOLACE) 8.6-50 MG tablet     sildenafil (REVATIO) 20 MG tablet     sildenafil (VIAGRA) 100 MG tablet     gabapentin (NEURONTIN) 100 MG capsule     No current facility-administered medications for this visit.      Past Medical History:   Patient Active Problem List   Diagnosis     Essential hypertension     Pure hypercholesterolemia     Impotence of organic origin     Acute bilateral low back pain with bilateral sciatica     Status post lumbar spine operative procedure for decompression of spinal cord     Past Medical History:   Diagnosis Date     Cobblestone retinal degeneration      Impotence of organic  origin      Nonsenile cataract      Pure hypercholesterolemia      Unspecified essential hypertension      Vitreous detachment of both eyes         CC Referred Self, MD  No address on file on close of this encounter.

## 2022-06-08 NOTE — NURSING NOTE
Dermatology Rooming Note    Felice Blood's goals for this visit include:   Chief Complaint   Patient presents with     Derm Problem     Follow up on cryo treated spots     Dee Pedro CMA on 6/8/2022 at 9:27 AM

## 2022-06-20 NOTE — PROGRESS NOTES
Chief Complaint - post-nasal drip; recheck right ear    History of Present Illness - Felice Blood is a 81 year old male who presents for evaluation of post-nasal drainage.  I saw him last year for this and prescribed atrovent. It was better, but he feels the medication only lasts a short while. He coughs it up, and it is worse at night. It is grey, sometimes yellow. It wakes him up. He spits in kleenex box.  He tried nasal saline irrigations, antihistamine pills, triamcinolone nose spray. The treatments seem to not help.     He had a right tympanic membrane perforation, 1-2 mm last visit 9/2021. No infections. No drainage.     Tests personally reviewed today for this visit:   1.) BMP 4/22/22 glucose 104  2.) CBC 4/22/22 WBC 13.7, with left shift, RBC 4.37.  3.) audiogram 9/21/22 showing down-sloping sensorineural hearing loss, some asymmetry with left ear worse. High volume type B tymp right, type A left.    Past Medical History -   Patient Active Problem List   Diagnosis     Essential hypertension     Pure hypercholesterolemia     Impotence of organic origin     Acute bilateral low back pain with bilateral sciatica     Status post lumbar spine operative procedure for decompression of spinal cord       Current Medications -   Current Outpatient Medications:      acetaminophen (TYLENOL) 325 MG tablet, Take 2 tablets (650 mg) by mouth every 4 hours as needed for other (mild pain), Disp: 100 tablet, Rfl: 0     albuterol (PROAIR HFA/PROVENTIL HFA/VENTOLIN HFA) 108 (90 Base) MCG/ACT inhaler, Inhale 2 puffs into the lungs every 4 hours as needed for shortness of breath / dyspnea or wheezing, Disp: 18 g, Rfl: 0     aspirin 81 MG tablet, Take 1 tablet by mouth every morning, Disp: , Rfl:      atorvastatin (LIPITOR) 10 MG tablet, Take 1 tablet (10 mg) by mouth daily (Patient taking differently: Take 10 mg by mouth every evening), Disp: 90 tablet, Rfl: 3     Calcium-Magnesium-Zinc 333-133-5 MG TABS per tablet, Take 1  tablet by mouth every morning, Disp: , Rfl:      cholecalciferol (VITAMIN D3) 25 mcg (1000 units) capsule, Take 1 capsule by mouth every morning, Disp: , Rfl:      fluticasone (FLONASE) 50 MCG/ACT spray, Spray 2 sprays into both nostrils At Bedtime (Patient taking differently: Spray 2 sprays into both nostrils as needed), Disp: 1 Bottle, Rfl: 1     gabapentin (NEURONTIN) 100 MG capsule, Take 1 capsule (100 mg) by mouth 3 times daily for 2 days, Disp: 6 capsule, Rfl: 0     hydrOXYzine (ATARAX) 10 MG tablet, Take 1 tablet (10 mg) by mouth every 6 hours as needed for itching or anxiety (with pain, moderate pain), Disp: 30 tablet, Rfl: 0     ipratropium (ATROVENT) 0.06 % nasal spray, Spray 2 sprays into both nostrils 4 times daily as needed for rhinitis, Disp: 15 mL, Rfl: 3     ketoconazole (NIZORAL) 2 % external shampoo, Massage into wet scalp, let sit 3-5 min, then rinse. Do this 3x weekly. (Patient taking differently: daily as needed Massage into wet scalp, let sit 3-5 min, then rinse. Do this 3x weekly.), Disp: 120 mL, Rfl: 11     lisinopril (ZESTRIL) 10 MG tablet, Take 1 tablet (10 mg) by mouth daily (Patient taking differently: Take 10 mg by mouth every evening), Disp: 90 tablet, Rfl: 3     ondansetron (ZOFRAN-ODT) 4 MG ODT tab, Take 1 tablet (4 mg) by mouth every 8 hours as needed for nausea Dissolve ON the tongue., Disp: 10 tablet, Rfl: 0     oxyCODONE (ROXICODONE) 5 MG tablet, Take 1-2 tablets (5-10 mg) by mouth every 6 hours as needed for severe pain (Moderate to Severe), Disp: 28 tablet, Rfl: 0     polyethylene glycol (MIRALAX) 17 g packet, Take 17 g by mouth daily, Disp: 7 packet, Rfl: 0     senna-docusate (SENOKOT-S/PERICOLACE) 8.6-50 MG tablet, Take 1-2 tablets by mouth 2 times daily Take while on oral narcotics to prevent or treat constipation., Disp: 30 tablet, Rfl: 0     sildenafil (REVATIO) 20 MG tablet, Take 5 tablets (100 mg) by mouth daily as needed. (Patient taking differently: as needed Take 5  tablets (100 mg) by mouth daily as needed.), Disp: 60 tablet, Rfl: 4     sildenafil (VIAGRA) 100 MG tablet, Take 1 tablet (100 mg) by mouth daily as needed, Disp: 20 tablet, Rfl: 11    Allergies -   Allergies   Allergen Reactions     Etodolac Unknown     Percocet [Oxycodone-Acetaminophen] Nausea     Other reaction(s): Muscle Weakness     Seasonal Allergies Cough     Sinus drainage, watery eyes       Social History -   Social History     Socioeconomic History     Marital status:    Tobacco Use     Smoking status: Never Smoker     Smokeless tobacco: Never Used   Substance and Sexual Activity     Alcohol use: Yes     Comment: 1 day       Family History -   Family History   Problem Relation Age of Onset     Glaucoma No family hx of      Macular Degeneration No family hx of        Physical Exam  General - The patient is in no distress. Alert and oriented to person and place, answers questions and cooperates with examination appropriately.   Neurologic - CN II-XII are grossly intact. No focal neurologic deficits.   Voice and Breathing - The patient was breathing comfortably without the use of accessory muscles. There was no wheezing, stridor, or stertor.  The patients voice was clear and strong.  Ears - right tympanic membrane perforation, 1-2 mm superior and posterior, no drainage. Left tympanic membrane intact, no effusion. No infection.  Mouth - Examination of the oral cavity showed pink, healthy oral mucosa. No lesions or ulcerations noted.  The tongue was mobile and midline, and the dentition were in good condition.    Throat - The walls of the oropharynx were smooth, pink, moist, symmetric, and had no lesions or ulcerations.  The tonsillar pillars and soft palate were symmetric.  The uvula was midline on elevation. clear postnasal drainage.  Nose - External contour is symmetric, no gross deflection or scars.  Nasal mucosa is pink and moist with clear mucus. The turbinates are normal. The septum was deviated  some.  No polyps, masses, or purulence noted on examination.  Neck -  Soft, non-tender. Palpation of the occipital, submental, submandibular, internal jugular chain, and supraclavicular nodes did not demonstrate any abnormal lymph nodes or masses. No parotid masses. Palpation of the thyroid was soft and smooth, with no nodules or goiter appreciated.  The trachea was mobile and midline.    NASAL ENDOSCOPY -     Anterior rhinoscopy was insufficient to account for the patient's symptoms, so I performed flexible nasal endoscopy, and color photographs were taken for the permanent medical record.  I first sprayed both sides of the nasal cavity with a mix of lidocaine and neosynephrine.  I then began on the right side using an adult flexible fiberoptic endoscope.  The septum was fairly straight and the nasal airway was open. The inferior turbinate was normal in size. No abnormal secretions, purulence, or polyps were noted. The right middle turbinate and middle meatus were clearly visualized and normal in appearance. The superior meatus was normal. Going further back, the sphenoethmoid recess was normal in appearance, with healthy appearing mucosa on the face of the sphenoid.  The nasopharynx was normal with some excess clear secretions, and the eustachian tube opening on this side was unobstructed. He has some postnasal drainage of clear mucous. Base of tongue, epiglottis, and vocal cords are normal. No lesions.     I then turned my attention to the left side.  Once again, the septum was fairly straight, and the airway was open. The inferior turbinate was normal size. No abnormal secretions, purulence, polyps were noted.  The left middle turbinate and middle meatus were clearly visualized and normal in appearance.  Looking up, the superior meatus was normal. Going further back the left sphenoethmoid recess was normal in appearance, and eustachian tube opening was unobstructed. Again, excess clear secretions.                                      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 He has tried and failed saline irrigations atrovent, antihistamine, and nasal steroids. He will continue to use atrovent, but more often along with the atarax.     I recommend clarifix. We discussed the risks, benefits, and alternatives including: failure of the procedure to provide benefit, the need for medical therapy, bleeding, infection, headache.     Has a right tympanic membrane perforation. No infections. Observe.    Camron Vigil MD  Otolaryngology  [facility]

## 2022-06-21 ENCOUNTER — OFFICE VISIT (OUTPATIENT)
Dept: OTOLARYNGOLOGY | Facility: CLINIC | Age: 82
End: 2022-06-21
Payer: MEDICARE

## 2022-06-21 VITALS
RESPIRATION RATE: 18 BRPM | SYSTOLIC BLOOD PRESSURE: 113 MMHG | HEART RATE: 56 BPM | DIASTOLIC BLOOD PRESSURE: 71 MMHG | OXYGEN SATURATION: 97 %

## 2022-06-21 DIAGNOSIS — J30.0 VASOMOTOR RHINITIS: ICD-10-CM

## 2022-06-21 DIAGNOSIS — H72.91 PERFORATION OF TYMPANIC MEMBRANE, RIGHT: ICD-10-CM

## 2022-06-21 DIAGNOSIS — R09.82 PND (POST-NASAL DRIP): Primary | ICD-10-CM

## 2022-06-21 DIAGNOSIS — R09.A2 GLOBUS SENSATION: ICD-10-CM

## 2022-06-21 PROCEDURE — 31231 NASAL ENDOSCOPY DX: CPT | Performed by: OTOLARYNGOLOGY

## 2022-06-21 PROCEDURE — 99214 OFFICE O/P EST MOD 30 MIN: CPT | Mod: 25 | Performed by: OTOLARYNGOLOGY

## 2022-06-21 RX ORDER — IPRATROPIUM BROMIDE 42 UG/1
2 SPRAY, METERED NASAL 4 TIMES DAILY PRN
Qty: 15 ML | Refills: 11 | Status: SHIPPED | OUTPATIENT
Start: 2022-06-21

## 2022-06-21 NOTE — LETTER
6/21/2022         RE: Felice Blood  [address]  [facility] [address]        Dear Colleague,    Thank you for referring your patient, Felice Blood, to the [facility]. Please see a copy of my visit note below.    Chief Complaint - post-nasal drip; recheck right ear    History of Present Illness - Felice Blood is a 81 year old male who presents for evaluation of post-nasal drainage.  I saw him last year for this and prescribed atrovent. It was better, but he feels the medication only lasts a short while. He coughs it up, and it is worse at night. It is grey, sometimes yellow. It wakes him up. He spits in kleenex box.  He tried nasal saline irrigations, antihistamine pills, triamcinolone nose spray. The treatments seem to not help.     He had a right tympanic membrane perforation, 1-2 mm last visit 9/2021. No infections. No drainage.     Tests personally reviewed today for this visit:   1.) BMP 4/22/22 glucose 104  2.) CBC 4/22/22 WBC 13.7, with left shift, RBC 4.37.  3.) audiogram 9/21/22 showing down-sloping sensorineural hearing loss, some asymmetry with left ear worse. High volume type B tymp right, type A left.    Past Medical History -   Patient Active Problem List   Diagnosis     Essential hypertension     Pure hypercholesterolemia     Impotence of organic origin     Acute bilateral low back pain with bilateral sciatica     Status post lumbar spine operative procedure for decompression of spinal cord       Current Medications -   Current Outpatient Medications:      acetaminophen (TYLENOL) 325 MG tablet, Take 2 tablets (650 mg) by mouth every 4 hours as needed for other (mild pain), Disp: 100 tablet, Rfl: 0     albuterol (PROAIR HFA/PROVENTIL HFA/VENTOLIN HFA) 108 (90 Base) MCG/ACT inhaler, Inhale 2 puffs into the lungs every 4 hours as needed for shortness of breath / dyspnea or wheezing, Disp: 18 g, Rfl: 0     aspirin 81 MG tablet, Take 1 tablet by mouth  every morning, Disp: , Rfl:      atorvastatin (LIPITOR) 10 MG tablet, Take 1 tablet (10 mg) by mouth daily (Patient taking differently: Take 10 mg by mouth every evening), Disp: 90 tablet, Rfl: 3     Calcium-Magnesium-Zinc 333-133-5 MG TABS per tablet, Take 1 tablet by mouth every morning, Disp: , Rfl:      cholecalciferol (VITAMIN D3) 25 mcg (1000 units) capsule, Take 1 capsule by mouth every morning, Disp: , Rfl:      fluticasone (FLONASE) 50 MCG/ACT spray, Spray 2 sprays into both nostrils At Bedtime (Patient taking differently: Spray 2 sprays into both nostrils as needed), Disp: 1 Bottle, Rfl: 1     gabapentin (NEURONTIN) 100 MG capsule, Take 1 capsule (100 mg) by mouth 3 times daily for 2 days, Disp: 6 capsule, Rfl: 0     hydrOXYzine (ATARAX) 10 MG tablet, Take 1 tablet (10 mg) by mouth every 6 hours as needed for itching or anxiety (with pain, moderate pain), Disp: 30 tablet, Rfl: 0     ipratropium (ATROVENT) 0.06 % nasal spray, Spray 2 sprays into both nostrils 4 times daily as needed for rhinitis, Disp: 15 mL, Rfl: 3     ketoconazole (NIZORAL) 2 % external shampoo, Massage into wet scalp, let sit 3-5 min, then rinse. Do this 3x weekly. (Patient taking differently: daily as needed Massage into wet scalp, let sit 3-5 min, then rinse. Do this 3x weekly.), Disp: 120 mL, Rfl: 11     lisinopril (ZESTRIL) 10 MG tablet, Take 1 tablet (10 mg) by mouth daily (Patient taking differently: Take 10 mg by mouth every evening), Disp: 90 tablet, Rfl: 3     ondansetron (ZOFRAN-ODT) 4 MG ODT tab, Take 1 tablet (4 mg) by mouth every 8 hours as needed for nausea Dissolve ON the tongue., Disp: 10 tablet, Rfl: 0     oxyCODONE (ROXICODONE) 5 MG tablet, Take 1-2 tablets (5-10 mg) by mouth every 6 hours as needed for severe pain (Moderate to Severe), Disp: 28 tablet, Rfl: 0     polyethylene glycol (MIRALAX) 17 g packet, Take 17 g by mouth daily, Disp: 7 packet, Rfl: 0     senna-docusate (SENOKOT-S/PERICOLACE) 8.6-50 MG tablet, Take  1-2 tablets by mouth 2 times daily Take while on oral narcotics to prevent or treat constipation., Disp: 30 tablet, Rfl: 0     sildenafil (REVATIO) 20 MG tablet, Take 5 tablets (100 mg) by mouth daily as needed. (Patient taking differently: as needed Take 5 tablets (100 mg) by mouth daily as needed.), Disp: 60 tablet, Rfl: 4     sildenafil (VIAGRA) 100 MG tablet, Take 1 tablet (100 mg) by mouth daily as needed, Disp: 20 tablet, Rfl: 11    Allergies -   Allergies   Allergen Reactions     Etodolac Unknown     Percocet [Oxycodone-Acetaminophen] Nausea     Other reaction(s): Muscle Weakness     Seasonal Allergies Cough     Sinus drainage, watery eyes       Social History -   Social History     Socioeconomic History     Marital status:    Tobacco Use     Smoking status: Never Smoker     Smokeless tobacco: Never Used   Substance and Sexual Activity     Alcohol use: Yes     Comment: 1 day       Family History -   Family History   Problem Relation Age of Onset     Glaucoma No family hx of      Macular Degeneration No family hx of        Physical Exam  General - The patient is in no distress. Alert and oriented to person and place, answers questions and cooperates with examination appropriately.   Neurologic - CN II-XII are grossly intact. No focal neurologic deficits.   Voice and Breathing - The patient was breathing comfortably without the use of accessory muscles. There was no wheezing, stridor, or stertor.  The patients voice was clear and strong.  Ears - right tympanic membrane perforation, 1-2 mm superior and posterior, no drainage. Left tympanic membrane intact, no effusion. No infection.  Mouth - Examination of the oral cavity showed pink, healthy oral mucosa. No lesions or ulcerations noted.  The tongue was mobile and midline, and the dentition were in good condition.    Throat - The walls of the oropharynx were smooth, pink, moist, symmetric, and had no lesions or ulcerations.  The tonsillar pillars and soft  palate were symmetric.  The uvula was midline on elevation. clear postnasal drainage.  Nose - External contour is symmetric, no gross deflection or scars.  Nasal mucosa is pink and moist with clear mucus. The turbinates are normal. The septum was deviated some.  No polyps, masses, or purulence noted on examination.  Neck -  Soft, non-tender. Palpation of the occipital, submental, submandibular, internal jugular chain, and supraclavicular nodes did not demonstrate any abnormal lymph nodes or masses. No parotid masses. Palpation of the thyroid was soft and smooth, with no nodules or goiter appreciated.  The trachea was mobile and midline.    NASAL ENDOSCOPY -     Anterior rhinoscopy was insufficient to account for the patient's symptoms, so I performed flexible nasal endoscopy, and color photographs were taken for the permanent medical record.  I first sprayed both sides of the nasal cavity with a mix of lidocaine and neosynephrine.  I then began on the right side using an adult flexible fiberoptic endoscope.  The septum was fairly straight and the nasal airway was open. The inferior turbinate was normal in size. No abnormal secretions, purulence, or polyps were noted. The right middle turbinate and middle meatus were clearly visualized and normal in appearance. The superior meatus was normal. Going further back, the sphenoethmoid recess was normal in appearance, with healthy appearing mucosa on the face of the sphenoid.  The nasopharynx was normal with some excess clear secretions, and the eustachian tube opening on this side was unobstructed. He has some postnasal drainage of clear mucous. Base of tongue, epiglottis, and vocal cords are normal. No lesions.     I then turned my attention to the left side.  Once again, the septum was fairly straight, and the airway was open. The inferior turbinate was normal size. No abnormal secretions, purulence, polyps were noted.  The left middle turbinate and middle meatus were  clearly visualized and normal in appearance.  Looking up, the superior meatus was normal. Going further back the left sphenoethmoid recess was normal in appearance, and eustachian tube opening was unobstructed. Again, excess clear secretions.                                     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 He has tried and failed saline irrigations atrovent, antihistamine, and nasal steroids. He will continue to use atrovent, but more often along with the atarax.     I recommend clarifix. We discussed the risks, benefits, and alternatives including: failure of the procedure to provide benefit, the need for medical therapy, bleeding, infection, headache.     Has a right tympanic membrane perforation. No infections. Observe.    Camron Vigil MD  Otolaryngology  [facility]        Again, thank you for allowing me to participate in the care of your patient.        Sincerely,        Camron Vigil MD

## 2022-08-02 ENCOUNTER — OFFICE VISIT (OUTPATIENT)
Dept: OTOLARYNGOLOGY | Facility: CLINIC | Age: 82
End: 2022-08-02
Payer: MEDICARE

## 2022-08-02 VITALS
RESPIRATION RATE: 18 BRPM | OXYGEN SATURATION: 96 % | DIASTOLIC BLOOD PRESSURE: 63 MMHG | SYSTOLIC BLOOD PRESSURE: 129 MMHG | HEART RATE: 51 BPM

## 2022-08-02 DIAGNOSIS — J30.0 VASOMOTOR RHINITIS: ICD-10-CM

## 2022-08-02 DIAGNOSIS — R09.82 PND (POST-NASAL DRIP): Primary | ICD-10-CM

## 2022-08-02 PROCEDURE — 30117 REMOVAL OF INTRANASAL LESION: CPT | Performed by: OTOLARYNGOLOGY

## 2022-08-02 PROCEDURE — 99207 PR DROP WITH A PROCEDURE: CPT | Performed by: OTOLARYNGOLOGY

## 2022-08-02 PROCEDURE — 31231 NASAL ENDOSCOPY DX: CPT | Performed by: OTOLARYNGOLOGY

## 2022-08-02 NOTE — LETTER
8/2/2022         RE: Felice Blood  [address] [facility] [address]        Dear Colleague,    Thank you for referring your patient, Felice Blood, to the [facility]. Please see a copy of my visit note below.    Procedure - bilateral clarifix nasal cryotherapy (see clinic note on 6/21/22 for HPI).    I discussed the risks, benefits, and alternatives to the procedure including: failure to relieve symptoms, bleeding, infection, headache, the need for additional procedures or revision procedure, the need for possible continued medical treatment.  The patient agreed and wished to proceed.  Informed consent was signed and scanned into the chart.    Both nasal cavities were sprayed with 4% lidocaine with phenylephrine.  I then placed 2 cotton pledgets soaked with 4% lidocaine with phenylephrine in each nasal cavity. The pledgets were then left for approximately 10 minutes to provide adequate topical anesthesia.  I then remove the pledgets.  On the right side, I used a 30 degree rigid endoscope and a bent 27-gauge spinal needle to inject 1% lidocaine; 1:100,000 epinephrine (0.5 ml) into the right sphenopalatine area. Next,on the left side using the rigid endoscope and a spinal needle I injected 1% lidocaine 1:100,000 epinephrine into the left sphenopalatine area. Using the endoscope and the clarifix cryotherapy device I placed the tip of the clarifix device in the right posterior lateral middle meatus against the lateral wall. The device was then turned on for 30 seconds of cryotherapy.  It was then discontinued and allowed to thaw for 30 seconds.  I then repeated this for an additional 30 seconds slightly posterior and inferior to the first treatment.  Then after thigh, the device was removed and there was adequate white blanching of the right posterior lateral nasal wall of the middle meatus.  Next on the left side using the 30 degree rigid endoscope I use the clarifix  device to place it in the most posterior aspect of the middle meatus against the left posterior lateral nasal wall.  The device was then turned on for 30 seconds of cryotherapy.  Again I moved more posterior and superior this time for an additional 32nd treatment.  It was then discontinued and removed after 30 seconds of thawing.  There was a nice white trinh of the left posterior lateral nasal wall of the left middle meatus. Everything was removed from the nose and there is good hemostasis.  The procedure was complete.    A/P - bilateral clarifix nasal cryotherapy was performed today.  The patient was reminded about the possibility of a headache and that they should drink warm beverages and that this will resolve.  No significant nose blowing for 1 week.  The patient was also reminded that the results may take 4 to 6 weeks to fully take effect.  They should contact me sooner with any concerns or questions.  Return in 4 to 6 weeks.        Again, thank you for allowing me to participate in the care of your patient.        Sincerely,        Camron Vigil MD

## 2022-08-02 NOTE — PROGRESS NOTES
Procedure - bilateral clarifix nasal cryotherapy (see clinic note on 6/21/22 for HPI).    I discussed the risks, benefits, and alternatives to the procedure including: failure to relieve symptoms, bleeding, infection, headache, the need for additional procedures or revision procedure, the need for possible continued medical treatment.  The patient agreed and wished to proceed.  Informed consent was signed and scanned into the chart.    Both nasal cavities were sprayed with 4% lidocaine with phenylephrine.  I then placed 2 cotton pledgets soaked with 4% lidocaine with phenylephrine in each nasal cavity. The pledgets were then left for approximately 10 minutes to provide adequate topical anesthesia.  I then remove the pledgets.  On the right side, I used a 30 degree rigid endoscope and a bent 27-gauge spinal needle to inject 1% lidocaine; 1:100,000 epinephrine (0.5 ml) into the right sphenopalatine area. Next,on the left side using the rigid endoscope and a spinal needle I injected 1% lidocaine 1:100,000 epinephrine into the left sphenopalatine area. Using the endoscope and the clarifix cryotherapy device I placed the tip of the clarifix device in the right posterior lateral middle meatus against the lateral wall. The device was then turned on for 30 seconds of cryotherapy.  It was then discontinued and allowed to thaw for 30 seconds.  I then repeated this for an additional 30 seconds slightly posterior and inferior to the first treatment.  Then after thigh, the device was removed and there was adequate white blanching of the right posterior lateral nasal wall of the middle meatus.  Next on the left side using the 30 degree rigid endoscope I use the clarifix device to place it in the most posterior aspect of the middle meatus against the left posterior lateral nasal wall.  The device was then turned on for 30 seconds of cryotherapy.  Again I moved more posterior and superior this time for an additional 32nd treatment.   It was then discontinued and removed after 30 seconds of thawing.  There was a nice white trinh of the left posterior lateral nasal wall of the left middle meatus. Everything was removed from the nose and there is good hemostasis.  The procedure was complete.    A/P - bilateral clarifix nasal cryotherapy was performed today.  The patient was reminded about the possibility of a headache and that they should drink warm beverages and that this will resolve.  No significant nose blowing for 1 week.  The patient was also reminded that the results may take 4 to 6 weeks to fully take effect.  They should contact me sooner with any concerns or questions.  Return in 4 to 6 weeks.

## 2022-08-31 DIAGNOSIS — N52.9 ERECTILE DYSFUNCTION, UNSPECIFIED ERECTILE DYSFUNCTION TYPE: ICD-10-CM

## 2022-09-04 RX ORDER — SILDENAFIL CITRATE 20 MG/1
TABLET ORAL
Qty: 60 TABLET | Refills: 4 | Status: SHIPPED | OUTPATIENT
Start: 2022-09-04 | End: 2023-06-26

## 2022-09-04 NOTE — TELEPHONE ENCOUNTER
sildenafil (REVATIO) 20 MG tablet    6/2/2022  [facility] [place]     William Barrientos MD    Student in organized health care education/training program

## 2022-09-09 ENCOUNTER — LAB (OUTPATIENT)
Dept: LAB | Facility: CLINIC | Age: 82
End: 2022-09-09
Payer: MEDICARE

## 2022-09-09 ENCOUNTER — OFFICE VISIT (OUTPATIENT)
Dept: FAMILY MEDICINE | Facility: CLINIC | Age: 82
End: 2022-09-09
Payer: MEDICARE

## 2022-09-09 VITALS
OXYGEN SATURATION: 97 % | RESPIRATION RATE: 18 BRPM | BODY MASS INDEX: 21.75 KG/M2 | HEIGHT: 70 IN | WEIGHT: 151.9 LBS | DIASTOLIC BLOOD PRESSURE: 91 MMHG | HEART RATE: 68 BPM | SYSTOLIC BLOOD PRESSURE: 156 MMHG

## 2022-09-09 DIAGNOSIS — Z00.00 ENCOUNTER FOR MEDICARE ANNUAL WELLNESS EXAM: ICD-10-CM

## 2022-09-09 DIAGNOSIS — N52.9 IMPOTENCE OF ORGANIC ORIGIN: ICD-10-CM

## 2022-09-09 DIAGNOSIS — H61.23 BILATERAL IMPACTED CERUMEN: ICD-10-CM

## 2022-09-09 DIAGNOSIS — E78.00 PURE HYPERCHOLESTEROLEMIA: ICD-10-CM

## 2022-09-09 DIAGNOSIS — I10 ESSENTIAL HYPERTENSION: ICD-10-CM

## 2022-09-09 DIAGNOSIS — Z12.11 SCREEN FOR COLON CANCER: ICD-10-CM

## 2022-09-09 DIAGNOSIS — M65.4 DE QUERVAIN'S DISEASE (RADIAL STYLOID TENOSYNOVITIS): Primary | ICD-10-CM

## 2022-09-09 LAB
ALBUMIN SERPL BCG-MCNC: 4.3 G/DL (ref 3.5–5.2)
ALP SERPL-CCNC: 60 U/L (ref 40–129)
ALT SERPL W P-5'-P-CCNC: 14 U/L (ref 10–50)
ANION GAP SERPL CALCULATED.3IONS-SCNC: 10 MMOL/L (ref 7–15)
AST SERPL W P-5'-P-CCNC: 27 U/L (ref 10–50)
BASOPHILS # BLD AUTO: 0 10E3/UL (ref 0–0.2)
BASOPHILS NFR BLD AUTO: 0 %
BILIRUB SERPL-MCNC: 1 MG/DL
BUN SERPL-MCNC: 9.6 MG/DL (ref 8–23)
CALCIUM SERPL-MCNC: 9.7 MG/DL (ref 8.8–10.2)
CHLORIDE SERPL-SCNC: 100 MMOL/L (ref 98–107)
CHOLEST SERPL-MCNC: 147 MG/DL
CREAT SERPL-MCNC: 0.79 MG/DL (ref 0.67–1.17)
DEPRECATED HCO3 PLAS-SCNC: 26 MMOL/L (ref 22–29)
EOSINOPHIL # BLD AUTO: 0.1 10E3/UL (ref 0–0.7)
EOSINOPHIL NFR BLD AUTO: 1 %
ERYTHROCYTE [DISTWIDTH] IN BLOOD BY AUTOMATED COUNT: 13 % (ref 10–15)
GFR SERPL CREATININE-BSD FRML MDRD: 89 ML/MIN/1.73M2
GLUCOSE SERPL-MCNC: 104 MG/DL (ref 70–99)
HCT VFR BLD AUTO: 45.2 % (ref 40–53)
HDLC SERPL-MCNC: 51 MG/DL
HGB BLD-MCNC: 15.9 G/DL (ref 13.3–17.7)
IMM GRANULOCYTES # BLD: 0 10E3/UL
IMM GRANULOCYTES NFR BLD: 0 %
LDLC SERPL CALC-MCNC: 78 MG/DL
LYMPHOCYTES # BLD AUTO: 1.8 10E3/UL (ref 0.8–5.3)
LYMPHOCYTES NFR BLD AUTO: 30 %
MCH RBC QN AUTO: 31.9 PG (ref 26.5–33)
MCHC RBC AUTO-ENTMCNC: 35.2 G/DL (ref 31.5–36.5)
MCV RBC AUTO: 91 FL (ref 78–100)
MONOCYTES # BLD AUTO: 0.4 10E3/UL (ref 0–1.3)
MONOCYTES NFR BLD AUTO: 7 %
NEUTROPHILS # BLD AUTO: 3.6 10E3/UL (ref 1.6–8.3)
NEUTROPHILS NFR BLD AUTO: 62 %
NONHDLC SERPL-MCNC: 96 MG/DL
NRBC # BLD AUTO: 0 10E3/UL
NRBC BLD AUTO-RTO: 0 /100
PLATELET # BLD AUTO: 221 10E3/UL (ref 150–450)
POTASSIUM SERPL-SCNC: 4.1 MMOL/L (ref 3.4–5.3)
PROT SERPL-MCNC: 7 G/DL (ref 6.4–8.3)
RBC # BLD AUTO: 4.98 10E6/UL (ref 4.4–5.9)
SODIUM SERPL-SCNC: 136 MMOL/L (ref 136–145)
T4 FREE SERPL-MCNC: 0.98 NG/DL (ref 0.9–1.7)
TRIGL SERPL-MCNC: 88 MG/DL
TSH SERPL DL<=0.005 MIU/L-ACNC: 4.78 UIU/ML (ref 0.3–4.2)
WBC # BLD AUTO: 5.8 10E3/UL (ref 4–11)

## 2022-09-09 PROCEDURE — 80053 COMPREHEN METABOLIC PANEL: CPT | Performed by: PATHOLOGY

## 2022-09-09 PROCEDURE — 84443 ASSAY THYROID STIM HORMONE: CPT | Performed by: FAMILY MEDICINE

## 2022-09-09 PROCEDURE — 80061 LIPID PANEL: CPT | Performed by: FAMILY MEDICINE

## 2022-09-09 PROCEDURE — 84439 ASSAY OF FREE THYROXINE: CPT | Performed by: FAMILY MEDICINE

## 2022-09-09 PROCEDURE — 36415 COLL VENOUS BLD VENIPUNCTURE: CPT | Performed by: PATHOLOGY

## 2022-09-09 PROCEDURE — 84443 ASSAY THYROID STIM HORMONE: CPT | Performed by: PATHOLOGY

## 2022-09-09 PROCEDURE — G0439 PPPS, SUBSEQ VISIT: HCPCS | Performed by: FAMILY MEDICINE

## 2022-09-09 PROCEDURE — 84403 ASSAY OF TOTAL TESTOSTERONE: CPT | Performed by: FAMILY MEDICINE

## 2022-09-09 PROCEDURE — 85027 COMPLETE CBC AUTOMATED: CPT | Performed by: PATHOLOGY

## 2022-09-09 RX ORDER — TADALAFIL 20 MG/1
20 TABLET ORAL DAILY PRN
Qty: 10 TABLET | Refills: 11 | Status: SHIPPED | OUTPATIENT
Start: 2022-09-09 | End: 2023-03-29

## 2022-09-09 RX ORDER — LISINOPRIL 10 MG/1
10 TABLET ORAL DAILY
Qty: 90 TABLET | Refills: 3 | Status: SHIPPED | OUTPATIENT
Start: 2022-09-09 | End: 2023-09-14

## 2022-09-09 RX ORDER — ATORVASTATIN CALCIUM 10 MG/1
10 TABLET, FILM COATED ORAL DAILY
Qty: 90 TABLET | Refills: 3 | Status: SHIPPED | OUTPATIENT
Start: 2022-09-09 | End: 2023-09-14

## 2022-09-09 NOTE — PROGRESS NOTES
bp  Lipids  Medicare Annual Wellness Visit         HPI     This 81 year old male presents as an established patient  Anthony Maddox who presents for an subsequent Medicare Wellness Exam.  Patient also reports Gradual worse pain w/ ROM R wrist no trauma not red warm swollen. Anxiety not a problem now. Back surgery helped a great deal. Viagra not as effective as it used to be. Home BP varies from low to hi normal.        Patient Active Problem List   Diagnosis     Essential hypertension     Pure hypercholesterolemia     Impotence of organic origin     Acute bilateral low back pain with bilateral sciatica     Status post lumbar spine operative procedure for decompression of spinal cord       Past Medical History:   Diagnosis Date     Cobblestone retinal degeneration      Impotence of organic origin      Nonsenile cataract      Pure hypercholesterolemia      Unspecified essential hypertension      Vitreous detachment of both eyes         Family History   Problem Relation Age of Onset     Glaucoma No family hx of      Macular Degeneration No family hx of          Past Surgical History:   Procedure Laterality Date     APPENDECTOMY       CATARACT IOL, RT/LT Right 05/01/2017     DECOMPRESSION LUMBAR ONE LEVEL N/A 4/21/2022    Procedure: Lumbar 2 to 3 decompression and discectomy;  Surgeon: Wolfgang Cabral MD;  Location: [facility] OR     PHACOEMULSIFICATION WITH STANDARD INTRAOCULAR LENS IMPLANT Right 5/1/2017    Procedure: PHACOEMULSIFICATION WITH STANDARD INTRAOCULAR LENS IMPLANT;  Right Eye Phacoemulsification with intraocular Lens Implant;  Surgeon: Camron Hansen MD;  Location: [facility] OR     TONSILLECTOMY         Reviewed no other significant FH    Family History and past Medical History reviewed and it is unchanged/updated.       Review of Systems     Ten point ROS otherwise negative       Medical Care     Have you been to an ER or a hospital in the last year? Yes  What other specialists or organizations  are involved in your medical care?    Current providers sharing in care for this patient include:  Patient Care Team:  Anthony Maddox MD as PCP - General (Family Practice)  Sharmaine Garcia MD as MD (Internal Medicine)  Camron Hansen MD as MD (Ophthalmology)  Christine Vieira NP as Nurse Practitioner (Family Practice)  Anthony Maddox MD as Assigned PCP  Dino Alba MD as Assigned Musculoskeletal Provider  Camron Vigil MD as Assigned Surgical Provider         Social History     Social History     Tobacco Use     Smoking status: Never Smoker     Smokeless tobacco: Never Used   Substance Use Topics     Alcohol use: Yes     Comment: 1 day         FUNCTIONAL ABILITY/SAFETY SCREENING     Fall Risk Assessment Today:  No    Hearing evaluation if done: No    EVALUATION OF COGNITIVE FUNCTION     Mood/affect:Normal  Appearance:Normal  Family member/caregiver input: Normal    Mini Cog Scoring   3 points   Clock Draw Test result:  Normal      SCREENING FOR PREVENTION and EARLY DETECTION     ECG (if done)not performed    Corrected Visual acuity: Sees eye MD routinely    CV Risk based on Pooled Cohort Risk:  The ASCVD Risk score (Gómez DC Jr., et al., 2013) failed to calculate for the following reasons:    The 2013 ASCVD risk score is only valid for ages 40 to 79  He'll stop asa  Advanced Directives: Discussed and patient desires to He'll get us copy of his.      Immunization History   Administered Date(s) Administered     COVID-19,PF,Pfizer (12+ Yrs) 02/10/2021, 03/03/2021, 09/30/2021     COVID-19,PF,Pfizer 12+ Yrs (2022 and After) 06/20/2022     Influenza (High Dose) 3 valent vaccine 10/06/2014, 12/01/2015, 11/01/2016, 12/05/2016, 10/28/2017, 11/12/2018     Influenza (IIV3) PF 11/10/2011, 10/04/2012, 11/18/2013     Influenza Quad, Recombinant, pf(RIV4) (Flublok) 10/14/2019     Pneumo Conj 13-V (2010&after) 04/10/2015     Pneumococcal 23 valent 01/24/2006     TD (ADULT, 7+) 01/24/2006,  "11/16/2015     Zoster vaccine recombinant adjuvanted (SHINGRIX) 04/18/2018     Zoster vaccine, live 03/19/2010       Reviewed Immunization Record Today  Shots utd         Physical Exam     Vitals: BP (!) 154/90 (BP Location: Right arm, Patient Position: Sitting, Cuff Size: Adult Regular)   Pulse 68   Resp 18   Ht 1.778 m (5' 10\")   Wt 68.9 kg (151 lb 14.4 oz)   SpO2 97%   BMI 21.80 kg/m    BMI= Body mass index is 21.8 kg/m .  GENERAL APPEARANCE: healthy, alert and no distress  EYES: Eyes grossly normal to inspection, PERRL and conjunctivae and sclerae normal  HENT: ear canals and TM's normal, nose and mouth without ulcers or lesions, oropharynx clear and oral mucous membranes moist  NECK: no adenopathy, no asymmetry, masses, or scars and thyroid normal to palpation  RESP: lungs clear to auscultation - no rales, rhonchi or wheezes  CV: regular rates and rhythm, normal S1 S2, no S3 or S4, no murmur, click or rub, no peripheral edema and peripheral pulses strong  ABDOMEN: soft, nontender, no hepatosplenomegaly, no masses and bowel sounds normal   (male): normal male genitalia without lesions or urethral discharge, no hernia  MS: no musculoskeletal defects are noted and gait is age appropriate without ataxia; Pos R Finkelsteins  SKIN: no suspicious lesions or rashes  NEURO: Normal strength and tone, sensory exam grossly normal, mentation intact and speech normal  PSYCH: mentation appears normal and affect normal/bright        Assessment and George         Felice was seen today for physical.    Diagnoses and all orders for this visit:    De Quervain's disease (radial styloid tenosynovitis)  -     Orthopedic  Referral; Future    PURE HYPERCHOLESTEROLEM  -     lisinopril (ZESTRIL) 10 MG tablet; Take 1 tablet (10 mg) by mouth daily  -     atorvastatin (LIPITOR) 10 MG tablet; Take 1 tablet (10 mg) by mouth daily  -     Lipid panel reflex to direct LDL Fasting; Future  -     Comprehensive metabolic panel; " Future    Screen for colon cancer  -     lisinopril (ZESTRIL) 10 MG tablet; Take 1 tablet (10 mg) by mouth daily  -     atorvastatin (LIPITOR) 10 MG tablet; Take 1 tablet (10 mg) by mouth daily    Impotence of organic origin  -     lisinopril (ZESTRIL) 10 MG tablet; Take 1 tablet (10 mg) by mouth daily  -     atorvastatin (LIPITOR) 10 MG tablet; Take 1 tablet (10 mg) by mouth daily  -     TSH with free T4 reflex; Future  -     Testosterone total; Future  -     tadalafil (CIALIS) 20 MG tablet; Take 1 tablet (20 mg) by mouth daily as needed    Essential hypertension  -     lisinopril (ZESTRIL) 10 MG tablet; Take 1 tablet (10 mg) by mouth daily  -     atorvastatin (LIPITOR) 10 MG tablet; Take 1 tablet (10 mg) by mouth daily  -     TSH with free T4 reflex; Future  -     CBC with platelets differential; Future    Bilateral impacted cerumen  -     lisinopril (ZESTRIL) 10 MG tablet; Take 1 tablet (10 mg) by mouth daily  -     atorvastatin (LIPITOR) 10 MG tablet; Take 1 tablet (10 mg) by mouth daily    Switch from Viagra to Cialis, check related ED labs  See Sp Med for dequer, in meantime use velcro otc splint        Options for treatment and follow-up care were reviewed with the Felice Aguilart and/or guardian engaged in the decision making process and verbalized understanding of the options discussed and agreed with the final plan.    RANCHO FAJARDO

## 2022-09-09 NOTE — PROGRESS NOTES
Medicare Annual Wellness Questionnaire:  This 81 year old year old male presents for a Medicare Wellness Exam.    Fall Risk Assessment:  Have you fallen 2 or more times in the last year?  No    How many times were you injured due to a fall in the last year?  0    PHQ-2:  Over the last 2 weeks, how often have you been bothered by feeling down, depressed, or hopeless?  Not at all (0)     Over the last 2 weeks, how often have you had little interest or pleasure in doing things?  Not at all (0)    Social History:  What is your marital status?      Who lives in your household?  Wife, myself    Does your home have loose rugs in the hallway:     Yes     Does your home have grab bars in the bathroom:    Yes     Does your home have handrails on the stairs?  Yes     Does your home have poorly lit areas?    No    Do you feel threatened or controlled by a partner, ex-partner or anyone in your life?   No    Has anyone hurt you physically, for example by pushing, hitting, slapping or kicking you or forcing you to have sex?   No    Do you need help with the phone, transportation, shopping, preparing meals, housework, laundry, medications or managing money?   No    Sexual Health:  Are you sexually active?    Yes     If yes, with men, women, or both?  Women    If yes, how many partners?  1    If yes, are you using condoms?    No    Have you had any sexually transmitted infections in the last year?   No    Do you have any sexual concerns?    Yes, Sildenafil doesn't work as well as it used to    General Health Assessment:  Have you noticed any hearing difficulties?   No    Do you wear hearing aids?   No    Have you seen a hearing professional such as an audiologist in the last 1 year?   Yes     Do you have vision difficulty?    No    Do you wear glasses or contacts?   Yes     Have you seen an eye doctor in the last 1 year?   Yes     How many servings of fruits and vegetables do you eat a day?  2    How often do you exercise in  a week?  Every day    How long and what kind of exercise do you do?  Walking    Tobacco and Alcohol History:  Do you use tobacco/nicotine products?    No    If yes, please list the method of use and average weekly consumption?  N/A    Do you use any other drugs?   No         Do you drink alcohol?   Yes     If you drink alcohol, how many drinks per week?  1    Advanced Directive:  Have you completed an Advance Directives document?  Yes     If yes, have you given a copy to the clinic?   Yes     Do you need information on Advance Directives?   No    YUE Bush at 10:56 AM on 9/9/2022

## 2022-09-09 NOTE — PATIENT INSTRUCTIONS
Personalized Prevention Plan  You are due for the preventive services outlined below.  Your care team is available to assist you in scheduling these services.  If you have already completed any of these items, please share that information with your care team to update in your medical record.  Health Maintenance Due   Topic Date Due     Depression Action Plan  Never done     Zoster (Shingles) Vaccine (3 of 3) 06/13/2018     Flu Vaccine (1) 09/01/2022

## 2022-09-09 NOTE — NURSING NOTE
Felice Blood is a 81 year old male patient that presents today in clinic for the following:    Chief Complaint   Patient presents with     Physical     Pt here for annual physical     The patient's allergies and medications were reviewed as noted. A set of vitals were recorded as noted without incident. The patient does not have any other questions for the provider.    Cass Moulton, EMT at 8:53 AM on 9/9/2022

## 2022-09-12 NOTE — PROGRESS NOTES
"History of Present Illness - Felice Blood is a 81 year old male who is status post Clarifix cryotherapy in both nasal cavities on 8/2/22.  The return and note postnasal drainage is better, but intermittently has some. Congestion is improved. Symptoms worse with dust and allergies.     Exam -   /81   Pulse 71   Resp 16   Ht 1.778 m (5' 10\")   Wt 68 kg (150 lb)   SpO2 96%   BMI 21.52 kg/m    General - The patient is in no distress.  Alert and oriented to person and place, answers questions and cooperates with examination appropriately.   Eyes - Extraocular movements intact.  Sclera were not icteric or injected, conjunctiva were pink and moist.  Nose - The septum is midline. Nasal mucosa is moist, but no excess drainage. No blood. No sign of synechiae or infection.   Mouth - mild clear postnasal drainage.   Ears - some cerumen. Both ears cleaned. Tympanic membranes intact. No effusion. No infection.    A/P - Felice Blood has had a nice result from Clarifix cryotherapy in both nasal cavities.  Nasal drainage is much improved. Congestion is improved.  He intermittently gets some postnasal drainage and can continue Atrovent and/or Zyrtec.  He had some cerumen in his ears I cleaned.  He can return once a year for ear cleaning.      "

## 2022-09-13 ENCOUNTER — OFFICE VISIT (OUTPATIENT)
Dept: OTOLARYNGOLOGY | Facility: CLINIC | Age: 82
End: 2022-09-13
Payer: MEDICARE

## 2022-09-13 VITALS
BODY MASS INDEX: 21.47 KG/M2 | HEART RATE: 71 BPM | WEIGHT: 150 LBS | DIASTOLIC BLOOD PRESSURE: 81 MMHG | OXYGEN SATURATION: 96 % | RESPIRATION RATE: 16 BRPM | SYSTOLIC BLOOD PRESSURE: 131 MMHG | HEIGHT: 70 IN

## 2022-09-13 DIAGNOSIS — R09.82 PND (POST-NASAL DRIP): Primary | ICD-10-CM

## 2022-09-13 DIAGNOSIS — H61.23 BILATERAL IMPACTED CERUMEN: ICD-10-CM

## 2022-09-13 DIAGNOSIS — J30.0 VASOMOTOR RHINITIS: ICD-10-CM

## 2022-09-13 LAB — TESTOST SERPL-MCNC: 466 NG/DL (ref 240–950)

## 2022-09-13 PROCEDURE — 99024 POSTOP FOLLOW-UP VISIT: CPT | Performed by: OTOLARYNGOLOGY

## 2022-09-13 ASSESSMENT — PAIN SCALES - GENERAL: PAINLEVEL: NO PAIN (0)

## 2022-09-13 NOTE — LETTER
"    9/13/2022         RE: Felice Blood  [address]  [place] [address]        Dear Colleague,    Thank you for referring your patient, Felice Blood, to the [facility]. Please see a copy of my visit note below.    History of Present Illness - Felice Blood is a 81 year old male who is status post Clarifix cryotherapy in both nasal cavities on 8/2/22.  The return and note postnasal drainage is better, but intermittently has some. Congestion is improved. Symptoms worse with dust and allergies.     Exam -   /81   Pulse 71   Resp 16   Ht 1.778 m (5' 10\")   Wt 68 kg (150 lb)   SpO2 96%   BMI 21.52 kg/m    General - The patient is in no distress.  Alert and oriented to person and place, answers questions and cooperates with examination appropriately.   Eyes - Extraocular movements intact.  Sclera were not icteric or injected, conjunctiva were pink and moist.  Nose - The septum is midline. Nasal mucosa is moist, but no excess drainage. No blood. No sign of synechiae or infection.   Mouth - mild clear postnasal drainage.   Ears - some cerumen. Both ears cleaned. Tympanic membranes intact. No effusion. No infection.    A/P - Felice Blood has had a nice result from Clarifix cryotherapy in both nasal cavities.  Nasal drainage is much improved. Congestion is improved.  He intermittently gets some postnasal drainage and can continue Atrovent and/or Zyrtec.  He had some cerumen in his ears I cleaned.  He can return once a year for ear cleaning.          Again, thank you for allowing me to participate in the care of your patient.        Sincerely,        Camron Vigil MD    "

## 2022-09-14 NOTE — TELEPHONE ENCOUNTER
DIAGNOSIS: De Quervain's disease / Anthony Maddox MD / medicare / no image   APPOINTMENT DATE: 9.15.22   NOTES STATUS DETAILS   OFFICE NOTE from referring provider Internal 9.9.22 Dr Anthony Maddox, [facility]   MEDICATION LIST Internal    XRAYS (IMAGES & REPORTS) Internal 11.23.15 R hand

## 2022-09-15 ENCOUNTER — OFFICE VISIT (OUTPATIENT)
Dept: ORTHOPEDICS | Facility: CLINIC | Age: 82
End: 2022-09-15
Attending: FAMILY MEDICINE
Payer: MEDICARE

## 2022-09-15 ENCOUNTER — PRE VISIT (OUTPATIENT)
Dept: ORTHOPEDICS | Facility: CLINIC | Age: 82
End: 2022-09-15

## 2022-09-15 DIAGNOSIS — M65.4 DE QUERVAIN'S DISEASE (RADIAL STYLOID TENOSYNOVITIS): ICD-10-CM

## 2022-09-15 PROCEDURE — 99213 OFFICE O/P EST LOW 20 MIN: CPT | Performed by: FAMILY MEDICINE

## 2022-09-15 NOTE — PROGRESS NOTES
Sports Medicine Clinic Visit    PCP: Anthony Maddox NILS Blood is a 81 year old male who is seen  in consultation at the request of Dr. Maddox presenting with wrist pain    Injury: none    Location of Pain: right radial wrist  Duration of Pain: 6 month(s)  Rating of Pain: 9/10  Pain is better with: Rest  Pain is worse with: Lifting, rotation  Additional Features: tenderness  Treatment so far consists of: Rest, brace  Prior History of related problems: None    DME FITTING    Relevant Diagnosis: De Quervaine's, Right  Right thumb spica brace was fit on patient's Right wrist.     Person(s) involved in teaching:   Patient    Brace was applied in standard Manner:  Yes  Brace fit well:  Yes  Patient reports brace to fit comfortably:  Yes    Education:   Patient shown self application and removal of brace: Yes  Patient shown how to adjust brace fit, if necessary: Yes  Patient educated on billing and return policy: Yes  Patient confirmed understanding when and how to contact clinic with concerns: Yes      Avery Muñoz ATC on 9/15/2022 at 8:43 AM

## 2022-09-15 NOTE — PROGRESS NOTES
Right-sided wrist discomfort.  In the last month and a half he is noted first dorsal compartment discomfort through his forearm and into the base of the thumb.  He had been weeding his garden at the time.  Bothers him with gripping and grasping.  He tried an over-the-counter splint with his thumb free, did not seem to improve the problem.        Right-sided hand x-ray 2015 suggested osteoarthritis change in the first CMC joint, as well as the radiocarpal joint.    Chart history of intolerance or allergy to etolodac      Exam: 3 views of the right hand, 11/23/2015.     History: Pain in the right fingers. Patient cut his right index finger  on a copper type on 11/14/2015, has been treated for cellulitis.     Findings: AP, oblique and lateral views of the right hand are  evaluated without comparison.     Swelling is noted in the right second finger soft tissues. No  associated bone changes are noted.     Polyarticular joint space loss is noted throughout the right hand,  most pronounced at the first carpometacarpal joint and second  metacarpophalangeal joints, as well as at the radiocarpal joint.     No fractures are noted in the right hand. Small well-corticated  fragment just dorsal to the first interphalangeal joint, likely  sequela of prior remote trauma.     IMPRESSION  Impression:   1. Soft tissue swelling of the right index finger, without associated  bony abnormality or radiodense foreign body.  2. Osteoarthritis about the right hand/wrist, most pronounced at the  radiocarpal joint, first carpometacarpal joint and second  metacarpophalangeal joints.     MELISSA FABIAN      [facility]:  Past Medical History:   Diagnosis Date     Cobblestone retinal degeneration      Impotence of organic origin      Nonsenile cataract      Pure hypercholesterolemia      Unspecified essential hypertension      Vitreous detachment of both eyes      Past Surgical History:  Appendectomy   Cataract IOL, right (5/1/17)  Phacoemulsification with  standard intraocular lens implant, right (5/1/17)  Tonsillectomy     Allergies:  Etodolac   Percocet (nausea, muscle weakness)  Active problem list:  Patient Active Problem List   Diagnosis     Essential hypertension     Pure hypercholesterolemia     Impotence of organic origin     Acute bilateral low back pain with bilateral sciatica     Status post lumbar spine operative procedure for decompression of spinal cord       FH:  Family History   Problem Relation Age of Onset     Glaucoma No family hx of      Macular Degeneration No family hx of        SH:  Social History     Socioeconomic History     Marital status:      Spouse name: Not on file     Number of children: Not on file     Years of education: Not on file     Highest education level: Not on file   Occupational History     Not on file   Tobacco Use     Smoking status: Never Smoker     Smokeless tobacco: Never Used   Substance and Sexual Activity     Alcohol use: Yes     Comment: 1 day     Drug use: Not on file     Sexual activity: Not on file   Other Topics Concern     Not on file   Social History Narrative     Not on file     Social Determinants of Health     Financial Resource Strain: Not on file   Food Insecurity: Not on file   Transportation Needs: Not on file   Physical Activity: Not on file   Stress: Not on file   Social Connections: Not on file   Intimate Partner Violence: Not on file   Housing Stability: Not on file       MEDS:  See EMR, reviewed  ALL:  See EMR, reviewed    REVIEW OF SYSTEMS:  CONSTITUTIONAL:NEGATIVE for fever, chills, change in weight  INTEGUMENTARY/SKIN: NEGATIVE for worrisome rashes, moles or lesions  EYES: NEGATIVE for vision changes or irritation  ENT/MOUTH: NEGATIVE for ear, mouth and throat problems  RESP:NEGATIVE for significant cough or SOB  BREAST: NEGATIVE for masses, tenderness or discharge  CV: NEGATIVE for chest pain, palpitations or peripheral edema  GI: NEGATIVE for nausea, abdominal pain, heartburn, or change  in bowel habits  :NEGATIVE for frequency, dysuria, or hematuria  :NEGATIVE for frequency, dysuria, or hematuria  NEURO: NEGATIVE for weakness, dizziness or paresthesias  ENDOCRINE: NEGATIVE for temperature intolerance, skin/hair changes  HEME/ALLERGY/IMMUNE: NEGATIVE for bleeding problems  PSYCHIATRIC: NEGATIVE for changes in mood or affect        Objective: He is tender in the first dorsal compartment and Finkelstein's test is positive.  He is nontender directly at the scaphoid.  Mildly tender at the CMC.  Nontender in the central wrist.  Grasp strength is normal.  Overlying skin is normal.  Appropriate conversation affect.      Assessment: De Quervain's tenosynovitis    Plan: He was given a thumb spica brace to use for the next 3 weeks.  He can remove it for massage of the first dorsal compartment he can remove it for work at his computer.  When he is no longer tender over the first dorsal compartment he can discontinue the splint.  I did discuss with the patient his past history of CMC and wrist DJD.  He understands that he could use the brace in the future for intermittent use if his thumb becomes uncomfortable again.  He will follow-up if the problem comes more persistent.  He tends to use Tylenol for discomfort and avoids nonsteroidal anti-inflammatories if possible.  He has been able to tolerate ibuprofen in the past but is intolerant to Lodine.

## 2022-09-15 NOTE — LETTER
9/15/2022      RE: Felice Blood  [address] [facility] [address]     Dear Colleague,    Thank you for referring your patient, Felice Blood, to the [facility] [place]. Please see a copy of my visit note below.    Right-sided wrist discomfort.  In the last month and a half he is noted first dorsal compartment discomfort through his forearm and into the base of the thumb.  He had been weeding his garden at the time.  Bothers him with gripping and grasping.  He tried an over-the-counter splint with his thumb free, did not seem to improve the problem.        Right-sided hand x-ray 2015 suggested osteoarthritis change in the first CMC joint, as well as the radiocarpal joint.    Chart history of intolerance or allergy to etolodac      Exam: 3 views of the right hand, 11/23/2015.     History: Pain in the right fingers. Patient cut his right index finger  on a copper type on 11/14/2015, has been treated for cellulitis.     Findings: AP, oblique and lateral views of the right hand are  evaluated without comparison.     Swelling is noted in the right second finger soft tissues. No  associated bone changes are noted.     Polyarticular joint space loss is noted throughout the right hand,  most pronounced at the first carpometacarpal joint and second  metacarpophalangeal joints, as well as at the radiocarpal joint.     No fractures are noted in the right hand. Small well-corticated  fragment just dorsal to the first interphalangeal joint, likely  sequela of prior remote trauma.     IMPRESSION  Impression:   1. Soft tissue swelling of the right index finger, without associated  bony abnormality or radiodense foreign body.  2. Osteoarthritis about the right hand/wrist, most pronounced at the  radiocarpal joint, first carpometacarpal joint and second  metacarpophalangeal joints.     MELISSA FABIAN      [facility]:  Past Medical History:   Diagnosis Date     Angela caldwell  degeneration      Impotence of organic origin      Nonsenile cataract      Pure hypercholesterolemia      Unspecified essential hypertension      Vitreous detachment of both eyes      Past Surgical History:  Appendectomy   Cataract IOL, right (5/1/17)  Phacoemulsification with standard intraocular lens implant, right (5/1/17)  Tonsillectomy     Allergies:  Etodolac   Percocet (nausea, muscle weakness)  Active problem list:  Patient Active Problem List   Diagnosis     Essential hypertension     Pure hypercholesterolemia     Impotence of organic origin     Acute bilateral low back pain with bilateral sciatica     Status post lumbar spine operative procedure for decompression of spinal cord       FH:  Family History   Problem Relation Age of Onset     Glaucoma No family hx of      Macular Degeneration No family hx of        SH:  Social History     Socioeconomic History     Marital status:      Spouse name: Not on file     Number of children: Not on file     Years of education: Not on file     Highest education level: Not on file   Occupational History     Not on file   Tobacco Use     Smoking status: Never Smoker     Smokeless tobacco: Never Used   Substance and Sexual Activity     Alcohol use: Yes     Comment: 1 day     Drug use: Not on file     Sexual activity: Not on file   Other Topics Concern     Not on file   Social History Narrative     Not on file     Social Determinants of Health     Financial Resource Strain: Not on file   Food Insecurity: Not on file   Transportation Needs: Not on file   Physical Activity: Not on file   Stress: Not on file   Social Connections: Not on file   Intimate Partner Violence: Not on file   Housing Stability: Not on file       MEDS:  See EMR, reviewed  ALL:  See EMR, reviewed    REVIEW OF SYSTEMS:  CONSTITUTIONAL:NEGATIVE for fever, chills, change in weight  INTEGUMENTARY/SKIN: NEGATIVE for worrisome rashes, moles or lesions  EYES: NEGATIVE for vision changes or  irritation  ENT/MOUTH: NEGATIVE for ear, mouth and throat problems  RESP:NEGATIVE for significant cough or SOB  BREAST: NEGATIVE for masses, tenderness or discharge  CV: NEGATIVE for chest pain, palpitations or peripheral edema  GI: NEGATIVE for nausea, abdominal pain, heartburn, or change in bowel habits  :NEGATIVE for frequency, dysuria, or hematuria  :NEGATIVE for frequency, dysuria, or hematuria  NEURO: NEGATIVE for weakness, dizziness or paresthesias  ENDOCRINE: NEGATIVE for temperature intolerance, skin/hair changes  HEME/ALLERGY/IMMUNE: NEGATIVE for bleeding problems  PSYCHIATRIC: NEGATIVE for changes in mood or affect        Objective: He is tender in the first dorsal compartment and Finkelstein's test is positive.  He is nontender directly at the scaphoid.  Mildly tender at the CMC.  Nontender in the central wrist.  Grasp strength is normal.  Overlying skin is normal.  Appropriate conversation affect.      Assessment: De Quervain's tenosynovitis    Plan: He was given a thumb spica brace to use for the next 3 weeks.  He can remove it for massage of the first dorsal compartment he can remove it for work at his computer.  When he is no longer tender over the first dorsal compartment he can discontinue the splint.  I did discuss with the patient his past history of CMC and wrist DJD.  He understands that he could use the brace in the future for intermittent use if his thumb becomes uncomfortable again.  He will follow-up if the problem comes more persistent.  He tends to use Tylenol for discomfort and avoids nonsteroidal anti-inflammatories if possible.  He has been able to tolerate ibuprofen in the past but is intolerant to Lodine.                  Sports Medicine Clinic Visit    PCP: Anthony Maddox NILS Blood is a 81 year old male who is seen  in consultation at the request of Dr. Maddox presenting with wrist pain    Injury: none    Location of Pain: right radial wrist  Duration of Pain:  6 month(s)  Rating of Pain: 9/10  Pain is better with: Rest  Pain is worse with: Lifting, rotation  Additional Features: tenderness  Treatment so far consists of: Rest, brace  Prior History of related problems: None    DME FITTING    Relevant Diagnosis: De Quervaine's, Right  Right thumb spica brace was fit on patient's Right wrist.     Person(s) involved in teaching:   Patient    Brace was applied in standard Manner:  Yes  Brace fit well:  Yes  Patient reports brace to fit comfortably:  Yes    Education:   Patient shown self application and removal of brace: Yes  Patient shown how to adjust brace fit, if necessary: Yes  Patient educated on billing and return policy: Yes  Patient confirmed understanding when and how to contact clinic with concerns: Yes      Avery Muñoz ATC on 9/15/2022 at 8:43 AM          Again, thank you for allowing me to participate in the care of your patient.      Sincerely,    Dino Alba MD

## 2022-09-28 ENCOUNTER — OFFICE VISIT (OUTPATIENT)
Dept: DERMATOLOGY | Facility: CLINIC | Age: 82
End: 2022-09-28
Payer: MEDICARE

## 2022-09-28 DIAGNOSIS — D49.2 NEOPLASM OF UNSPECIFIED BEHAVIOR OF BONE, SOFT TISSUE, AND SKIN: ICD-10-CM

## 2022-09-28 DIAGNOSIS — L90.5 SCAR: ICD-10-CM

## 2022-09-28 DIAGNOSIS — L82.1 SEBORRHEIC KERATOSIS: ICD-10-CM

## 2022-09-28 DIAGNOSIS — D22.9 MULTIPLE BENIGN NEVI: ICD-10-CM

## 2022-09-28 DIAGNOSIS — L81.4 SOLAR LENTIGO: Primary | ICD-10-CM

## 2022-09-28 DIAGNOSIS — Z85.828 HISTORY OF NONMELANOMA SKIN CANCER: ICD-10-CM

## 2022-09-28 DIAGNOSIS — L57.0 ACTINIC KERATOSIS: ICD-10-CM

## 2022-09-28 DIAGNOSIS — L81.4 LENTIGINES: ICD-10-CM

## 2022-09-28 PROCEDURE — 17000 DESTRUCT PREMALG LESION: CPT | Performed by: DERMATOLOGY

## 2022-09-28 PROCEDURE — 99213 OFFICE O/P EST LOW 20 MIN: CPT | Mod: 25 | Performed by: DERMATOLOGY

## 2022-09-28 ASSESSMENT — PAIN SCALES - GENERAL: PAINLEVEL: NO PAIN (0)

## 2022-09-28 NOTE — LETTER
9/28/2022       RE: Felice Blood  [address] [facility] [address]     Dear Colleague,    Thank you for referring your patient, Felice Blood, to the [facility]ERMATOLOGY [facility] [place] [facility]. Please see a copy of my visit note below.    Note already prepped.    [facility] Dermatology Note  Encounter Date: Sep 28, 2022  Office Visit     Dermatology Problem List:  # FBSE, 9/28/2022  1. Seborrheic dermatitis.  - Ketoconazole shampoo  2. 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 AKs, s/p cryotherapy   - HAK, R upper back, s/p shave bx 12/3/21  4. Xerosis cutis  - Current tx: daily moisturizer  5. Benign bx:  - Lichenoid keratosis, left lateral mid back, s/p shave bx 12/2021  - Lichenoid keratosis, left medial thigh, s/p shave bx 12/2021    # NUB, Left dorsal hand, suspected scar from recent injury   ____________________________________________    Assessment & Plan:     # NUB, Left dorsal hand, suspected scar from recent injury   - Monitor for any changes, he will let us know if grows/changes    # AK, left temple  - Cryotherapy today, see procedure note below     # Multiple benign nevi.   # Solar lentigines   - Monitor for ABCDEs of melanoma   - Continue sun protection - recommend SPF 30 or higher with frequent application   - Return sooner if noticing changing or symptomatic lesions    # Seborrheic keratoses  Discussed the natural history and benign nature of this lesion. Reassurance provided that no additional treatment is necessary.     # History of NMSC. No evidence of recurrent disease.  - Continue photoprotection - recommend SPF 30 or higher with frequent reapplication  - Continue yearly skin exams  - Advised to monitor for changing, non-healing, bleeding, painful, changing, or otherwise symptomatic lesions    Procedures Performed:   - Cryotherapy procedure note, location(s): see  above. After verbal consent and discussion of risks and benefits including, but not limited to, dyspigmentation/scar, blister, and pain, 1 lesion(s) was(were) treated with 1-2 mm freeze border for 1-2 cycles with liquid nitrogen. Post cryotherapy instructions were provided.    Follow-up: 1 year(s) in-person, or earlier for new or changing lesions    Staff and Scribe:     Scribe Disclosure:  I, CASIMIRO LYONS, am serving as a scribe to document services personally performed by Silvana Do MD based on data collection and the provider's statements to me.     Provider Disclosure:   The documentation recorded by the scribe accurately reflects the services I personally performed and the decisions made by me.    Silvana Do MD    Department of Dermatology  [facility]: Phone: [phone], Fax: [phone]  9/29/2022     ____________________________________________    CC: Skin Check (Felice is here today for a skin check. He states he does not have any areas of concern)    HPI:  Mr. Felice Blood is a(n) 81 year old male who presents today as a return patient for FBSE. Last seen by myself on 6/8/22, at which time patient had no lesions of concern.    Today, the patient does not have any specific areas of concern. Denies any noticeable spots that are painful, bleeding, or growing.     Patient is otherwise feeling well, without additional skin concerns.    Labs Reviewed:  N/A    Physical Exam:  Vitals: There were no vitals taken for this visit.  SKIN: Full skin, which includes the head/face, both arms, chest, back, abdomen,both legs, buttocks, digits and/or nails, was examined.  - Left dorsal hand, slightly linear firm pink papule.   - Well healed scars on the right upper back and the left upper posterior arm.  - Scattered brown macules on sun exposed areas.  - There are waxy stuck on tan to brown papules on the trunk  and extremities.   - There is an erythematous macule with overyling adherent scale on the left temple.   - No other lesions of concern on areas examined.     Medications:  Current Outpatient Medications   Medication     acetaminophen (TYLENOL) 325 MG tablet     albuterol (PROAIR HFA/PROVENTIL HFA/VENTOLIN HFA) 108 (90 Base) MCG/ACT inhaler     atorvastatin (LIPITOR) 10 MG tablet     Calcium-Magnesium-Zinc 333-133-5 MG TABS per tablet     cholecalciferol (VITAMIN D3) 25 mcg (1000 units) capsule     fluticasone (FLONASE) 50 MCG/ACT spray     hydrOXYzine (ATARAX) 10 MG tablet     ipratropium (ATROVENT) 0.06 % nasal spray     ketoconazole (NIZORAL) 2 % external shampoo     lisinopril (ZESTRIL) 10 MG tablet     ondansetron (ZOFRAN-ODT) 4 MG ODT tab     oxyCODONE (ROXICODONE) 5 MG tablet     polyethylene glycol (MIRALAX) 17 g packet     senna-docusate (SENOKOT-S/PERICOLACE) 8.6-50 MG tablet     sildenafil (REVATIO) 20 MG tablet     sildenafil (VIAGRA) 100 MG tablet     tadalafil (CIALIS) 20 MG tablet     gabapentin (NEURONTIN) 100 MG capsule     No current facility-administered medications for this visit.      Past Medical History:   Patient Active Problem List   Diagnosis     Essential hypertension     Pure hypercholesterolemia     Impotence of organic origin     Acute bilateral low back pain with bilateral sciatica     Status post lumbar spine operative procedure for decompression of spinal cord     Past Medical History:   Diagnosis Date     Cobblestone retinal degeneration      Impotence of organic origin      Nonsenile cataract      Pure hypercholesterolemia      Unspecified essential hypertension      Vitreous detachment of both eyes         CC Silvana Do MD   DERMATOLOGY  [address] [facility] [address] on close of this encounter.

## 2022-09-28 NOTE — PATIENT INSTRUCTIONS
Patient Education     Checking for Skin Cancer  You can find cancer early by checking your skin each month. There are 3 kinds of skin cancer. They are melanoma, basal cell carcinoma, and squamous cell carcinoma. Doing monthly skin checks is the best way to find new marks or skin changes. Follow the instructions below for checking your skin.   The ABCDEs of checking moles for melanoma   Check your moles or growths for signs of melanoma using ABCDE:   Asymmetry: the sides of the mole or growth don t match  Border: the edges are ragged, notched, or blurred  Color: the color within the mole or growth varies  Diameter: the mole or growth is larger than 6 mm (size of a pencil eraser)  Evolving: the size, shape, or color of the mole or growth is changing (evolving is not shown in the images below)    Checking for other types of skin cancer  Basal cell carcinoma or squamous cell carcinoma have symptoms such as:     A spot or mole that looks different from all other marks on your skin  Changes in how an area feels, such as itching, tenderness, or pain  Changes in the skin's surface, such as oozing, bleeding, or scaliness  A sore that does not heal  New swelling or redness beyond the border of a mole    Who s at risk?  Anyone can get skin cancer. But you are at greater risk if you have:   Fair skin, light-colored hair, or light-colored eyes  Many moles or abnormal moles on your skin  A history of sunburns from sunlight or tanning beds  A family history of skin cancer  A history of exposure to radiation or chemicals  A weakened immune system  If you have had skin cancer in the past, you are at risk for recurring skin cancer.   How to check your skin  Do your monthly skin checkups in front of a full-length mirror. Check all parts of your body, including your:   Head (ears, face, neck, and scalp)  Torso (front, back, and sides)  Arms (tops, undersides, upper, and lower armpits)  Hands (palms, backs, and fingers, including  under the nails)  Buttocks and genitals  Legs (front, back, and sides)  Feet (tops, soles, toes, including under the nails, and between toes)  If you have a lot of moles, take digital photos of them each month. Make sure to take photos both up close and from a distance. These can help you see if any moles change over time.   Most skin changes are not cancer. But if you see any changes in your skin, call your doctor right away. Only he or she can diagnose a problem. If you have skin cancer, seeing your doctor can be the first step toward getting the treatment that could save your life.   [employer] last reviewed this educational content on 4/1/2019 2000-2020 [employer]. [address] [place] [address], [place] [address]. All rights reserved. This information is not intended as a substitute for professional medical care. Always follow your healthcare professional's instructions.       When should I call my doctor?  If you are worsening or not improving, please, contact us or seek urgent care as noted below.     Who should I call with questions (adults)?  [facility] (adult and pediatric): [phone]  [facility] (adult): [phone]  For urgent needs outside of business hours call the [facility] at [phone] and ask for the dermatology resident on call to be paged  If this is a medical emergency and you are unable to reach an ER, Call [phone]    Who should I call with questions (pediatric)?  [facility]- Pediatric Dermatology  Dr. Laura Sainz, Dr. Aminah Sandy, Dr. Humaira Sloan, CAMILLA Hitchcock, Dr. Kell Byrne, Dr. Dixie Ibarra & Dr. Woo Kline  Non-urgent nurse triage line; [phone]- Sanam and Marlyn WEEKS Care Coordinatorludwin Bob (/Complex ) [phone]    If you need a prescription refill, please contact your pharmacy. Refills are approved or denied by our  Physicians during normal business hours, Monday through Fridays  Per office policy, refills will not be granted if you have not been seen within the past year (or sooner depending on your child's condition)    Scheduling Information:  Pediatric Appointment Scheduling and Call Center [phone]  Radiology Scheduling- [phone]  Sedation Unit Scheduling- [phone]  [employer] Scheduling- General [phone]; Pediatric Dermatology [phone]  Main  Services: [phone]  Polish: [phone]  Eritrean: [phone]  Hmong/Eritrean/Sinhala: [phone]  Preadmission Nursing Department Fax Number: [phone] (Fax all pre-operative paperwork to this number)    For urgent matters arising during evenings, weekends, or holidays that cannot wait for normal business hours please call [phone] and ask for the dermatology resident on call to be paged.       Cryotherapy    What is it?  Use of a very cold liquid, such as liquid nitrogen, to freeze and destroy abnormal skin cells that need to be removed    What should I expect?  Tenderness and redness  A small blister that might grow and fill with dark purple blood. There may be crusting.  More than one treatment may be needed if the lesions do not go away.    How do I care for the treated area?  Gently wash the area with your hands when bathing.  Use a thin layer of Vaseline to help with healing. You may use a Band-Aid.   The area should heal within 7-10 days and may leave behind a pink or lighter color.   Do not use an antibiotic or Neosporin ointment.   You may take acetaminophen (Tylenol) for pain.     Call your doctor if you have:  Severe pain  Signs of infection (warmth, redness, cloudy yellow drainage, and or a bad smell)  Questions or concerns    Who should I call with questions?      [facility]: [phone]      [facility]: [phone]      For urgent needs outside of business  hours call the [facility] at [phone] and ask for the dermatology resident on call

## 2022-10-24 ENCOUNTER — ANCILLARY PROCEDURE (OUTPATIENT)
Dept: GENERAL RADIOLOGY | Facility: CLINIC | Age: 82
End: 2022-10-24
Attending: FAMILY MEDICINE
Payer: MEDICARE

## 2022-10-24 ENCOUNTER — OFFICE VISIT (OUTPATIENT)
Dept: ORTHOPEDICS | Facility: CLINIC | Age: 82
End: 2022-10-24
Payer: MEDICARE

## 2022-10-24 ENCOUNTER — PRE VISIT (OUTPATIENT)
Dept: ORTHOPEDICS | Facility: CLINIC | Age: 82
End: 2022-10-24

## 2022-10-24 DIAGNOSIS — M25.561 RIGHT KNEE PAIN: ICD-10-CM

## 2022-10-24 DIAGNOSIS — M17.11 PRIMARY OSTEOARTHRITIS OF RIGHT KNEE: Primary | ICD-10-CM

## 2022-10-24 PROCEDURE — 20610 DRAIN/INJ JOINT/BURSA W/O US: CPT | Mod: RT | Performed by: FAMILY MEDICINE

## 2022-10-24 PROCEDURE — 73562 X-RAY EXAM OF KNEE 3: CPT | Mod: RT | Performed by: RADIOLOGY

## 2022-10-24 PROCEDURE — 99213 OFFICE O/P EST LOW 20 MIN: CPT | Mod: 25 | Performed by: FAMILY MEDICINE

## 2022-10-24 RX ORDER — LIDOCAINE HYDROCHLORIDE 10 MG/ML
4 INJECTION, SOLUTION EPIDURAL; INFILTRATION; INTRACAUDAL; PERINEURAL
Status: SHIPPED | OUTPATIENT
Start: 2022-10-24

## 2022-10-24 RX ORDER — TRIAMCINOLONE ACETONIDE 40 MG/ML
40 INJECTION, SUSPENSION INTRA-ARTICULAR; INTRAMUSCULAR
Status: SHIPPED | OUTPATIENT
Start: 2022-10-24

## 2022-10-24 RX ADMIN — LIDOCAINE HYDROCHLORIDE 4 ML: 10 INJECTION, SOLUTION EPIDURAL; INFILTRATION; INTRACAUDAL; PERINEURAL at 15:58

## 2022-10-24 RX ADMIN — TRIAMCINOLONE ACETONIDE 40 MG: 40 INJECTION, SUSPENSION INTRA-ARTICULAR; INTRAMUSCULAR at 15:58

## 2022-10-24 NOTE — TELEPHONE ENCOUNTER
DIAGNOSIS: R leg swelling by knee/self/Medicare/ortho con   APPOINTMENT DATE: 10.24.22   NOTES STATUS DETAILS   MEDICATION LIST Internal

## 2022-10-24 NOTE — LETTER
10/24/2022      RE: Felice Blood  [address]  [facility] [address]     Dear Colleague,    Thank you for referring your patient, Felice Blood, to the [facility] [place]. Please see a copy of my visit note below.    Sports Medicine Clinic Visit    PCP: Anthony Maddox    Felice Blood is a 81 year old male who is seen  as self referral presenting with right anterior knee pain.  In the last 2 weeks he has noted focal right-sided knee discomfort with activities like walking up and down stairs, making the motion to get out of a chair.  This will cause him to have weightbearing pain.  He is noted that the longer he walks the pain seems to improve.  The knee is not locking or catching.  He denies a prior history of recurrent right-sided knee pain.    Injury: None    Location of Pain: right anterior knee  Duration of Pain: 2 week(s)  Rating of Pain: 5/10  Pain is better with: Disuse  Pain is worse with: Standing, stairs  Additional Features: pain  Treatment so far consists of: Disuse   Prior History of related problems: none    There were no vitals taken for this visit.      S/p  L2-L3 Decompression Dr. Cabral ortho spine 5/2022. Vascular studies of the lower extremities including CALVIN, exercise arterial ultrasound, normal.  Normal abdominal aortic ultrasound.  SI joint x-rays reveal no significant DJD or sacroiliitis.  Mild degenerative disease at the hips.  Normal colonoscopy 2016.    MRI lumbar spine 11/2021 c/w moderate central stenosis at L2-L3 and severe facet arthropathy at that level. L2-3: Right central/subarticular disc extrusion with slight superior  migration narrows the right lateral recess and likely impinges upon the traversing right L3 nerve root.. Moderate right and mild left neural foraminal narrowing at L2L3   Also moderate bilateral foraminal narrowing at L5-S1 due to facet arthropathy and disc bulge.      Patient's allergies include  ketorolac, Percocet.     He indicates that he has been able to tolerate ibuprofen in the past without adverse reaction.    BUN, creatinine, liver function tests 9/9/2022        PMH:  Past Medical History:   Diagnosis Date     Cobblestone retinal degeneration      Impotence of organic origin      Nonsenile cataract      Pure hypercholesterolemia      Unspecified essential hypertension      Vitreous detachment of both eyes        Active problem list:  Patient Active Problem List   Diagnosis     Essential hypertension     Pure hypercholesterolemia     Impotence of organic origin     Acute bilateral low back pain with bilateral sciatica     Status post lumbar spine operative procedure for decompression of spinal cord       FH:  Family History   Problem Relation Age of Onset     Skin Cancer Father      Glaucoma No family hx of      Macular Degeneration No family hx of      Melanoma No family hx of        SH:  Social History     Socioeconomic History     Marital status:      Spouse name: Not on file     Number of children: Not on file     Years of education: Not on file     Highest education level: Not on file   Occupational History     Not on file   Tobacco Use     Smoking status: Never     Smokeless tobacco: Never   Substance and Sexual Activity     Alcohol use: Yes     Comment: 1 day     Drug use: Not on file     Sexual activity: Not on file   Other Topics Concern     Not on file   Social History Narrative     Not on file     Social Determinants of Health     Financial Resource Strain: Not on file   Food Insecurity: Not on file   Transportation Needs: Not on file   Physical Activity: Not on file   Stress: Not on file   Social Connections: Not on file   Intimate Partner Violence: Not on file   Housing Stability: Not on file       MEDS:  See EMR, reviewed  ALL:  See EMR, reviewed    REVIEW OF SYSTEMS:  CONSTITUTIONAL:NEGATIVE for fever, chills, change in weight  INTEGUMENTARY/SKIN: NEGATIVE for worrisome rashes,  moles or lesions  EYES: NEGATIVE for vision changes or irritation  ENT/MOUTH: NEGATIVE for ear, mouth and throat problems  RESP:NEGATIVE for significant cough or SOB  BREAST: NEGATIVE for masses, tenderness or discharge  CV: NEGATIVE for chest pain, palpitations or peripheral edema  GI: NEGATIVE for nausea, abdominal pain, heartburn, or change in bowel habits  :NEGATIVE for frequency, dysuria, or hematuria  :NEGATIVE for frequency, dysuria, or hematuria  NEURO: NEGATIVE for weakness, dizziness or paresthesias  ENDOCRINE: NEGATIVE for temperature intolerance, skin/hair changes  HEME/ALLERGY/IMMUNE: NEGATIVE for bleeding problems  PSYCHIATRIC: NEGATIVE for changes in mood or affect    Objective: The right knee reveals no effusion.  I can flex and extend it fully.  There is no swelling in the popliteal space.  No swelling or tenderness in the calf.  Mildly tender over the medial joint line, nontender over the lateral joint line.  Anterior and posterior drawer is negative.  Normal range of motion of the right hip.  Overlying skin is intact.  Appropriate conversation and affect.    I personally reviewed with the patient x-rays of the right knee that suggests moderate medial joint space narrowing and spurring in the tibiofemoral space and mild patellofemoral DJD on the right    Assessment: Right-sided knee DJD.  History of lumbar spine decompression surgery 6 months ago    Plan: We discussed conservative care for knee DJD.  We discussed Tylenol and nonsteroidal anti-inflammatories and their side effects.  We discussed a cortisone injection.  Pull-up knee sleeve from pharmacy.  Patient would like to try right-sided knee cortisone injection.  After informed consent about bleeding, infection, steroid flare after prepping with surgical scrub he was injected his right knee from a lateral approach in the seated position with 1 cc of Kenalog 40 and 4 cc of 1% lidocaine.  The medicine went in easily, he left the clinic  ambulatory, he will look for improved with the injection and follow-up as needed.            Large Joint Injection: R knee joint    Date/Time: 10/24/2022 3:58 PM  Performed by: Dino Alba MD  Authorized by: Dino Alba MD     Indications:  Pain and osteoarthritis  Needle Size:  25 G  Guidance: landmark guided    Approach:  Anterolateral  Location:  Knee      Medications:  40 mg triamcinolone 40 MG/ML; 4 mL lidocaine (PF) 1 %  Outcome:  Tolerated well, no immediate complications  Procedure discussed: discussed risks, benefits, and alternatives    Consent Given by:  Patient  Timeout: timeout called immediately prior to procedure    Prep: patient was prepped and draped in usual sterile fashion     Avery Muñoz ATC on 10/24/2022 at 3:58 PM      Again, thank you for allowing me to participate in the care of your patient.      Sincerely,    Dino Alba MD

## 2022-10-24 NOTE — PROGRESS NOTES
Sports Medicine Clinic Visit    PCP: Anthony Maddox NILS Blood is a 81 year old male who is seen  as self referral presenting with right anterior knee pain.  In the last 2 weeks he has noted focal right-sided knee discomfort with activities like walking up and down stairs, making the motion to get out of a chair.  This will cause him to have weightbearing pain.  He is noted that the longer he walks the pain seems to improve.  The knee is not locking or catching.  He denies a prior history of recurrent right-sided knee pain.    Injury: None    Location of Pain: right anterior knee  Duration of Pain: 2 week(s)  Rating of Pain: 5/10  Pain is better with: Disuse  Pain is worse with: Standing, stairs  Additional Features: pain  Treatment so far consists of: Disuse   Prior History of related problems: none    There were no vitals taken for this visit.      S/p  L2-L3 Decompression Dr. Cabral ortho spine 5/2022. Vascular studies of the lower extremities including CALVIN, exercise arterial ultrasound, normal.  Normal abdominal aortic ultrasound.  SI joint x-rays reveal no significant DJD or sacroiliitis.  Mild degenerative disease at the hips.  Normal colonoscopy 2016.    MRI lumbar spine 11/2021 c/w moderate central stenosis at L2-L3 and severe facet arthropathy at that level. L2-3: Right central/subarticular disc extrusion with slight superior  migration narrows the right lateral recess and likely impinges upon the traversing right L3 nerve root.. Moderate right and mild left neural foraminal narrowing at L2L3   Also moderate bilateral foraminal narrowing at L5-S1 due to facet arthropathy and disc bulge.      Patient's allergies include ketorolac, Percocet.     He indicates that he has been able to tolerate ibuprofen in the past without adverse reaction.    BUN, creatinine, liver function tests 9/9/2022        PMH:  Past Medical History:   Diagnosis Date     Cobblestone retinal degeneration      Impotence of  organic origin      Nonsenile cataract      Pure hypercholesterolemia      Unspecified essential hypertension      Vitreous detachment of both eyes        Active problem list:  Patient Active Problem List   Diagnosis     Essential hypertension     Pure hypercholesterolemia     Impotence of organic origin     Acute bilateral low back pain with bilateral sciatica     Status post lumbar spine operative procedure for decompression of spinal cord       FH:  Family History   Problem Relation Age of Onset     Skin Cancer Father      Glaucoma No family hx of      Macular Degeneration No family hx of      Melanoma No family hx of        SH:  Social History     Socioeconomic History     Marital status:      Spouse name: Not on file     Number of children: Not on file     Years of education: Not on file     Highest education level: Not on file   Occupational History     Not on file   Tobacco Use     Smoking status: Never     Smokeless tobacco: Never   Substance and Sexual Activity     Alcohol use: Yes     Comment: 1 day     Drug use: Not on file     Sexual activity: Not on file   Other Topics Concern     Not on file   Social History Narrative     Not on file     Social Determinants of Health     Financial Resource Strain: Not on file   Food Insecurity: Not on file   Transportation Needs: Not on file   Physical Activity: Not on file   Stress: Not on file   Social Connections: Not on file   Intimate Partner Violence: Not on file   Housing Stability: Not on file       MEDS:  See EMR, reviewed  ALL:  See EMR, reviewed    REVIEW OF SYSTEMS:  CONSTITUTIONAL:NEGATIVE for fever, chills, change in weight  INTEGUMENTARY/SKIN: NEGATIVE for worrisome rashes, moles or lesions  EYES: NEGATIVE for vision changes or irritation  ENT/MOUTH: NEGATIVE for ear, mouth and throat problems  RESP:NEGATIVE for significant cough or SOB  BREAST: NEGATIVE for masses, tenderness or discharge  CV: NEGATIVE for chest pain, palpitations or peripheral  edema  GI: NEGATIVE for nausea, abdominal pain, heartburn, or change in bowel habits  :NEGATIVE for frequency, dysuria, or hematuria  :NEGATIVE for frequency, dysuria, or hematuria  NEURO: NEGATIVE for weakness, dizziness or paresthesias  ENDOCRINE: NEGATIVE for temperature intolerance, skin/hair changes  HEME/ALLERGY/IMMUNE: NEGATIVE for bleeding problems  PSYCHIATRIC: NEGATIVE for changes in mood or affect    Objective: The right knee reveals no effusion.  I can flex and extend it fully.  There is no swelling in the popliteal space.  No swelling or tenderness in the calf.  Mildly tender over the medial joint line, nontender over the lateral joint line.  Anterior and posterior drawer is negative.  Normal range of motion of the right hip.  Overlying skin is intact.  Appropriate conversation and affect.    I personally reviewed with the patient x-rays of the right knee that suggests moderate medial joint space narrowing and spurring in the tibiofemoral space and mild patellofemoral DJD on the right    Assessment: Right-sided knee DJD.  History of lumbar spine decompression surgery 6 months ago    Plan: We discussed conservative care for knee DJD.  We discussed Tylenol and nonsteroidal anti-inflammatories and their side effects.  We discussed a cortisone injection.  Pull-up knee sleeve from pharmacy.  Patient would like to try right-sided knee cortisone injection.  After informed consent about bleeding, infection, steroid flare after prepping with surgical scrub he was injected his right knee from a lateral approach in the seated position with 1 cc of Kenalog 40 and 4 cc of 1% lidocaine.  The medicine went in easily, he left the clinic ambulatory, he will look for improved with the injection and follow-up as needed.            Large Joint Injection: R knee joint    Date/Time: 10/24/2022 3:58 PM  Performed by: Dino Alba MD  Authorized by: Dino Alba MD     Indications:  Pain and  osteoarthritis  Needle Size:  25 G  Guidance: landmark guided    Approach:  Anterolateral  Location:  Knee      Medications:  40 mg triamcinolone 40 MG/ML; 4 mL lidocaine (PF) 1 %  Outcome:  Tolerated well, no immediate complications  Procedure discussed: discussed risks, benefits, and alternatives    Consent Given by:  Patient  Timeout: timeout called immediately prior to procedure    Prep: patient was prepped and draped in usual sterile fashion     Avery Muñoz ATC on 10/24/2022 at 3:58 PM

## 2022-10-24 NOTE — NURSING NOTE
[address] [address]  Dept: [phone]  ______________________________________________________________________________    Patient: Felice Blood   : 1940   MRN: [id number]   2022    INVASIVE PROCEDURE SAFETY CHECKLIST    Date: 10/24/22   Procedure: Right knee IA CSI  Patient Name: Felice Blood  MRN: [id number]  YOB: 1940    Action: Complete sections as appropriate. Any discrepancy results in a HARD COPY until resolved.     PRE PROCEDURE:  Patient ID verified with 2 identifiers (name and  or MRN): Yes  Procedure and site verified with patient/designee (when able): Yes  Accurate consent documentation in medical record: Yes  H&P (or appropriate assessment) documented in medical record: Yes  H&P must be up to 20 days prior to procedure and updates within 24 hours of procedure as applicable: NA  Relevant diagnostic and radiology test results appropriately labeled and displayed as applicable: Yes  Procedure site(s) marked with provider initials: NA    TIMEOUT:  Time-Out performed immediately prior to starting procedure, including verbal and active participation of all team members addressing the following:Yes  * Correct patient identify  * Confirmed that the correct side and site are marked  * An accurate procedure consent form  * Agreement on the procedure to be done  * Correct patient position  * Relevant images and results are properly labeled and appropriately displayed  * The need to administer antibiotics or fluids for irrigation purposes during the procedure as applicable   * Safety precautions based on patient history or medication use    DURING PROCEDURE: Verification of correct person, site, and procedures any time the responsibility for care of the patient is transferred to another member of the care team.       Prior to injection, verified patient identity using patient's name and date of  birth.  Due to injection administration, patient instructed to remain in clinic for 15 minutes  afterwards, and to report any adverse reaction to me immediately.    Joint injection was performed.      Drug Amount Wasted:  None.  Vial/Syringe: Single dose vial  Expiration Date:  6/1/24      Avery Muñoz, ATC  October 24, 2022

## 2022-11-22 PROBLEM — M54.41 ACUTE BILATERAL LOW BACK PAIN WITH BILATERAL SCIATICA: Status: RESOLVED | Noted: 2021-09-28 | Resolved: 2022-11-22

## 2022-11-22 PROBLEM — M54.42 ACUTE BILATERAL LOW BACK PAIN WITH BILATERAL SCIATICA: Status: RESOLVED | Noted: 2021-09-28 | Resolved: 2022-11-22

## 2022-11-22 NOTE — PROGRESS NOTES
Patient is discharged from PT.  Please refer to progress note dated 12/15/21 for last known functional status as well as final objective/subjective values.

## 2023-03-24 DIAGNOSIS — N52.9 IMPOTENCE OF ORGANIC ORIGIN: ICD-10-CM

## 2023-03-27 RX ORDER — TADALAFIL 20 MG/1
20 TABLET ORAL DAILY PRN
Qty: 10 TABLET | Refills: 0 | OUTPATIENT
Start: 2023-03-27

## 2023-03-28 DIAGNOSIS — N52.9 IMPOTENCE OF ORGANIC ORIGIN: ICD-10-CM

## 2023-03-29 RX ORDER — TADALAFIL 20 MG/1
20 TABLET ORAL DAILY PRN
Qty: 10 TABLET | Refills: 6 | Status: SHIPPED | OUTPATIENT
Start: 2023-03-29 | End: 2023-08-28

## 2023-03-29 NOTE — TELEPHONE ENCOUNTER
Tadalafil Oral Tablet 20 MG  Last Written Prescription Date:   9/9/2022  Last Fill Quantity: 10,   # refills: 11  Last Office Visit :  9/9/2022  Future Office visit:   9/13/2023  Needing a new order.   Order in computer system is locked and they can not get into it.   Sent a new order for Pt care.    10 Tabs, 6 Refills sent to lj Quevedo RN  Central Triage Red Flags/Med Refills

## 2023-04-04 NOTE — PROGRESS NOTES
[facility] Dermatology Note  Encounter Date: Apr 7, 2023  Office Visit     Dermatology Problem List:  # FBSE, 4/7/2023  1. Seborrheic dermatitis.  - Ketoconazole shampoo  2. 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 AKs, s/p cryotherapy   - HAK, R upper back, s/p shave bx 12/3/21  4. Xerosis cutis  - Current tx: daily moisturizer  5. Benign bx:  - Lichenoid keratosis, left lateral mid back, s/p shave bx 12/2021  - Lichenoid keratosis, left medial thigh, s/p shave bx 12/2021  6. Folliculitis  - BPO wash  7. Eczematous dermatitis  - TMC 0.1% cream    # Lesion to monitor, right distal upper arm, favor trauma   # Lesion to monitor, left lateral distal thigh, favor BLK     # NUB, left upper posterior arm, s/p shave bx 4/7/2023  # NUB, left chest, s/p shave bx 4/7/2023  ____________________________________________    Assessment & Plan:    # Lesion to monitor, Left dorsal hand, suspected scar from recent injury, resolved on exam today     # Lesion to monitor, right distal upper arm, favor trauma   # Lesion to monitor, left lateral distal thigh, favor BLK  - Photodocumentation today  - Recheck at next visit     # NUB, left upper posterior arm, ddx: BCC vs BLK  # NUB, left chest, ddx: BCC vs BLK  - Shave biopsy today, see procedure note below    # Eczematous dermatitis  - Start TMC 0.1% cream BID until resolved    # Folliculitis  - Recommend BPO wash in the shower  - Patient will reach out in a month if he would like clindamycin     # AK, right lateral neck, left mid helix - x2 in total  - Cryotherapy today, see procedure note below     # Multiple benign nevi.   # Solar lentigines   - Monitor for ABCDEs of melanoma   - Continue sun protection - recommend SPF 30 or higher with frequent application   - Return sooner if noticing changing or symptomatic lesions     # Seborrheic keratoses  Discussed the natural history and benign nature of this lesion. Reassurance provided that no  additional treatment is necessary.      # History of NMSC. No evidence of recurrent disease.  - Continue photoprotection - recommend SPF 30 or higher with frequent reapplication  - Continue yearly skin exams  - Advised to monitor for changing, non-healing, bleeding, painful, changing, or otherwise symptomatic lesions     Procedures Performed:   - Cryotherapy procedure note, location(s): see above. After verbal consent and discussion of risks and benefits including, but not limited to, dyspigmentation/scar, blister, and pain, 2 lesion(s) was(were) treated with 1-2 mm freeze border for 1-2 cycles with liquid nitrogen. Post cryotherapy instructions were provided.  - Shave biopsy procedure note, location(s): see above. After discussion of benefits and risks including but not limited to bleeding, infection, scar, incomplete removal, recurrence, and non-diagnostic biopsy, written consent and photographs were obtained. The area was cleaned with isopropyl alcohol. 0.5mL of 1% lidocaine with epinephrine was injected to obtain adequate anesthesia of lesion(s). Shave biopsy at site(s) performed. Hemostasis was achieved with aluminium chloride. Petrolatum ointment and a sterile dressing were applied. The patient tolerated the procedure and no complications were noted. The patient was provided with verbal and written post care instructions.     Follow-up: 3 month(s) in-person, recheck above lesion to monitor, or earlier for new or changing lesions    Staff and Scribe:     Scribe Disclosure:  CASIMIRO DUBOSE, am serving as a scribe to document services personally performed by Silvana Do MD based on data collection and the provider's statements to me.     Provider Disclosure:   The documentation recorded by the scribe accurately reflects the services I personally performed and the decisions made by me.    Silvana Do MD    Department of Dermatology  [facility]  [facility]: Phone: [phone], Fax: [phone]  4/8/2023     ____________________________________________    CC: Skin Check (Felice is here today for a skin check. )    HPI:  Mr. Felice Blood is a(n) 82 year old male who presents today as a return patient for FBSE. Last seen by myself on 9/28/22, at which time the patient underwent cryotherapy for treatment of AKs.    The patient reports itchy spots on his buttocks that he has been treating with soap.    Patient is otherwise feeling well, without additional skin concerns.    Labs Reviewed:  N/A    Physical Exam:  Vitals: There were no vitals taken for this visit.  SKIN: Full skin, which includes the head/face, both arms, chest, back, abdomen,both legs, genitalia and/or groin buttocks, digits and/or nails, was examined.  - Left lateral distal thigh, nonspecific pink macule with thin overlying scale  - Pink scaly patches on the legs.  - Inflammatory crusted papules on the lateral thighs.  - Left upper posterior arm, shiny pink macule.  - Left chest, shiny pink macule.  - Two somewhat linear pink scaly macules on the right distal upper arm.  - Prior firm pink papule on the left dorsal hand is resolved.   - There are dome shaped bright red papules on the trunk and extremities.   - Multiple regular brown pigmented macules and papules are identified on the trunk and extremities.   - Scattered brown macules on sun exposed areas.  - There are waxy stuck on tan to brown papules on the trunk and extremities.   - There are erythematous macules with overyling adherent scale on the left mid helix, right lateral neck.    - No other lesions of concern on areas examined.     Medications:  Current Outpatient Medications   Medication     acetaminophen (TYLENOL) 325 MG tablet     albuterol (PROAIR HFA/PROVENTIL HFA/VENTOLIN HFA) 108 (90 Base) MCG/ACT inhaler     atorvastatin (LIPITOR) 10 MG tablet     Calcium-Magnesium-Zinc 333-133-5 MG TABS  per tablet     cholecalciferol (VITAMIN D3) 25 mcg (1000 units) capsule     fluticasone (FLONASE) 50 MCG/ACT spray     hydrOXYzine (ATARAX) 10 MG tablet     ipratropium (ATROVENT) 0.06 % nasal spray     ketoconazole (NIZORAL) 2 % external shampoo     lisinopril (ZESTRIL) 10 MG tablet     ondansetron (ZOFRAN-ODT) 4 MG ODT tab     oxyCODONE (ROXICODONE) 5 MG tablet     polyethylene glycol (MIRALAX) 17 g packet     senna-docusate (SENOKOT-S/PERICOLACE) 8.6-50 MG tablet     sildenafil (REVATIO) 20 MG tablet     sildenafil (VIAGRA) 100 MG tablet     tadalafil (CIALIS) 20 MG tablet     gabapentin (NEURONTIN) 100 MG capsule     Current Facility-Administered Medications   Medication     lidocaine (PF) (XYLOCAINE) 1 % injection 4 mL     triamcinolone (KENALOG-40) injection 40 mg      Past Medical History:   Patient Active Problem List   Diagnosis     Essential hypertension     Pure hypercholesterolemia     Impotence of organic origin     Status post lumbar spine operative procedure for decompression of spinal cord     Past Medical History:   Diagnosis Date     Cobblestone retinal degeneration      Impotence of organic origin      Nonsenile cataract      Pure hypercholesterolemia      Unspecified essential hypertension      Vitreous detachment of both eyes         CC Silvana Do MD   DERMATOLOGY  [address] [place], [place] [id number] [address] on close of this encounter.

## 2023-04-07 ENCOUNTER — OFFICE VISIT (OUTPATIENT)
Dept: DERMATOLOGY | Facility: CLINIC | Age: 83
End: 2023-04-07
Payer: MEDICARE

## 2023-04-07 DIAGNOSIS — L82.1 SEBORRHEIC KERATOSIS: ICD-10-CM

## 2023-04-07 DIAGNOSIS — D22.9 MULTIPLE BENIGN NEVI: ICD-10-CM

## 2023-04-07 DIAGNOSIS — L73.9 FOLLICULITIS: Primary | ICD-10-CM

## 2023-04-07 DIAGNOSIS — Z85.828 HISTORY OF NONMELANOMA SKIN CANCER: ICD-10-CM

## 2023-04-07 DIAGNOSIS — D49.2 NEOPLASM OF UNSPECIFIED BEHAVIOR OF BONE, SOFT TISSUE, AND SKIN: ICD-10-CM

## 2023-04-07 DIAGNOSIS — D18.01 CHERRY ANGIOMA: ICD-10-CM

## 2023-04-07 DIAGNOSIS — L30.9 ECZEMA, UNSPECIFIED TYPE: ICD-10-CM

## 2023-04-07 DIAGNOSIS — L57.0 ACTINIC KERATOSIS: ICD-10-CM

## 2023-04-07 PROCEDURE — 99213 OFFICE O/P EST LOW 20 MIN: CPT | Mod: 25 | Performed by: DERMATOLOGY

## 2023-04-07 PROCEDURE — 17000 DESTRUCT PREMALG LESION: CPT | Mod: XS | Performed by: DERMATOLOGY

## 2023-04-07 PROCEDURE — 17003 DESTRUCT PREMALG LES 2-14: CPT | Mod: XS | Performed by: DERMATOLOGY

## 2023-04-07 PROCEDURE — 88305 TISSUE EXAM BY PATHOLOGIST: CPT | Mod: TC | Performed by: DERMATOLOGY

## 2023-04-07 PROCEDURE — 11102 TANGNTL BX SKIN SINGLE LES: CPT | Performed by: DERMATOLOGY

## 2023-04-07 PROCEDURE — 88305 TISSUE EXAM BY PATHOLOGIST: CPT | Mod: 26 | Performed by: DERMATOLOGY

## 2023-04-07 PROCEDURE — 11103 TANGNTL BX SKIN EA SEP/ADDL: CPT | Performed by: DERMATOLOGY

## 2023-04-07 RX ORDER — TRIAMCINOLONE ACETONIDE 1 MG/G
CREAM TOPICAL 2 TIMES DAILY
Qty: 80 G | Refills: 1 | Status: SHIPPED | OUTPATIENT
Start: 2023-04-07

## 2023-04-07 ASSESSMENT — PAIN SCALES - GENERAL: PAINLEVEL: NO PAIN (0)

## 2023-04-07 NOTE — NURSING NOTE
Dermatology Rooming Note    Felice Blood's goals for this visit include:   Chief Complaint   Patient presents with     Skin Check     Felice is here today for a skin check.      Thais Beck RN

## 2023-04-07 NOTE — PROGRESS NOTES
Lidocaine-epinephrine 1-1:968751 % injection   1.5 mL once for one use, starting 4/7/2023 ending 4/7/2023,  2mL disp, R-0, injection  Injected by Ramiro Li, EMT-B

## 2023-04-07 NOTE — LETTER
4/7/2023       RE: Felice Blood  [address] [facility] [address]     Dear Colleague,    Thank you for referring your patient, Felice Blood, to the [facility] DERMATOLOGY [facility] [place] at [facility]. Please see a copy of my visit note below.    [facility] Dermatology Note  Encounter Date: Apr 7, 2023  Office Visit     Dermatology Problem List:  # FBSE, 4/7/2023  1. Seborrheic dermatitis.  - Ketoconazole shampoo  2. 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 AKs, s/p cryotherapy   - HAK, R upper back, s/p shave bx 12/3/21  4. Xerosis cutis  - Current tx: daily moisturizer  5. Benign bx:  - Lichenoid keratosis, left lateral mid back, s/p shave bx 12/2021  - Lichenoid keratosis, left medial thigh, s/p shave bx 12/2021  6. Folliculitis  - BPO wash  7. Eczematous dermatitis  - TMC 0.1% cream    # Lesion to monitor, right distal upper arm, favor trauma   # Lesion to monitor, left lateral distal thigh, favor BLK     # NUB, left upper posterior arm, s/p shave bx 4/7/2023  # NUB, left chest, s/p shave bx 4/7/2023  ____________________________________________    Assessment & Plan:    # Lesion to monitor, Left dorsal hand, suspected scar from recent injury, resolved on exam today     # Lesion to monitor, right distal upper arm, favor trauma   # Lesion to monitor, left lateral distal thigh, favor BLK  - Photodocumentation today  - Recheck at next visit     # NUB, left upper posterior arm, ddx: BCC vs BLK  # NUB, left chest, ddx: BCC vs BLK  - Shave biopsy today, see procedure note below    # Eczematous dermatitis  - Start TMC 0.1% cream BID until resolved    # Folliculitis  - Recommend BPO wash in the shower  - Patient will reach out in a month if he would like clindamycin     # AK, right lateral neck, left mid helix - x2 in total  - Cryotherapy today, see procedure note below     # Multiple benign nevi.    # Solar lentigines   - Monitor for ABCDEs of melanoma   - Continue sun protection - recommend SPF 30 or higher with frequent application   - Return sooner if noticing changing or symptomatic lesions     # Seborrheic keratoses  Discussed the natural history and benign nature of this lesion. Reassurance provided that no additional treatment is necessary.      # History of NMSC. No evidence of recurrent disease.  - Continue photoprotection - recommend SPF 30 or higher with frequent reapplication  - Continue yearly skin exams  - Advised to monitor for changing, non-healing, bleeding, painful, changing, or otherwise symptomatic lesions     Procedures Performed:   - Cryotherapy procedure note, location(s): see above. After verbal consent and discussion of risks and benefits including, but not limited to, dyspigmentation/scar, blister, and pain, 2 lesion(s) was(were) treated with 1-2 mm freeze border for 1-2 cycles with liquid nitrogen. Post cryotherapy instructions were provided.  - Shave biopsy procedure note, location(s): see above. After discussion of benefits and risks including but not limited to bleeding, infection, scar, incomplete removal, recurrence, and non-diagnostic biopsy, written consent and photographs were obtained. The area was cleaned with isopropyl alcohol. 0.5mL of 1% lidocaine with epinephrine was injected to obtain adequate anesthesia of lesion(s). Shave biopsy at site(s) performed. Hemostasis was achieved with aluminium chloride. Petrolatum ointment and a sterile dressing were applied. The patient tolerated the procedure and no complications were noted. The patient was provided with verbal and written post care instructions.     Follow-up: 3 month(s) in-person, recheck above lesion to monitor, or earlier for new or changing lesions    Staff and Scribe:     Scribe Disclosure:  CASIMIRO DUBOSE, am serving as a scribe to document services personally performed by Silvana Do MD based on data  collection and the provider's statements to me.     Provider Disclosure:   The documentation recorded by the scribe accurately reflects the services I personally performed and the decisions made by me.    Silvana Do MD    Department of Dermatology  [facility]: Phone: [phone], Fax: [phone]  4/8/2023     ____________________________________________    CC: Skin Check (Felice is here today for a skin check. )    HPI:  Mr. Felice Blood is a(n) 82 year old male who presents today as a return patient for FBSE. Last seen by myself on 9/28/22, at which time the patient underwent cryotherapy for treatment of AKs.    The patient reports itchy spots on his buttocks that he has been treating with soap.    Patient is otherwise feeling well, without additional skin concerns.    Labs Reviewed:  N/A    Physical Exam:  Vitals: There were no vitals taken for this visit.  SKIN: Full skin, which includes the head/face, both arms, chest, back, abdomen,both legs, genitalia and/or groin buttocks, digits and/or nails, was examined.  - Left lateral distal thigh, nonspecific pink macule with thin overlying scale  - Pink scaly patches on the legs.  - Inflammatory crusted papules on the lateral thighs.  - Left upper posterior arm, shiny pink macule.  - Left chest, shiny pink macule.  - Two somewhat linear pink scaly macules on the right distal upper arm.  - Prior firm pink papule on the left dorsal hand is resolved.   - There are dome shaped bright red papules on the trunk and extremities.   - Multiple regular brown pigmented macules and papules are identified on the trunk and extremities.   - Scattered brown macules on sun exposed areas.  - There are waxy stuck on tan to brown papules on the trunk and extremities.   - There are erythematous macules with overyling adherent scale on the left mid helix, right lateral neck.    - No  other lesions of concern on areas examined.     Medications:  Current Outpatient Medications   Medication    acetaminophen (TYLENOL) 325 MG tablet    albuterol (PROAIR HFA/PROVENTIL HFA/VENTOLIN HFA) 108 (90 Base) MCG/ACT inhaler    atorvastatin (LIPITOR) 10 MG tablet    Calcium-Magnesium-Zinc 333-133-5 MG TABS per tablet    cholecalciferol (VITAMIN D3) 25 mcg (1000 units) capsule    fluticasone (FLONASE) 50 MCG/ACT spray    hydrOXYzine (ATARAX) 10 MG tablet    ipratropium (ATROVENT) 0.06 % nasal spray    ketoconazole (NIZORAL) 2 % external shampoo    lisinopril (ZESTRIL) 10 MG tablet    ondansetron (ZOFRAN-ODT) 4 MG ODT tab    oxyCODONE (ROXICODONE) 5 MG tablet    polyethylene glycol (MIRALAX) 17 g packet    senna-docusate (SENOKOT-S/PERICOLACE) 8.6-50 MG tablet    sildenafil (REVATIO) 20 MG tablet    sildenafil (VIAGRA) 100 MG tablet    tadalafil (CIALIS) 20 MG tablet    gabapentin (NEURONTIN) 100 MG capsule     Current Facility-Administered Medications   Medication    lidocaine (PF) (XYLOCAINE) 1 % injection 4 mL    triamcinolone (KENALOG-40) injection 40 mg      Past Medical History:   Patient Active Problem List   Diagnosis    Essential hypertension    Pure hypercholesterolemia    Impotence of organic origin    Status post lumbar spine operative procedure for decompression of spinal cord     Past Medical History:   Diagnosis Date    Cobblestone retinal degeneration     Impotence of organic origin     Nonsenile cataract     Pure hypercholesterolemia     Unspecified essential hypertension     Vitreous detachment of both eyes       CC Silvana Do MD   DERMATOLOGY  [address] [facility] [address] on close of this encounter.    Lidocaine-epinephrine 1-1:722488 % injection   1.5 mL once for one use, starting 4/7/2023 ending 4/7/2023,  2mL disp, R-0, injection  Injected by Ramiro Li, EMT-B

## 2023-04-07 NOTE — PATIENT INSTRUCTIONS
Benzyl Peroxide Wash over the counter     Cryotherapy    What is it?  Use of a very cold liquid, such as liquid nitrogen, to freeze and destroy abnormal skin cells that need to be removed    What should I expect?  Tenderness and redness  A small blister that might grow and fill with dark purple blood. There may be crusting.  More than one treatment may be needed if the lesions do not go away.    How do I care for the treated area?  Gently wash the area with your hands when bathing.  Use a thin layer of Vaseline to help with healing. You may use a Band-Aid.   The area should heal within 7-10 days and may leave behind a pink or lighter color.   Do not use an antibiotic or Neosporin ointment.   You may take acetaminophen (Tylenol) for pain.     Call your doctor if you have:  Severe pain  Signs of infection (warmth, redness, cloudy yellow drainage, and or a bad smell)  Questions or concerns    Who should I call with questions?      [facility]: [phone]      [facility]: [phone]      For urgent needs outside of business hours call the [facility] at [phone] and ask for the dermatology resident on call   Patient Education     Checking for Skin Cancer  You can find cancer early by checking your skin each month. There are 3 kinds of skin cancer. They are melanoma, basal cell carcinoma, and squamous cell carcinoma. Doing monthly skin checks is the best way to find new marks or skin changes. Follow the instructions below for checking your skin.   The ABCDEs of checking moles for melanoma   Check your moles or growths for signs of melanoma using ABCDE:   Asymmetry: the sides of the mole or growth don t match  Border: the edges are ragged, notched, or blurred  Color: the color within the mole or growth varies  Diameter: the mole or growth is larger than 6 mm (size of a pencil eraser)  Evolving: the size, shape, or color of the mole or growth is  changing (evolving is not shown in the images below)    Checking for other types of skin cancer  Basal cell carcinoma or squamous cell carcinoma have symptoms such as:     A spot or mole that looks different from all other marks on your skin  Changes in how an area feels, such as itching, tenderness, or pain  Changes in the skin's surface, such as oozing, bleeding, or scaliness  A sore that does not heal  New swelling or redness beyond the border of a mole    Who s at risk?  Anyone can get skin cancer. But you are at greater risk if you have:   Fair skin, light-colored hair, or light-colored eyes  Many moles or abnormal moles on your skin  A history of sunburns from sunlight or tanning beds  A family history of skin cancer  A history of exposure to radiation or chemicals  A weakened immune system  If you have had skin cancer in the past, you are at risk for recurring skin cancer.   How to check your skin  Do your monthly skin checkups in front of a full-length mirror. Check all parts of your body, including your:   Head (ears, face, neck, and scalp)  Torso (front, back, and sides)  Arms (tops, undersides, upper, and lower armpits)  Hands (palms, backs, and fingers, including under the nails)  Buttocks and genitals  Legs (front, back, and sides)  Feet (tops, soles, toes, including under the nails, and between toes)  If you have a lot of moles, take digital photos of them each month. Make sure to take photos both up close and from a distance. These can help you see if any moles change over time.   Most skin changes are not cancer. But if you see any changes in your skin, call your doctor right away. Only he or she can diagnose a problem. If you have skin cancer, seeing your doctor can be the first step toward getting the treatment that could save your life.   [employer] last reviewed this educational content on 4/1/2019 2000-2020 [employer]. [address] [facility] [facility], [place] [address]. All rights  reserved. This information is not intended as a substitute for professional medical care. Always follow your healthcare professional's instructions.       When should I call my doctor?  If you are worsening or not improving, please, contact us or seek urgent care as noted below.     Who should I call with questions (adults)?  [facility] (adult and pediatric): [phone]  [facility] (adult): [phone]  For urgent needs outside of business hours call the [facility] at [phone] and ask for the dermatology resident on call to be paged  If this is a medical emergency and you are unable to reach an ER, Call [phone]    Who should I call with questions (pediatric)?  [facility]- Pediatric Dermatology  Dr. Laura Sainz, Dr. Aminah Sandy, Dr. Humaira Sloan, CAMILLA Hitchcock, Dr. Kell Byrne, Dr. Dixie Ibarra & Dr. Woo Kline  Non-urgent nurse triage line; [phone]- Sanam and Marlyn WEEKS Care Coordinatorludwin Bob (/Complex ) [phone]    If you need a prescription refill, please contact your pharmacy. Refills are approved or denied by our Physicians during normal business hours, Monday through Fridays  Per office policy, refills will not be granted if you have not been seen within the past year (or sooner depending on your child's condition)    Scheduling Information:  Pediatric Appointment Scheduling and Call Center [phone]  Radiology Scheduling- [phone]  Sedation Unit Scheduling- [phone]  [place] Scheduling- General [phone]; Pediatric Dermatology [phone]  Main  Services: [phone]  Palestinian: [phone]  Ghanaian: [phone]  Hmong/Oral/Greenlandic: [phone]  Preadmission Nursing Department Fax Number: [phone] (Fax all pre-operative paperwork to this number)    For urgent matters arising during evenings, weekends, or holidays that  cannot wait for normal business hours please call [phone] and ask for the dermatology resident on call to be paged. Wound Care After a Biopsy    What is a skin biopsy?  A skin biopsy allows the doctor to examine a very small piece of tissue under the microscope to determine the diagnosis and the best treatment for the skin condition. A local anesthetic (numbing medicine)  is injected with a very small needle into the skin area to be tested. A small piece of skin is taken from the area. Sometimes a suture (stitch) is used.     What are the risks of a skin biopsy?  I will experience scar, bleeding, swelling, pain, crusting and redness. I may experience incomplete removal or recurrence. Risks of this procedure are excessive bleeding, bruising, infection, nerve damage, numbness, thick (hypertrophic or keloidal) scar and non-diagnostic biopsy.    How should I care for my wound for the first 24 hours?  Keep the wound dry and covered for 24 hours  If it bleeds, hold direct pressure on the area for 15 minutes. If bleeding does not stop then go to the emergency room  Avoid strenuous exercise the first 1-2 days or as your doctor instructs you    How should I care for the wound after 24 hours?  After 24 hours, remove the bandage  You may bathe or shower as normal  If you had a scalp biopsy, you can shampoo as usual and can use shower water to clean the biopsy site daily  Clean the wound twice a day with gentle soap and water  Do not scrub, be gentle  Apply white petroleum/Vaseline after cleaning the wound with a cotton swab or a clean finger, and keep the site covered with a Bandaid /bandage. Bandages are not necessary with a scalp biopsy  If you are unable to cover the site with a Bandaid /bandage, re-apply ointment 2-3 times a day to keep the site moist. Moisture will help with healing  Avoid strenuous activity for first 1-2 days  Avoid lakes, rivers, pools, and oceans until the stitches are removed or the site is  healed    How do I clean my wound?  Wash hands thoroughly with soap or use hand  before all wound care  Clean the wound with gentle soap and water  Apply white petroleum/Vaseline  to wound after it is clean  Replace the Bandaid /bandage to keep the wound covered for the first few days or as instructed by your doctor  If you had a scalp biopsy, warm shower water to the area on a daily basis should suffice    What should I use to clean my wound?   Cotton-tipped applicators (Qtips )  White petroleum jelly (Vaseline ). Use a clean new container and use Q-tips to apply.  Bandaids   as needed  Gentle soap     How should I care for my wound long term?  Do not get your wound dirty  Keep up with wound care for one week or until the area is healed.  A small scab will form and fall off by itself when the area is completely healed. The area will be red and will become pink in color as it heals. Sun protection is very important for how your scar will turn out. Sunscreen with an SPF 30 or greater is recommended once the area is healed.  If you have stitches, stitches need to be removed in 14 days. You may return to our clinic for this or you may have it done locally at your doctor s office.  You should have some soreness but it should be mild and slowly go away over several days. Talk to your doctor about using tylenol for pain,    When should I call my doctor?  If you have increased:   Pain or swelling  Pus or drainage (clear or slightly yellow drainage is ok)  Temperature over 100F  Spreading redness or warmth around wound    When will I hear about my results?  The biopsy results can take 2 weeks to come back.  Your results will automatically release to [employer] before your provider has even reviewed them.  The clinic will call you with the results, send you a [employer] message, or have you schedule a follow-up clinic or phone time to discuss the results.  Contact our clinics if you do not hear from us in 2 weeks.    Who  should I call with questions?  [facility]: [phone]  [facility]: [phone]  For urgent needs outside of business hours call the [facility] at [phone] and ask for the dermatology resident on call

## 2023-06-13 ENCOUNTER — OFFICE VISIT (OUTPATIENT)
Dept: OPHTHALMOLOGY | Facility: CLINIC | Age: 83
End: 2023-06-13
Attending: OPHTHALMOLOGY
Payer: MEDICARE

## 2023-06-13 DIAGNOSIS — H26.492 PCO (POSTERIOR CAPSULAR OPACIFICATION), LEFT: ICD-10-CM

## 2023-06-13 DIAGNOSIS — Z96.1 PSEUDOPHAKIA OF BOTH EYES: Primary | ICD-10-CM

## 2023-06-13 DIAGNOSIS — H43.21 ASTEROID HYALITIS OF RIGHT EYE: ICD-10-CM

## 2023-06-13 PROCEDURE — G0463 HOSPITAL OUTPT CLINIC VISIT: HCPCS | Performed by: OPHTHALMOLOGY

## 2023-06-13 PROCEDURE — 92014 COMPRE OPH EXAM EST PT 1/>: CPT | Performed by: OPHTHALMOLOGY

## 2023-06-13 ASSESSMENT — REFRACTION_MANIFEST
OD_CYLINDER: +1.00
OS_ADD: +2.75
OD_ADD: +2.75
OS_AXIS: 025
OS_CYLINDER: +0.50
OD_SPHERE: -2.00
OS_SPHERE: -2.00
OD_AXIS: 165

## 2023-06-13 ASSESSMENT — REFRACTION_WEARINGRX
SPECS_TYPE: PAL
OS_AXIS: 025
OD_SPHERE: -2.25
OD_ADD: +2.75
OS_SPHERE: -2.00
OS_ADD: +2.75
OD_AXIS: 165
OS_CYLINDER: +0.50
OD_CYLINDER: +1.00

## 2023-06-13 ASSESSMENT — CONF VISUAL FIELD
OS_INFERIOR_NASAL_RESTRICTION: 0
OD_SUPERIOR_TEMPORAL_RESTRICTION: 0
OD_NORMAL: 1
METHOD: COUNTING FINGERS
OS_NORMAL: 1
OD_INFERIOR_TEMPORAL_RESTRICTION: 0
OD_SUPERIOR_NASAL_RESTRICTION: 0
OS_SUPERIOR_NASAL_RESTRICTION: 0
OD_INFERIOR_NASAL_RESTRICTION: 0
OS_INFERIOR_TEMPORAL_RESTRICTION: 0
OS_SUPERIOR_TEMPORAL_RESTRICTION: 0

## 2023-06-13 ASSESSMENT — VISUAL ACUITY
METHOD: SNELLEN - LINEAR
OS_CC+: -2
OS_CC: 20/20
OD_CC+: -3
OD_CC: 20/20
CORRECTION_TYPE: GLASSES

## 2023-06-13 ASSESSMENT — SLIT LAMP EXAM - LIDS: COMMENTS: MILD MGD

## 2023-06-13 ASSESSMENT — CUP TO DISC RATIO
OS_RATIO: 0.3
OD_RATIO: 0.3

## 2023-06-13 ASSESSMENT — TONOMETRY
OD_IOP_MMHG: 20
OS_IOP_MMHG: 17
IOP_METHOD: TONOPEN

## 2023-06-13 NOTE — PROGRESS NOTES
Chief Complaint(s) and History of Present Illness(es)     Annual Eye Exam            Associated symptoms: glare.  Negative for eye pain, flashes and floaters    Treatments tried: no treatments    Pain scale: 0/10    Comments: 1 year follow up DFE           Comments    Pt states no vision changes both eyes since last visit.  Would like updated glasses Rx today.   Noticing some glare, both eyes with sunlight.  No new flashes or floaters. No redness or dryness.   No eye pain or discomfort today.    Pedro Mark 2:13 PM June 13, 2023                  Review of systems for the eyes was negative other than the pertinent positives/negatives listed in the HPI.      Assessment & Plan      Felice Blood is a 82 year old male with the following diagnoses:   1. Pseudophakia of both eyes    2. PCO (posterior capsular opacification), left    3. Asteroid hyalitis of right eye         Doing well overall  Would like new glasses  Refractive options reviewed  Refraction given    Stable dilated fundus exam both eyes    Return precautions reviewed     Patient disposition:   Return in about 1 year (around 6/13/2024) for DFE.           Attending Physician Attestation:  Complete documentation of historical and exam elements from today's encounter can be found in the full encounter summary report (not reduplicated in this progress note).  I personally obtained the chief complaint(s) and history of present illness.  I confirmed and edited as necessary the review of systems, past medical/surgical history, family history, social history, and examination findings as documented by others; and I examined the patient myself.  I personally reviewed the relevant tests, images, and reports as documented above.  I formulated and edited as necessary the assessment and plan and discussed the findings and management plan with the patient and family. . - Camron Hansen MD

## 2023-06-13 NOTE — NURSING NOTE
Chief Complaints and History of Present Illnesses   Patient presents with     Annual Eye Exam     1 year follow up DFE      Chief Complaint(s) and History of Present Illness(es)     Annual Eye Exam            Associated symptoms: glare.  Negative for eye pain, flashes and floaters    Treatments tried: no treatments    Pain scale: 0/10    Comments: 1 year follow up DFE           Comments    Pt states no vision changes both eyes since last visit.  Would like updated glasses Rx today.   Noticing some glare, both eyes with sunlight.  No new flashes or floaters. No redness or dryness.   No eye pain or discomfort today.    Pedro Flores 2:13 PM June 13, 2023

## 2023-06-19 NOTE — PATIENT INSTRUCTIONS
PERONEAL TENDONITIS  What is a peroneal tendon injury?   A peroneal tendon injury is a problem with the tendons and muscles on the outer side of your lower leg and foot. Tendons are strong bands of tissue that attach muscle to bone. The peroneal tendons help keep your foot and ankle stable when you walk.   Tendons can be injured suddenly or they may be slowly damaged over time. You can have tiny or partial tears in your tendon. If you have a complete tear of your tendon, it is called a rupture. Other tendon injuries may be called a strain, tendinosis, or tendonitis.  What is the cause?   Peroneal injuries can be caused by:  Overuse of the tendon from a sport or work activity that causes your foot and ankle to roll inward, like when you run on sloped surfaces or run in shoes that are getting worn out on the outside of the heel.   A sudden activity that forces your foot upward toward your shin, like landing on your feet after a fall, or rolling your ankle on a rock while running.   What are the symptoms?   You may hear a pop or a snap when the injury happens. You may have pain and swelling on the outer side of your lower leg or ankle.   How is it diagnosed?   Your healthcare provider will examine you and ask about your symptoms, activities, and medical history. You may have X-rays or other scans.  How is it treated?   While you are recovering from your injury, you will need to change your sport or activity to one that will not make your condition worse. For example, you may need to swim instead of run.   Your healthcare provider may recommend stretching and strengthening exercises to help you heal.  Use an elastic bandage or an ankle brace as directed by your provider. You may need to use crutches until you can walk without pain.   The pain often gets better within a few weeks with self-care, but some injuries may take several months or longer to heal. It s important to follow all of your healthcare provider s  instructions.  How can I take care of myself?   To help relieve swelling and pain:  Put an ice pack, gel pack, or package of frozen vegetables wrapped in a cloth, on the area every 3 to 4 hours for up to 20 minutes at a time.   Do ice massage. To do this, first freeze water in a Styrofoam cup, then peel the top of the cup away to expose the ice. Hold the bottom of the cup and rub the ice over your tendon for 5 to 10 minutes. Do this several times a day while you have pain.   Keep your ankle up on a pillow when you sit or lie down.   Take pain medicine, such as acetaminophen, ibuprofen, or other medicine as directed by your provider. Nonsteroidal anti-inflammatory medicines (NSAIDs), such as ibuprofen, may cause stomach bleeding and other problems. These risks increase with age. Read the label and take as directed. Unless recommended by your healthcare provider, do not take for more than 10 days.  Moist heat may help relax your muscles and make it easier to move your leg. Put moist heat on the injured area for 10 to 15 minutes at a time before you do warm-up and stretching exercises. Moist heat includes heat patches or moist heating pads that you can purchase at most [employer], a wet washcloth or towel that has been heated in the dryer, or a hot shower. Don t use heat if you have swelling.   Follow your healthcare provider's instructions, including any exercises recommended by your provider. Ask your provider:  How and when you will hear your test results   How long it will take to recover   What activities you should avoid, including how much you can lift, and when you can return to your normal activities   How to take care of yourself at home   What symptoms or problems you should watch for and what to do if you have them  Make sure you know when you should come back for a checkup.  How can I help prevent a peroneal tendon injury?   Warm-up exercises and stretching before activities can help prevent injuries. For  example, do exercises that keep your ankles and leg muscles strong. If your leg or ankle hurts after exercise, putting ice on it may help keep it from getting injured.   Follow safety rules and use any protective equipment recommended for your work or sport. For example, wear high-top athletic shoes or a supportive ankle brace. When you run, choose level surfaces and avoid rocks or holes.  Developed by [employer].  Published by [employer].  Copyright  2014 [employer] and/or one of its subsidiaries. All rights reserved.                Peroneal Tendon Exercises  You may start these exercises when you can stand comfortably on your injured leg with your heel resting on the floor and your full weight evenly distributed on both legs.  Towel stretch: Sit on a hard surface with your injured leg stretched out in front of you. Loop a towel around your toes and the ball of your foot and pull the towel toward your body keeping your leg straight. Hold this position for 15 to 30 seconds and then relax. Repeat 3 times.  When you don't feel much of a stretch using the towel, you can start the following exercises.  Standing calf stretch: Stand facing a wall with your hands on the wall at about eye level. Keep your injured leg back with your heel on the floor. Keep the other leg forward with the knee bent. Turn your back foot slightly inward (as if you were pigeon-toed). Slowly lean into the wall until you feel a stretch in the back of your calf. Hold the stretch for 15 to 30 seconds. Return to the starting position. Repeat 3 times. Do this exercise several times each day.   Standing soleus stretch: Stand facing a wall with your hands on the wall at about chest height. Keep your injured leg back with your heel on the floor. Keep the other leg forward with the knee bent. Turn your back foot slightly inward (as if you were pigeon-toed). Bend your back knee slightly and gently lean into the wall until you feel a stretch in  the lower calf of your injured leg. Hold the stretch for 15 to 30 seconds. Return to the starting position. Repeat 3 times.   Achilles stretch: Stand with the ball of one foot on a stair. Reach for the step below with your heel until you feel a stretch in the arch of your foot. Hold this position for 15 to 30 seconds and then relax. Repeat 3 times.   Heel raise: Stand behind a chair or counter with both feet flat on the floor. Using the chair or counter as a support, rise up onto your toes and hold for 5 seconds. Then slowly lower yourself down without holding onto the support. (It's OK to keep holding onto the support if you need to.) When this exercise becomes less painful, try doing this exercise while you are standing on the injured leg only. Repeat 15 times. Do 2 sets of 15. Rest 30 seconds between sets.   Step-up: Stand with the foot of your injured leg on a support 3 to 5 inches (8 to 13 centimeters) high --like a small step or block of wood. Keep your other foot flat on the floor. Shift your weight onto the injured leg on the support. Straighten your injured leg as the other leg comes off the floor. Return to the starting position by bending your injured leg and slowly lowering your uninjured leg back to the floor. Do 2 sets of 15.   Resisted ankle eversion: Sit with both legs stretched out in front of you, with your feet about a shoulder's width apart. Tie a loop in one end of elastic tubing. Put the foot of your injured leg through the loop so that the tubing goes around the arch of that foot and wraps around the outside of the other foot. Hold onto the other end of the tubing with your hand to provide tension. Turn the foot of your injured leg up and out. Make sure you keep your other foot still so that it will allow the tubing to stretch as you move the foot of your injured leg. Return to the starting position. Do 2 sets of 15.   Balance and reach exercises: Stand next to a chair with your injured leg  farther from the chair. The chair will provide support if you need it. Stand on the foot of your injured leg and bend your knee slightly. Try to raise the arch of this foot while keeping your big toe on the floor. Keep your foot in this position.   With the hand that is farther away from the chair, reach forward in front of you by bending at the waist. Avoid bending your knee any more as you do this. Repeat this 15 times. To make the exercise more challenging, reach farther in front of you. Do 2 sets of 15.   While keeping your arch raised, reach the hand that is farther away from the chair across your body toward the chair. The farther you reach, the more challenging the exercise. Do 2 sets of 15.  If you have access to a wobble board, do the following exercises:  Wobble board exercises   Stand on a wobble board with your feet shoulder-width apart.   Rock the board forwards and backwards 30 times, then side to side 30 times. Hold on to a chair if you need support.   Rotate the wobble board around so that the edge of the board is in contact with the floor at all times. Do this 30 times in a clockwise and then a counterclockwise direction.   Balance on the wobble board for as long as you can without letting the edges touch the floor. Try to do this for 2 minutes without touching the floor.   Rotate the wobble board in clockwise and counterclockwise circles, but do not let the edge of the board touch the floor.   When you have mastered the wobble exercises standing on both legs, try repeating them while standing on just your injured leg. After you are able to do these exercises on one leg, try to do them with your eyes closed. Make sure you have something nearby to support you in case you lose your balance.  Developed by [employer].  Published by [employer].  Copyright  2014 [employer] and/or one of its subsidiaries. All rights reserved.             Facial numbness

## 2023-06-22 DIAGNOSIS — N52.9 ERECTILE DYSFUNCTION, UNSPECIFIED ERECTILE DYSFUNCTION TYPE: ICD-10-CM

## 2023-06-26 RX ORDER — SILDENAFIL CITRATE 20 MG/1
TABLET ORAL
Qty: 60 TABLET | Refills: 4 | Status: SHIPPED | OUTPATIENT
Start: 2023-06-26 | End: 2024-01-20

## 2023-06-26 NOTE — TELEPHONE ENCOUNTER
sildenafil (REVATIO) 20 MG tablet    9/9/2022  [facility]     Anthony Maddox MD  Family Medicine     Nv:9/13/23

## 2023-08-04 ENCOUNTER — OFFICE VISIT (OUTPATIENT)
Dept: DERMATOLOGY | Facility: CLINIC | Age: 83
End: 2023-08-04
Payer: MEDICARE

## 2023-08-04 DIAGNOSIS — L57.0 ACTINIC KERATOSIS: ICD-10-CM

## 2023-08-04 DIAGNOSIS — L82.0 SEBORRHEIC KERATOSIS, INFLAMED: Primary | ICD-10-CM

## 2023-08-04 PROCEDURE — 17110 DESTRUCTION B9 LES UP TO 14: CPT | Performed by: DERMATOLOGY

## 2023-08-04 PROCEDURE — 99212 OFFICE O/P EST SF 10 MIN: CPT | Mod: 25 | Performed by: DERMATOLOGY

## 2023-08-04 ASSESSMENT — PAIN SCALES - GENERAL: PAINLEVEL: NO PAIN (0)

## 2023-08-04 NOTE — PATIENT INSTRUCTIONS
Cryotherapy    What is it?  Use of a very cold liquid, such as liquid nitrogen, to freeze and destroy abnormal skin cells that need to be removed    What should I expect?  Tenderness and redness  A small blister that might grow and fill with dark purple blood. There may be crusting.  More than one treatment may be needed if the lesions do not go away.    How do I care for the treated area?  Gently wash the area with your hands when bathing.  Use a thin layer of Vaseline to help with healing. You may use a Band-Aid.   The area should heal within 7-10 days and may leave behind a pink or lighter color.   Do not use an antibiotic or Neosporin ointment.   You may take acetaminophen (Tylenol) for pain.     Call your doctor if you have:  Severe pain  Signs of infection (warmth, redness, cloudy yellow drainage, and or a bad smell)  Questions or concerns    Who should I call with questions?      [facility]: [phone]      [facility]: [phone]      For urgent needs outside of business hours call the [facility] at [phone] and ask for the dermatology resident on call

## 2023-08-04 NOTE — PROGRESS NOTES
[facility] Dermatology Note  Encounter Date: Aug 4, 2023  Office Visit     Dermatology Problem List:  # FBSE, 4/7/2023  1. Seborrheic dermatitis.  - Ketoconazole shampoo  2. 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 AKs, s/p cryotherapy   - HAK, R upper back, s/p shave bx 12/3/21  - AK, left chest, s/p shave bx 4/7/23  4. Xerosis cutis  - Current tx: daily moisturizer  5. Benign bx:  - Lichenoid keratosis, left lateral mid back, s/p shave bx 12/2021  - Lichenoid keratosis, left medial thigh, s/p shave bx 12/2021  -BLK, left upper posterior arm, s/p shave bx 4/7/23  6. Folliculitis  - BPO wash  7. Eczematous dermatitis  - TMC 0.1% cream    # Lesion to monitor, right distal upper arm, favor trauma   # Lesion to monitor, left lateral distal thigh, favor BLK       ____________________________________________    Assessment & Plan:    # AK, left chest, s/p shave bx 4/7/23  - Appears resolved today, continue to monitor    # Lesion to monitor, right distal upper arm, favor trauma - resolved  # Lesion to monitor, left lateral distal thigh, favor BLK - resolved    # ISKs, bilateral medial thighs x2.  - Cryo today  - notify me if do not resolve or worsen, particularly left medial thigh which is a little bit sensitive      Procedures Performed:   - Cryotherapy procedure note, location(s): see above. After verbal consent and discussion of risks and benefits including, but not limited to, dyspigmentation/scar, blister, and pain, 2 lesion(s) was(were) treated with 1-2 mm freeze border for 1-2 cycles with liquid nitrogen. Post cryotherapy instructions were provided.    Follow-up: 6 month(s) in-person, recheck above lesion to monitor, or earlier for new or changing lesions    Staff :  Silvana Do MD    Department of Dermatology  [facility]: Phone: [phone], Fax:  [phone]  8/4/2023      ____________________________________________    CC: Skin Check (Patient reports no new lesions of concern. The patient would like a full body skin check. )    HPI:  Mr. Felice Blood is a(n) 82 year old male who presents today as a return patient for recheck of spots noted last visit.   Two spots on thighs    Patient is otherwise feeling well, without additional skin concerns.    Labs Reviewed:  N/A    Physical Exam:  Vitals: There were no vitals taken for this visit.  SKIN:   - Prior lesions of concern resolved  - well healed bx scar on left chest, no scaling  - two pink scaly stuck on papules bilateral medial thighs  - No other lesions of concern on areas examined.     Medications:  Current Outpatient Medications   Medication    acetaminophen (TYLENOL) 325 MG tablet    albuterol (PROAIR HFA/PROVENTIL HFA/VENTOLIN HFA) 108 (90 Base) MCG/ACT inhaler    atorvastatin (LIPITOR) 10 MG tablet    Calcium-Magnesium-Zinc 333-133-5 MG TABS per tablet    cholecalciferol (VITAMIN D3) 25 mcg (1000 units) capsule    fluticasone (FLONASE) 50 MCG/ACT spray    hydrOXYzine (ATARAX) 10 MG tablet    ipratropium (ATROVENT) 0.06 % nasal spray    ketoconazole (NIZORAL) 2 % external shampoo    lisinopril (ZESTRIL) 10 MG tablet    ondansetron (ZOFRAN-ODT) 4 MG ODT tab    oxyCODONE (ROXICODONE) 5 MG tablet    polyethylene glycol (MIRALAX) 17 g packet    senna-docusate (SENOKOT-S/PERICOLACE) 8.6-50 MG tablet    sildenafil (REVATIO) 20 MG tablet    sildenafil (VIAGRA) 100 MG tablet    tadalafil (CIALIS) 20 MG tablet    triamcinolone (KENALOG) 0.1 % external cream    gabapentin (NEURONTIN) 100 MG capsule     Current Facility-Administered Medications   Medication    lidocaine (PF) (XYLOCAINE) 1 % injection 4 mL    triamcinolone (KENALOG-40) injection 40 mg      Past Medical History:   Patient Active Problem List   Diagnosis    Essential hypertension    Pure hypercholesterolemia    Impotence of organic origin     Status post lumbar spine operative procedure for decompression of spinal cord     Past Medical History:   Diagnosis Date    Cobblestone retinal degeneration     Impotence of organic origin     Nonsenile cataract     Pure hypercholesterolemia     Sciatica, unspecified laterality 04/2022    Unspecified essential hypertension     Vitreous detachment of both eyes         CC Silvana Do MD   DERMATOLOGY  [address] [facility] [address] on close of this encounter.

## 2023-08-04 NOTE — NURSING NOTE
Dermatology Rooming Note    Felice Blood's goals for this visit include:   Chief Complaint   Patient presents with    Skin Check     Patient reports no new lesions of concern. The patient would like a full body skin check.      Jayla Hall LPN

## 2023-08-04 NOTE — LETTER
8/4/2023       RE: Felice Blood  [address] [facility] [address]     Dear Colleague,    Thank you for referring your patient, Felice Blood, to the [facility] [place] [facility]. Please see a copy of my visit note below.    [facility] Dermatology Note  Encounter Date: Aug 4, 2023  Office Visit     Dermatology Problem List:  # FBSE, 4/7/2023  1. Seborrheic dermatitis.  - Ketoconazole shampoo  2. 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 AKs, s/p cryotherapy   - HAK, R upper back, s/p shave bx 12/3/21  - AK, left chest, s/p shave bx 4/7/23  4. Xerosis cutis  - Current tx: daily moisturizer  5. Benign bx:  - Lichenoid keratosis, left lateral mid back, s/p shave bx 12/2021  - Lichenoid keratosis, left medial thigh, s/p shave bx 12/2021  -BLK, left upper posterior arm, s/p shave bx 4/7/23  6. Folliculitis  - BPO wash  7. Eczematous dermatitis  - TMC 0.1% cream    # Lesion to monitor, right distal upper arm, favor trauma   # Lesion to monitor, left lateral distal thigh, favor BLK       ____________________________________________    Assessment & Plan:    # AK, left chest, s/p shave bx 4/7/23  - Appears resolved today, continue to monitor    # Lesion to monitor, right distal upper arm, favor trauma - resolved  # Lesion to monitor, left lateral distal thigh, favor BLK - resolved    # ISKs, bilateral medial thighs x2.  - Cryo today  - notify me if do not resolve or worsen, particularly left medial thigh which is a little bit sensitive      Procedures Performed:   - Cryotherapy procedure note, location(s): see above. After verbal consent and discussion of risks and benefits including, but not limited to, dyspigmentation/scar, blister, and pain, 2 lesion(s) was(were) treated with 1-2 mm freeze border for 1-2 cycles with liquid nitrogen. Post cryotherapy instructions were  provided.    Follow-up: 6 month(s) in-person, recheck above lesion to monitor, or earlier for new or changing lesions    Staff :  Silvana Do MD    Department of Dermatology  [facility]: Phone: [phone], Fax: [phone]  8/4/2023      ____________________________________________    CC: Skin Check (Patient reports no new lesions of concern. The patient would like a full body skin check. )    HPI:  Mr. Felice Blood is a(n) 82 year old male who presents today as a return patient for FBSE.   Two spots on thighs    Patient is otherwise feeling well, without additional skin concerns.    Labs Reviewed:  N/A    Physical Exam:  Vitals: There were no vitals taken for this visit.  SKIN:   - Prior lesions of concern resolved  - well healed bx scar on left chest, no scaling  - two pink scaly stuck on papules bilateral medial thighs  - No other lesions of concern on areas examined.     Medications:  Current Outpatient Medications   Medication    acetaminophen (TYLENOL) 325 MG tablet    albuterol (PROAIR HFA/PROVENTIL HFA/VENTOLIN HFA) 108 (90 Base) MCG/ACT inhaler    atorvastatin (LIPITOR) 10 MG tablet    Calcium-Magnesium-Zinc 333-133-5 MG TABS per tablet    cholecalciferol (VITAMIN D3) 25 mcg (1000 units) capsule    fluticasone (FLONASE) 50 MCG/ACT spray    hydrOXYzine (ATARAX) 10 MG tablet    ipratropium (ATROVENT) 0.06 % nasal spray    ketoconazole (NIZORAL) 2 % external shampoo    lisinopril (ZESTRIL) 10 MG tablet    ondansetron (ZOFRAN-ODT) 4 MG ODT tab    oxyCODONE (ROXICODONE) 5 MG tablet    polyethylene glycol (MIRALAX) 17 g packet    senna-docusate (SENOKOT-S/PERICOLACE) 8.6-50 MG tablet    sildenafil (REVATIO) 20 MG tablet    sildenafil (VIAGRA) 100 MG tablet    tadalafil (CIALIS) 20 MG tablet    triamcinolone (KENALOG) 0.1 % external cream    gabapentin (NEURONTIN) 100 MG capsule     Current  Facility-Administered Medications   Medication    lidocaine (PF) (XYLOCAINE) 1 % injection 4 mL    triamcinolone (KENALOG-40) injection 40 mg      Past Medical History:   Patient Active Problem List   Diagnosis    Essential hypertension    Pure hypercholesterolemia    Impotence of organic origin    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

## 2023-08-08 ENCOUNTER — TELEPHONE (OUTPATIENT)
Dept: FAMILY MEDICINE | Facility: CLINIC | Age: 83
End: 2023-08-08
Payer: MEDICARE

## 2023-08-25 DIAGNOSIS — N52.9 IMPOTENCE OF ORGANIC ORIGIN: ICD-10-CM

## 2023-08-28 RX ORDER — TADALAFIL 20 MG/1
20 TABLET ORAL DAILY PRN
Qty: 10 TABLET | Refills: 0 | Status: SHIPPED | OUTPATIENT
Start: 2023-08-28 | End: 2023-09-14

## 2023-08-28 NOTE — TELEPHONE ENCOUNTER
tadalafil (CIALIS) 20 MG   Last Written Prescription Date: 3/24/23  Last Fill Quantity: 10,   # refills: 6  Last Office Visit : 9/9/22  Future Office visit:  9/14/23  Routing refill request to provider for review/approval because:  Drug not on the refill protocol

## 2023-09-12 ASSESSMENT — ENCOUNTER SYMPTOMS
HOARSE VOICE: 0
SMELL DISTURBANCE: 0
SINUS PAIN: 0
TASTE DISTURBANCE: 0
SORE THROAT: 0
SINUS CONGESTION: 1
NECK MASS: 0
TROUBLE SWALLOWING: 0

## 2023-09-14 ENCOUNTER — OFFICE VISIT (OUTPATIENT)
Dept: FAMILY MEDICINE | Facility: CLINIC | Age: 83
End: 2023-09-14
Payer: MEDICARE

## 2023-09-14 ENCOUNTER — LAB (OUTPATIENT)
Dept: LAB | Facility: CLINIC | Age: 83
End: 2023-09-14
Payer: MEDICARE

## 2023-09-14 VITALS
SYSTOLIC BLOOD PRESSURE: 122 MMHG | WEIGHT: 154.1 LBS | DIASTOLIC BLOOD PRESSURE: 75 MMHG | HEIGHT: 69 IN | BODY MASS INDEX: 22.82 KG/M2 | HEART RATE: 79 BPM | OXYGEN SATURATION: 95 %

## 2023-09-14 DIAGNOSIS — N52.9 IMPOTENCE OF ORGANIC ORIGIN: ICD-10-CM

## 2023-09-14 DIAGNOSIS — R05.9 COUGH, UNSPECIFIED TYPE: ICD-10-CM

## 2023-09-14 DIAGNOSIS — Z00.00 ENCOUNTER FOR MEDICARE ANNUAL WELLNESS EXAM: Primary | ICD-10-CM

## 2023-09-14 DIAGNOSIS — H61.23 BILATERAL IMPACTED CERUMEN: ICD-10-CM

## 2023-09-14 DIAGNOSIS — Z98.890 STATUS POST LUMBAR SPINE OPERATIVE PROCEDURE FOR DECOMPRESSION OF SPINAL CORD: ICD-10-CM

## 2023-09-14 DIAGNOSIS — Z12.11 SCREEN FOR COLON CANCER: ICD-10-CM

## 2023-09-14 DIAGNOSIS — E78.00 PURE HYPERCHOLESTEROLEMIA: ICD-10-CM

## 2023-09-14 DIAGNOSIS — I10 ESSENTIAL HYPERTENSION: ICD-10-CM

## 2023-09-14 LAB
ALBUMIN SERPL BCG-MCNC: 4.4 G/DL (ref 3.5–5.2)
ALP SERPL-CCNC: 47 U/L (ref 40–129)
ALT SERPL W P-5'-P-CCNC: 13 U/L (ref 0–70)
ANION GAP SERPL CALCULATED.3IONS-SCNC: 9 MMOL/L (ref 7–15)
AST SERPL W P-5'-P-CCNC: 23 U/L (ref 0–45)
BASOPHILS # BLD AUTO: 0 10E3/UL (ref 0–0.2)
BASOPHILS NFR BLD AUTO: 0 %
BILIRUB SERPL-MCNC: 0.5 MG/DL
BUN SERPL-MCNC: 13.4 MG/DL (ref 8–23)
CALCIUM SERPL-MCNC: 9.7 MG/DL (ref 8.8–10.2)
CHLORIDE SERPL-SCNC: 99 MMOL/L (ref 98–107)
CHOLEST SERPL-MCNC: 134 MG/DL
CREAT SERPL-MCNC: 0.9 MG/DL (ref 0.67–1.17)
DEPRECATED HCO3 PLAS-SCNC: 26 MMOL/L (ref 22–29)
EGFRCR SERPLBLD CKD-EPI 2021: 85 ML/MIN/1.73M2
EOSINOPHIL # BLD AUTO: 0.1 10E3/UL (ref 0–0.7)
EOSINOPHIL NFR BLD AUTO: 1 %
ERYTHROCYTE [DISTWIDTH] IN BLOOD BY AUTOMATED COUNT: 12.5 % (ref 10–15)
GLUCOSE SERPL-MCNC: 107 MG/DL (ref 70–99)
HCT VFR BLD AUTO: 44.4 % (ref 40–53)
HDLC SERPL-MCNC: 57 MG/DL
HGB BLD-MCNC: 15.9 G/DL (ref 13.3–17.7)
IMM GRANULOCYTES # BLD: 0 10E3/UL
IMM GRANULOCYTES NFR BLD: 0 %
LDLC SERPL CALC-MCNC: 57 MG/DL
LYMPHOCYTES # BLD AUTO: 2.3 10E3/UL (ref 0.8–5.3)
LYMPHOCYTES NFR BLD AUTO: 25 %
MCH RBC QN AUTO: 32.1 PG (ref 26.5–33)
MCHC RBC AUTO-ENTMCNC: 35.8 G/DL (ref 31.5–36.5)
MCV RBC AUTO: 90 FL (ref 78–100)
MONOCYTES # BLD AUTO: 0.7 10E3/UL (ref 0–1.3)
MONOCYTES NFR BLD AUTO: 7 %
NEUTROPHILS # BLD AUTO: 6.2 10E3/UL (ref 1.6–8.3)
NEUTROPHILS NFR BLD AUTO: 67 %
NONHDLC SERPL-MCNC: 77 MG/DL
NRBC # BLD AUTO: 0 10E3/UL
NRBC BLD AUTO-RTO: 0 /100
PLATELET # BLD AUTO: 249 10E3/UL (ref 150–450)
POTASSIUM SERPL-SCNC: 4.5 MMOL/L (ref 3.4–5.3)
PROT SERPL-MCNC: 7.1 G/DL (ref 6.4–8.3)
RBC # BLD AUTO: 4.96 10E6/UL (ref 4.4–5.9)
SODIUM SERPL-SCNC: 134 MMOL/L (ref 136–145)
T4 FREE SERPL-MCNC: 0.91 NG/DL (ref 0.9–1.7)
TRIGL SERPL-MCNC: 100 MG/DL
TSH SERPL DL<=0.005 MIU/L-ACNC: 7.99 UIU/ML (ref 0.3–4.2)
WBC # BLD AUTO: 9.4 10E3/UL (ref 4–11)

## 2023-09-14 PROCEDURE — 85025 COMPLETE CBC W/AUTO DIFF WBC: CPT | Performed by: PATHOLOGY

## 2023-09-14 PROCEDURE — 84443 ASSAY THYROID STIM HORMONE: CPT | Performed by: PATHOLOGY

## 2023-09-14 PROCEDURE — 84439 ASSAY OF FREE THYROXINE: CPT | Performed by: PATHOLOGY

## 2023-09-14 PROCEDURE — 80061 LIPID PANEL: CPT | Performed by: PATHOLOGY

## 2023-09-14 PROCEDURE — 36415 COLL VENOUS BLD VENIPUNCTURE: CPT | Performed by: PATHOLOGY

## 2023-09-14 PROCEDURE — G0439 PPPS, SUBSEQ VISIT: HCPCS | Performed by: FAMILY MEDICINE

## 2023-09-14 PROCEDURE — 80053 COMPREHEN METABOLIC PANEL: CPT | Performed by: PATHOLOGY

## 2023-09-14 RX ORDER — LOSARTAN POTASSIUM 25 MG/1
25 TABLET ORAL DAILY
Qty: 90 TABLET | Refills: 3 | Status: SHIPPED | OUTPATIENT
Start: 2023-09-14 | End: 2024-09-16

## 2023-09-14 RX ORDER — TADALAFIL 20 MG/1
20 TABLET ORAL DAILY PRN
Qty: 30 TABLET | Refills: 11 | Status: SHIPPED | OUTPATIENT
Start: 2023-09-14

## 2023-09-14 RX ORDER — ATORVASTATIN CALCIUM 10 MG/1
10 TABLET, FILM COATED ORAL DAILY
Qty: 90 TABLET | Refills: 3 | Status: SHIPPED | OUTPATIENT
Start: 2023-09-14 | End: 2024-09-17

## 2023-09-14 RX ORDER — ALBUTEROL SULFATE 90 UG/1
2 AEROSOL, METERED RESPIRATORY (INHALATION) EVERY 4 HOURS PRN
Qty: 18 G | Refills: 0 | Status: SHIPPED | OUTPATIENT
Start: 2023-09-14

## 2023-09-14 RX ORDER — HYDROXYZINE HYDROCHLORIDE 10 MG/1
10 TABLET, FILM COATED ORAL EVERY 6 HOURS PRN
Qty: 30 TABLET | Refills: 0 | Status: SHIPPED | OUTPATIENT
Start: 2023-09-14

## 2023-09-14 NOTE — PROGRESS NOTES
Medicare Annual Wellness Questionnaire  This 82 year old year old male presents for a Medicare Wellness Exam.    The following is Felice Blood's care team:  Patient Care Team         Relationship Specialty Notifications Start End    Anthony Maddox MD PCP - General Family Practice  4/2/12     Phone: [phone] Fax: [phone]         [address] [place] [address]    Sharmaine Garcia MD MD Internal Medicine  11/16/15      XXX RETIRED XXX 09/30/2019    Camron Hansen MD MD Ophthalmology  8/5/19     Phone: [phone] Fax: [phone]         [address] [place] [address]    Christine Vieira NP Nurse Practitioner Family Practice  9/13/19     Phone: [phone] Fax: [phone]         [address] [address]    Anthony Maddox MD Assigned PCP   1/3/21     Phone: [phone] Fax: [phone]         [address] [place] [address]    Dino Alba MD Assigned Musculoskeletal Provider   10/3/21     Phone: [phone] Fax: [phone]         [address] [place] [address]    Silvana Do MD Assigned Surgical Provider   2/4/23     Phone: [phone] Pager: [phone] Fax: [phone]         DERMATOLOGY [address] [place], [place] [address] [address]            Fall Risk Assessment:  Have you fallen 2 or more times in the last year? No  How many times were you injured due to a fall in the last year? 0    PHQ-2:  Over the last 2 weeks, how often have you been bothered by feeling down, depressed, or hopeless? Not at all (0)   Over the last 2 weeks, how often have you had little interest or pleasure in doing things? Not at all (0)     Social History:  What is your marital status?   Who lives in your household? wife  Does your home have loose rugs in the hallway: Yes   Does your home have grab bars in the bathroom: Yes   Does your home have handrails on the stairs? Yes   Does your home have poorly lit areas?  No  Do you feel threatened or controlled by a partner, ex-partner or anyone in your life? No  Has anyone hurt you physically, for example by pushing, hitting, slapping or kicking you or forcing you to have sex? No  Do you need help with the phone, transportation, shopping, preparing meals, housework, laundry, medications or managing money? No    Sexual Health:  Are you sexually active? Yes   If yes, with men, women, or both? Female  If yes, how many partners? 1  If yes, are you using condoms? No  Have you had any sexually transmitted infections in the last year? No  Do you have any sexual concerns? Yes E.D    General Health Assessment:  Have you noticed any hearing difficulties? No  Do you wear hearing aids? No  Have you seen a hearing professional such as an audiologist in the last 1 year? No  Do you have vision difficulty? No  Do you wear glasses or contacts? Yes   Have you seen an eye doctor in the last 1 year? Yes   How many servings of fruits and vegetables do you eat a day? 2  How often do you exercise in a week? 7  How long and what kind of exercise do you do? WALKING    Tobacco and Alcohol History:  Do you use tobacco/nicotine products? No  Do you use any other drugs? No  Do you drink alcohol? Yes   If you drink alcohol, how many drinks per week? 1    Advance Directive:  Have you completed an Advance Directives document? Yes   If yes, have you given a copy to the clinic? Yes   Do you need information on Advance Directives? No    Cassy Rosenberg, EMT at 4:50 PM on 9/14/2023.  Primary care clinic: [phone] submitted by the patient for this visit:  Symptoms you have experienced in the last 30 days (Submitted on 9/12/2023)  General Symptoms: No  Skin Symptoms: No  HENT Symptoms: Yes  EYE SYMPTOMS: No  HEART SYMPTOMS: No  LUNG SYMPTOMS: No  INTESTINAL SYMPTOMS: No  URINARY SYMPTOMS: No  REPRODUCTIVE SYMPTOMS: Yes  SKELETAL SYMPTOMS: No  BLOOD SYMPTOMS: No  NERVOUS SYSTEM SYMPTOMS: No  MENTAL  HEALTH SYMPTOMS: No  Please answer the questions below to tell us what conditions you are experiencing: (Submitted on 9/12/2023)  Ear pain: No  Ear discharge: No  Hearing loss: No  Tinnitus: No  Nosebleeds: No  Congestion: Yes  Sinus pain: No  Trouble swallowing: No   Voice hoarseness: No  Mouth sores: No  Sore throat: No  Tooth pain: No  Gum tenderness: No  Bleeding gums: No  Change in taste: No  Change in sense of smell: No  Dry mouth: No  Hearing aid used: No  Neck lump: No  Please answer the questions below to tell us what condition you are experiencing: (Submitted on 9/12/2023)  Scrotal pain or swelling: No  Erectile dysfunction: Yes  Penile discharge: No  Genital ulcers: No  Reduced libido: No

## 2023-09-14 NOTE — NURSING NOTE
"Felice Blood is a 82 year old male patient that presents today in clinic for the following:    Chief Complaint   Patient presents with    Medicare wellness     Gunk down the throat.  Medication refill/renew     The patient's allergies and medications were reviewed as noted. A set of vitals were recorded as noted without incident: /75 (BP Location: Right arm, Patient Position: Sitting, Cuff Size: Adult Regular)   Pulse 79   Ht 1.753 m (5' 9\")   Wt 69.9 kg (154 lb 1.6 oz)   SpO2 95%   BMI 22.76 kg/m  . The patient does not have any other questions for the provider.    Cassy Rosenberg, EMT at 3:49 PM on 9/14/2023.  Primary care clinic: [phone]    "

## 2023-09-15 NOTE — PROGRESS NOTES
SUBJECTIVE:   Felice is a 82 year old who presents for Preventive Visit.    Are you in the first 12 months of your Medicare coverage?  No    HPI  Overall doing well  Anxiety and wrist pain improved  Cough chronic irina supine some clear phlegm no other pulm ENT or systemic sx  On ace      Have you ever done Advance Care Planning? (For example, a Health Directive, POLST, or a discussion with a medical provider or your loved ones about your wishes): see other note      Fall risk  See other note    Cognitive Screening normal clock draw and three word recall        Reviewed and updated as needed this visit by clinical staff   Tobacco  Allergies  Meds              Reviewed and updated as needed this visit by Provider                 Social History     Tobacco Use    Smoking status: Never    Smokeless tobacco: Never   Substance Use Topics    Alcohol use: Yes     Comment: 1 day              No data to display              Do you have a current opioid prescription? No  Do you use any other controlled substances or medications that are not prescribed by a provider? None    Past Medical History:   Diagnosis Date    Cobblestone retinal degeneration     Impotence of organic origin     Nonsenile cataract     Pure hypercholesterolemia     Sciatica, unspecified laterality 04/2022    Unspecified essential hypertension     Vitreous detachment of both eyes      Past Surgical History:   Procedure Laterality Date    APPENDECTOMY      CATARACT IOL, RT/LT Right 05/01/2017    DECOMPRESSION LUMBAR ONE LEVEL N/A 4/21/2022    Procedure: Lumbar 2 to 3 decompression and discectomy;  Surgeon: Wolfgang Cabral MD;  Location: [facility] OR    PHACOEMULSIFICATION WITH STANDARD INTRAOCULAR LENS IMPLANT Right 5/1/2017    Procedure: PHACOEMULSIFICATION WITH STANDARD INTRAOCULAR LENS IMPLANT;  Right Eye Phacoemulsification with intraocular Lens Implant;  Surgeon: Camron Hansen MD;  Location: [facility] OR    TONSILLECTOMY       Current Outpatient  Medications   Medication    acetaminophen (TYLENOL) 325 MG tablet    albuterol (PROAIR HFA/PROVENTIL HFA/VENTOLIN HFA) 108 (90 Base) MCG/ACT inhaler    atorvastatin (LIPITOR) 10 MG tablet    Calcium-Magnesium-Zinc 333-133-5 MG TABS per tablet    cholecalciferol (VITAMIN D3) 25 mcg (1000 units) capsule    fluticasone (FLONASE) 50 MCG/ACT spray    hydrOXYzine (ATARAX) 10 MG tablet    ipratropium (ATROVENT) 0.06 % nasal spray    ketoconazole (NIZORAL) 2 % external shampoo    losartan (COZAAR) 25 MG tablet    ondansetron (ZOFRAN-ODT) 4 MG ODT tab    oxyCODONE (ROXICODONE) 5 MG tablet    polyethylene glycol (MIRALAX) 17 g packet    senna-docusate (SENOKOT-S/PERICOLACE) 8.6-50 MG tablet    sildenafil (REVATIO) 20 MG tablet    sildenafil (VIAGRA) 100 MG tablet    tadalafil (CIALIS) 20 MG tablet    triamcinolone (KENALOG) 0.1 % external cream    gabapentin (NEURONTIN) 100 MG capsule     Current Facility-Administered Medications   Medication    lidocaine (PF) (XYLOCAINE) 1 % injection 4 mL    triamcinolone (KENALOG-40) injection 40 mg     Allergies   Allergen Reactions    Etodolac Unknown    Percocet [Oxycodone-Acetaminophen] Nausea     Other reaction(s): Muscle Weakness    Seasonal Allergies Cough     Sinus drainage, watery eyes     Family History   Problem Relation Age of Onset    Skin Cancer Father     Glaucoma No family hx of     Macular Degeneration No family hx of     Melanoma No family hx of      Social History     Socioeconomic History    Marital status:      Spouse name: Not on file    Number of children: Not on file    Years of education: Not on file    Highest education level: Not on file   Occupational History    Not on file   Tobacco Use    Smoking status: Never    Smokeless tobacco: Never   Substance and Sexual Activity    Alcohol use: Yes     Comment: 1 day    Drug use: Not on file    Sexual activity: Not on file   Other Topics Concern    Not on file   Social History Narrative    Not on file     Social  Determinants of Health     Financial Resource Strain: Not on file   Food Insecurity: Not on file   Transportation Needs: Not on file   Physical Activity: Not on file   Stress: Not on file   Social Connections: Not on file   Intimate Partner Violence: Not on file   Housing Stability: Not on file         Current providers sharing in care for this patient include:   Patient Care Team:  Anthony Maddox MD as PCP - General (Family Practice)  Sharmaine Garcia MD as MD (Internal Medicine)  Camron Hansen MD as MD (Ophthalmology)  Christine Vieira NP as Nurse Practitioner (Family Practice)  Anthony Maddox MD as Assigned PCP  Dino Alba MD as Assigned Musculoskeletal Provider  Silvana Do MD as Assigned Surgical Provider    The following health maintenance items are reviewed in Epic and correct as of today:  Health Maintenance   Topic Date Due    DTAP/TDAP/TD IMMUNIZATION (1 - Tdap) 11/17/2015    INFLUENZA VACCINE (1) 09/01/2023    FALL RISK ASSESSMENT  09/09/2023    MEDICARE ANNUAL WELLNESS VISIT  09/09/2023    COLORECTAL CANCER SCREENING  05/03/2026    ADVANCE CARE PLANNING  01/26/2027    PHQ-2 (once per calendar year)  Completed    Pneumococcal Vaccine: 65+ Years  Completed    ZOSTER IMMUNIZATION  Completed    COVID-19 Vaccine  Completed    IPV IMMUNIZATION  Aged Out    HPV IMMUNIZATION  Aged Out    MENINGITIS IMMUNIZATION  Aged Out           Review of Systems  Answers submitted by the patient for this visit:  Symptoms you have experienced in the last 30 days (Submitted on 9/12/2023)  General Symptoms: No  Skin Symptoms: No  HENT Symptoms: Yes  EYE SYMPTOMS: No  HEART SYMPTOMS: No  LUNG SYMPTOMS: No  INTESTINAL SYMPTOMS: No  URINARY SYMPTOMS: No  REPRODUCTIVE SYMPTOMS: Yes  SKELETAL SYMPTOMS: No  BLOOD SYMPTOMS: No  NERVOUS SYSTEM SYMPTOMS: No  MENTAL HEALTH SYMPTOMS: No  Please answer the questions below to tell us what conditions you are experiencing: (Submitted on  "9/12/2023)  Congestion: Yes   Voice hoarseness: No  Tooth pain: No  Gum tenderness: No  Bleeding gums: No  Change in taste: No  Change in sense of smell: No  Dry mouth: No  Hearing aid used: No  Neck lump: No  Please answer the questions below to tell us what condition you are experiencing: (Submitted on 9/12/2023)  Scrotal pain or swelling: No  Erectile dysfunction: Yes  Genital ulcers: No  Reduced libido: No      OBJECTIVE:   /75 (BP Location: Right arm, Patient Position: Sitting, Cuff Size: Adult Regular)   Pulse 79   Ht 1.753 m (5' 9\")   Wt 69.9 kg (154 lb 1.6 oz)   SpO2 95%   BMI 22.76 kg/m   Estimated body mass index is 22.76 kg/m  as calculated from the following:    Height as of this encounter: 1.753 m (5' 9\").    Weight as of this encounter: 69.9 kg (154 lb 1.6 oz).  Physical Exam  GENERAL: healthy, alert and no distress  EYES: Eyes grossly normal to inspection, PERRL and conjunctivae and sclerae normal  HENT: ear canals and TM's normal, nose and mouth without ulcers or lesions  NECK: no adenopathy, no asymmetry, masses, or scars and thyroid normal to palpation  RESP: lungs clear to auscultation - no rales, rhonchi or wheezes  CV: regular rate and rhythm, normal S1 S2, no S3 or S4, no murmur, click or rub, no peripheral edema and peripheral pulses strong  ABDOMEN: soft, nontender, no hepatosplenomegaly, no masses and bowel sounds normal   (male): normal male genitalia without lesions or urethral discharge, no hernia  MS: no gross musculoskeletal defects noted, no edema  SKIN: no suspicious lesions or rashes  NEURO: Normal strength and tone, mentation intact and speech normal  PSYCH: mentation appears normal, affect normal/bright    Diagnostic Test Results:  Labs reviewed in Epic    ASSESSMENT / PLAN:       ICD-10-CM    1. PURE HYPERCHOLESTEROLEM  E78.00 atorvastatin (LIPITOR) 10 MG tablet     Comprehensive metabolic panel (BMP + Alb, Alk Phos, ALT, AST, Total. Bili, TP)     Lipid panel reflex " to direct LDL Fasting      2. Screen for colon cancer  Z12.11 atorvastatin (LIPITOR) 10 MG tablet      3. Impotence of organic origin  N52.9 atorvastatin (LIPITOR) 10 MG tablet     tadalafil (CIALIS) 20 MG tablet      4. Essential hypertension  I10 atorvastatin (LIPITOR) 10 MG tablet     losartan (COZAAR) 25 MG tablet     CBC with platelets and differential     TSH with free T4 reflex      5. Bilateral impacted cerumen  H61.23 atorvastatin (LIPITOR) 10 MG tablet      6. Cough  R05.9 albuterol (PROAIR HFA/PROVENTIL HFA/VENTOLIN HFA) 108 (90 Base) MCG/ACT inhaler      7. Status post lumbar spine operative procedure for decompression of spinal cord  Z98.890 hydrOXYzine (ATARAX) 10 MG tablet        For cough he'll let us know if 2 weeks if better switching from ace to arb, if not consider singulair trial and follow-up ent  Patient has been advised of split billing requirements and indicates understanding: Yes      COUNSELING:  Reviewed preventive health counseling, as reflected in patient instructions       Regular exercise       Healthy diet/nutrition        He reports that he has never smoked. He has never used smokeless tobacco.      Appropriate preventive services were discussed with this patient, including applicable screening as appropriate for cardiovascular disease, diabetes, osteopenia/osteoporosis, and glaucoma.  As appropriate for age/gender, discussed screening for colorectal cancer, prostate cancer, breast cancer, and cervical cancer. Checklist reviewing preventive services available has been given to the patient.    Reviewed patients plan of care and provided an AVS. The Basic Care Plan (routine screening as documented in Health Maintenance) for Felice meets the Care Plan requirement. This Care Plan has been established and reviewed with the Patient.    Anthony Maddox MD  [facility] PRIMARY CARE [facility]    Identified Health Risks:  I have reviewed Opioid Use Disorder and Substance Use  Disorder risk factors and made any needed referrals.

## 2023-09-15 NOTE — PATIENT INSTRUCTIONS
Patient Education   Personalized Prevention Plan  You are due for the preventive services outlined below.  Your care team is available to assist you in scheduling these services.  If you have already completed any of these items, please share that information with your care team to update in your medical record.  Health Maintenance Due   Topic Date Due     Diptheria Tetanus Pertussis (DTAP/TDAP/TD) Vaccine (1 - Tdap) 11/17/2015     Flu Vaccine (1) 09/01/2023     FALL RISK ASSESSMENT  09/09/2023

## 2024-01-17 DIAGNOSIS — N52.9 ERECTILE DYSFUNCTION, UNSPECIFIED ERECTILE DYSFUNCTION TYPE: ICD-10-CM

## 2024-01-20 RX ORDER — SILDENAFIL CITRATE 20 MG/1
TABLET ORAL
Qty: 60 TABLET | Refills: 4 | Status: SHIPPED | OUTPATIENT
Start: 2024-01-20 | End: 2024-07-09

## 2024-01-20 NOTE — TELEPHONE ENCOUNTER
sildenafil (REVATIO) 20 MG tablet   60 tablet 4 6/26/2023 9/14/2023  [facility] Anthony Guevara MD  Family Medicine

## 2024-02-16 ENCOUNTER — OFFICE VISIT (OUTPATIENT)
Dept: DERMATOLOGY | Facility: CLINIC | Age: 84
End: 2024-02-16
Payer: MEDICARE

## 2024-02-16 DIAGNOSIS — L57.0 ACTINIC KERATOSIS: Primary | ICD-10-CM

## 2024-02-16 DIAGNOSIS — D22.9 MULTIPLE BENIGN NEVI: ICD-10-CM

## 2024-02-16 DIAGNOSIS — R21 RASH: ICD-10-CM

## 2024-02-16 DIAGNOSIS — L81.4 LENTIGINES: ICD-10-CM

## 2024-02-16 DIAGNOSIS — L21.9 SEBORRHEIC DERMATITIS: ICD-10-CM

## 2024-02-16 DIAGNOSIS — L82.1 SEBORRHEIC KERATOSES: ICD-10-CM

## 2024-02-16 DIAGNOSIS — T14.8XXA EXCORIATION: ICD-10-CM

## 2024-02-16 DIAGNOSIS — D18.01 CHERRY ANGIOMA: ICD-10-CM

## 2024-02-16 PROBLEM — L11.1 GROVER'S DISEASE: Status: ACTIVE | Noted: 2024-02-16

## 2024-02-16 PROCEDURE — 99214 OFFICE O/P EST MOD 30 MIN: CPT | Mod: 25 | Performed by: DERMATOLOGY

## 2024-02-16 PROCEDURE — 17003 DESTRUCT PREMALG LES 2-14: CPT | Mod: GC | Performed by: DERMATOLOGY

## 2024-02-16 PROCEDURE — 17000 DESTRUCT PREMALG LESION: CPT | Mod: GC | Performed by: DERMATOLOGY

## 2024-02-16 RX ORDER — KETOCONAZOLE 20 MG/ML
SHAMPOO TOPICAL
Qty: 120 ML | Refills: 11 | Status: SHIPPED | OUTPATIENT
Start: 2024-02-16

## 2024-02-16 ASSESSMENT — PAIN SCALES - GENERAL: PAINLEVEL: NO PAIN (0)

## 2024-02-16 NOTE — PROGRESS NOTES
[facility] Dermatology Note  Encounter Date: Feb 16, 2024  Office Visit     Dermatology Problem List:  Last skin check 02/16/24  1. Seborrheic dermatitis.  - Ketoconazole shampoo  2. 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 AKs, s/p cryotherapy   - HAK, R upper back, s/p shave bx 12/3/21  - AK, left chest, s/p shave bx 4/7/23  4. Xerosis cutis  - Current tx: daily moisturizer  5. Benign bx:  - Lichenoid keratosis, left lateral mid back, s/p shave bx 12/2021  - Lichenoid keratosis, left medial thigh, s/p shave bx 12/2021  -BLK, left upper posterior arm, s/p shave bx 4/7/23  6. Folliculitis  - BPO wash  7. Eczematous dermatitis  - TMC 0.1% cream     # Lesion to monitor, right distal upper arm, favor trauma   # Lesion to monitor, left lateral distal thigh, favor BLK      ____________________________________________    Assessment & Plan:    # Excoriated papule, central posterior scalp  Pt describes a lesion on the scalp that 'comes and goes'. Clinically appears as a linear excoriation and dermoscopy reveals benign findings. Discussed management options with patient and recommended could consider biopsy due to hx. Pt would prefer to cover and monitor the lesion and allow it to heal. If not improving or if still present at follow-up, would recommend bx.   - duoderm patch to wound   - recheck at follow-up, biopsy if no improvement.    # Actinic keratosis.   L forehead and L upper arm.  - Cryotherapy performed today (see procedure note(s) below).      # Seborrheic dermatitis.  - start ketoconazole shampoo 3x weekly    # Pink papules on chest/trunk  Scattered pink flat topped papules on chest and trunk, could consider Grovers. Ddx also includes seborrheic dermatitis vs eczema. Reassuringly asymptomatic. Reviewed etiology,declined treatment.  - no further tx  - in the future, could consider ketoconazole and triamcinolone if symptomatic    # Benign lesions - SKs, cherry angiomas,  lentigenes.  - No treatment required    # Multiple benign nevi.   - Monitor for ABCDEs of melanoma   - Continue sun protection - recommend SPF 30 or higher with frequent application   - Return sooner if noticing changing or symptomatic lesions    # History of NMSC. No evidence of recurrent disease.  - Continue photoprotection - recommend SPF 30 or higher with frequent reapplication  - Continue yearly skin exams  - Advised to monitor for changing, non-healing, bleeding, painful, changing, or otherwise symptomatic lesions    Procedures Performed:   - Cryotherapy procedure note, location(s): L posterior upper arm and L forehead. After verbal consent and discussion of risks and benefits including, but not limited to, dyspigmentation/scar, blister, and pain, 2 lesion(s) was(were) treated with 1-2 mm freeze border for 1-2 cycles with liquid nitrogen. Post cryotherapy instructions were provided.    Follow-up: 6 month(s) in-person, or earlier for new or changing lesions    Staff and Scribe and Resident:     ITrinh, saw and staffed this patient with the attending. All recommendations, therapies and procedures were pre-staffed with the attending or administered with direct supervision.       Staff Physician Comments:   I saw and evaluated the patient with the resident and I agree with the assessment and plan.  I was present for the entire minor procedure and examination.    Silvana Do MD    Department of Dermatology  [facility] [place]: Phone: [phone], Fax: [phone]  2/21/2024     ____________________________________________    CC: Skin Check (Patient presents for FBSE. The patient denies any new lesions of concern. )    HPI:  Mr. Felice Blood is a(n) 83 year old male who presents today as a return patient for FBSE.    - presents today w spouse  - doing well overall  - no spots of concern    - we inquired about a  spot on the scalp  - patient says it comes and goes, frequently scratches at it  - non tender    Patient is otherwise feeling well, without additional skin concerns.    Labs Reviewed:  N/A    Physical Exam:  Vitals: There were no vitals taken for this visit.  SKIN: Total skin excluding the undergarment areas was performed. The exam included the head/face, neck, both arms, chest, back, abdomen, both legs, digits and/or nails.   - pink flat topped papules scattered on trunk, some are excoriated. Clear borders and without scale  - gritty pink papule on L temple, hyperkeratotic papule on L distal upper arm.  - excoriated pink papule on posterior central scalp  - There are dome shaped bright red papules on the trunk and extremities .   - Multiple regular brown pigmented macules and papules are identified on the trunk and extremities. .   - Scattered brown macules on sun exposed areas.  - Waxy stuck on papules and plaques on trunk and extremities.   - No other lesions of concern on areas examined.     Medications:  Current Outpatient Medications   Medication    acetaminophen (TYLENOL) 325 MG tablet    albuterol (PROAIR HFA/PROVENTIL HFA/VENTOLIN HFA) 108 (90 Base) MCG/ACT inhaler    atorvastatin (LIPITOR) 10 MG tablet    Calcium-Magnesium-Zinc 333-133-5 MG TABS per tablet    cholecalciferol (VITAMIN D3) 25 mcg (1000 units) capsule    fluticasone (FLONASE) 50 MCG/ACT spray    hydrOXYzine (ATARAX) 10 MG tablet    ipratropium (ATROVENT) 0.06 % nasal spray    ketoconazole (NIZORAL) 2 % external shampoo    losartan (COZAAR) 25 MG tablet    ondansetron (ZOFRAN-ODT) 4 MG ODT tab    oxyCODONE (ROXICODONE) 5 MG tablet    polyethylene glycol (MIRALAX) 17 g packet    senna-docusate (SENOKOT-S/PERICOLACE) 8.6-50 MG tablet    sildenafil (REVATIO) 20 MG tablet    sildenafil (VIAGRA) 100 MG tablet    tadalafil (CIALIS) 20 MG tablet    triamcinolone (KENALOG) 0.1 % external cream    gabapentin (NEURONTIN) 100 MG capsule     Current  Facility-Administered Medications   Medication    lidocaine (PF) (XYLOCAINE) 1 % injection 4 mL    triamcinolone (KENALOG-40) injection 40 mg      Past Medical History:   Patient Active Problem List   Diagnosis    Essential hypertension    Pure hypercholesterolemia    Impotence of organic origin    Status post lumbar spine operative procedure for decompression of spinal cord    Cough     Past Medical History:   Diagnosis Date    Cobblestone retinal degeneration     Impotence of organic origin     Nonsenile cataract     Pure hypercholesterolemia     Sciatica, unspecified laterality 04/2022    Unspecified essential hypertension     Vitreous detachment of both eyes         CC Silvana Do MD  [address] [facility] [id number]   [place] [address] on close of this encounter.

## 2024-02-16 NOTE — LETTER
2/16/2024       RE: Felice Blood  [address]  [place] [facility] [address]     Dear Colleague,    Thank you for referring your patient, Felice Blood, to the [facility] [place] at [facility]. Please see a copy of my visit note below.    [facility] Dermatology Note  Encounter Date: Feb 16, 2024  Office Visit     Dermatology Problem List:  Last skin check 02/16/24  1. Seborrheic dermatitis.  - Ketoconazole shampoo  2. 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 AKs, s/p cryotherapy   - HAK, R upper back, s/p shave bx 12/3/21  - AK, left chest, s/p shave bx 4/7/23  4. Xerosis cutis  - Current tx: daily moisturizer  5. Benign bx:  - Lichenoid keratosis, left lateral mid back, s/p shave bx 12/2021  - Lichenoid keratosis, left medial thigh, s/p shave bx 12/2021  -BLK, left upper posterior arm, s/p shave bx 4/7/23  6. Folliculitis  - BPO wash  7. Eczematous dermatitis  - TMC 0.1% cream     # Lesion to monitor, right distal upper arm, favor trauma   # Lesion to monitor, left lateral distal thigh, favor BLK      ____________________________________________    Assessment & Plan:    # Excoriated papule, central posterior scalp  Pt describes a lesion on the scalp that 'comes and goes'. Clinically appears as a linear excoriation and dermoscopy reveals benign findings. Discussed management options with patient and recommended could consider biopsy due to hx. Pt would prefer to cover and monitor the lesion and allow it to heal. If not improving or if still present at follow-up, would recommend bx.   - duoderm patch to wound   - recheck at follow-up, biopsy if no improvement.    # Actinic keratosis.   L forehead and L upper arm.  - Cryotherapy performed today (see procedure note(s) below).      # Seborrheic dermatitis.  - start ketoconazole shampoo 3x weekly    # Pink papules on  chest/trunk  Scattered pink flat topped papules on chest and trunk, could consider Gro[place]. Ddx also includes seborrheic dermatitis vs eczema. Reassuringly asymptomatic. Reviewed etiology,declined treatment.  - no further tx  - in the future, could consider ketoconazole and triamcinolone if symptomatic    # Benign lesions - SKs, cherry angiomas, lentigenes.  - No treatment required    # Multiple benign nevi.   - Monitor for ABCDEs of melanoma   - Continue sun protection - recommend SPF 30 or higher with frequent application   - Return sooner if noticing changing or symptomatic lesions    # History of NMSC. No evidence of recurrent disease.  - Continue photoprotection - recommend SPF 30 or higher with frequent reapplication  - Continue yearly skin exams  - Advised to monitor for changing, non-healing, bleeding, painful, changing, or otherwise symptomatic lesions    Procedures Performed:   - Cryotherapy procedure note, location(s): L posterior upper arm and L forehead. After verbal consent and discussion of risks and benefits including, but not limited to, dyspigmentation/scar, blister, and pain, 2 lesion(s) was(were) treated with 1-2 mm freeze border for 1-2 cycles with liquid nitrogen. Post cryotherapy instructions were provided.    Follow-up: 6 month(s) in-person, or earlier for new or changing lesions    Staff and Scribe and Resident:     ITrinh, saw and staffed this patient with the attending. All recommendations, therapies and procedures were pre-staffed with the attending or administered with direct supervision.       Staff Physician Comments:   I saw and evaluated the patient with the resident and I agree with the assessment and plan.  I was present for the entire minor procedure and examination.    Silvana Do MD    Department of Dermatology  [facility]: Phone: [phone], Fax: [phone]  2/21/2024      ____________________________________________    CC: Skin Check (Patient presents for FBSE. The patient denies any new lesions of concern. )    HPI:  Mr. Felice Blood is a(n) 83 year old male who presents today as a return patient for FBSE.    - presents today w spouse  - doing well overall  - no spots of concern    - we inquired about a spot on the scalp  - patient says it comes and goes, frequently scratches at it  - non tender    Patient is otherwise feeling well, without additional skin concerns.    Labs Reviewed:  N/A    Physical Exam:  Vitals: There were no vitals taken for this visit.  SKIN: Total skin excluding the undergarment areas was performed. The exam included the head/face, neck, both arms, chest, back, abdomen, both legs, digits and/or nails.   - pink flat topped papules scattered on trunk, some are excoriated. Clear borders and without scale  - gritty pink papule on L temple, hyperkeratotic papule on L distal upper arm.  - excoriated pink papule on posterior central scalp  - There are dome shaped bright red papules on the trunk and extremities .   - Multiple regular brown pigmented macules and papules are identified on the trunk and extremities. .   - Scattered brown macules on sun exposed areas.  - Waxy stuck on papules and plaques on trunk and extremities.   - No other lesions of concern on areas examined.     Medications:  Current Outpatient Medications   Medication    acetaminophen (TYLENOL) 325 MG tablet    albuterol (PROAIR HFA/PROVENTIL HFA/VENTOLIN HFA) 108 (90 Base) MCG/ACT inhaler    atorvastatin (LIPITOR) 10 MG tablet    Calcium-Magnesium-Zinc 333-133-5 MG TABS per tablet    cholecalciferol (VITAMIN D3) 25 mcg (1000 units) capsule    fluticasone (FLONASE) 50 MCG/ACT spray    hydrOXYzine (ATARAX) 10 MG tablet    ipratropium (ATROVENT) 0.06 % nasal spray    ketoconazole (NIZORAL) 2 % external shampoo    losartan (COZAAR) 25 MG tablet    ondansetron (ZOFRAN-ODT) 4 MG ODT tab     oxyCODONE (ROXICODONE) 5 MG tablet    polyethylene glycol (MIRALAX) 17 g packet    senna-docusate (SENOKOT-S/PERICOLACE) 8.6-50 MG tablet    sildenafil (REVATIO) 20 MG tablet    sildenafil (VIAGRA) 100 MG tablet    tadalafil (CIALIS) 20 MG tablet    triamcinolone (KENALOG) 0.1 % external cream    gabapentin (NEURONTIN) 100 MG capsule     Current Facility-Administered Medications   Medication    lidocaine (PF) (XYLOCAINE) 1 % injection 4 mL    triamcinolone (KENALOG-40) injection 40 mg      Past Medical History:   Patient Active Problem List   Diagnosis    Essential hypertension    Pure hypercholesterolemia    Impotence of organic origin    Status post lumbar spine operative procedure for decompression of spinal cord    Cough     Past Medical History:   Diagnosis Date    Cobblestone retinal degeneration     Impotence of organic origin     Nonsenile cataract     Pure hypercholesterolemia     Sciatica, unspecified laterality 04/2022    Unspecified essential hypertension     Vitreous detachment of both eyes         CC Silvana Do MD  [address] [facility] [id number]   [place] [address] on close of this encounter.

## 2024-02-16 NOTE — NURSING NOTE
Chief Complaint   Patient presents with    Skin Check     Patient presents for FBSE. The patient denies any new lesions of concern.      Jayla Hall LPN

## 2024-02-16 NOTE — PATIENT INSTRUCTIONS
Please start using the ketoconazole shampoo as prescribed.    We have given you 'duoderm' bandages. Please cut a small piece and place on top of the sore on your scalp. Do this daily until healed. If it doesn't heal, we should talk about biopsy at your next visit.     Cryotherapy    What is it?  Use of a very cold liquid, such as liquid nitrogen, to freeze and destroy abnormal skin cells that need to be removed    What should I expect?  Tenderness and redness  A small blister that might grow and fill with dark purple blood. There may be crusting.  More than one treatment may be needed if the lesions do not go away.    How do I care for the treated area?  Gently wash the area with your hands when bathing.  Use a thin layer of Vaseline to help with healing. You may use a Band-Aid.   The area should heal within 7-10 days and may leave behind a pink or lighter color.   Do not use an antibiotic or Neosporin ointment.   You may take acetaminophen (Tylenol) for pain.     Call your doctor if you have:  Severe pain  Signs of infection (warmth, redness, cloudy yellow drainage, and or a bad smell)  Questions or concerns    Who should I call with questions?      [facility]: [phone]      [facility]: [phone]      For urgent needs outside of business hours call the [facility] at [phone] and ask for the dermatology resident on call

## 2024-03-19 NOTE — PATIENT INSTRUCTIONS
Preparing for Your Surgery      Name:  Felice Blood   MRN:  [id number]   :  1940   Today's Date:  2022       Arriving for surgery:  Surgery date:  2022  Arrival time:  8:15 am    Restrictions due to COVID 19       Effective 22 [facility] is implementing the following visitor policy:     1 person may accompany the patient through the Pre-Op process.      That same person may wait in the Surgery Waiting room, provided there is enough room to social distance         Inpatients are allowed 2 visitors per day for the duration of their stay.        Visitors must wear a mask.      Visitors must not be ill.      Visiting hours are 8 am to 8 pm.    [place] parking is available for anyone with mobility limitations or disabilities.  ([place]  24 hours/ 7 days a week; [facility]  7 am- 3:30 pm, Mon- Fri)    Please come to:     [facility] [address]  [facility]   [address] [place]  [address]    -Parking is available in the Green Ramp     -Proceed to the 3rd floor, check in at the Adult Surgery Waiting Lounge. [phone]    If an escort is needed stop at the Information Desk in the lobby.     What can I eat or drink?  -  You may eat and drink normally for up to 8 hours before your surgery. (Until 2 am)  -  You may have clear liquids until 2 hours before surgery. (Until 8:15 am arrival time)    Examples of clear liquids:  Water  Clear broth  Juices (apple, white grape, white cranberry  and cider) without pulp  Noncarbonated, powder based beverages  (lemonade and Mohan-Aid)  Sodas (Sprite, 7-Up, ginger ale and seltzer)  Coffee or tea (without milk or cream)  Gatorade    -  No Alcohol for at least 24 hours before surgery     Which medicines can I take?    Hold Aspirin for 7 days before surgery.     Hold Multivitamins for 7 days before surgery.  Hold Supplements for 7 days before surgery.  Hold Ibuprofen  (Advil, Motrin) for 1 day before surgery--unless otherwise directed by surgeon.  Hold Naproxen (Aleve) for 4 days before surgery.    Hold Sildenafil for at least 24 hours before surgery     -  DO NOT take these medications the day of surgery:  Calcium  Vitamin D        -  PLEASE TAKE these medications the day of surgery:  Inhaler as usual and bring to hospital  Nasal sprays per your routine  Hydroxyzine if needed        How do I prepare myself?  - Please take 2 showers before surgery using Scrubcare or Hibiclens soap.    Use this soap only from the neck to your toes.     Leave the soap on your skin for one minute--then rinse thoroughly.      You may use your own shampoo and conditioner; no other hair products.   - Please remove all jewelry and body piercings.  - No lotions, deodorants or fragrance.  - No makeup or fingernail polish.   - Bring your ID and insurance card.    -If you have a Deep Brain Stimulator, Spinal Cord Stimulator or any neuro stimulator device---you must bring the remote control to the hospital     - All patients are required to have a Covid-19 test within 4 days of surgery/procedure.      -Patients will be contacted by the [facility] scheduling team within 1 week of surgery to make an appointment.      - Patients may call the Scheduling team at [phone] if they have not been scheduled within 4 days of  surgery.      ALL PATIENTS GOING HOME THE SAME DAY OF SURGERY ARE REQUIRED TO HAVE A RESPONSIBLE ADULT TO DRIVE AND BE IN ATTENDANCE WITH THEM FOR 24 HOURS FOLLOWING SURGERY.      Questions or Concerns:    - For any questions regarding the day of surgery or your hospital stay, please contact the Pre Admission Nursing Office at [phone].       - If you have health changes between today and your surgery please call your surgeon.       For questions after surgery please call your surgeons office.                            [facility] NEPHROLOGY- PROGRESS NOTE    64y Male with history of CHF presents with SOB. Nephrology consulted for elevated Scr.    REVIEW OF SYSTEMS:  Gen: no fevers  Cards: no chest pain  Resp: no dyspnea  GI: no nausea or vomiting or diarrhea  Vascular: + LE edema improving    No Known Allergies      Hospital Medications: Medications reviewed      VITALS:  T(F): 97.7 (03-19-24 @ 04:50), Max: 98.6 (03-18-24 @ 19:59)  HR: 71 (03-19-24 @ 04:50)  BP: 127/85 (03-19-24 @ 04:50)  RR: 17 (03-19-24 @ 04:50)  SpO2: 100% (03-19-24 @ 04:50)  Wt(kg): --    03-18 @ 07:01  -  03-19 @ 07:00  --------------------------------------------------------  IN: 0 mL / OUT: 1250 mL / NET: -1250 mL        PHYSICAL EXAM:    Gen: NAD, calm  Cards: RRR, +S1/S2, no M/G/R  Resp: CTA B/L  GI: soft, NT/ND, NABS  Vascular: 1+ LE edema B/L      LABS:  03-19    138  |  91<L>  |  53<H>  ----------------------------<  108<H>  3.7   |  37<H>  |  1.36<H>    Ca    9.3      19 Mar 2024 06:55  Phos  3.7     03-19  Mg     2.10     03-19      Creatinine Trend: 1.36 <--, 1.44 <--, 1.54 <--, 1.42 <--, 1.41 <--, 1.40 <--, 1.46 <--                        12.4   4.85  )-----------( 259      ( 19 Mar 2024 06:55 )             37.6     Urine Studies:  Urinalysis Basic - ( 19 Mar 2024 06:55 )    Color:  / Appearance:  / SG:  / pH:   Gluc: 108 mg/dL / Ketone:   / Bili:  / Urobili:    Blood:  / Protein:  / Nitrite:    Leuk Esterase:  / RBC:  / WBC    Sq Epi:  / Non Sq Epi:  / Bacteria:       Creatinine, Random Urine: 27 mg/dL (03-19 @ 01:00)  Protein/Creatinine Ratio Calculation: 0.3 Ratio (03-19 @ 01:00)

## 2024-04-15 ENCOUNTER — TELEPHONE (OUTPATIENT)
Dept: FAMILY MEDICINE | Facility: CLINIC | Age: 84
End: 2024-04-15
Payer: MEDICARE

## 2024-04-15 NOTE — TELEPHONE ENCOUNTER
Patient confirmed scheduled appointment:  Date: 9/17  Time: 9:30a  Visit type: epp  Provider: pcp  Location: [facility] [place]

## 2024-05-14 ENCOUNTER — TELEPHONE (OUTPATIENT)
Dept: DERMATOLOGY | Facility: CLINIC | Age: 84
End: 2024-05-14
Payer: MEDICARE

## 2024-05-14 NOTE — TELEPHONE ENCOUNTER
Patient Contacted and schedule the following:    Appointment type: Return   Provider: Dr. Do  Return date: 1/03/25  Specialty phone number: [phone]

## 2024-06-20 ENCOUNTER — OFFICE VISIT (OUTPATIENT)
Dept: OPHTHALMOLOGY | Facility: CLINIC | Age: 84
End: 2024-06-20
Attending: OPHTHALMOLOGY
Payer: MEDICARE

## 2024-06-20 DIAGNOSIS — Z96.1 PSEUDOPHAKIA OF BOTH EYES: Primary | ICD-10-CM

## 2024-06-20 DIAGNOSIS — H35.342 LAMELLAR MACULAR HOLE, LEFT: ICD-10-CM

## 2024-06-20 DIAGNOSIS — H26.492 PCO (POSTERIOR CAPSULAR OPACIFICATION), LEFT: ICD-10-CM

## 2024-06-20 DIAGNOSIS — H43.21 ASTEROID HYALITIS OF RIGHT EYE: ICD-10-CM

## 2024-06-20 DIAGNOSIS — H35.373 EPIRETINAL MEMBRANE (ERM) OF BOTH EYES: ICD-10-CM

## 2024-06-20 PROCEDURE — G0463 HOSPITAL OUTPT CLINIC VISIT: HCPCS | Performed by: OPHTHALMOLOGY

## 2024-06-20 PROCEDURE — 92134 CPTRZ OPH DX IMG PST SGM RTA: CPT | Performed by: OPHTHALMOLOGY

## 2024-06-20 PROCEDURE — 92014 COMPRE OPH EXAM EST PT 1/>: CPT | Performed by: OPHTHALMOLOGY

## 2024-06-20 ASSESSMENT — REFRACTION_MANIFEST
OD_AXIS: 160
OS_ADD: +2.75
OD_CYLINDER: +1.00
OS_CYLINDER: SPHERE
OS_SPHERE: -2.00
OD_ADD: +2.75
OD_SPHERE: -2.00

## 2024-06-20 ASSESSMENT — REFRACTION_WEARINGRX
SPECS_TYPE: PAL
OS_CYLINDER: +0.50
OD_CYLINDER: +1.00
OS_AXIS: 025
OS_SPHERE: -2.00
OD_ADD: +2.75
OD_AXIS: 165
OS_ADD: +2.75
OD_SPHERE: -2.25

## 2024-06-20 ASSESSMENT — SLIT LAMP EXAM - LIDS: COMMENTS: MILD MGD

## 2024-06-20 ASSESSMENT — VISUAL ACUITY
OD_CC+: -1
OS_CC: 20/50
CORRECTION_TYPE: GLASSES
OS_CC+: -1
METHOD: SNELLEN - LINEAR
OD_CC: 20/50

## 2024-06-20 ASSESSMENT — TONOMETRY
OD_IOP_MMHG: 15
IOP_METHOD: TONOPEN
OS_IOP_MMHG: 13

## 2024-06-20 ASSESSMENT — CONF VISUAL FIELD
OD_SUPERIOR_TEMPORAL_RESTRICTION: 0
OD_NORMAL: 1
OS_INFERIOR_NASAL_RESTRICTION: 0
OS_INFERIOR_TEMPORAL_RESTRICTION: 0
OD_INFERIOR_TEMPORAL_RESTRICTION: 0
OS_NORMAL: 1
METHOD: COUNTING FINGERS
OD_SUPERIOR_NASAL_RESTRICTION: 0
OS_SUPERIOR_NASAL_RESTRICTION: 0
OD_INFERIOR_NASAL_RESTRICTION: 0
OS_SUPERIOR_TEMPORAL_RESTRICTION: 0

## 2024-06-20 ASSESSMENT — CUP TO DISC RATIO
OD_RATIO: 0.3
OS_RATIO: 0.3

## 2024-06-20 NOTE — PROGRESS NOTES
HPI       Annual Eye Exam    In both eyes.  Since onset it is stable.  Associated symptoms include floaters.  Negative for eye pain and flashes.  Treatments tried include no treatments.             Comments    Felice Blood is a(n) 83 year old male who presents for a comprehensive exam. Last eye exam was 1 year(s) ago. Since exam, vision is about the same. No lubricating drops. No flashes with occasional floaters. No eye pain.     Jeronimo Kearns COT 7:50 AM June 20, 2024               Last edited by Jeronimo Kearns on 6/20/2024  7:50 AM.          Review of systems for the eyes was negative other than the pertinent positives/negatives listed in the HPI.      Assessment & Plan      Felice Blood is a 83 year old male with the following diagnoses:   1. Pseudophakia of both eyes    2. PCO (posterior capsular opacification), left    3. Asteroid hyalitis of right eye    4. Epiretinal membrane (ERM) of both eyes    5. Lamellar macular hole, left           Here for annual dilated fundus exam   Mild change noted in both eyes vision with occasional blur and glare     Reduced on exam to 20/50 right eye and 20/25 both eyes   Epiretinal membrane progressing right eye   New Epiretinal membrane identified c lamellar hole left eye   Discussed etiology for vision loss and possible treatment for the right eye   He would like to try new glasses first  Refractive options reviewed  Refraction given   Return precautions reviewed     Patient disposition:   Return in about 1 year (around 6/20/2025) for DFE, OCT Macula.           Attending Physician Attestation:  Complete documentation of historical and exam elements from today's encounter can be found in the full encounter summary report (not reduplicated in this progress note).  I personally obtained the chief complaint(s) and history of present illness.  I confirmed and edited as necessary the review of systems, past medical/surgical history, family history, social history, and examination  findings as documented by others; and I examined the patient myself.  I personally reviewed the relevant tests, images, and reports as documented above.  I formulated and edited as necessary the assessment and plan and discussed the findings and management plan with the patient and family. . - Camron Hansen MD

## 2024-06-30 DIAGNOSIS — N52.9 ERECTILE DYSFUNCTION, UNSPECIFIED ERECTILE DYSFUNCTION TYPE: Primary | ICD-10-CM

## 2024-07-09 RX ORDER — SILDENAFIL CITRATE 20 MG/1
TABLET ORAL
Qty: 60 TABLET | Refills: 1 | Status: SHIPPED | OUTPATIENT
Start: 2024-07-09 | End: 2024-09-16

## 2024-08-22 ENCOUNTER — OFFICE VISIT (OUTPATIENT)
Dept: FAMILY MEDICINE | Facility: CLINIC | Age: 84
End: 2024-08-22
Payer: MEDICARE

## 2024-08-22 ENCOUNTER — TELEPHONE (OUTPATIENT)
Dept: NEUROLOGY | Facility: CLINIC | Age: 84
End: 2024-08-22

## 2024-08-22 VITALS
WEIGHT: 157.1 LBS | BODY MASS INDEX: 23.2 KG/M2 | OXYGEN SATURATION: 97 % | DIASTOLIC BLOOD PRESSURE: 85 MMHG | SYSTOLIC BLOOD PRESSURE: 144 MMHG | HEART RATE: 67 BPM

## 2024-08-22 DIAGNOSIS — M79.605 PAIN OF LEFT LOWER EXTREMITY: ICD-10-CM

## 2024-08-22 DIAGNOSIS — R05.9 COUGH, UNSPECIFIED TYPE: ICD-10-CM

## 2024-08-22 DIAGNOSIS — R29.898 LEFT LEG WEAKNESS: Primary | ICD-10-CM

## 2024-08-22 PROCEDURE — G2211 COMPLEX E/M VISIT ADD ON: HCPCS | Performed by: FAMILY MEDICINE

## 2024-08-22 PROCEDURE — 99214 OFFICE O/P EST MOD 30 MIN: CPT | Performed by: FAMILY MEDICINE

## 2024-08-22 RX ORDER — MONTELUKAST SODIUM 10 MG/1
10 TABLET ORAL AT BEDTIME
Qty: 30 TABLET | Refills: 11 | Status: SHIPPED | OUTPATIENT
Start: 2024-08-22 | End: 2024-09-17

## 2024-08-22 RX ORDER — METHYLPREDNISOLONE 4 MG
TABLET, DOSE PACK ORAL
Qty: 21 TABLET | Refills: 0 | Status: SHIPPED | OUTPATIENT
Start: 2024-08-22

## 2024-08-22 NOTE — PROGRESS NOTES
Assessment & Plan     Left leg weakness  EMG last. Discussed rationale for each  - methylPREDNISolone (MEDROL DOSEPAK) 4 MG tablet therapy pack; Follow Package Directions  - Physical Therapy  Referral; Future  - Spine  Referral; Future  - EMG; Future    Pain of left lower extremity  Same  - MR Lumbar Spine w/o & w Contrast; Future  - methylPREDNISolone (MEDROL DOSEPAK) 4 MG tablet therapy pack; Follow Package Directions  - Physical Therapy  Referral; Future  - Spine  Referral; Future  - EMG; Future    Cough, unspecified type  Try  - montelukast (SINGULAIR) 10 MG tablet; Take 1 tablet (10 mg) by mouth at bedtime.    The longitudinal plan of care for the diagnosis(es)/condition(s) as documented were addressed during this visit. Due to the added complexity in care, I will continue to support Felice in the subsequent management and with ongoing continuity of care.  35 minutes spent by me on the date of the encounter doing chart review, history and exam, documentation and further activities per the note          No follow-ups on file.    Subjective   Felice is a 83 year old, presenting for the following health issues:  Leg Problem (Left leg weakness for past 3 weeks.) and Throat Problem (Thick discharge in throat at night.)      8/22/2024    10:57 AM   Additional Questions   Roomed by KTR   Accompanied by Jeannette(spouse)     History of Present Illness       Reason for visit:  Left leg weakness  Symptom onset:  3-4 weeks ago  Symptom intensity:  Moderate  Symptom progression:  Staying the same  Had these symptoms before:  No  What makes it worse:  Carrying objects moving up the stairs.   He is taking medications regularly.   Spine surgery several years ago for lpb bilat sciatica; very improved post. Last three weeks new issue; no lbp. L leg only. No sx sit/lie down. No trauma. If walks mild sx. Going up stairs, L leg is weak and painful. Ok going down stairs.   Pain whole leg, notes  when going up stairs and pushes L leg down to propel.   Not numb.   Not red warm swollen    Also notes pnd nights, some gerd too  Notes sx worse if around env irritants eg campfire smoke    Past Medical History:   Diagnosis Date    Cobblestone retinal degeneration     Impotence of organic origin     Nonsenile cataract     Pure hypercholesterolemia     Sciatica, unspecified laterality 04/2022    Unspecified essential hypertension     Vitreous detachment of both eyes      Past Surgical History:   Procedure Laterality Date    APPENDECTOMY      CATARACT IOL, RT/LT Right 05/01/2017    DECOMPRESSION LUMBAR ONE LEVEL N/A 4/21/2022    Procedure: Lumbar 2 to 3 decompression and discectomy;  Surgeon: Wolfgang Cabral MD;  Location: [facility] OR    PHACOEMULSIFICATION WITH STANDARD INTRAOCULAR LENS IMPLANT Right 5/1/2017    Procedure: PHACOEMULSIFICATION WITH STANDARD INTRAOCULAR LENS IMPLANT;  Right Eye Phacoemulsification with intraocular Lens Implant;  Surgeon: Camron Hansen MD;  Location: [facility] OR    TONSILLECTOMY       Current Outpatient Medications   Medication Sig Dispense Refill    acetaminophen (TYLENOL) 325 MG tablet Take 2 tablets (650 mg) by mouth every 4 hours as needed for other (mild pain) 100 tablet 0    albuterol (PROAIR HFA/PROVENTIL HFA/VENTOLIN HFA) 108 (90 Base) MCG/ACT inhaler Inhale 2 puffs into the lungs every 4 hours as needed for shortness of breath or wheezing 18 g 0    atorvastatin (LIPITOR) 10 MG tablet Take 1 tablet (10 mg) by mouth daily 90 tablet 3    Calcium-Magnesium-Zinc 333-133-5 MG TABS per tablet Take 1 tablet by mouth every morning      cholecalciferol (VITAMIN D3) 25 mcg (1000 units) capsule Take 1 capsule by mouth every morning      fluticasone (FLONASE) 50 MCG/ACT spray Spray 2 sprays into both nostrils At Bedtime (Patient taking differently: Spray 2 sprays into both nostrils as needed.) 1 Bottle 1    hydrOXYzine (ATARAX) 10 MG tablet Take 1 tablet (10 mg) by mouth every 6  hours as needed for itching or anxiety (with pain, moderate pain) 30 tablet 0    ipratropium (ATROVENT) 0.06 % nasal spray Spray 2 sprays into both nostrils 4 times daily as needed for rhinitis 15 mL 11    ketoconazole (NIZORAL) 2 % external shampoo Massage into wet scalp, let sit 3-5 min, then rinse. Do this 3x weekly. 120 mL 11    losartan (COZAAR) 25 MG tablet Take 1 tablet (25 mg) by mouth daily 90 tablet 3    methylPREDNISolone (MEDROL DOSEPAK) 4 MG tablet therapy pack Follow Package Directions 21 tablet 0    montelukast (SINGULAIR) 10 MG tablet Take 1 tablet (10 mg) by mouth at bedtime. 30 tablet 11    ondansetron (ZOFRAN-ODT) 4 MG ODT tab Take 1 tablet (4 mg) by mouth every 8 hours as needed for nausea Dissolve ON the tongue. 10 tablet 0    oxyCODONE (ROXICODONE) 5 MG tablet Take 1-2 tablets (5-10 mg) by mouth every 6 hours as needed for severe pain (Moderate to Severe) 28 tablet 0    polyethylene glycol (MIRALAX) 17 g packet Take 17 g by mouth daily 7 packet 0    senna-docusate (SENOKOT-S/PERICOLACE) 8.6-50 MG tablet Take 1-2 tablets by mouth 2 times daily Take while on oral narcotics to prevent or treat constipation. 30 tablet 0    sildenafil (REVATIO) 20 MG tablet Take 5 tablets by mouth once daily as needed 60 tablet 1    sildenafil (VIAGRA) 100 MG tablet Take 1 tablet (100 mg) by mouth daily as needed 20 tablet 11    tadalafil (CIALIS) 20 MG tablet Take 1 tablet (20 mg) by mouth daily as needed 30 tablet 11    triamcinolone (KENALOG) 0.1 % external cream Apply topically 2 times daily To legs as needed 80 g 1    gabapentin (NEURONTIN) 100 MG capsule Take 1 capsule (100 mg) by mouth 3 times daily for 2 days 6 capsule 0     Current Facility-Administered Medications   Medication Dose Route Frequency Provider Last Rate Last Admin    lidocaine (PF) (XYLOCAINE) 1 % injection 4 mL  4 mL   Dino Alba MD   4 mL at 10/24/22 1558    triamcinolone (KENALOG-40) injection 40 mg  40 mg   Dino Alba  MD   40 mg at 10/24/22 1558     Allergies   Allergen Reactions    Etodolac Unknown    Percocet [Oxycodone-Acetaminophen] Nausea     Other reaction(s): Muscle Weakness    Seasonal Allergies Cough     Sinus drainage, watery eyes     Family History   Problem Relation Age of Onset    Skin Cancer Father     Glaucoma No family hx of     Macular Degeneration No family hx of     Melanoma No family hx of      Social History     Socioeconomic History    Marital status:      Spouse name: Not on file    Number of children: Not on file    Years of education: Not on file    Highest education level: Not on file   Occupational History    Not on file   Tobacco Use    Smoking status: Never    Smokeless tobacco: Never   Substance and Sexual Activity    Alcohol use: Yes     Comment: 1 day    Drug use: Not on file    Sexual activity: Not on file   Other Topics Concern    Not on file   Social History Narrative    Not on file     Social Determinants of Health     Financial Resource Strain: Not on file   Food Insecurity: Not on file   Transportation Needs: Not on file   Physical Activity: Not on file   Stress: Not on file   Social Connections: Not on file   Interpersonal Safety: Not on file   Housing Stability: Not on file         Objective    BP (!) 144/85 (BP Location: Right arm, Patient Position: Sitting, Cuff Size: Adult Regular)   Pulse 67   Wt 71.3 kg (157 lb 1.6 oz)   SpO2 97%   BMI 23.20 kg/m    Body mass index is 23.2 kg/m .  Physical Exam   GENERAL: alert and no distress  MS: no gross musculoskeletal defects noted, no edema  Normal gait  Normal ability to walk on heels and toes  Left leg 4/5 strength throughtout, R leg 5/5  Pos SLR left, not on R  No red warm swollen in legs  Normal LT senstaion both legs              Signed Electronically by: Anthony Maddox MD

## 2024-08-23 ENCOUNTER — TELEPHONE (OUTPATIENT)
Dept: FAMILY MEDICINE | Facility: CLINIC | Age: 84
End: 2024-08-23
Payer: MEDICARE

## 2024-08-23 NOTE — TELEPHONE ENCOUNTER
Left Voicemail (1st Attempt) and Sent Mychart (1st Attempt) for the patient to call back and schedule the following:    Appointment type: MRI lumbar spine  Provider: per PCP  Return date: any  Specialty phone number: [phone]

## 2024-08-26 ENCOUNTER — THERAPY VISIT (OUTPATIENT)
Dept: PHYSICAL THERAPY | Facility: CLINIC | Age: 84
End: 2024-08-26
Attending: FAMILY MEDICINE
Payer: MEDICARE

## 2024-08-26 DIAGNOSIS — R29.898 LEFT LEG WEAKNESS: ICD-10-CM

## 2024-08-26 DIAGNOSIS — M79.605 PAIN OF LEFT LOWER EXTREMITY: ICD-10-CM

## 2024-08-26 DIAGNOSIS — M79.605 ACUTE PAIN OF LEFT LOWER EXTREMITY: Primary | ICD-10-CM

## 2024-08-26 PROCEDURE — 97110 THERAPEUTIC EXERCISES: CPT | Mod: GP | Performed by: PHYSICAL THERAPIST

## 2024-08-26 PROCEDURE — 97161 PT EVAL LOW COMPLEX 20 MIN: CPT | Mod: GP | Performed by: PHYSICAL THERAPIST

## 2024-08-26 PROCEDURE — 97112 NEUROMUSCULAR REEDUCATION: CPT | Mod: GP | Performed by: PHYSICAL THERAPIST

## 2024-08-26 ASSESSMENT — ACTIVITIES OF DAILY LIVING (ADL)
GOING UP 1 FLIGHT OF STAIRS: MODERATE DIFFICULTY
PLEASE_INDICATE_YOR_PRIMARY_REASON_FOR_REFERRAL_TO_THERAPY:: HIP
LIFTING_AN_OBJECT,_LIKE_A_BAG_OF_GROCERIES_FROM_THE_FLOOR: A LITTLE BIT OF DIFFICULTY
RISE FROM A CHAIR: ACTIVITY IS NOT DIFFICULT
DEEP SQUATTING: SLIGHT DIFFICULTY
STANDING FOR 15 MINUTES: NO DIFFICULTY AT ALL
GIVING WAY, BUCKLING OR SHIFTING OF KNEE: THE SYMPTOM AFFECTS MY ACTIVITY SLIGHTLY
HOS_ADL_SCORE(%): 85.29
WALKING_DOWN_STEEP_HILLS: NO DIFFICULTY AT ALL
RISE FROM A CHAIR: ACTIVITY IS NOT DIFFICULT
GO UP STAIRS: ACTIVITY IS SOMEWHAT DIFFICULT
STAND: ACTIVITY IS NOT DIFFICULT
WALKING_BETWEEN_ROOMS: NO DIFFICULTY
WALKING_APPROXIMATELY_10_MINUTES: NO DIFFICULTY AT ALL
HOS_ADL_ITEM_SCORE_TOTAL: 58
SITTING_FOR_15_MINUTES: NO DIFFICULTY AT ALL
GO DOWN STAIRS: ACTIVITY IS NOT DIFFICULT
LIMPING: I DO NOT HAVE THE SYMPTOM
ABILITY_TO_PERFORM_ACTIVITY_WITH_YOUR_NORMAL_TECHNIQUE: SLIGHT DIFFICULTY
HEAVY_WORK: MODERATE DIFFICULTY
ROLLING_OVER_IN_BED: NO DIFFICULTY
SWELLING: I DO NOT HAVE THE SYMPTOM
TWISTING/PIVOTING_ON_INVOLVED_LEG: SLIGHT DIFFICULTY
GETTING_INTO_AND_OUT_OF_A_BATH: NO DIFFICULTY
YOUR_USUAL_HOBBIES,_RECREATIONAL_OR_SPORTING_ACTIVITIES: A LITTLE BIT OF DIFFICULTY
DEEP_SQUATTING: SLIGHT DIFFICULTY
ROLLING OVER IN BED: NO DIFFICULTY AT ALL
SQUATTING: A LITTLE BIT OF DIFFICULTY
GOING_UP_OR_DOWN_10_STAIRS: MODERATE DIFFICULTY
WALKING_INITIALLY: NO DIFFICULTY AT ALL
HOW_WOULD_YOU_RATE_YOUR_CURRENT_LEVEL_OF_FUNCTION_DURING_YOUR_USUAL_ACTIVITIES_OF_DAILY_LIVING_FROM_0_TO_100_WITH_100_BEING_YOUR_LEVEL_OF_FUNCTION_PRIOR_TO_YOUR_HIP_PROBLEM_AND_0_BEING_THE_INABILITY_TO_PERFORM_ANY_OF_YOUR_USUAL_DAILY_ACTIVITIES?: 89
GO UP STAIRS: ACTIVITY IS SOMEWHAT DIFFICULT
KNEEL ON THE FRONT OF YOUR KNEE: ACTIVITY IS NOT DIFFICULT
SIT WITH YOUR KNEE BENT: ACTIVITY IS NOT DIFFICULT
HOW_WOULD_YOU_RATE_THE_OVERALL_FUNCTION_OF_YOUR_KNEE_DURING_YOUR_USUAL_DAILY_ACTIVITIES?: NEARLY NORMAL
HOS_ADL_HIGHEST_POTENTIAL_SCORE: 68
SQUAT: ACTIVITY IS MINIMALLY DIFFICULT
WALKING_FOR_APPROXIMATELY_10_MINUTES: NO DIFFICULTY AT ALL
KNEE_ACTIVITY_OF_DAILY_LIVING_SCORE: 82.86
KNEE_ACTIVITY_OF_DAILY_LIVING_SUM: 58
RECREATIONAL ACTIVITIES: SLIGHT DIFFICULTY
GOING_UP_1_FLIGHT_OF_STAIRS: MODERATE DIFFICULTY
STAND: ACTIVITY IS NOT DIFFICULT
WALKING_UP_STEEP_HILLS: MODERATE DIFFICULTY
PLEASE_INDICATE_YOR_PRIMARY_REASON_FOR_REFERRAL_TO_THERAPY:: KNEE
WALKING_UP_STEEP_HILLS: MODERATE DIFFICULTY
STEPPING UP AND DOWN CURBS: SLIGHT DIFFICULTY
HOW_WOULD_YOU_RATE_THE_CURRENT_FUNCTION_OF_YOUR_KNEE_DURING_YOUR_USUAL_DAILY_ACTIVITIES_ON_A_SCALE_FROM_0_TO_100_WITH_100_BEING_YOUR_LEVEL_OF_KNEE_FUNCTION_PRIOR_TO_YOUR_INJURY_AND_0_BEING_THE_INABILITY_TO_PERFORM_ANY_OF_YOUR_USUAL_DAILY_ACTIVITIES?: 89
SHOPPING: A LITTLE BIT OF DIFFICULTY
GETTING_INTO_AND_OUT_OF_A_BATHTUB: NO DIFFICULTY AT ALL
SITTING_FOR_1_HOUR: NO DIFFICULTY
RAW_SCORE: 58
STIFFNESS: I DO NOT HAVE THE SYMPTOM
WALKING_2_BLOCKS: NO DIFFICULTY
RUNNING_ON_EVEN_GROUND: A LITTLE BIT OF DIFFICULTY
GETTING_INTO_AND_OUT_OF_AN_AVERAGE_CAR: NO DIFFICULTY AT ALL
GO DOWN STAIRS: ACTIVITY IS NOT DIFFICULT
GETTING_INTO_OR_OUT_OF_A_CAR: NO DIFFICULTY
HOW_WOULD_YOU_RATE_THE_CURRENT_FUNCTION_OF_YOUR_KNEE_DURING_YOUR_USUAL_DAILY_ACTIVITIES_ON_A_SCALE_FROM_0_TO_100_WITH_100_BEING_YOUR_LEVEL_OF_KNEE_FUNCTION_PRIOR_TO_YOUR_INJURY_AND_0_BEING_THE_INABILITY_TO_PERFORM_ANY_OF_YOUR_USUAL_DAILY_ACTIVITIES?: 89
WALKING_A_MILE: NO DIFFICULTY
STANDING_FOR_15_MINUTES: NO DIFFICULTY AT ALL
HOW_WOULD_YOU_RATE_THE_OVERALL_FUNCTION_OF_YOUR_KNEE_DURING_YOUR_USUAL_DAILY_ACTIVITIES?: NEARLY NORMAL
WALKING_DOWN_STEEP_HILLS: NO DIFFICULTY AT ALL
MAKING_SHARP_TURNS_WHILE_RUNNING_FAST: A LITTLE BIT OF DIFFICULTY
WALKING_INITIALLY: NO DIFFICULTY AT ALL
SWELLING: I DO NOT HAVE THE SYMPTOM
AS_A_RESULT_OF_YOUR_KNEE_INJURY,_HOW_WOULD_YOU_RATE_YOUR_CURRENT_LEVEL_OF_DAILY_ACTIVITY?: NEARLY NORMAL
SQUAT: ACTIVITY IS MINIMALLY DIFFICULT
STANDING_FOR_1_HOUR: NO DIFFICULTY
WEAKNESS: THE SYMPTOM AFFECTS MY ACTIVITY MODERATELY
PUTTING_ON_SOCKS_AND_SHOES: NO DIFFICULTY AT ALL
TWISTING/PIVOTING ON INVOLVED LEG: SLIGHT DIFFICULTY
ADL_SCORE(%): 0
GOING_DOWN_1_FLIGHT_OF_STAIRS: NO DIFFICULTY AT ALL
WALK: ACTIVITY IS MINIMALLY DIFFICULT
ANY_OF_YOUR_USUAL_WORK,_HOUSEWORK_OR_SCHOOL_ACTIVITIES: A LITTLE BIT OF DIFFICULTY
PUTTING_ON_YOUR_SHOES_OR_SOCKS: NO DIFFICULTY
ADL_TOTAL_ITEM_SCORE: 0
GIVING WAY, BUCKLING OR SHIFTING OF KNEE: THE SYMPTOM AFFECTS MY ACTIVITY SLIGHTLY
SPORTS_COUNT: 9
AS_A_RESULT_OF_YOUR_KNEE_INJURY,_HOW_WOULD_YOU_RATE_YOUR_CURRENT_LEVEL_OF_DAILY_ACTIVITY?: NEARLY NORMAL
PAIN: THE SYMPTOM AFFECTS MY ACTIVITY MODERATELY
SITTING FOR 15 MINUTES: NO DIFFICULTY AT ALL
HOW_WOULD_YOU_RATE_YOUR_CURRENT_LEVEL_OF_FUNCTION_DURING_YOUR_USUAL_ACTIVITIES_OF_DAILY_LIVING_FROM_0_TO_100_WITH_100_BEING_YOUR_LEVEL_OF_FUNCTION_PRIOR_TO_YOUR_HIP_PROBLEM_AND_0_BEING_THE_INABILITY_TO_PERFORM_ANY_OF_YOUR_USUAL_DAILY_ACTIVITIES?: 89
LIGHT_TO_MODERATE_WORK: NO DIFFICULTY AT ALL
LIGHT_TO_MODERATE_WORK: NO DIFFICULTY AT ALL
STEPPING_UP_AND_DOWN_CURBS: SLIGHT DIFFICULTY
ADL_COUNT: 17
STIFFNESS: I DO NOT HAVE THE SYMPTOM
WALKING_15_MINUTES_OR_GREATER: NO DIFFICULTY AT ALL
SPORTS_TOTAL_ITEM_SCORE: 0
LIMPING: I DO NOT HAVE THE SYMPTOM
PERFORMING_LIGHT_ACTIVITIES_AROUND_YOUR_HOME: A LITTLE BIT OF DIFFICULTY
PLEASE_INDICATE_YOR_PRIMARY_REASON_FOR_REFERRAL_TO_THERAPY:: FOOT AND/OR ANKLE
WALK: ACTIVITY IS MINIMALLY DIFFICULT
LOW_IMPACT_ACTIVITIES_LIKE_FAST_WALKING: SLIGHT DIFFICULTY
GETTING_INTO_AND_OUT_OF_A_BATHTUB: NO DIFFICULTY AT ALL
RECREATIONAL_ACTIVITIES: SLIGHT DIFFICULTY
PUTTING ON SOCKS AND SHOES: NO DIFFICULTY AT ALL
PAIN: THE SYMPTOM AFFECTS MY ACTIVITY MODERATELY
SIT WITH YOUR KNEE BENT: ACTIVITY IS NOT DIFFICULT
GOING DOWN 1 FLIGHT OF STAIRS: NO DIFFICULTY AT ALL
WEAKNESS: THE SYMPTOM AFFECTS MY ACTIVITY MODERATELY
SPORTS_HIGHEST_POTENTIAL_SCORE: 36
SPORTS_SCORE(%): 0
KNEEL ON THE FRONT OF YOUR KNEE: ACTIVITY IS NOT DIFFICULT
LEFS_RAW_SCORE: 0
ROLLING_OVER_IN_BED: NO DIFFICULTY AT ALL
HOW_WOULD_YOU_RATE_YOUR_CURRENT_LEVEL_OF_FUNCTION?: NEARLY NORMAL
ADL_HIGHEST_POTENTIAL_SCORE: 68
RUNNING_ON_UNEVEN_GROUND: A LITTLE BIT OF DIFFICULTY
GETTING INTO AND OUT OF AN AVERAGE CAR: NO DIFFICULTY AT ALL
PERFORMING_HEAVY_ACTIVITIES_AROUND_YOUR_HOME: A LITTLE BIT OF DIFFICULTY
HEAVY_WORK: MODERATE DIFFICULTY
WALKING_15_MINUTES_OR_GREATER: NO DIFFICULTY AT ALL
LEFS_SCORE(%): 0

## 2024-08-26 NOTE — PROGRESS NOTES
"PHYSICAL THERAPY EVALUATION  Type of Visit: Evaluation              Subjective   About 3-4 weeks ago pt started to noticed weakness in left leg with tingling from foot to hip as he was carrying groceries up stairs. Kept happening off and on. Notices it mostly when going upstairs.       Presenting condition or subjective complaint: weakness in left leg when climbing stairs  Date of onset: 08/22/24 (PT order date)    Relevant medical history:     Dates & types of surgery: back surgery about 2 years ago - was getting pain down both legs. Resolved with the surgery and has had no problems.    Prior diagnostic imaging/testing results:     MRI scheduled  Prior therapy history for the same diagnosis, illness or injury: No      Prior Level of Function  Transfers: Independent  Ambulation: Independent  ADL: Independent  IADL:     Living Environment  Social support: With a significant other or spouse   Type of home: House   Stairs to enter the home: Yes       Ramp: Yes   Stairs inside the home: Yes   Is there a railing: Yes     Help at home: None  Equipment owned: Straight Cane; Walker; Walker with wheels; Crutches; Grab bars; Bath bench     Employment: No    Hobbies/Interests:      Patient goals for therapy: carry grocrries up stairs without left leg weakness/pqin    Pain assessment: See objective evaluation for additional pain details     Objective   LUMBAR SPINE EVALUATION  PAIN: Pain Level at Rest: 0/10  Pain Level with Use: 5/10  Pain Location: pt is rating \"weakness\" - does not have pain  Pain Quality: no pain  Pain Frequency: no pain  Pain is Worst: no pain  Pain is Exacerbated By: feeling L LE weakness when going up stairs  Pain is Relieved By: none  Pain Progression: Unchanged  INTEGUMENTARY (edema, incisions): WNL  POSTURE: Sitting Posture: Rounded shoulders, Lordosis decreased, Thoracic kyphosis increased  GAIT:   Weightbearing Status: WBAT  Assistive Device(s): None  Gait Deviations: WNL  BALANCE/PROPRIOCEPTION:  NT " today d/t time - will continue to assess  WEIGHTBEARING ALIGNMENT: WNL  NON-WEIGHTBEARING ALIGNMENT:    ROM: AROM WNL weakness in L LE reproduced with flexion and L SB  PELVIC/SI SCREEN:   STRENGTH:  hip ext 4+/5 bilat, abd 4+/5 bilat     MYOTOMES: WNL  DTR S:   CORD SIGNS:   DERMATOMES: WNL  NEURAL TENSION:  + slump L, negative SLR bilat  FLEXIBILITY:  decreased piriformis bilat  LUMBAR/HIP Special Tests:  Negative MENA bilat    PELVIS/SI SPECIAL TESTS:   FUNCTIONAL TESTS: Double Leg Squat: Anterior knee translation, Knee valgus, Hip internal rotation, and Improper use of glutes/hips  PALPATION:  tender L piriformis, glute med  SPINAL SEGMENTAL CONCLUSIONS:     ** Left leg weakness going up stairs was diminished in clinic after repeated extension in standing. Repeated flexion in standing increased leg weakness.   Assessment & Plan   CLINICAL IMPRESSIONS  Medical Diagnosis: L LE    Treatment Diagnosis: L LE   Impression/Assessment: Patient is a 83 year old male with left LE pain/weakness complaints.  The following significant findings have been identified: Pain, Decreased ROM/flexibility, Decreased joint mobility, Decreased strength, Impaired balance, and Impaired gait. These impairments interfere with their ability to perform self care tasks, recreational activities, household chores, driving , household mobility, and community mobility as compared to previous level of function.     Clinical Decision Making (Complexity):  Clinical Presentation: Stable/Uncomplicated  Clinical Presentation Rationale: based on medical and personal factors listed in PT evaluation  Clinical Decision Making (Complexity): Low complexity    PLAN OF CARE  Treatment Interventions:  Interventions: Manual Therapy, Neuromuscular Re-education, Therapeutic Activity, Therapeutic Exercise    Long Term Goals     PT Goal 1  Goal Identifier: Stairs  Goal Description: Pt will descend stairs with 0/10 pain  Rationale: to maximize safety and  independence with performance of ADLs and functional tasks;to maximize safety and independence within the home;to maximize safety and independence within the community  Goal Progress: Pt with 3-5/10 pain going downstairs  Target Date: 10/21/24      Frequency of Treatment: 1x/week  Duration of Treatment: 8 weeks    Recommended Referrals to Other Professionals:   Education Assessment:        Risks and benefits of evaluation/treatment have been explained.   Patient/Family/caregiver agrees with Plan of Care.     Evaluation Time:     PT Eval, Low Complexity Minutes (66288): 15       Signing Clinician: NA [facility]                                                                                   OUTPATIENT PHYSICAL THERAPY      PLAN OF TREATMENT FOR OUTPATIENT REHABILITATION   Patient's Last Name, First Name, Felice Brian YOB: 1940   Provider's Name   [facility]   Medical Record No.  [id number]     Onset Date: 08/22/24 (PT order date)  Start of Care Date: 08/26/24     Medical Diagnosis:  L LE      PT Treatment Diagnosis:  L LE Plan of Treatment  Frequency/Duration: 1x/week/ 8 weeks    Certification date from 08/26/24 to 10/21/24         See note for plan of treatment details and functional goals     Pilar Sebastian, PT                         I CERTIFY THE NEED FOR THESE SERVICES FURNISHED UNDER        THIS PLAN OF TREATMENT AND WHILE UNDER MY CARE     (Physician attestation of this document indicates review and certification of the therapy plan).              Referring Provider:  Anthony Maddox MD    Initial Assessment  See Epic Evaluation- Start of Care Date: 08/26/24

## 2024-09-10 ENCOUNTER — THERAPY VISIT (OUTPATIENT)
Dept: PHYSICAL THERAPY | Facility: CLINIC | Age: 84
End: 2024-09-10
Attending: FAMILY MEDICINE
Payer: MEDICARE

## 2024-09-10 DIAGNOSIS — N52.9 ERECTILE DYSFUNCTION, UNSPECIFIED ERECTILE DYSFUNCTION TYPE: ICD-10-CM

## 2024-09-10 DIAGNOSIS — I10 ESSENTIAL HYPERTENSION: ICD-10-CM

## 2024-09-10 DIAGNOSIS — M79.605 ACUTE PAIN OF LEFT LOWER EXTREMITY: Primary | ICD-10-CM

## 2024-09-10 PROCEDURE — 97112 NEUROMUSCULAR REEDUCATION: CPT | Mod: GP | Performed by: PHYSICAL THERAPIST

## 2024-09-10 PROCEDURE — 97110 THERAPEUTIC EXERCISES: CPT | Mod: GP | Performed by: PHYSICAL THERAPIST

## 2024-09-16 RX ORDER — SILDENAFIL CITRATE 20 MG/1
TABLET ORAL
Qty: 60 TABLET | Refills: 3 | Status: SHIPPED | OUTPATIENT
Start: 2024-09-16 | End: 2024-09-17

## 2024-09-16 RX ORDER — LOSARTAN POTASSIUM 25 MG/1
25 TABLET ORAL DAILY
Qty: 90 TABLET | Refills: 3 | Status: SHIPPED | OUTPATIENT
Start: 2024-09-16 | End: 2024-09-17

## 2024-09-16 NOTE — TELEPHONE ENCOUNTER
losartan (COZAAR) 25 MG tablet       Last Written Prescription Date:  9/14/23  Last Fill Quantity: 90,   # refills: 3  Last Office Visit : 8/22/24  Future Office visit:  9/17/24    Routing refill request to provider for review/approval because:  Angiotensin-II Receptors [id number]/10/2024 12:58 PM   Protocol Details Last blood pressure under 140/90 in past 12 months     BP Readings from Last 3 Encounters:   08/22/24 (!) 144/85   09/14/23 122/75   09/13/22 131/81       sildenafil (REVATIO) 20 MG tablet       Last Written Prescription Date:  7/9/24  Last Fill Quantity: 60,   # refills: 1  Last Office Visit : 8/22/24  Future Office visit:  9/17/24    Routing refill request to provider for review/approval because:  Antihypertensive Agents Jlmyiv54/10/2024 12:58 PM   Protocol Details Blood pressure under 140/90 in past 12 months     BP Readings from Last 3 Encounters:   08/22/24 (!) 144/85   09/14/23 122/75   09/13/22 131/81   Nataliia MICHELLE RN  P Central Nursing/Red Flag Triage & Med Refill Team

## 2024-09-17 ENCOUNTER — LAB (OUTPATIENT)
Dept: LAB | Facility: CLINIC | Age: 84
End: 2024-09-17
Payer: MEDICARE

## 2024-09-17 ENCOUNTER — OFFICE VISIT (OUTPATIENT)
Dept: FAMILY MEDICINE | Facility: CLINIC | Age: 84
End: 2024-09-17
Payer: MEDICARE

## 2024-09-17 VITALS
OXYGEN SATURATION: 95 % | SYSTOLIC BLOOD PRESSURE: 134 MMHG | DIASTOLIC BLOOD PRESSURE: 83 MMHG | WEIGHT: 156.2 LBS | HEART RATE: 82 BPM | BODY MASS INDEX: 23.13 KG/M2 | HEIGHT: 69 IN

## 2024-09-17 DIAGNOSIS — H61.23 BILATERAL IMPACTED CERUMEN: ICD-10-CM

## 2024-09-17 DIAGNOSIS — I10 ESSENTIAL HYPERTENSION: ICD-10-CM

## 2024-09-17 DIAGNOSIS — E78.00 PURE HYPERCHOLESTEROLEMIA: ICD-10-CM

## 2024-09-17 DIAGNOSIS — N52.9 ERECTILE DYSFUNCTION, UNSPECIFIED ERECTILE DYSFUNCTION TYPE: ICD-10-CM

## 2024-09-17 DIAGNOSIS — Z00.00 ENCOUNTER FOR MEDICARE ANNUAL WELLNESS EXAM: ICD-10-CM

## 2024-09-17 DIAGNOSIS — F43.9 SITUATIONAL STRESS: ICD-10-CM

## 2024-09-17 DIAGNOSIS — N52.9 IMPOTENCE OF ORGANIC ORIGIN: ICD-10-CM

## 2024-09-17 DIAGNOSIS — E78.00 PURE HYPERCHOLESTEROLEMIA: Primary | ICD-10-CM

## 2024-09-17 DIAGNOSIS — R05.9 COUGH, UNSPECIFIED TYPE: ICD-10-CM

## 2024-09-17 LAB
ALBUMIN SERPL BCG-MCNC: 4.1 G/DL (ref 3.5–5.2)
ALP SERPL-CCNC: 53 U/L (ref 40–150)
ALT SERPL W P-5'-P-CCNC: 13 U/L (ref 0–70)
ANION GAP SERPL CALCULATED.3IONS-SCNC: 8 MMOL/L (ref 7–15)
AST SERPL W P-5'-P-CCNC: 19 U/L (ref 0–45)
BASOPHILS # BLD AUTO: 0 10E3/UL (ref 0–0.2)
BASOPHILS NFR BLD AUTO: 0 %
BILIRUB SERPL-MCNC: 0.6 MG/DL
BUN SERPL-MCNC: 13.8 MG/DL (ref 8–23)
CALCIUM SERPL-MCNC: 9.6 MG/DL (ref 8.8–10.4)
CHLORIDE SERPL-SCNC: 100 MMOL/L (ref 98–107)
CHOLEST SERPL-MCNC: 140 MG/DL
CREAT SERPL-MCNC: 0.88 MG/DL (ref 0.67–1.17)
EGFRCR SERPLBLD CKD-EPI 2021: 85 ML/MIN/1.73M2
EOSINOPHIL # BLD AUTO: 0.1 10E3/UL (ref 0–0.7)
EOSINOPHIL NFR BLD AUTO: 2 %
ERYTHROCYTE [DISTWIDTH] IN BLOOD BY AUTOMATED COUNT: 13.1 % (ref 10–15)
FASTING STATUS PATIENT QL REPORTED: NO
FASTING STATUS PATIENT QL REPORTED: NO
GLUCOSE SERPL-MCNC: 79 MG/DL (ref 70–99)
HCO3 SERPL-SCNC: 27 MMOL/L (ref 22–29)
HCT VFR BLD AUTO: 44.1 % (ref 40–53)
HDLC SERPL-MCNC: 50 MG/DL
HGB BLD-MCNC: 15.5 G/DL (ref 13.3–17.7)
IMM GRANULOCYTES # BLD: 0 10E3/UL
IMM GRANULOCYTES NFR BLD: 0 %
LDLC SERPL CALC-MCNC: 70 MG/DL
LYMPHOCYTES # BLD AUTO: 1.9 10E3/UL (ref 0.8–5.3)
LYMPHOCYTES NFR BLD AUTO: 29 %
MCH RBC QN AUTO: 31.4 PG (ref 26.5–33)
MCHC RBC AUTO-ENTMCNC: 35.1 G/DL (ref 31.5–36.5)
MCV RBC AUTO: 90 FL (ref 78–100)
MONOCYTES # BLD AUTO: 0.5 10E3/UL (ref 0–1.3)
MONOCYTES NFR BLD AUTO: 7 %
NEUTROPHILS # BLD AUTO: 4.1 10E3/UL (ref 1.6–8.3)
NEUTROPHILS NFR BLD AUTO: 62 %
NONHDLC SERPL-MCNC: 90 MG/DL
NRBC # BLD AUTO: 0 10E3/UL
NRBC BLD AUTO-RTO: 0 /100
PLATELET # BLD AUTO: 242 10E3/UL (ref 150–450)
POTASSIUM SERPL-SCNC: 4.2 MMOL/L (ref 3.4–5.3)
PROT SERPL-MCNC: 6.8 G/DL (ref 6.4–8.3)
RBC # BLD AUTO: 4.93 10E6/UL (ref 4.4–5.9)
SODIUM SERPL-SCNC: 135 MMOL/L (ref 135–145)
T4 FREE SERPL-MCNC: 0.84 NG/DL (ref 0.9–1.7)
TRIGL SERPL-MCNC: 98 MG/DL
TSH SERPL DL<=0.005 MIU/L-ACNC: 6.31 UIU/ML (ref 0.3–4.2)
WBC # BLD AUTO: 6.6 10E3/UL (ref 4–11)

## 2024-09-17 PROCEDURE — 36415 COLL VENOUS BLD VENIPUNCTURE: CPT | Performed by: PATHOLOGY

## 2024-09-17 PROCEDURE — 80053 COMPREHEN METABOLIC PANEL: CPT | Performed by: PATHOLOGY

## 2024-09-17 PROCEDURE — 84443 ASSAY THYROID STIM HORMONE: CPT | Performed by: PATHOLOGY

## 2024-09-17 PROCEDURE — 84439 ASSAY OF FREE THYROXINE: CPT | Performed by: PATHOLOGY

## 2024-09-17 PROCEDURE — 85025 COMPLETE CBC W/AUTO DIFF WBC: CPT | Performed by: PATHOLOGY

## 2024-09-17 PROCEDURE — G0439 PPPS, SUBSEQ VISIT: HCPCS | Performed by: FAMILY MEDICINE

## 2024-09-17 PROCEDURE — 80061 LIPID PANEL: CPT | Performed by: PATHOLOGY

## 2024-09-17 RX ORDER — LOSARTAN POTASSIUM 25 MG/1
25 TABLET ORAL DAILY
Qty: 90 TABLET | Refills: 3 | Status: SHIPPED | OUTPATIENT
Start: 2024-09-17

## 2024-09-17 RX ORDER — SILDENAFIL CITRATE 20 MG/1
TABLET ORAL
Qty: 60 TABLET | Refills: 3 | Status: SHIPPED | OUTPATIENT
Start: 2024-09-17

## 2024-09-17 RX ORDER — ATORVASTATIN CALCIUM 10 MG/1
10 TABLET, FILM COATED ORAL DAILY
Qty: 90 TABLET | Refills: 3 | Status: SHIPPED | OUTPATIENT
Start: 2024-09-17

## 2024-09-17 RX ORDER — MONTELUKAST SODIUM 10 MG/1
10 TABLET ORAL AT BEDTIME
Qty: 90 TABLET | Refills: 3 | Status: SHIPPED | OUTPATIENT
Start: 2024-09-17

## 2024-09-17 NOTE — PROGRESS NOTES
Preventive Care Visit  [facility]  Anthony Maddox MD, Family Medicine  Sep 17, 2024      Assessment & Plan     Pure hypercholesterolemia    - Lipid panel reflex to direct LDL Non-fasting; Future  - atorvastatin (LIPITOR) 10 MG tablet; Take 1 tablet (10 mg) by mouth daily.    Essential hypertension    - Lipid panel reflex to direct LDL Non-fasting; Future  - Comprehensive metabolic panel (BMP + Alb, Alk Phos, ALT, AST, Total. Bili, TP); Future  - CBC with platelets and differential; Future  - TSH with free T4 reflex; Future  - atorvastatin (LIPITOR) 10 MG tablet; Take 1 tablet (10 mg) by mouth daily.  - losartan (COZAAR) 25 MG tablet; Take 1 tablet (25 mg) by mouth daily.    PURE HYPERCHOLESTEROLEM    - Lipid panel reflex to direct LDL Non-fasting; Future  - atorvastatin (LIPITOR) 10 MG tablet; Take 1 tablet (10 mg) by mouth daily.    Impotence of organic origin    - atorvastatin (LIPITOR) 10 MG tablet; Take 1 tablet (10 mg) by mouth daily.    Bilateral impacted cerumen    - atorvastatin (LIPITOR) 10 MG tablet; Take 1 tablet (10 mg) by mouth daily.    Cough, unspecified type    - montelukast (SINGULAIR) 10 MG tablet; Take 1 tablet (10 mg) by mouth at bedtime.    Erectile dysfunction, unspecified erectile dysfunction type    - sildenafil (REVATIO) 20 MG tablet; Take 5 tablets by mouth once daily as needed    Situational stress  Dsicussed    Encounter for Medicare annual wellness exam  Dne    Meds rvwd; tolerated, helpful for assigned condtions            Counseling  Appropriate preventive services were addressed with this patient via screening, questionnaire, or discussion as appropriate for fall prevention, nutrition, physical activity, Tobacco-use cessation, social engagement, weight loss and cognition.  Checklist reviewing preventive services available has been given to the patient.  Reviewed patient's diet, addressing concerns and/or questions.   The patient was  instructed to see the dentist every 6 months.   Discussed possible causes of fatigue.         No follow-ups on file.    Danni Viveros is a 83 year old, presenting for the following:  Physical        9/17/2024     9:21 AM   Additional Questions   Roomed by [facility] EMT   Accompanied by N/A       Health Care Directive  Patient has a Health Care Directive on file  Advance care planning document is on file and is current.    HPI  Doing well medically  Stress: wife w/ Alzheimers. Has good support thru her MD, family, defers need for therapy but discussed how stressful and hard it is  Defers wash but thinks ear plug w/ wax, if so will see own community ENT MD  Past Medical History:   Diagnosis Date    Arthritis     Cobblestone retinal degeneration     History of blood transfusion     Impotence of organic origin     Nonsenile cataract     Pure hypercholesterolemia     Sciatica, unspecified laterality 04/2022    Unspecified essential hypertension     Vitreous detachment of both eyes      Past Surgical History:   Procedure Laterality Date    ABDOMEN SURGERY      APPENDECTOMY      BIOPSY      CATARACT IOL, RT/LT Right 05/01/2017    COLONOSCOPY      DECOMPRESSION LUMBAR ONE LEVEL N/A 04/21/2022    Procedure: Lumbar 2 to 3 decompression and discectomy;  Surgeon: Wolfgang Cabral MD;  Location: [facility] OR    PHACOEMULSIFICATION WITH STANDARD INTRAOCULAR LENS IMPLANT Right 05/01/2017    Procedure: PHACOEMULSIFICATION WITH STANDARD INTRAOCULAR LENS IMPLANT;  Right Eye Phacoemulsification with intraocular Lens Implant;  Surgeon: Camron Hansen MD;  Location: [facility] OR    TONSILLECTOMY       Current Outpatient Medications   Medication Sig Dispense Refill    acetaminophen (TYLENOL) 325 MG tablet Take 2 tablets (650 mg) by mouth every 4 hours as needed for other (mild pain) 100 tablet 0    atorvastatin (LIPITOR) 10 MG tablet Take 1 tablet (10 mg) by mouth daily. 90 tablet 3    Calcium-Magnesium-Zinc 333-133-5 MG TABS per  tablet Take 1 tablet by mouth every morning      Cetirizine HCl (ZYRTEC PO) Take by mouth.      cholecalciferol (VITAMIN D3) 25 mcg (1000 units) capsule Take 1 capsule by mouth every morning      fluticasone (FLONASE) 50 MCG/ACT spray Spray 2 sprays into both nostrils At Bedtime (Patient taking differently: Spray 2 sprays into both nostrils as needed.) 1 Bottle 1    hydrOXYzine (ATARAX) 10 MG tablet Take 1 tablet (10 mg) by mouth every 6 hours as needed for itching or anxiety (with pain, moderate pain) 30 tablet 0    ketoconazole (NIZORAL) 2 % external shampoo Massage into wet scalp, let sit 3-5 min, then rinse. Do this 3x weekly. 120 mL 11    losartan (COZAAR) 25 MG tablet Take 1 tablet (25 mg) by mouth daily. 90 tablet 3    montelukast (SINGULAIR) 10 MG tablet Take 1 tablet (10 mg) by mouth at bedtime. 90 tablet 3    sildenafil (REVATIO) 20 MG tablet Take 5 tablets by mouth once daily as needed 60 tablet 3    tadalafil (CIALIS) 20 MG tablet Take 1 tablet (20 mg) by mouth daily as needed 30 tablet 11    triamcinolone (KENALOG) 0.1 % external cream Apply topically 2 times daily To legs as needed 80 g 1    albuterol (PROAIR HFA/PROVENTIL HFA/VENTOLIN HFA) 108 (90 Base) MCG/ACT inhaler Inhale 2 puffs into the lungs every 4 hours as needed for shortness of breath or wheezing (Patient not taking: Reported on 9/17/2024) 18 g 0    gabapentin (NEURONTIN) 100 MG capsule Take 1 capsule (100 mg) by mouth 3 times daily for 2 days 6 capsule 0    ipratropium (ATROVENT) 0.06 % nasal spray Spray 2 sprays into both nostrils 4 times daily as needed for rhinitis (Patient not taking: Reported on 9/17/2024) 15 mL 11    methylPREDNISolone (MEDROL DOSEPAK) 4 MG tablet therapy pack Follow Package Directions (Patient not taking: Reported on 9/17/2024) 21 tablet 0    ondansetron (ZOFRAN-ODT) 4 MG ODT tab Take 1 tablet (4 mg) by mouth every 8 hours as needed for nausea Dissolve ON the tongue. (Patient not taking: Reported on 9/17/2024) 10  tablet 0    oxyCODONE (ROXICODONE) 5 MG tablet Take 1-2 tablets (5-10 mg) by mouth every 6 hours as needed for severe pain (Moderate to Severe) (Patient not taking: Reported on 9/17/2024) 28 tablet 0    polyethylene glycol (MIRALAX) 17 g packet Take 17 g by mouth daily (Patient not taking: Reported on 9/17/2024) 7 packet 0    senna-docusate (SENOKOT-S/PERICOLACE) 8.6-50 MG tablet Take 1-2 tablets by mouth 2 times daily Take while on oral narcotics to prevent or treat constipation. (Patient not taking: Reported on 9/17/2024) 30 tablet 0    sildenafil (VIAGRA) 100 MG tablet Take 1 tablet (100 mg) by mouth daily as needed (Patient not taking: Reported on 9/17/2024) 20 tablet 11     Current Facility-Administered Medications   Medication Dose Route Frequency Provider Last Rate Last Admin    lidocaine (PF) (XYLOCAINE) 1 % injection 4 mL  4 mL   Dino Alba MD   4 mL at 10/24/22 1558    triamcinolone (KENALOG-40) injection 40 mg  40 mg   Dino Alba MD   40 mg at 10/24/22 1558     Allergies   Allergen Reactions    Etodolac Unknown    Percocet [Oxycodone-Acetaminophen] Nausea     Other reaction(s): Muscle Weakness    Seasonal Allergies Cough     Sinus drainage, watery eyes     Family History   Problem Relation Age of Onset    Skin Cancer Father     Hypertension Son     Breast Cancer Sister     Glaucoma No family hx of     Macular Degeneration No family hx of     Melanoma No family hx of      Social History     Socioeconomic History    Marital status:      Spouse name: Not on file    Number of children: Not on file    Years of education: Not on file    Highest education level: Not on file   Occupational History    Not on file   Tobacco Use    Smoking status: Never    Smokeless tobacco: Never   Substance and Sexual Activity    Alcohol use: Yes     Comment: 1 day    Drug use: Never    Sexual activity: Yes     Partners: Female     Birth control/protection: Female Surgical   Other Topics Concern     Parent/sibling w/ CABG, MI or angioplasty before 65F 55M? No   Social History Narrative    Not on file     Social Determinants of Health     Financial Resource Strain: Low Risk  (9/16/2024)    Financial Resource Strain     Within the past 12 months, have you or your family members you live with been unable to get utilities (heat, electricity) when it was really needed?: No   Food Insecurity: Low Risk  (9/16/2024)    Food Insecurity     Within the past 12 months, did you worry that your food would run out before you got money to buy more?: No     Within the past 12 months, did the food you bought just not last and you didn t have money to get more?: No   Transportation Needs: Low Risk  (9/16/2024)    Transportation Needs     Within the past 12 months, has lack of transportation kept you from medical appointments, getting your medicines, non-medical meetings or appointments, work, or from getting things that you need?: No   Physical Activity: Sufficiently Active (9/16/2024)    Exercise Vital Sign     Days of Exercise per Week: 6 days     Minutes of Exercise per Session: 30 min   Stress: Stress Concern Present (9/16/2024)    [employer] [place] [employer] - Occupational Stress Questionnaire     Feeling of Stress : To some extent   Social Connections: Unknown (9/16/2024)    Social Connection and Isolation Panel [NHANES]     Frequency of Communication with Friends and Family: Not on file     Frequency of Social Gatherings with Friends and Family: More than three times a week     Attends Sabianist Services: Not on file     Active Member of Clubs or Organizations: Not on file     Attends Club or Organization Meetings: Not on file     Marital Status: Not on file   Interpersonal Safety: Low Risk  (9/17/2024)    Interpersonal Safety     Do you feel physically and emotionally safe where you currently live?: Yes     Within the past 12 months, have you been hit, slapped, kicked or otherwise physically hurt by someone?:  No     Within the past 12 months, have you been humiliated or emotionally abused in other ways by your partner or ex-partner?: No   Housing Stability: Low Risk  (9/16/2024)    Housing Stability     Do you have housing? : Yes     Are you worried about losing your housing?: No         9/16/2024   General Health   How would you rate your overall physical health? Good   Feel stress (tense, anxious, or unable to sleep) To some extent      (!) STRESS CONCERN      9/16/2024   Nutrition   Diet: Regular (no restrictions)            9/16/2024   Exercise   Days per week of moderate/strenous exercise 6 days   Average minutes spent exercising at this level 30 min            9/16/2024   Social Factors   Frequency of gathering with friends or relatives More than three times a week   Worry food won't last until get money to buy more No   Food not last or not have enough money for food? No   Do you have housing? (Housing is defined as stable permanent housing and does not include staying ouside in a car, in a tent, in an abandoned building, in an overnight shelter, or couch-surfing.) Yes   Are you worried about losing your housing? No   Lack of transportation? No   Unable to get utilities (heat,electricity)? No            9/17/2024   Fall Risk   Gait Speed Test (Document in seconds) 2.96   Gait Speed Test Interpretation Less than or equal to 5.00 seconds - PASS             9/16/2024   Activities of Daily Living- Home Safety   Needs help with the following daily activites None of the above   Safety concerns in the home None of the above            9/16/2024   Dental   Dentist two times every year? (!) NO            9/16/2024   Hearing Screening   Hearing concerns? None of the above            9/16/2024   Driving Risk Screening   Patient/family members have concerns about driving No            9/16/2024   General Alertness/Fatigue Screening   Have you been more tired than usual lately? (!) YES            9/16/2024   Urinary  Incontinence Screening   Bothered by leaking urine in past 6 months No            9/16/2024   TB Screening   Were you born outside of the [place]? No            Today's PHQ-2 Score:       9/16/2024     4:28 PM   PHQ-2 ( 1999 [employer])   Q1: Little interest or pleasure in doing things 0   Q2: Feeling down, depressed or hopeless 0   PHQ-2 Score 0   Q1: Little interest or pleasure in doing things Not at all   Q2: Feeling down, depressed or hopeless Not at all   PHQ-2 Score 0           9/16/2024   Substance Use   Alcohol more than 3/day or more than 7/wk No   Do you have a current opioid prescription? No   How severe/bad is pain from 1 to 10? 0/10 (No Pain)   Do you use any other substances recreationally? No        Social History     Tobacco Use    Smoking status: Never    Smokeless tobacco: Never   Substance Use Topics    Alcohol use: Yes     Comment: 1 day    Drug use: Never       Reviewed and updated as needed this visit by Provider                    Current providers sharing in care for this patient include:  Patient Care Team:  Anthony Maddox MD as PCP - General (Family Practice)  Camron Hansen MD as MD (Ophthalmology)  Christine Vieira NP as Nurse Practitioner (Family Practice)  Anthony Maddox MD as Assigned PCP  Silvana Do MD as Assigned Surgical Provider    The following health maintenance items are reviewed in Epic and correct as of today:  Health Maintenance   Topic Date Due    ANNUAL REVIEW OF HM ORDERS  Never done    RSV VACCINE (1 - 1-dose 75+ series) Never done    DTAP/TDAP/TD IMMUNIZATION (1 - Tdap) 11/17/2015    INFLUENZA VACCINE (1) 09/01/2024    COVID-19 Vaccine (9 - 2024-25 season) 09/01/2024    LIPID  09/14/2024    MEDICARE ANNUAL WELLNESS VISIT  09/14/2024    FALL RISK ASSESSMENT  09/17/2025    COLORECTAL CANCER SCREENING  05/03/2026    ADVANCE CARE PLANNING  01/26/2027    PHQ-2 (once per calendar year)  Completed    Pneumococcal Vaccine: 65+ Years  Completed    ZOSTER  "IMMUNIZATION  Completed    HPV IMMUNIZATION  Aged Out    MENINGITIS IMMUNIZATION  Aged Out    RSV MONOCLONAL ANTIBODY  Aged Out            Objective    Exam  /83 (BP Location: Right arm, Patient Position: Sitting, Cuff Size: Adult Small)   Pulse 82   Ht 1.753 m (5' 9.02\")   Wt 70.9 kg (156 lb 3.2 oz)   SpO2 95%   BMI 23.06 kg/m     Estimated body mass index is 23.06 kg/m  as calculated from the following:    Height as of this encounter: 1.753 m (5' 9.02\").    Weight as of this encounter: 70.9 kg (156 lb 3.2 oz).    Physical Exam  GENERAL: alert and no distress  EYES: Eyes grossly normal to inspection, PERRL and conjunctivae and sclerae normal  HENT: ear canals and TM's normal, nose and mouth without ulcers or lesions  NECK: no adenopathy, no asymmetry, masses, or scars  RESP: lungs clear to auscultation - no rales, rhonchi or wheezes  CV: regular rate and rhythm, normal S1 S2, no S3 or S4, no murmur, click or rub, no peripheral edema  ABDOMEN: soft, nontender, no hepatosplenomegaly, no masses and bowel sounds normal   (male): normal male genitalia without lesions or urethral discharge, no hernia  MS: no gross musculoskeletal defects noted, no edema  SKIN: no suspicious lesions or rashes  NEURO: Normal strength and tone, mentation intact and speech normal  PSYCH: mentation appears normal, affect normal/bright        9/17/2024   Mini Cog   Clock Draw Score 2 Normal   3 Item Recall 3 objects recalled   Mini Cog Total Score 5            Signed Electronically by: Anthony Maddox MD    "

## 2024-09-17 NOTE — PATIENT INSTRUCTIONS
Patient Education   Preventive Care Advice   This is general advice given by our system to help you stay healthy. However, your care team may have specific advice just for you. Please talk to your care team about your preventive care needs.  Nutrition  Eat 5 or more servings of fruits and vegetables each day.  Try wheat bread, brown rice and whole grain pasta (instead of white bread, rice, and pasta).  Get enough calcium and vitamin D. Check the label on foods and aim for 100% of the RDA (recommended daily allowance).  Lifestyle  Exercise at least 150 minutes each week  (30 minutes a day, 5 days a week).  Do muscle strengthening activities 2 days a week. These help control your weight and prevent disease.  No smoking.  Wear sunscreen to prevent skin cancer.  Have a dental exam and cleaning every 6 months.  Yearly exams  See your health care team every year to talk about:  Any changes in your health.  Any medicines your care team has prescribed.  Preventive care, family planning, and ways to prevent chronic diseases.  Shots (vaccines)   HPV shots (up to age 26), if you've never had them before.  Hepatitis B shots (up to age 59), if you've never had them before.  COVID-19 shot: Get this shot when it's due.  Flu shot: Get a flu shot every year.  Tetanus shot: Get a tetanus shot every 10 years.  Pneumococcal, hepatitis A, and RSV shots: Ask your care team if you need these based on your risk.  Shingles shot (for age 50 and up)  General health tests  Diabetes screening:  Starting at age 35, Get screened for diabetes at least every 3 years.  If you are younger than age 35, ask your care team if you should be screened for diabetes.  Cholesterol test: At age 39, start having a cholesterol test every 5 years, or more often if advised.  Bone density scan (DEXA): At age 50, ask your care team if you should have this scan for osteoporosis (brittle bones).  Hepatitis C: Get tested at least once in your life.  STIs (sexually  transmitted infections)  Before age 24: Ask your care team if you should be screened for STIs.  After age 24: Get screened for STIs if you're at risk. You are at risk for STIs (including HIV) if:  You are sexually active with more than one person.  You don't use condoms every time.  You or a partner was diagnosed with a sexually transmitted infection.  If you are at risk for HIV, ask about PrEP medicine to prevent HIV.  Get tested for HIV at least once in your life, whether you are at risk for HIV or not.  Cancer screening tests  Cervical cancer screening: If you have a cervix, begin getting regular cervical cancer screening tests starting at age 21.  Breast cancer scan (mammogram): If you've ever had breasts, begin having regular mammograms starting at age 40. This is a scan to check for breast cancer.  Colon cancer screening: It is important to start screening for colon cancer at age 45.  Have a colonoscopy test every 10 years (or more often if you're at risk) Or, ask your provider about stool tests like a FIT test every year or Cologuard test every 3 years.  To learn more about your testing options, visit:   .  For help making a decision, visit:   https://bit.ly/bu70315.  Prostate cancer screening test: If you have a prostate, ask your care team if a prostate cancer screening test (PSA) at age 55 is right for you.  Lung cancer screening: If you are a current or former smoker ages 50 to 80, ask your care team if ongoing lung cancer screenings are right for you.  For informational purposes only. Not to replace the advice of your health care provider. Copyright   2023 [facility]. All rights reserved. Clinically reviewed by the [facility]. [employer] [id number] - REV 01/24.  Preventing Falls: Care Instructions  Injuries and health problems such as trouble walking or poor eyesight can increase your risk of falling. So can some medicines. But there are things you can do to help  "prevent falls. You can exercise to get stronger. You can also arrange your home to make it safer.    Talk to your doctor about the medicines you take. Ask if any of them increase the risk of falls and whether they can be changed or stopped.   Try to exercise regularly. It can help improve your strength and balance. This can help lower your risk of falling.     Practice fall safety and prevention.    Wear low-heeled shoes that fit well and give your feet good support. Talk to your doctor if you have foot problems that make this hard.  Carry a cellphone or wear a medical alert device that you can use to call for help.  Use stepladders instead of chairs to reach high objects. Don't climb if you're at risk for falls. Ask for help, if needed.  Wear the correct eyeglasses, if you need them.    Make your home safer.    Remove rugs, cords, clutter, and furniture from walkways.  Keep your house well lit. Use night-lights in hallways and bathrooms.  Install and use sturdy handrails on stairways.  Wear nonskid footwear, even inside. Don't walk barefoot or in socks without shoes.    Be safe outside.    Use handrails, curb cuts, and ramps whenever possible.  Keep your hands free by using a shoulder bag or backpack.  Try to walk in well-lit areas. Watch out for uneven ground, changes in pavement, and debris.  Be careful in the winter. Walk on the grass or gravel when sidewalks are slippery. Use de-icer on steps and walkways. Add non-slip devices to shoes.    Put grab bars and nonskid mats in your shower or tub and near the toilet. Try to use a shower chair or bath bench when bathing.   Get into a tub or shower by putting in your weaker leg first. Get out with your strong side first. Have a phone or medical alert device in the bathroom with you.   Where can you learn more?  Go to https://www.DotBlu.net/patiented  Enter G117 in the search box to learn more about \"Preventing Falls: Care Instructions.\"  Current as of: July 17, " 2023               Content Version: 14.0    3159-8403 [employer].   Care instructions adapted under license by your healthcare professional. If you have questions about a medical condition or this instruction, always ask your healthcare professional. [employer] disclaims any warranty or liability for your use of this information.      Learning About Sleeping Well  What does sleeping well mean?     Sleeping well means getting enough sleep to feel good and stay healthy. How much sleep is enough varies among people.  The number of hours you sleep and how you feel when you wake up are both important. If you do not feel refreshed, you probably need more sleep. Another sign of not getting enough sleep is feeling tired during the day.  Experts recommend that adults get at least 7 or more hours of sleep per day. Children and older adults need more sleep.  Why is getting enough sleep important?  Getting enough quality sleep is a basic part of good health. When your sleep suffers, your physical health, mood, and your thoughts can suffer too. You may find yourself feeling more grumpy or stressed. Not getting enough sleep also can lead to serious problems, including injury, accidents, anxiety, and depression.  What might cause poor sleeping?  Many things can cause sleep problems, including:  Changes to your sleep schedule.  Stress. Stress can be caused by fear about a single event, such as giving a speech. Or you may have ongoing stress, such as worry about work or school.  Depression, anxiety, and other mental or emotional conditions.  Changes in your sleep habits or surroundings. This includes changes that happen where you sleep, such as noise, light, or sleeping in a different bed. It also includes changes in your sleep pattern, such as having jet lag or working a late shift.  Health problems, such as pain, breathing problems, and restless legs syndrome.  Lack of regular exercise.  Using alcohol,  "nicotine, or caffeine before bed.  How can you help yourself?  Here are some tips that may help you sleep more soundly and wake up feeling more refreshed.  Your sleeping area   Use your bedroom only for sleeping and sex. A bit of light reading may help you fall asleep. But if it doesn't, do your reading elsewhere in the house. Try not to use your TV, computer, smartphone, or tablet while you are in bed.  Be sure your bed is big enough to stretch out comfortably, especially if you have a sleep partner.  Keep your bedroom quiet, dark, and cool. Use curtains, blinds, or a sleep mask to block out light. To block out noise, use earplugs, soothing music, or a \"white noise\" machine.  Your evening and bedtime routine   Create a relaxing bedtime routine. You might want to take a warm shower or bath, or listen to soothing music.  Go to bed at the same time every night. And get up at the same time every morning, even if you feel tired.  What to avoid   Limit caffeine (coffee, tea, caffeinated sodas) during the day, and don't have any for at least 6 hours before bedtime.  Avoid drinking alcohol before bedtime. Alcohol can cause you to wake up more often during the night.  Try not to smoke or use tobacco, especially in the evening. Nicotine can keep you awake.  Limit naps during the day, especially close to bedtime.  Avoid lying in bed awake for too long. If you can't fall asleep or if you wake up in the middle of the night and can't get back to sleep within about 20 minutes, get out of bed and go to another room until you feel sleepy.  Avoid taking medicine right before bed that may keep you awake or make you feel hyper or energized. Your doctor can tell you if your medicine may do this and if you can take it earlier in the day.  If you can't sleep   Imagine yourself in a peaceful, pleasant scene. Focus on the details and feelings of being in a place that is relaxing.  Get up and do a quiet or boring activity until you feel " "sleepy.  Avoid drinking any liquids before going to bed to help prevent waking up often to use the bathroom.  Where can you learn more?  Go to https://www.Imprivata.net/patiented  Enter J942 in the search box to learn more about \"Learning About Sleeping Well.\"  Current as of: July 10, 2023  Content Version: 14.1    0747-5769 [employer].   Care instructions adapted under license by your healthcare professional. If you have questions about a medical condition or this instruction, always ask your healthcare professional. [employer] disclaims any warranty or liability for your use of this information.       "

## 2024-09-25 ENCOUNTER — THERAPY VISIT (OUTPATIENT)
Dept: PHYSICAL THERAPY | Facility: CLINIC | Age: 84
End: 2024-09-25
Attending: FAMILY MEDICINE
Payer: MEDICARE

## 2024-09-25 DIAGNOSIS — M79.605 ACUTE PAIN OF LEFT LOWER EXTREMITY: Primary | ICD-10-CM

## 2024-09-25 PROCEDURE — 97110 THERAPEUTIC EXERCISES: CPT | Mod: GP | Performed by: PHYSICAL THERAPIST

## 2024-09-25 PROCEDURE — 97112 NEUROMUSCULAR REEDUCATION: CPT | Mod: GP | Performed by: PHYSICAL THERAPIST

## 2024-09-27 ENCOUNTER — ANCILLARY PROCEDURE (OUTPATIENT)
Dept: MRI IMAGING | Facility: CLINIC | Age: 84
End: 2024-09-27
Attending: FAMILY MEDICINE
Payer: MEDICARE

## 2024-09-27 DIAGNOSIS — M79.605 PAIN OF LEFT LOWER EXTREMITY: ICD-10-CM

## 2024-09-27 PROCEDURE — 72158 MRI LUMBAR SPINE W/O & W/DYE: CPT | Mod: MG | Performed by: STUDENT IN AN ORGANIZED HEALTH CARE EDUCATION/TRAINING PROGRAM

## 2024-09-27 PROCEDURE — A9585 GADOBUTROL INJECTION: HCPCS | Performed by: STUDENT IN AN ORGANIZED HEALTH CARE EDUCATION/TRAINING PROGRAM

## 2024-09-27 PROCEDURE — G1010 CDSM STANSON: HCPCS | Performed by: STUDENT IN AN ORGANIZED HEALTH CARE EDUCATION/TRAINING PROGRAM

## 2024-09-27 RX ORDER — GADOBUTROL 604.72 MG/ML
7.5 INJECTION INTRAVENOUS ONCE
Status: COMPLETED | OUTPATIENT
Start: 2024-09-27 | End: 2024-09-27

## 2024-09-27 RX ADMIN — GADOBUTROL 7 ML: 604.72 INJECTION INTRAVENOUS at 18:15

## 2024-10-02 ENCOUNTER — THERAPY VISIT (OUTPATIENT)
Dept: PHYSICAL THERAPY | Facility: CLINIC | Age: 84
End: 2024-10-02
Payer: MEDICARE

## 2024-10-02 DIAGNOSIS — M79.605 ACUTE PAIN OF LEFT LOWER EXTREMITY: Primary | ICD-10-CM

## 2024-10-02 PROCEDURE — 97110 THERAPEUTIC EXERCISES: CPT | Mod: GP | Performed by: PHYSICAL THERAPIST

## 2024-10-02 PROCEDURE — 97140 MANUAL THERAPY 1/> REGIONS: CPT | Mod: GP | Performed by: PHYSICAL THERAPIST

## 2024-10-03 ENCOUNTER — OFFICE VISIT (OUTPATIENT)
Dept: PHYSICAL MEDICINE AND REHAB | Facility: CLINIC | Age: 84
End: 2024-10-03
Attending: FAMILY MEDICINE
Payer: MEDICARE

## 2024-10-03 VITALS — SYSTOLIC BLOOD PRESSURE: 134 MMHG | HEART RATE: 76 BPM | DIASTOLIC BLOOD PRESSURE: 74 MMHG | OXYGEN SATURATION: 96 %

## 2024-10-03 DIAGNOSIS — M79.605 PAIN OF LEFT LOWER EXTREMITY: ICD-10-CM

## 2024-10-03 DIAGNOSIS — M53.3 DISORDER OF SACRUM: ICD-10-CM

## 2024-10-03 DIAGNOSIS — M79.18 MYALGIA, OTHER SITE: ICD-10-CM

## 2024-10-03 DIAGNOSIS — M47.817 LUMBOSACRAL SPONDYLOSIS WITHOUT MYELOPATHY: Primary | ICD-10-CM

## 2024-10-03 DIAGNOSIS — R29.898 LEFT LEG WEAKNESS: ICD-10-CM

## 2024-10-03 PROCEDURE — 99205 OFFICE O/P NEW HI 60 MIN: CPT | Performed by: PHYSICAL MEDICINE & REHABILITATION

## 2024-10-03 ASSESSMENT — PAIN SCALES - GENERAL: PAINLEVEL: NO PAIN (0)

## 2024-10-03 NOTE — NURSING NOTE
Chief Complaint   Patient presents with    Consult     [facility] PHYSICIAN - Left leg weakness ,Pain of left lower extremity     /74 (BP Location: Right arm, Patient Position: Sitting, Cuff Size: Adult Regular)   Pulse 76   SpO2 96%   [place] Sarah, EMT

## 2024-10-03 NOTE — PROGRESS NOTES
CC: I am seeing this patient for evaluation of left leg weakness and pain.    Patient is a very pleasant 83-year-old gentleman who has undergone L2-3 decompression and discectomy on 4/21/2022.  Surgery was performed by Dr. Wolfgang Cabral.  He had been doing quite well until recently when he developed weakness.  This was in the early part of August and he was seen by his primary on 8/22/2020 for with a 3 to 4-week history of left leg weakness and some pain and tingling.  There was no history of trauma.  He does take care of his wife who has Alzheimer's and does have to do multiple stairs.  He also lives [place] and has at least 14 steps to the main living area.  He does this several times a day.  He was evaluated and put on Medrol Dosepak.  He has noticed improvement in his weakness though there is still some residual weakness.  He has been doing physical therapy and apparently had some issues with his right knee.  That pain has since improved.  He has had some tingling on the left side but even that is improved.  He is here for evaluation.  He has been scheduled for EMG of the left lower extremity on 11/12/2024.    On a scale of 0-10 he rates his pain currently in the 3-4 out of 10 range.  He denies any issues with his bowel or bladder function.  He has not had any weight loss or fever.  Currently he does not report any numbness or tingling.  He is independent with his self-cares and mobility and is sleeping well.    MR LUMBAR SPINE W/O & W CONTRAST 9/27/2024 6:31 PM     History: 2022 lumbar surgery (note in Epic). Now, no trauma, three  weeks left leg weak and painful, getting worse, entire eg, no back  pain.; Pain of left lower extremity.     Additional history from EMR: L2 and L3 laminectomy and partial medial  facetectomies and foraminotomies for L2 and L3 nerve roots in  4/21/2022     ICD-10: Pain of left lower extremity     Comparison: Lumbar spine radiograph 6/7/2022 and MRI of the lumbar  spine from  11/17/2021     Technique: Sagittal STIR, T1-weighted turbo spin-echo, 3-D volumetric  T2-weighted and axial reconstructed T2-weighted images of the lumbar  spine were obtained without intravenous contrast.      Findings: There are 5 lumbar-type vertebrae, counting from C2.  The  tip of the conus medullaris is at  L1.  Regarding alignment, there is  grade 1 anterolisthesis at L2-3 which is slightly progressed since  2021. Multilevel disc height loss and disc degeneration most  pronounced at L2-3, L4-5 and L5-S1. Mixed fatty and edematous bone  marrow changes in the opposing endplates of L4-5 and L5-S1. No  worrisome bone marrow signal.     On a level by level basis:     L1-2: Disc bulge and facet hypertrophy without significant spinal  canal or foraminal narrowing.     L2-3: Chronic disc bulge and right subarticular disc extrusion,  slightly improved since 2021. Status post laminectomy and  foraminotomy. The anterolisthesis is slightly progressed. There is  mild to moderate right neural foraminal stenosis, slightly progressed.  Left neural foramen is patent. No definite spinal canal narrowing.  Effacement of right lateral recess with abutment of right traversing  L3 which is improved since 2021. Spinal canal patency is also improved  since 2021.     L3-4: Disc bulge and facet hypertrophy without significant spinal  canal or foraminal stenosis.     L4-5: Disc bulge and facet hypertrophy and thickening of the  ligamentum flavum. Mild spinal canal narrowing. No significant  foraminal stenosis.     L5-S1: Disc bulge and left greater than right facet hypertrophy.  Moderate left neural foraminal stenosis. Right neural foramen is  patent. Mild spinal canal narrowing and partial effacement of the left  lateral recess with abutment of left traversing S1. Findings are  unchanged.     Visualized retroperitoneal structures are unremarkable.                                                                      Impression: In  comparison to MRI from 11/17/2021;  1. Status post laminectomy and foraminotomy at L2-3. The  anterolisthesis at L2-3 slightly progressed. Previous right lateral  recess effacement and spinal canal narrowing is improved. Previous  disc herniation and mass effect is also improved with residual chronic  right subarticular disc extrusion. There is minimally worsened  mild-to-moderate right neural foraminal stenosis.  2. Stable mild spinal canal narrowing at L4-5.  3. Stable degenerative changes at L5-S1 with moderate left neural  foraminal stenosis and partial effacement of the left lateral recess  with abutment of left traversing S1.     AJITH WESLEY MD        Past Medical History:   Diagnosis Date    Arthritis     Cobblestone retinal degeneration     History of blood transfusion     Impotence of organic origin     Nonsenile cataract     Pure hypercholesterolemia     Sciatica, unspecified laterality 04/2022    Unspecified essential hypertension     Vitreous detachment of both eyes      Past Surgical History:   Procedure Laterality Date    ABDOMEN SURGERY      APPENDECTOMY      BIOPSY      CATARACT IOL, RT/LT Right 05/01/2017    COLONOSCOPY      DECOMPRESSION LUMBAR ONE LEVEL N/A 04/21/2022    Procedure: Lumbar 2 to 3 decompression and discectomy;  Surgeon: Wolfgang Cabral MD;  Location: [facility] OR    PHACOEMULSIFICATION WITH STANDARD INTRAOCULAR LENS IMPLANT Right 05/01/2017    Procedure: PHACOEMULSIFICATION WITH STANDARD INTRAOCULAR LENS IMPLANT;  Right Eye Phacoemulsification with intraocular Lens Implant;  Surgeon: Camron Hansen MD;  Location: [facility] OR    TONSILLECTOMY       Family History       Problem (# of Occurrences) Relation (Name,Age of Onset)    Hypertension (1) Son (Adalberto)    Breast Cancer (1) Sister (Alyssa)    Skin Cancer (1) Father           Negative family history of: Glaucoma, Macular Degeneration, Melanoma           Social History     Socioeconomic History    Marital status:       Spouse name: Not on file    Number of children: Not on file    Years of education: Not on file    Highest education level: Not on file   Occupational History    Not on file   Tobacco Use    Smoking status: Never    Smokeless tobacco: Never   Substance and Sexual Activity    Alcohol use: Yes     Comment: 1 day    Drug use: Never    Sexual activity: Yes     Partners: Female     Birth control/protection: Female Surgical   Other Topics Concern    Parent/sibling w/ CABG, MI or angioplasty before 65F 55M? No   Social History Narrative    Not on file     Social Determinants of Health     Financial Resource Strain: Low Risk  (9/16/2024)    Financial Resource Strain     Within the past 12 months, have you or your family members you live with been unable to get utilities (heat, electricity) when it was really needed?: No   Food Insecurity: Low Risk  (9/16/2024)    Food Insecurity     Within the past 12 months, did you worry that your food would run out before you got money to buy more?: No     Within the past 12 months, did the food you bought just not last and you didn t have money to get more?: No   Transportation Needs: Low Risk  (9/16/2024)    Transportation Needs     Within the past 12 months, has lack of transportation kept you from medical appointments, getting your medicines, non-medical meetings or appointments, work, or from getting things that you need?: No   Physical Activity: Sufficiently Active (9/16/2024)    Exercise Vital Sign     Days of Exercise per Week: 6 days     Minutes of Exercise per Session: 30 min   Stress: Stress Concern Present (9/16/2024)    [employer] - Occupational Stress Questionnaire     Feeling of Stress : To some extent   Social Connections: Unknown (9/16/2024)    Social Connection and Isolation Panel [NHANES]     Frequency of Communication with Friends and Family: Not on file     Frequency of Social Gatherings with Friends and Family: More than three times a  week     Attends Zoroastrian Services: Not on file     Active Member of Clubs or Organizations: Not on file     Attends Club or Organization Meetings: Not on file     Marital Status: Not on file   Interpersonal Safety: Low Risk  (9/17/2024)    Interpersonal Safety     Do you feel physically and emotionally safe where you currently live?: Yes     Within the past 12 months, have you been hit, slapped, kicked or otherwise physically hurt by someone?: No     Within the past 12 months, have you been humiliated or emotionally abused in other ways by your partner or ex-partner?: No   Housing Stability: Low Risk  (9/16/2024)    Housing Stability     Do you have housing? : Yes     Are you worried about losing your housing?: No       Current Outpatient Medications   Medication Sig Dispense Refill    acetaminophen (TYLENOL) 325 MG tablet Take 2 tablets (650 mg) by mouth every 4 hours as needed for other (mild pain) 100 tablet 0    albuterol (PROAIR HFA/PROVENTIL HFA/VENTOLIN HFA) 108 (90 Base) MCG/ACT inhaler Inhale 2 puffs into the lungs every 4 hours as needed for shortness of breath or wheezing (Patient not taking: Reported on 9/17/2024) 18 g 0    atorvastatin (LIPITOR) 10 MG tablet Take 1 tablet (10 mg) by mouth daily. 90 tablet 3    Calcium-Magnesium-Zinc 333-133-5 MG TABS per tablet Take 1 tablet by mouth every morning      Cetirizine HCl (ZYRTEC PO) Take by mouth.      cholecalciferol (VITAMIN D3) 25 mcg (1000 units) capsule Take 1 capsule by mouth every morning      fluticasone (FLONASE) 50 MCG/ACT spray Spray 2 sprays into both nostrils At Bedtime (Patient taking differently: Spray 2 sprays into both nostrils as needed.) 1 Bottle 1    gabapentin (NEURONTIN) 100 MG capsule Take 1 capsule (100 mg) by mouth 3 times daily for 2 days 6 capsule 0    hydrOXYzine (ATARAX) 10 MG tablet Take 1 tablet (10 mg) by mouth every 6 hours as needed for itching or anxiety (with pain, moderate pain) 30 tablet 0    ipratropium (ATROVENT)  0.06 % nasal spray Spray 2 sprays into both nostrils 4 times daily as needed for rhinitis (Patient not taking: Reported on 9/17/2024) 15 mL 11    ketoconazole (NIZORAL) 2 % external shampoo Massage into wet scalp, let sit 3-5 min, then rinse. Do this 3x weekly. 120 mL 11    levothyroxine (SYNTHROID/LEVOTHROID) 25 MCG tablet Take 1 tablet (25 mcg) by mouth daily. 90 tablet 3    losartan (COZAAR) 25 MG tablet Take 1 tablet (25 mg) by mouth daily. 90 tablet 3    methylPREDNISolone (MEDROL DOSEPAK) 4 MG tablet therapy pack Follow Package Directions (Patient not taking: Reported on 9/17/2024) 21 tablet 0    montelukast (SINGULAIR) 10 MG tablet Take 1 tablet (10 mg) by mouth at bedtime. 90 tablet 3    ondansetron (ZOFRAN-ODT) 4 MG ODT tab Take 1 tablet (4 mg) by mouth every 8 hours as needed for nausea Dissolve ON the tongue. (Patient not taking: Reported on 9/17/2024) 10 tablet 0    oxyCODONE (ROXICODONE) 5 MG tablet Take 1-2 tablets (5-10 mg) by mouth every 6 hours as needed for severe pain (Moderate to Severe) (Patient not taking: Reported on 9/17/2024) 28 tablet 0    polyethylene glycol (MIRALAX) 17 g packet Take 17 g by mouth daily (Patient not taking: Reported on 9/17/2024) 7 packet 0    senna-docusate (SENOKOT-S/PERICOLACE) 8.6-50 MG tablet Take 1-2 tablets by mouth 2 times daily Take while on oral narcotics to prevent or treat constipation. (Patient not taking: Reported on 9/17/2024) 30 tablet 0    sildenafil (REVATIO) 20 MG tablet Take 5 tablets by mouth once daily as needed 60 tablet 3    sildenafil (VIAGRA) 100 MG tablet Take 1 tablet (100 mg) by mouth daily as needed (Patient not taking: Reported on 9/17/2024) 20 tablet 11    tadalafil (CIALIS) 20 MG tablet Take 1 tablet (20 mg) by mouth daily as needed 30 tablet 11    triamcinolone (KENALOG) 0.1 % external cream Apply topically 2 times daily To legs as needed 80 g 1       /74 (BP Location: Right arm, Patient Position: Sitting, Cuff Size: Adult Regular)    Pulse 76   SpO2 96%      On examination, patient is alert and cooperative.  Vital signs are stable.  He is afebrile.  General physical examination is unremarkable.  HEENT is negative.  Extraocular movements are intact.  Face is symmetric.  Tongue is midline.  Neck is supple.    He is able to move his upper extremities functionally.  He is able to carry out straight leg raising test bilaterally.  There is no tenderness over the knee on either side.  There is no discomfort with rotation at the hips.  In a prone position he has some tenderness over the left lumbar spine overlying the facets.  There is some tenderness over the left SI joint and left piriformis.  Right side was nontender.    Neurologically, he responds to touch bilaterally.  Vibration is present.  Temperature sense is intact.  Strength is full.  He is able to stand.  Romberg is negative.  He is able to walk.  He is able to walk on his heels and toes.  He is able to transfer in and out without difficulty.    Reflexes are symmetric and plantars are downgoing.    Impression: At this point this 83-year-old gentleman with a prior history of lumbar decompression at L2-3 with discectomy currently has some facet involvement on the left with possible SI and piriformis involvement as well.  He has improved nicely with the Medrol Dosepak and physical therapy and currently to my examination his strength is full.  He does acknowledge some weakness as he progresses and it is not clear if this is partly from his pain that is limiting him.    In addition to continuing his physical therapy, he has few more sessions left, and completing his EMG as planned, I have suggested a follow-up in 4 weeks time.  I am suspecting that he will continue to improve.  We talked about strategies to minimize the number of times he goes up and down the stairs though this seems to be something that he cannot avoid.  He does have good help from his family including his children who can be  of assistance as well as the knees who is helpful.    I offered him medications such as muscle relaxants and NSAIDs but he would like to avoid as he is already on quite a few.    This was reviewed at length with the patient.  All questions were answered to his satisfaction..    60 minutes visit, greater than 50% was for counseling on above-mentioned issues.    Thank you much for allow me to assist in his care.    Chicho Cabrales MD

## 2024-10-03 NOTE — LETTER
10/3/2024       RE: Felice Blood  [address] [facility] [address]     Dear Colleague,    Thank you for referring your patient, Felice Blood, to the [facility] PHYSICAL MEDICINE AND REHABILITATION CLINIC [place] at [facility]. Please see a copy of my visit note below.    CC: I am seeing this patient for evaluation of left leg weakness and pain.    Patient is a very pleasant 83-year-old gentleman who has undergone L2-3 decompression and discectomy on 4/21/2022.  Surgery was performed by Dr. Wolfgang Cabral.  He had been doing quite well until recently when he developed weakness.  This was in the early part of August and he was seen by his primary on 8/22/2020 for with a 3 to 4-week history of left leg weakness and some pain and tingling.  There was no history of trauma.  He does take care of his wife who has Alzheimer's and does have to do multiple stairs.  He also lives [place] and has at least 14 steps to the main living area.  He does this several times a day.  He was evaluated and put on Medrol Dosepak.  He has noticed improvement in his weakness though there is still some residual weakness.  He has been doing physical therapy and apparently had some issues with his right knee.  That pain has since improved.  He has had some tingling on the left side but even that is improved.  He is here for evaluation.  He has been scheduled for EMG of the left lower extremity on 11/12/2024.    On a scale of 0-10 he rates his pain currently in the 3-4 out of 10 range.  He denies any issues with his bowel or bladder function.  He has not had any weight loss or fever.  Currently he does not report any numbness or tingling.  He is independent with his self-cares and mobility and is sleeping well.    MR LUMBAR SPINE W/O & W CONTRAST 9/27/2024 6:31 PM     History: 2022 lumbar surgery (note in Epic). Now, no trauma, three  weeks left leg weak and  painful, getting worse, entire eg, no back  pain.; Pain of left lower extremity.     Additional history from EMR: L2 and L3 laminectomy and partial medial  facetectomies and foraminotomies for L2 and L3 nerve roots in  4/21/2022     ICD-10: Pain of left lower extremity     Comparison: Lumbar spine radiograph 6/7/2022 and MRI of the lumbar  spine from 11/17/2021     Technique: Sagittal STIR, T1-weighted turbo spin-echo, 3-D volumetric  T2-weighted and axial reconstructed T2-weighted images of the lumbar  spine were obtained without intravenous contrast.      Findings: There are 5 lumbar-type vertebrae, counting from C2.  The  tip of the conus medullaris is at  L1.  Regarding alignment, there is  grade 1 anterolisthesis at L2-3 which is slightly progressed since  2021. Multilevel disc height loss and disc degeneration most  pronounced at L2-3, L4-5 and L5-S1. Mixed fatty and edematous bone  marrow changes in the opposing endplates of L4-5 and L5-S1. No  worrisome bone marrow signal.     On a level by level basis:     L1-2: Disc bulge and facet hypertrophy without significant spinal  canal or foraminal narrowing.     L2-3: Chronic disc bulge and right subarticular disc extrusion,  slightly improved since 2021. Status post laminectomy and  foraminotomy. The anterolisthesis is slightly progressed. There is  mild to moderate right neural foraminal stenosis, slightly progressed.  Left neural foramen is patent. No definite spinal canal narrowing.  Effacement of right lateral recess with abutment of right traversing  L3 which is improved since 2021. Spinal canal patency is also improved  since 2021.     L3-4: Disc bulge and facet hypertrophy without significant spinal  canal or foraminal stenosis.     L4-5: Disc bulge and facet hypertrophy and thickening of the  ligamentum flavum. Mild spinal canal narrowing. No significant  foraminal stenosis.     L5-S1: Disc bulge and left greater than right facet hypertrophy.  Moderate  left neural foraminal stenosis. Right neural foramen is  patent. Mild spinal canal narrowing and partial effacement of the left  lateral recess with abutment of left traversing S1. Findings are  unchanged.     Visualized retroperitoneal structures are unremarkable.                                                                      Impression: In comparison to MRI from 11/17/2021;  1. Status post laminectomy and foraminotomy at L2-3. The  anterolisthesis at L2-3 slightly progressed. Previous right lateral  recess effacement and spinal canal narrowing is improved. Previous  disc herniation and mass effect is also improved with residual chronic  right subarticular disc extrusion. There is minimally worsened  mild-to-moderate right neural foraminal stenosis.  2. Stable mild spinal canal narrowing at L4-5.  3. Stable degenerative changes at L5-S1 with moderate left neural  foraminal stenosis and partial effacement of the left lateral recess  with abutment of left traversing S1.     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      History of blood transfusion      Impotence of organic origin      Nonsenile cataract      Pure hypercholesterolemia      Sciatica, unspecified laterality 04/2022     Unspecified essential hypertension      Vitreous detachment of both eyes      Past Surgical History:   Procedure Laterality Date     ABDOMEN SURGERY       APPENDECTOMY       BIOPSY       CATARACT IOL, RT/LT Right 05/01/2017     COLONOSCOPY       DECOMPRESSION LUMBAR ONE LEVEL N/A 04/21/2022    Procedure: Lumbar 2 to 3 decompression and discectomy;  Surgeon: Wolfgang Cabral MD;  Location: [facility] OR     PHACOEMULSIFICATION WITH STANDARD INTRAOCULAR LENS IMPLANT Right 05/01/2017    Procedure: PHACOEMULSIFICATION WITH STANDARD INTRAOCULAR LENS IMPLANT;  Right Eye Phacoemulsification with intraocular Lens Implant;  Surgeon: Camron Hansen MD;   Location: [facility] OR     TONSILLECTOMY       Family History       Problem (# of Occurrences) Relation (Name,Age of Onset)    Hypertension (1) Son (Adalberto)    Breast Cancer (1) Sister (Alyssa)    Skin Cancer (1) Father           Negative family history of: Glaucoma, Macular Degeneration, Melanoma           Social History     Socioeconomic History     Marital status:      Spouse name: Not on file     Number of children: Not on file     Years of education: Not on file     Highest education level: Not on file   Occupational History     Not on file   Tobacco Use     Smoking status: Never     Smokeless tobacco: Never   Substance and Sexual Activity     Alcohol use: Yes     Comment: 1 day     Drug use: Never     Sexual activity: Yes     Partners: Female     Birth control/protection: Female Surgical   Other Topics Concern     Parent/sibling w/ CABG, MI or angioplasty before 65F 55M? No   Social History Narrative     Not on file     Social Determinants of Health     Financial Resource Strain: Low Risk  (9/16/2024)    Financial Resource Strain      Within the past 12 months, have you or your family members you live with been unable to get utilities (heat, electricity) when it was really needed?: No   Food Insecurity: Low Risk  (9/16/2024)    Food Insecurity      Within the past 12 months, did you worry that your food would run out before you got money to buy more?: No      Within the past 12 months, did the food you bought just not last and you didn t have money to get more?: No   Transportation Needs: Low Risk  (9/16/2024)    Transportation Needs      Within the past 12 months, has lack of transportation kept you from medical appointments, getting your medicines, non-medical meetings or appointments, work, or from getting things that you need?: No   Physical Activity: Sufficiently Active (9/16/2024)    Exercise Vital Sign      Days of Exercise per Week: 6 days      Minutes of Exercise per Session: 30 min   Stress: Stress  Concern Present (9/16/2024)    [place] [employer] - Occupational Stress Questionnaire      Feeling of Stress : To some extent   Social Connections: Unknown (9/16/2024)    Social Connection and Isolation Panel [NHANES]      Frequency of Communication with Friends and Family: Not on file      Frequency of Social Gatherings with Friends and Family: More than three times a week      Attends Jew Services: Not on file      Active Member of Clubs or Organizations: Not on file      Attends Club or Organization Meetings: Not on file      Marital Status: Not on file   Interpersonal Safety: Low Risk  (9/17/2024)    Interpersonal Safety      Do you feel physically and emotionally safe where you currently live?: Yes      Within the past 12 months, have you been hit, slapped, kicked or otherwise physically hurt by someone?: No      Within the past 12 months, have you been humiliated or emotionally abused in other ways by your partner or ex-partner?: No   Housing Stability: Low Risk  (9/16/2024)    Housing Stability      Do you have housing? : Yes      Are you worried about losing your housing?: No       Current Outpatient Medications   Medication Sig Dispense Refill     acetaminophen (TYLENOL) 325 MG tablet Take 2 tablets (650 mg) by mouth every 4 hours as needed for other (mild pain) 100 tablet 0     albuterol (PROAIR HFA/PROVENTIL HFA/VENTOLIN HFA) 108 (90 Base) MCG/ACT inhaler Inhale 2 puffs into the lungs every 4 hours as needed for shortness of breath or wheezing (Patient not taking: Reported on 9/17/2024) 18 g 0     atorvastatin (LIPITOR) 10 MG tablet Take 1 tablet (10 mg) by mouth daily. 90 tablet 3     Calcium-Magnesium-Zinc 333-133-5 MG TABS per tablet Take 1 tablet by mouth every morning       Cetirizine HCl (ZYRTEC PO) Take by mouth.       cholecalciferol (VITAMIN D3) 25 mcg (1000 units) capsule Take 1 capsule by mouth every morning       fluticasone (FLONASE) 50 MCG/ACT spray Spray 2 sprays  into both nostrils At Bedtime (Patient taking differently: Spray 2 sprays into both nostrils as needed.) 1 Bottle 1     gabapentin (NEURONTIN) 100 MG capsule Take 1 capsule (100 mg) by mouth 3 times daily for 2 days 6 capsule 0     hydrOXYzine (ATARAX) 10 MG tablet Take 1 tablet (10 mg) by mouth every 6 hours as needed for itching or anxiety (with pain, moderate pain) 30 tablet 0     ipratropium (ATROVENT) 0.06 % nasal spray Spray 2 sprays into both nostrils 4 times daily as needed for rhinitis (Patient not taking: Reported on 9/17/2024) 15 mL 11     ketoconazole (NIZORAL) 2 % external shampoo Massage into wet scalp, let sit 3-5 min, then rinse. Do this 3x weekly. 120 mL 11     levothyroxine (SYNTHROID/LEVOTHROID) 25 MCG tablet Take 1 tablet (25 mcg) by mouth daily. 90 tablet 3     losartan (COZAAR) 25 MG tablet Take 1 tablet (25 mg) by mouth daily. 90 tablet 3     methylPREDNISolone (MEDROL DOSEPAK) 4 MG tablet therapy pack Follow Package Directions (Patient not taking: Reported on 9/17/2024) 21 tablet 0     montelukast (SINGULAIR) 10 MG tablet Take 1 tablet (10 mg) by mouth at bedtime. 90 tablet 3     ondansetron (ZOFRAN-ODT) 4 MG ODT tab Take 1 tablet (4 mg) by mouth every 8 hours as needed for nausea Dissolve ON the tongue. (Patient not taking: Reported on 9/17/2024) 10 tablet 0     oxyCODONE (ROXICODONE) 5 MG tablet Take 1-2 tablets (5-10 mg) by mouth every 6 hours as needed for severe pain (Moderate to Severe) (Patient not taking: Reported on 9/17/2024) 28 tablet 0     polyethylene glycol (MIRALAX) 17 g packet Take 17 g by mouth daily (Patient not taking: Reported on 9/17/2024) 7 packet 0     senna-docusate (SENOKOT-S/PERICOLACE) 8.6-50 MG tablet Take 1-2 tablets by mouth 2 times daily Take while on oral narcotics to prevent or treat constipation. (Patient not taking: Reported on 9/17/2024) 30 tablet 0     sildenafil (REVATIO) 20 MG tablet Take 5 tablets by mouth once daily as needed 60 tablet 3     sildenafil  (VIAGRA) 100 MG tablet Take 1 tablet (100 mg) by mouth daily as needed (Patient not taking: Reported on 9/17/2024) 20 tablet 11     tadalafil (CIALIS) 20 MG tablet Take 1 tablet (20 mg) by mouth daily as needed 30 tablet 11     triamcinolone (KENALOG) 0.1 % external cream Apply topically 2 times daily To legs as needed 80 g 1       /74 (BP Location: Right arm, Patient Position: Sitting, Cuff Size: Adult Regular)   Pulse 76   SpO2 96%      On examination, patient is alert and cooperative.  Vital signs are stable.  He is afebrile.  General physical examination is unremarkable.  HEENT is negative.  Extraocular movements are intact.  Face is symmetric.  Tongue is midline.  Neck is supple.    He is able to move his upper extremities functionally.  He is able to carry out straight leg raising test bilaterally.  There is no tenderness over the knee on either side.  There is no discomfort with rotation at the hips.  In a prone position he has some tenderness over the left lumbar spine overlying the facets.  There is some tenderness over the left SI joint and left piriformis.  Right side was nontender.    Neurologically, he responds to touch bilaterally.  Vibration is present.  Temperature sense is intact.  Strength is full.  He is able to stand.  Romberg is negative.  He is able to walk.  He is able to walk on his heels and toes.  He is able to transfer in and out without difficulty.    Reflexes are symmetric and plantars are downgoing.    Impression: At this point this 83-year-old gentleman with a prior history of lumbar decompression at L2-3 with discectomy currently has some facet involvement on the left with possible SI and piriformis involvement as well.  He has improved nicely with the Medrol Dosepak and physical therapy and currently to my examination his strength is full.  He does acknowledge some weakness as he progresses and it is not clear if this is partly from his pain that is limiting him.    In  addition to continuing his physical therapy, he has few more sessions left, and completing his EMG as planned, I have suggested a follow-up in 4 weeks time.  I am suspecting that he will continue to improve.  We talked about strategies to minimize the number of times he goes up and down the stairs though this seems to be something that he cannot avoid.  He does have good help from his family including his children who can be of assistance as well as the knees who is helpful.    I offered him medications such as muscle relaxants and NSAIDs but he would like to avoid as he is already on quite a few.    This was reviewed at length with the patient.  All questions were answered to his satisfaction..    60 minutes visit, greater than 50% was for counseling on above-mentioned issues.    Thank you much for allow me to assist in his care.    Chicho Cabrales MD       Again, thank you for allowing me to participate in the care of your patient.      Sincerely,    Chicho Cabrales MD

## 2024-10-09 ENCOUNTER — THERAPY VISIT (OUTPATIENT)
Dept: PHYSICAL THERAPY | Facility: CLINIC | Age: 84
End: 2024-10-09
Payer: MEDICARE

## 2024-10-09 DIAGNOSIS — M79.605 ACUTE PAIN OF LEFT LOWER EXTREMITY: Primary | ICD-10-CM

## 2024-10-09 PROCEDURE — 97112 NEUROMUSCULAR REEDUCATION: CPT | Mod: GP | Performed by: PHYSICAL THERAPIST

## 2024-10-09 PROCEDURE — 97110 THERAPEUTIC EXERCISES: CPT | Mod: GP | Performed by: PHYSICAL THERAPIST

## 2024-10-16 ENCOUNTER — THERAPY VISIT (OUTPATIENT)
Dept: PHYSICAL THERAPY | Facility: CLINIC | Age: 84
End: 2024-10-16
Payer: MEDICARE

## 2024-10-16 DIAGNOSIS — M79.605 ACUTE PAIN OF LEFT LOWER EXTREMITY: Primary | ICD-10-CM

## 2024-10-16 PROCEDURE — 97112 NEUROMUSCULAR REEDUCATION: CPT | Mod: GP | Performed by: PHYSICAL THERAPIST

## 2024-10-16 PROCEDURE — 97110 THERAPEUTIC EXERCISES: CPT | Mod: GP | Performed by: PHYSICAL THERAPIST

## 2024-10-23 ENCOUNTER — THERAPY VISIT (OUTPATIENT)
Dept: PHYSICAL THERAPY | Facility: CLINIC | Age: 84
End: 2024-10-23
Payer: MEDICARE

## 2024-10-23 DIAGNOSIS — M79.605 ACUTE PAIN OF LEFT LOWER EXTREMITY: Primary | ICD-10-CM

## 2024-10-23 PROCEDURE — 97112 NEUROMUSCULAR REEDUCATION: CPT | Mod: GP | Performed by: PHYSICAL THERAPIST

## 2024-10-23 PROCEDURE — 97110 THERAPEUTIC EXERCISES: CPT | Mod: GP | Performed by: PHYSICAL THERAPIST

## 2024-10-23 NOTE — PROGRESS NOTES
"   10/23/24 0500   Appointment Info   Signing clinician's name / credentials Deon Georges PT   Total/Authorized Visits 8 (PT)   Visits Used 7   Medical Diagnosis L LE   PT Tx Diagnosis L LE   Progress Note/Certification   Start of Care Date 08/26/24   Onset of illness/injury or Date of Surgery 08/22/24  (PT order date)   Therapy Frequency 1x/week   Predicted Duration 8 weeks   Certification date from 10/22/24   Certification date to 12/17/24   Progress Note Completed Date 10/23/24   PT Goal 1   Goal Identifier Stairs   Goal Description Pt will descend stairs with 0/10 \"weakness\"  (pt is rating weakness)   Rationale to maximize safety and independence with performance of ADLs and functional tasks;to maximize safety and independence within the home;to maximize safety and independence within the community   Goal Progress Pt with 4/10 \"weakness\" going downstairs  (pt is rating weakness)   Target Date 12/17/24  (date extended due to slow progress)   Subjective Report   Subjective Report Better.  Not painful, but still feels weak.   Objective Measures   Objective Measures Objective Measure 1   Objective Measure 1   Objective Measure + deon test R   Treatment Interventions (PT)   Interventions Therapeutic Procedure/Exercise;Neuromuscular Re-education   Therapeutic Procedure/Exercise   Therapeutic Procedures: strength, endurance, ROM, flexibility minutes (54911) 30   Therapeutic Procedures Ther Proc 3;Ther Proc 4;Ther Proc 5   Ther Proc 1 clamshell x 20   Ther Proc 1 - Details iso hip add x 20   Ther Proc 2 step ups x 15 6\" block   PTRx Ther Proc 2 Bridging #1   PTRx Ther Proc 2 - Details x 20 - C/o R knee pain   PTRx Ther Proc 4 Hip Flexion Straight Leg Raise   PTRx Ther Proc 4 - Details SLY x 20 with high fatigue   PTRx Ther Proc 5  Hip Flexor Stretch Deon Test Position   PTRx Ther Proc 5 - Details 15 sec hold, hanging leg off of side of plinth - feels big stretch  (pt reports difficulty with this ex at home as he " "has nothing as high as a plinth at home)   Ther Proc 3 heel raises   Ther Proc 3 - Details x 15   Ther Proc 4 NUstep   Ther Proc 4 - Details 5 min level 7 - felt slight pain in R knee   Ther Proc 5 clam   Ther Proc 5 - Details x 15   Skilled Intervention speed cues with most exercises   Patient Response/Progress pt c/o R knee pain with several exercises.  L leg feels weak per reports   Neuromuscular Re-education   Neuromuscular re-ed of mvmt, balance, coord, kinesthetic sense, posture, proprioception minutes (13207) 10   Neuromuscular Re-education Neuro Re-ed 5   Neuro Re-ed 1 SLS 30\" bilat   Neuro Re-ed 1 - Details tandem stance 30\" bilat - increased difficulty with L leg in front   Neuro Re-ed 2 mini squat x 15 - corrected technique to sit back more   Neuro Re-ed 3 - Details tandem stance on airex x 30 sec each   Neuro Re-ed 4 side stepping   Neuro Re-ed 4 - Details plinth <> x 5   Patient Response/Progress unsteadiness with balance continues   Skilled Intervention initiated side stepping   Education   Learner/Method Patient   Plan   Home program PTRX on phone   Plan for next session progress with strength / balance as able   Total Session Time   Timed Code Treatment Minutes 40   Total Treatment Time (sum of timed and untimed services) 40         [facility]                                                                                   OUTPATIENT PHYSICAL THERAPY    PLAN OF TREATMENT FOR OUTPATIENT REHABILITATION   Patient's Last Name, First Name, Felice Brian YOB: 1940   Provider's Name   [facility]   Medical Record No.  [id number]     Onset Date: 08/22/24 (PT order date)  Start of Care Date: 08/26/24     Medical Diagnosis:  L LE      PT Treatment Diagnosis:  L LE Plan of Treatment  Frequency/Duration: 1x/week/ 8 weeks    Certification date from 10/22/24 to 12/17/24         See note for plan of treatment details and " functional goals     Deon Georges, PT                         I CERTIFY THE NEED FOR THESE SERVICES FURNISHED UNDER        THIS PLAN OF TREATMENT AND WHILE UNDER MY CARE     (Physician attestation of this document indicates review and certification of the therapy plan).              Referring Provider:  Anthony Maddox    Initial Assessment  See Epic Evaluation- Start of Care Date: 08/26/24

## 2024-10-30 ENCOUNTER — THERAPY VISIT (OUTPATIENT)
Dept: PHYSICAL THERAPY | Facility: CLINIC | Age: 84
End: 2024-10-30
Payer: MEDICARE

## 2024-10-30 DIAGNOSIS — M79.605 ACUTE PAIN OF LEFT LOWER EXTREMITY: Primary | ICD-10-CM

## 2024-10-30 PROCEDURE — 97112 NEUROMUSCULAR REEDUCATION: CPT | Mod: GP | Performed by: PHYSICAL THERAPIST

## 2024-10-30 PROCEDURE — 97110 THERAPEUTIC EXERCISES: CPT | Mod: GP | Performed by: PHYSICAL THERAPIST

## 2024-11-02 DIAGNOSIS — N52.9 IMPOTENCE OF ORGANIC ORIGIN: ICD-10-CM

## 2024-11-05 RX ORDER — TADALAFIL 20 MG/1
20 TABLET ORAL DAILY PRN
Qty: 30 TABLET | Refills: 0 | Status: SHIPPED | OUTPATIENT
Start: 2024-11-05

## 2024-11-07 ENCOUNTER — OFFICE VISIT (OUTPATIENT)
Dept: PHYSICAL MEDICINE AND REHAB | Facility: CLINIC | Age: 84
End: 2024-11-07
Payer: MEDICARE

## 2024-11-07 VITALS — DIASTOLIC BLOOD PRESSURE: 75 MMHG | OXYGEN SATURATION: 100 % | HEART RATE: 75 BPM | SYSTOLIC BLOOD PRESSURE: 134 MMHG

## 2024-11-07 DIAGNOSIS — M47.817 LUMBOSACRAL SPONDYLOSIS WITHOUT MYELOPATHY: ICD-10-CM

## 2024-11-07 DIAGNOSIS — M79.18 MYALGIA, OTHER SITE: Primary | ICD-10-CM

## 2024-11-07 PROCEDURE — 99214 OFFICE O/P EST MOD 30 MIN: CPT | Performed by: PHYSICAL MEDICINE & REHABILITATION

## 2024-11-07 ASSESSMENT — PAIN SCALES - GENERAL: PAINLEVEL_OUTOF10: NO PAIN (1)

## 2024-11-07 NOTE — PROGRESS NOTES
CC: Patient returns for a follow-up visit for his ongoing issues related to leg weakness and pain.     He was last seen by me on 10/3/2024.  At that time he was noted to have some lumbar discomfort overlying the facets some discomfort over the SI and piriformis.  He had received a Medrol Dosepak and was doing physical therapy.  His strength itself was nicely improved.  He is continuing with physical therapy.    He does note some new pain in his right knee.  He rates the pain pretty low in the 1-2 out of 10.  He is functioning otherwise well.    Patient is a very pleasant 83-year-old gentleman who has undergone L2-3 decompression and discectomy on 4/21/2022.  Surgery was performed by Dr. Wolfgang Cabral.  He had been doing quite well until recently when he developed weakness.  This was in the early part of August and he was seen by his primary on 8/22/2020 for with a 3 to 4-week history of left leg weakness and some pain and tingling.  There was no history of trauma.  He does take care of his wife who has Alzheimer's and does have to do multiple stairs.  He also [place] and has at least 14 steps to the main living area.  He does this several times a day.  He was evaluated and put on Medrol Dosepak.  He has noticed improvement in his weakness though there is still some residual weakness.  He has been doing physical therapy and apparently had some issues with his right knee.  That pain has since improved.  He has had some tingling on the left side but even that is improved.  He is here for evaluation.  He has been scheduled for EMG of the left lower extremity on 11/12/2024.     On a scale of 0-10 he has previously rated pain in the 3-4 out of 10 range.  He denies any issues with his bowel or bladder function.  He has not had any weight loss or fever.  Currently he does not report any numbness or tingling.  He is independent with his self-cares and mobility and is sleeping well.     MR LUMBAR SPINE W/O & W  CONTRAST 9/27/2024 6:31 PM     History: 2022 lumbar surgery (note in Epic). Now, no trauma, three  weeks left leg weak and painful, getting worse, entire eg, no back  pain.; Pain of left lower extremity.     Additional history from EMR: L2 and L3 laminectomy and partial medial  facetectomies and foraminotomies for L2 and L3 nerve roots in  4/21/2022     ICD-10: Pain of left lower extremity     Comparison: Lumbar spine radiograph 6/7/2022 and MRI of the lumbar  spine from 11/17/2021     Technique: Sagittal STIR, T1-weighted turbo spin-echo, 3-D volumetric  T2-weighted and axial reconstructed T2-weighted images of the lumbar  spine were obtained without intravenous contrast.      Findings: There are 5 lumbar-type vertebrae, counting from C2.  The  tip of the conus medullaris is at  L1.  Regarding alignment, there is  grade 1 anterolisthesis at L2-3 which is slightly progressed since  2021. Multilevel disc height loss and disc degeneration most  pronounced at L2-3, L4-5 and L5-S1. Mixed fatty and edematous bone  marrow changes in the opposing endplates of L4-5 and L5-S1. No  worrisome bone marrow signal.     On a level by level basis:     L1-2: Disc bulge and facet hypertrophy without significant spinal  canal or foraminal narrowing.     L2-3: Chronic disc bulge and right subarticular disc extrusion,  slightly improved since 2021. Status post laminectomy and  foraminotomy. The anterolisthesis is slightly progressed. There is  mild to moderate right neural foraminal stenosis, slightly progressed.  Left neural foramen is patent. No definite spinal canal narrowing.  Effacement of right lateral recess with abutment of right traversing  L3 which is improved since 2021. Spinal canal patency is also improved  since 2021.     L3-4: Disc bulge and facet hypertrophy without significant spinal  canal or foraminal stenosis.     L4-5: Disc bulge and facet hypertrophy and thickening of the  ligamentum flavum. Mild spinal canal  narrowing. No significant  foraminal stenosis.     L5-S1: Disc bulge and left greater than right facet hypertrophy.  Moderate left neural foraminal stenosis. Right neural foramen is  patent. Mild spinal canal narrowing and partial effacement of the left  lateral recess with abutment of left traversing S1. Findings are  unchanged.     Visualized retroperitoneal structures are unremarkable.                                                                      Impression: In comparison to MRI from 11/17/2021;  1. Status post laminectomy and foraminotomy at L2-3. The  anterolisthesis at L2-3 slightly progressed. Previous right lateral  recess effacement and spinal canal narrowing is improved. Previous  disc herniation and mass effect is also improved with residual chronic  right subarticular disc extrusion. There is minimally worsened  mild-to-moderate right neural foraminal stenosis.  2. Stable mild spinal canal narrowing at L4-5.  3. Stable degenerative changes at L5-S1 with moderate left neural  foraminal stenosis and partial effacement of the left lateral recess  with abutment of left traversing S1.     AJITH WESLEY MD         Past Medical History        Past Medical History:   Diagnosis Date    Arthritis      Cobblestone retinal degeneration      History of blood transfusion      Impotence of organic origin      Nonsenile cataract      Pure hypercholesterolemia      Sciatica, unspecified laterality 04/2022    Unspecified essential hypertension      Vitreous detachment of both eyes           Past Surgical History         Past Surgical History:   Procedure Laterality Date    ABDOMEN SURGERY        APPENDECTOMY        BIOPSY        CATARACT IOL, RT/LT Right 05/01/2017    COLONOSCOPY        DECOMPRESSION LUMBAR ONE LEVEL N/A 04/21/2022     Procedure: Lumbar 2 to 3 decompression and discectomy;  Surgeon: Wolfgang Cabral MD;  Location: [facility] OR    PHACOEMULSIFICATION WITH STANDARD INTRAOCULAR LENS IMPLANT Right  05/01/2017     Procedure: PHACOEMULSIFICATION WITH STANDARD INTRAOCULAR LENS IMPLANT;  Right Eye Phacoemulsification with intraocular Lens Implant;  Surgeon: Camron Hansen MD;  Location: [facility] OR    TONSILLECTOMY             Family History            Problem (# of Occurrences) Relation (Name,Age of Onset)     Hypertension (1) Son (Adalberto)     Breast Cancer (1) Sister (Alyssa)     Skin Cancer (1) Father               Negative family history of: Glaucoma, Macular Degeneration, Melanoma                Social History   Social History            Socioeconomic History    Marital status:        Spouse name: Not on file    Number of children: Not on file    Years of education: Not on file    Highest education level: Not on file   Occupational History    Not on file   Tobacco Use    Smoking status: Never    Smokeless tobacco: Never   Substance and Sexual Activity    Alcohol use: Yes       Comment: 1 day    Drug use: Never    Sexual activity: Yes       Partners: Female       Birth control/protection: Female Surgical   Other Topics Concern    Parent/sibling w/ CABG, MI or angioplasty before 65F 55M? No   Social History Narrative    Not on file      Social Determinants of Health           Financial Resource Strain: Low Risk  (9/16/2024)     Financial Resource Strain      Within the past 12 months, have you or your family members you live with been unable to get utilities (heat, electricity) when it was really needed?: No   Food Insecurity: Low Risk  (9/16/2024)     Food Insecurity      Within the past 12 months, did you worry that your food would run out before you got money to buy more?: No      Within the past 12 months, did the food you bought just not last and you didn t have money to get more?: No   Transportation Needs: Low Risk  (9/16/2024)     Transportation Needs      Within the past 12 months, has lack of transportation kept you from medical appointments, getting your medicines, non-medical meetings or  appointments, work, or from getting things that you need?: No   Physical Activity: Sufficiently Active (9/16/2024)     Exercise Vital Sign      Days of Exercise per Week: 6 days      Minutes of Exercise per Session: 30 min   Stress: Stress Concern Present (9/16/2024)     [place] citizen [place] [employer] - Occupational Stress Questionnaire      Feeling of Stress : To some extent   Social Connections: Unknown (9/16/2024)     Social Connection and Isolation Panel [NHANES]      Frequency of Communication with Friends and Family: Not on file      Frequency of Social Gatherings with Friends and Family: More than three times a week      Attends Faith Services: Not on file      Active Member of Clubs or Organizations: Not on file      Attends Club or Organization Meetings: Not on file      Marital Status: Not on file   Interpersonal Safety: Low Risk  (9/17/2024)     Interpersonal Safety      Do you feel physically and emotionally safe where you currently live?: Yes      Within the past 12 months, have you been hit, slapped, kicked or otherwise physically hurt by someone?: No      Within the past 12 months, have you been humiliated or emotionally abused in other ways by your partner or ex-partner?: No   Housing Stability: Low Risk  (9/16/2024)     Housing Stability      Do you have housing? : Yes      Are you worried about losing your housing?: No            Current Outpatient Medications   Medication Sig Dispense Refill    acetaminophen (TYLENOL) 325 MG tablet Take 2 tablets (650 mg) by mouth every 4 hours as needed for other (mild pain) 100 tablet 0    albuterol (PROAIR HFA/PROVENTIL HFA/VENTOLIN HFA) 108 (90 Base) MCG/ACT inhaler Inhale 2 puffs into the lungs every 4 hours as needed for shortness of breath or wheezing (Patient not taking: Reported on 9/17/2024) 18 g 0    atorvastatin (LIPITOR) 10 MG tablet Take 1 tablet (10 mg) by mouth daily. 90 tablet 3    Calcium-Magnesium-Zinc 333-133-5 MG TABS per tablet  Take 1 tablet by mouth every morning      Cetirizine HCl (ZYRTEC PO) Take by mouth.      cholecalciferol (VITAMIN D3) 25 mcg (1000 units) capsule Take 1 capsule by mouth every morning      fluticasone (FLONASE) 50 MCG/ACT spray Spray 2 sprays into both nostrils At Bedtime (Patient taking differently: Spray 2 sprays into both nostrils as needed.) 1 Bottle 1    gabapentin (NEURONTIN) 100 MG capsule Take 1 capsule (100 mg) by mouth 3 times daily for 2 days 6 capsule 0    hydrOXYzine (ATARAX) 10 MG tablet Take 1 tablet (10 mg) by mouth every 6 hours as needed for itching or anxiety (with pain, moderate pain) 30 tablet 0    ipratropium (ATROVENT) 0.06 % nasal spray Spray 2 sprays into both nostrils 4 times daily as needed for rhinitis (Patient not taking: Reported on 9/17/2024) 15 mL 11    ketoconazole (NIZORAL) 2 % external shampoo Massage into wet scalp, let sit 3-5 min, then rinse. Do this 3x weekly. 120 mL 11    levothyroxine (SYNTHROID/LEVOTHROID) 25 MCG tablet Take 1 tablet (25 mcg) by mouth daily. 90 tablet 3    losartan (COZAAR) 25 MG tablet Take 1 tablet (25 mg) by mouth daily. 90 tablet 3    methylPREDNISolone (MEDROL DOSEPAK) 4 MG tablet therapy pack Follow Package Directions (Patient not taking: Reported on 9/17/2024) 21 tablet 0    montelukast (SINGULAIR) 10 MG tablet Take 1 tablet (10 mg) by mouth at bedtime. 90 tablet 3    ondansetron (ZOFRAN-ODT) 4 MG ODT tab Take 1 tablet (4 mg) by mouth every 8 hours as needed for nausea Dissolve ON the tongue. (Patient not taking: Reported on 9/17/2024) 10 tablet 0    oxyCODONE (ROXICODONE) 5 MG tablet Take 1-2 tablets (5-10 mg) by mouth every 6 hours as needed for severe pain (Moderate to Severe) (Patient not taking: Reported on 9/17/2024) 28 tablet 0    polyethylene glycol (MIRALAX) 17 g packet Take 17 g by mouth daily (Patient not taking: Reported on 9/17/2024) 7 packet 0    senna-docusate (SENOKOT-S/PERICOLACE) 8.6-50 MG tablet Take 1-2 tablets by mouth 2 times  daily Take while on oral narcotics to prevent or treat constipation. (Patient not taking: Reported on 9/17/2024) 30 tablet 0    sildenafil (REVATIO) 20 MG tablet Take 5 tablets by mouth once daily as needed 60 tablet 3    sildenafil (VIAGRA) 100 MG tablet Take 1 tablet (100 mg) by mouth daily as needed (Patient not taking: Reported on 9/17/2024) 20 tablet 11    tadalafil (CIALIS) 20 MG tablet Take 1 tablet (20 mg) by mouth daily as needed 30 tablet 0    triamcinolone (KENALOG) 0.1 % external cream Apply topically 2 times daily To legs as needed 80 g 1         .  /75 (BP Location: Right arm, Patient Position: Sitting, Cuff Size: Adult Regular)   Pulse 75   SpO2 100%       On examination, patient is alert and cooperative.  Vital signs are stable.  He is afebrile.  General physical examination is unremarkable.  HEENT is negative.  Extraocular movements are intact.  Face is symmetric.  Tongue is midline.  Neck is supple.     He is able to move his upper extremities functionally.  He is able to carry out straight leg raising test bilaterally.  There is some right knee tenderness with the movement of patella.  He has a tight quadriceps.  He is also complaining of spasming on the right calf there is no discomfort with rotation at the hips.      Neurologically, he responds to touch bilaterally.  Vibration is present.  Temperature sense is intact.  Strength is full.  He is able to stand.  Romberg is negative.  He is able to walk.  He is able to walk on his heels and toes.  He is able to transfer in and out without difficulty.     Reflexes are symmetric and plantars are downgoing.     Impression: At this point this 83-year-old gentleman with a prior history of lumbar decompression at L2-3 with discectomy currently has some facet involvement on the left with possible SI and piriformis involvement as well.  He has improved nicely with the Medrol Dosepak and physical therapy and currently to my examination his strength  is full.  He feels his strength is improved and is functioning better.    For his right knee pain and calf pain, it is a combination of quadriceps spasms and calf spasms and there may be contributory to some of his challenges.  I recommend stretching both and gave him some home exercise program that he can do at home.    In addition to continuing his physical therapy, he has few more sessions left, and completing his EMG as planned, I have suggested updating my clinic in about 10 days time.  I am suspecting that he will continue to improve.  If he is still having knee pain and calf spasms, consideration for injections could be given at that point.    I offered him medications such as muscle relaxants and NSAIDs but he would like to avoid as he is already on quite a few.     This was reviewed at length with the patient.  All questions were answered to his satisfaction..     30 minutes visit, greater than 50% was for counseling on above-mentioned issues.     Thank you much for allow me to assist in his care.     Chicho Cabrales MD    51.3

## 2024-11-07 NOTE — LETTER
11/7/2024       RE: Felice Blood  [address]  [facility] [address]     Dear Colleague,    Thank you for referring your patient, Felice Blood, to the [facility] PHYSICAL MEDICINE AND REHABILIT[facility] CLIN[facility] [place] at [facility]. Please see a copy of my visit note below.    CC: Patient returns for a follow-up visit for his ongoing issues related to leg weakness and pain.     He was last seen by me on 10/3/2024.  At that time he was noted to have some lumbar discomfort overlying the facets some discomfort over the SI and piriformis.  He had received a Medrol Dosepak and was doing physical therapy.  His strength itself was nicely improved.  He is continuing with physical therapy.    He does note some new pain in his right knee.  He rates the pain pretty low in the 1-2 out of 10.  He is functioning otherwise well.    Patient is a very pleasant 83-year-old gentleman who has undergone L2-3 decompression and discectomy on 4/21/2022.  Surgery was performed by Dr. Wolfgang Cabral.  He had been doing quite well until recently when he developed weakness.  This was in the early part of August and he was seen by his primary on 8/22/2020 for with a 3 to 4-week history of left leg weakness and some pain and tingling.  There was no history of trauma.  He does take care of his wife who has Alzheimer's and does have to do multiple stairs.  He also lives on a hill and has at least 14 steps to the main living area.  He does this several times a day.  He was evaluated and put on Medrol Dosepak.  He has noticed improvement in his weakness though there is still some residual weakness.  He has been doing physical therapy and apparently had some issues with his right knee.  That pain has since improved.  He has had some tingling on the left side but even that is improved.  He is here for evaluation.  He has been scheduled for EMG of the left lower extremity on  11/12/2024.     On a scale of 0-10 he has previously rated pain in the 3-4 out of 10 range.  He denies any issues with his bowel or bladder function.  He has not had any weight loss or fever.  Currently he does not report any numbness or tingling.  He is independent with his self-cares and mobility and is sleeping well.     MR LUMBAR SPINE W/O & W CONTRAST 9/27/2024 6:31 PM     History: 2022 lumbar surgery (note in Epic). Now, no trauma, three  weeks left leg weak and painful, getting worse, entire eg, no back  pain.; Pain of left lower extremity.     Additional history from EMR: L2 and L3 laminectomy and partial medial  facetectomies and foraminotomies for L2 and L3 nerve roots in  4/21/2022     ICD-10: Pain of left lower extremity     Comparison: Lumbar spine radiograph 6/7/2022 and MRI of the lumbar  spine from 11/17/2021     Technique: Sagittal STIR, T1-weighted turbo spin-echo, 3-D volumetric  T2-weighted and axial reconstructed T2-weighted images of the lumbar  spine were obtained without intravenous contrast.      Findings: There are 5 lumbar-type vertebrae, counting from C2.  The  tip of the conus medullaris is at  L1.  Regarding alignment, there is  grade 1 anterolisthesis at L2-3 which is slightly progressed since  2021. Multilevel disc height loss and disc degeneration most  pronounced at L2-3, L4-5 and L5-S1. Mixed fatty and edematous bone  marrow changes in the opposing endplates of L4-5 and L5-S1. No  worrisome bone marrow signal.     On a level by level basis:     L1-2: Disc bulge and facet hypertrophy without significant spinal  canal or foraminal narrowing.     L2-3: Chronic disc bulge and right subarticular disc extrusion,  slightly improved since 2021. Status post laminectomy and  foraminotomy. The anterolisthesis is slightly progressed. There is  mild to moderate right neural foraminal stenosis, slightly progressed.  Left neural foramen is patent. No definite spinal canal narrowing.  Effacement  of right lateral recess with abutment of right traversing  L3 which is improved since 2021. Spinal canal patency is also improved  since 2021.     L3-4: Disc bulge and facet hypertrophy without significant spinal  canal or foraminal stenosis.     L4-5: Disc bulge and facet hypertrophy and thickening of the  ligamentum flavum. Mild spinal canal narrowing. No significant  foraminal stenosis.     L5-S1: Disc bulge and left greater than right facet hypertrophy.  Moderate left neural foraminal stenosis. Right neural foramen is  patent. Mild spinal canal narrowing and partial effacement of the left  lateral recess with abutment of left traversing S1. Findings are  unchanged.     Visualized retroperitoneal structures are unremarkable.                                                                      Impression: In comparison to MRI from 11/17/2021;  1. Status post laminectomy and foraminotomy at L2-3. The  anterolisthesis at L2-3 slightly progressed. Previous right lateral  recess effacement and spinal canal narrowing is improved. Previous  disc herniation and mass effect is also improved with residual chronic  right subarticular disc extrusion. There is minimally worsened  mild-to-moderate right neural foraminal stenosis.  2. Stable mild spinal canal narrowing at L4-5.  3. Stable degenerative changes at L5-S1 with moderate left neural  foraminal stenosis and partial effacement of the left lateral recess  with abutment of left traversing S1.     AJITH WESLEY MD         Past Medical History        Past Medical History:   Diagnosis Date     Arthritis       Cobblestone retinal degeneration       History of blood transfusion       Impotence of organic origin       Nonsenile cataract       Pure hypercholesterolemia       Sciatica, unspecified laterality 04/2022     Unspecified essential hypertension       Vitreous detachment of both eyes           Past Surgical History         Past Surgical History:   Procedure Laterality  Date     ABDOMEN SURGERY         APPENDECTOMY         BIOPSY         CATARACT IOL, RT/LT Right 05/01/2017     COLONOSCOPY         DECOMPRESSION LUMBAR ONE LEVEL N/A 04/21/2022     Procedure: Lumbar 2 to 3 decompression and discectomy;  Surgeon: Wolfgang Cabral MD;  Location: [facility] OR     PHACOEMULSIFICATION WITH STANDARD INTRAOCULAR LENS IMPLANT Right 05/01/2017     Procedure: PHACOEMULSIFICATION WITH STANDARD INTRAOCULAR LENS IMPLANT;  Right Eye Phacoemulsification with intraocular Lens Implant;  Surgeon: Camron Hansen MD;  Location: [facility] OR     TONSILLECTOMY             Family History            Problem (# of Occurrences) Relation (Name,Age of Onset)     Hypertension (1) Son (Adalberto)     Breast Cancer (1) Sister (Alyssa)     Skin Cancer (1) Father               Negative family history of: Glaucoma, Macular Degeneration, Melanoma                Social History   Social History            Socioeconomic History     Marital status:        Spouse name: Not on file     Number of children: Not on file     Years of education: Not on file     Highest education level: Not on file   Occupational History     Not on file   Tobacco Use     Smoking status: Never     Smokeless tobacco: Never   Substance and Sexual Activity     Alcohol use: Yes       Comment: 1 day     Drug use: Never     Sexual activity: Yes       Partners: Female       Birth control/protection: Female Surgical   Other Topics Concern     Parent/sibling w/ CABG, MI or angioplasty before 65F 55M? No   Social History Narrative     Not on file      Social Determinants of Health           Financial Resource Strain: Low Risk  (9/16/2024)     Financial Resource Strain       Within the past 12 months, have you or your family members you live with been unable to get utilities (heat, electricity) when it was really needed?: No   Food Insecurity: Low Risk  (9/16/2024)     Food Insecurity       Within the past 12 months, did you worry that your food would  run out before you got money to buy more?: No       Within the past 12 months, did the food you bought just not last and you didn t have money to get more?: No   Transportation Needs: Low Risk  (9/16/2024)     Transportation Needs       Within the past 12 months, has lack of transportation kept you from medical appointments, getting your medicines, non-medical meetings or appointments, work, or from getting things that you need?: No   Physical Activity: Sufficiently Active (9/16/2024)     Exercise Vital Sign       Days of Exercise per Week: 6 days       Minutes of Exercise per Session: 30 min   Stress: Stress Concern Present (9/16/2024)     [place] [employer] [place] [employer] - Occupational Stress Questionnaire       Feeling of Stress : To some extent   Social Connections: Unknown (9/16/2024)     Social Connection and Isolation Panel [NHANES]       Frequency of Communication with Friends and Family: Not on file       Frequency of Social Gatherings with Friends and Family: More than three times a week       Attends Congregational Services: Not on file       Active Member of Clubs or Organizations: Not on file       Attends Club or Organization Meetings: Not on file       Marital Status: Not on file   Interpersonal Safety: Low Risk  (9/17/2024)     Interpersonal Safety       Do you feel physically and emotionally safe where you currently live?: Yes       Within the past 12 months, have you been hit, slapped, kicked or otherwise physically hurt by someone?: No       Within the past 12 months, have you been humiliated or emotionally abused in other ways by your partner or ex-partner?: No   Housing Stability: Low Risk  (9/16/2024)     Housing Stability       Do you have housing? : Yes       Are you worried about losing your housing?: No            Current Outpatient Medications   Medication Sig Dispense Refill     acetaminophen (TYLENOL) 325 MG tablet Take 2 tablets (650 mg) by mouth every 4 hours as needed for other  (mild pain) 100 tablet 0     albuterol (PROAIR HFA/PROVENTIL HFA/VENTOLIN HFA) 108 (90 Base) MCG/ACT inhaler Inhale 2 puffs into the lungs every 4 hours as needed for shortness of breath or wheezing (Patient not taking: Reported on 9/17/2024) 18 g 0     atorvastatin (LIPITOR) 10 MG tablet Take 1 tablet (10 mg) by mouth daily. 90 tablet 3     Calcium-Magnesium-Zinc 333-133-5 MG TABS per tablet Take 1 tablet by mouth every morning       Cetirizine HCl (ZYRTEC PO) Take by mouth.       cholecalciferol (VITAMIN D3) 25 mcg (1000 units) capsule Take 1 capsule by mouth every morning       fluticasone (FLONASE) 50 MCG/ACT spray Spray 2 sprays into both nostrils At Bedtime (Patient taking differently: Spray 2 sprays into both nostrils as needed.) 1 Bottle 1     gabapentin (NEURONTIN) 100 MG capsule Take 1 capsule (100 mg) by mouth 3 times daily for 2 days 6 capsule 0     hydrOXYzine (ATARAX) 10 MG tablet Take 1 tablet (10 mg) by mouth every 6 hours as needed for itching or anxiety (with pain, moderate pain) 30 tablet 0     ipratropium (ATROVENT) 0.06 % nasal spray Spray 2 sprays into both nostrils 4 times daily as needed for rhinitis (Patient not taking: Reported on 9/17/2024) 15 mL 11     ketoconazole (NIZORAL) 2 % external shampoo Massage into wet scalp, let sit 3-5 min, then rinse. Do this 3x weekly. 120 mL 11     levothyroxine (SYNTHROID/LEVOTHROID) 25 MCG tablet Take 1 tablet (25 mcg) by mouth daily. 90 tablet 3     losartan (COZAAR) 25 MG tablet Take 1 tablet (25 mg) by mouth daily. 90 tablet 3     methylPREDNISolone (MEDROL DOSEPAK) 4 MG tablet therapy pack Follow Package Directions (Patient not taking: Reported on 9/17/2024) 21 tablet 0     montelukast (SINGULAIR) 10 MG tablet Take 1 tablet (10 mg) by mouth at bedtime. 90 tablet 3     ondansetron (ZOFRAN-ODT) 4 MG ODT tab Take 1 tablet (4 mg) by mouth every 8 hours as needed for nausea Dissolve ON the tongue. (Patient not taking: Reported on 9/17/2024) 10 tablet 0      oxyCODONE (ROXICODONE) 5 MG tablet Take 1-2 tablets (5-10 mg) by mouth every 6 hours as needed for severe pain (Moderate to Severe) (Patient not taking: Reported on 9/17/2024) 28 tablet 0     polyethylene glycol (MIRALAX) 17 g packet Take 17 g by mouth daily (Patient not taking: Reported on 9/17/2024) 7 packet 0     senna-docusate (SENOKOT-S/PERICOLACE) 8.6-50 MG tablet Take 1-2 tablets by mouth 2 times daily Take while on oral narcotics to prevent or treat constipation. (Patient not taking: Reported on 9/17/2024) 30 tablet 0     sildenafil (REVATIO) 20 MG tablet Take 5 tablets by mouth once daily as needed 60 tablet 3     sildenafil (VIAGRA) 100 MG tablet Take 1 tablet (100 mg) by mouth daily as needed (Patient not taking: Reported on 9/17/2024) 20 tablet 11     tadalafil (CIALIS) 20 MG tablet Take 1 tablet (20 mg) by mouth daily as needed 30 tablet 0     triamcinolone (KENALOG) 0.1 % external cream Apply topically 2 times daily To legs as needed 80 g 1         .  /75 (BP Location: Right arm, Patient Position: Sitting, Cuff Size: Adult Regular)   Pulse 75   SpO2 100%       On examination, patient is alert and cooperative.  Vital signs are stable.  He is afebrile.  General physical examination is unremarkable.  HEENT is negative.  Extraocular movements are intact.  Face is symmetric.  Tongue is midline.  Neck is supple.     He is able to move his upper extremities functionally.  He is able to carry out straight leg raising test bilaterally.  There is some right knee tenderness with the movement of patella.  He has a tight quadriceps.  He is also complaining of spasming on the right calf there is no discomfort with rotation at the hips.      Neurologically, he responds to touch bilaterally.  Vibration is present.  Temperature sense is intact.  Strength is full.  He is able to stand.  Romberg is negative.  He is able to walk.  He is able to walk on his heels and toes.  He is able to transfer in and out without  difficulty.     Reflexes are symmetric and plantars are downgoing.     Impression: At this point this 83-year-old gentleman with a prior history of lumbar decompression at L2-3 with discectomy currently has some facet involvement on the left with possible SI and piriformis involvement as well.  He has improved nicely with the Medrol Dosepak and physical therapy and currently to my examination his strength is full.  He feels his strength is improved and is functioning better.    For his right knee pain and calf pain, it is a combination of quadriceps spasms and calf spasms and there may be contributory to some of his challenges.  I recommend stretching both and gave him some home exercise program that he can do at home.    In addition to continuing his physical therapy, he has few more sessions left, and completing his EMG as planned, I have suggested updating my clinic in about 10 days time.  I am suspecting that he will continue to improve.  If he is still having knee pain and calf spasms, consideration for injections could be given at that point.    I offered him medications such as muscle relaxants and NSAIDs but he would like to avoid as he is already on quite a few.     This was reviewed at length with the patient.  All questions were answered to his satisfaction..     30 minutes visit, greater than 50% was for counseling on above-mentioned issues.     Thank you much for allow me to assist in his care.     Chicho Cabrales MD       Again, thank you for allowing me to participate in the care of your patient.      Sincerely,    Chicho Cabrales MD

## 2024-11-07 NOTE — NURSING NOTE
Chief Complaint   Patient presents with    RECHECK     RETURN MED SPINE - 1 month f/u       Argentina Sarah, EMT

## 2024-11-12 ENCOUNTER — OFFICE VISIT (OUTPATIENT)
Dept: NEUROLOGY | Facility: CLINIC | Age: 84
End: 2024-11-12
Attending: FAMILY MEDICINE
Payer: MEDICARE

## 2024-11-12 DIAGNOSIS — M79.605 PAIN OF LEFT LOWER EXTREMITY: ICD-10-CM

## 2024-11-12 DIAGNOSIS — R29.898 LEFT LEG WEAKNESS: ICD-10-CM

## 2024-11-12 NOTE — PROCEDURES
[facility]  Electrodiagnostic Laboratory                 Department of Neurology                                                                                                         Test Date:  2024    Patient: Felice Blood : 1940 Physician: Almita Colmenares MD   Sex: Male AGE: 83 year Ref Phys: Anthony Maddox MD   ID#: [id number]   Technician: Miranda Dhillon     History and Examination:  83-year-old male with history of L2-L3 decompression and discectomy presenting with weakness in both legs, left worse than right.  No numbness or radiating pain.  He has underwent physical therapy with significant improvement of his symptoms.  This study was performed to assess for lumbosacral radiculopathy.    Techniques:  Motor and sensory conduction studies were done with surface recording electrodes. Temperature was monitored and recorded throughout the study. Upper extremities were maintained at a temperature of 32 degrees Centigrade or higher.  EMG was done with a concentric needle electrode.     Results:  Nerve conduction studies showed absent bilateral sural sensory responses, which could be normal findings in the patient's age group. Fibular (EDB) compound muscle action potentials were absent on the left and markedly reduced on the right.  Bilateral fibular (TA) and tibial compound muscle action potential amplitudes were mild-moderately reduced.  Tibial F-wave latency was prolonged.    Needle EMG of bilateral lower limbs showed mildly reduced recruitment of long-duration and sometimes high-amplitude motor unit potentials in bilateral L4-5 innervated muscles.  There were no fibrillation potentials.  Needle EMG of the lumbar paraspinal muscle was not examined due to history of lumbar spine surgery.    Interpretation:  This is an abnormal study.  There is electrophysiologic evidence of a chronic inactive bilateral L4-5 radiculopathy.    Almita Colmenares  MD  Department of Neurology        Nerve Conduction Studies  Motor Sites      Latency Neg. Amp Neg. Amp Diff Segment Distance Velocity Neg. Dur Neg Area Diff Temperature Comment   Site (ms) Norm (mV) Norm (%)  cm m/s Norm (ms) (%) ( C)    Left Dp Branch Fibular (TA) Motor   Fibular Head 3.4  < 6.0 4.1 -      11.6  31.4    Pop Fossa 6.0  < 5.7 3.7 - -10 Pop Fossa-Fibular Head 10 38 - 9.3 -14 31.4    Right Dp Branch Fibular (TA) Motor   Fibular Head 4.8  < 6.0 4.3 -      9.6  32.2    Left Fibular (EDB) Motor   Ankle NR  < 6.0 NR -  Ankle-EDB 8   NR  31.3    Bel Fibular Head NR - NR - NR Bel Fibular Head-Ankle - NR  > 38 NR NR 31.4    Right Fibular (EDB) Motor   Ankle 6.0  < 6.0 0.78 -  Ankle-EDB 8   6.0  23.8    Left Median (APB) Motor   Wrist 4.4  < 4.4 6.4  > 5.0  Wrist-APB 8   5.4  32    Elbow 9.1 - 5.9  > 5.0 -8 Elbow-Wrist 22.5 48  > 48 5.4 1 32    Left Tibial (AHB) Motor   Ankle 5.6  < 6.5 1.20  > 5.0  Ankle-AH 8   6.3  30.8    Knee 15.5 - 0.55 - -54 Knee-Ankle 38 38  > 38 7.9 -33 31.2    Right Tibial (AHB) Motor   Ankle 5.7  < 6.5 2.1  > 5.0  Ankle-AH 8   6.3  31      F-Wave Sites      Min F-Lat Max-Min F-Lat Mean F-Lat   Site (ms) (ms) (ms)   Right Tibial F-Wave   Ankle 75.7 7.7 -     Sensory Sites      Onset Lat Peak Lat Amp (O-P) Amp (P-P) Segment Distance Velocity Temperature Comment   Site ms (ms)  V Norm ( V)  cm m/s Norm ( C)    Left Radial Sensory   Forearm-Wrist 2.1 2.9 17  > 15 20 Forearm-Wrist 10 48 - 31.8    Left Sural Sensory   Calf-Lat Malleolus NR NR NR  > 5 NR Calf-Lat Malleolus 14 NR  > 38 30.8    Right Sural Sensory   Calf-Lat Malleolus NR NR NR  > 5 NR Calf-Lat Malleolus 14 NR  > 38 28.8        Electromyography     Side Muscle Ins Act Fibs/PSW Fasc HF Amp Dur Poly Recrt Int Pat   Left Tib ant Nml None Nml 0 Nml 1+ 0 Danny Nml   Left Gastroc MH Nml None Nml 0 Nml Nml 0 Nml Nml   Left Vastus med Nml None Nml 0 1+ 1+ 0 Danny Nml   Left Add longus Nml None Nml 0 Nml 1+ 0 Danny Nml   Left  Tens fasc lat Nml None Nml 0 Nml Nml 0 Nml Nml   Right Tib ant Nml None Nml 0 1+ 2+ 0 ModRed Nml   Right Gastroc MH Nml None Nml 0 Nml Nml 0 Nml Nml   Right Vastus med Nml None Nml 0 1+ 1+ 0 Danny Nml   Right Tens fasc lat Nml None Nml 0 Nml Nml 0 Nml Nml           NCS Waveforms:    Motor                         Sensory           F-Wave

## 2024-11-12 NOTE — LETTER
11/12/2024       RE: Felice Blood  [address] [facility] [address]     Dear Colleague,    Thank you for referring your patient, Felice Blood, to the [facility] EMG CLIN[facility] [place] at [facility]. Please see a copy of my visit note below.    See procedure note.      Again, thank you for allowing me to participate in the care of your patient.      Sincerely,    Almita Colmenares MD

## 2024-11-13 ENCOUNTER — THERAPY VISIT (OUTPATIENT)
Dept: PHYSICAL THERAPY | Facility: CLINIC | Age: 84
End: 2024-11-13
Payer: MEDICARE

## 2024-11-13 DIAGNOSIS — M79.605 ACUTE PAIN OF LEFT LOWER EXTREMITY: Primary | ICD-10-CM

## 2024-11-13 PROCEDURE — 97110 THERAPEUTIC EXERCISES: CPT | Mod: GP | Performed by: PHYSICAL THERAPIST

## 2024-11-13 PROCEDURE — 97112 NEUROMUSCULAR REEDUCATION: CPT | Mod: GP | Performed by: PHYSICAL THERAPIST

## 2024-11-18 ENCOUNTER — TELEPHONE (OUTPATIENT)
Dept: PHYSICAL MEDICINE AND REHAB | Facility: CLINIC | Age: 84
End: 2024-11-18
Payer: MEDICARE

## 2024-11-18 NOTE — TELEPHONE ENCOUNTER
Health Call Center    Phone Message    May a detailed message be left on voicemail: yes     Reason for Call: Other: Patient called stating he is needing to remind Dr. Cabrales to review his recent EMG results. Please review.      Action Taken: Message routed to:  [facility] PM&R    Travel Screening: Not Applicable     Date of Service:

## 2024-11-18 NOTE — TELEPHONE ENCOUNTER
----- Message -----  From: Chicho Cabrales MD  Sent: 11/18/2024   2:30 PM CST  To: Christine Mendez RN  Subject: RE: EMG RESULT                                   Called and reassured. To call back if any worse.    Chicho Cabrales MD  ----- Message -----  From: Christine Mendez RN  Sent: 11/18/2024   1:35 PM CST  To: Chicho Cabrales MD; Christien Mendez RN  Subject: FW: EMG RESULT                                   Patient is calling to see if you will please review his EMG results. Can you review and please advise and provide recommendations on next steps based on his results?     Thank you,    Christine

## 2024-11-20 ENCOUNTER — THERAPY VISIT (OUTPATIENT)
Dept: PHYSICAL THERAPY | Facility: CLINIC | Age: 84
End: 2024-11-20
Payer: MEDICARE

## 2024-11-20 DIAGNOSIS — M79.605 ACUTE PAIN OF LEFT LOWER EXTREMITY: Primary | ICD-10-CM

## 2024-11-20 PROCEDURE — 97110 THERAPEUTIC EXERCISES: CPT | Mod: GP | Performed by: PHYSICAL THERAPIST

## 2024-11-20 PROCEDURE — 97112 NEUROMUSCULAR REEDUCATION: CPT | Mod: GP | Performed by: PHYSICAL THERAPIST

## 2024-11-27 ENCOUNTER — THERAPY VISIT (OUTPATIENT)
Dept: PHYSICAL THERAPY | Facility: CLINIC | Age: 84
End: 2024-11-27
Payer: MEDICARE

## 2024-11-27 DIAGNOSIS — M79.605 ACUTE PAIN OF LEFT LOWER EXTREMITY: Primary | ICD-10-CM

## 2024-11-27 PROCEDURE — 97110 THERAPEUTIC EXERCISES: CPT | Mod: GP | Performed by: PHYSICAL THERAPIST

## 2024-11-27 PROCEDURE — 97112 NEUROMUSCULAR REEDUCATION: CPT | Mod: GP | Performed by: PHYSICAL THERAPIST

## 2024-12-10 ENCOUNTER — LAB (OUTPATIENT)
Dept: LAB | Facility: CLINIC | Age: 84
End: 2024-12-10
Payer: MEDICARE

## 2024-12-10 DIAGNOSIS — E03.9 HYPOTHYROIDISM: ICD-10-CM

## 2024-12-10 LAB
T4 FREE SERPL-MCNC: 1.1 NG/DL (ref 0.9–1.7)
TSH SERPL DL<=0.005 MIU/L-ACNC: 4.63 UIU/ML (ref 0.3–4.2)

## 2024-12-10 PROCEDURE — 84443 ASSAY THYROID STIM HORMONE: CPT

## 2024-12-10 PROCEDURE — 84439 ASSAY OF FREE THYROXINE: CPT

## 2024-12-10 PROCEDURE — 36415 COLL VENOUS BLD VENIPUNCTURE: CPT

## 2024-12-23 DIAGNOSIS — N52.9 IMPOTENCE OF ORGANIC ORIGIN: ICD-10-CM

## 2024-12-29 RX ORDER — TADALAFIL 20 MG/1
20 TABLET ORAL DAILY PRN
Qty: 30 TABLET | Refills: 1 | Status: SHIPPED | OUTPATIENT
Start: 2024-12-29

## 2024-12-29 NOTE — TELEPHONE ENCOUNTER
LVD:  9/17/2024  [facility] Anthony Guevara MD  Family Medicine     Refilled per protocol.

## 2025-01-03 PROCEDURE — 88305 TISSUE EXAM BY PATHOLOGIST: CPT | Mod: TC | Performed by: DERMATOLOGY

## 2025-01-03 PROCEDURE — 88305 TISSUE EXAM BY PATHOLOGIST: CPT | Mod: 26 | Performed by: PATHOLOGY

## 2025-01-07 NOTE — PROGRESS NOTES
Cerumen Removal - Patient here for ear cleaning. Denies ear pain, drainage, and infection.     He is status post Clarifix cryotherapy in both nasal cavities on 8/2/22     Physical Exam and Procedure  Ears - On examination of the ears, I found that both ears were impacted with cerumen.  Therefore, I positioned the patient in the examination chair in a semi-supine position. I used the binocular surgical microscope to perform cerumen removal.  On the right side, I began by using a cerumen loop to gently lift the edges of the cerumen mass away from the walls of the external canal.  Once I did this, I was able to pull away fragments of wax and debris. I removed all the wax and debri.  The tympanic membrane was intact, no sign of perforation or middle ear effusion.    I turned my attention to the left side once again using the binocular surgical microscope to perform cerumen removal.  I began by using a cerumen loop to gently lift the edges of the cerumen mass away from the walls of the external canal.  Once I did this, I was able to pull away fragments of wax and debris. I removed all the wax and debri. The tympanic membrane was intact, no sign of perforation or middle ear effusion.    A/P - bilateral cerumen impaction. Both ears cleaned today in clinic. Return prn.

## 2025-01-13 ENCOUNTER — TELEPHONE (OUTPATIENT)
Dept: DERMATOLOGY | Facility: CLINIC | Age: 85
End: 2025-01-13
Payer: MEDICARE

## 2025-01-13 NOTE — TELEPHONE ENCOUNTER
Called patient to schedule surgery with Dr. Rocha    Date of Surgery: 03/04    Surgery type: Mohs    Consult scheduled: Yes    Has patient had mohs with us before? No    Additional comments: melo Raza on 1/13/2025 at 11:59 AM

## 2025-01-20 NOTE — TELEPHONE ENCOUNTER
FUTURE VISIT INFORMATION      FUTURE VISIT INFORMATION:  Date: 3.4.25  Time: 9:30  Location: [facility]  REFERRAL INFORMATION:  Referring provider:  Hi  Referring providers clinic:  Derm  Reason for visit/diagnosis  BCC Right lateral frontal scalp      RECORDS REQUESTED FROM:       Clinic name Comments Records Status Imaging Status   Derm 1.3.25  Path # [id number] Epic Epic

## 2025-01-21 ENCOUNTER — OFFICE VISIT (OUTPATIENT)
Dept: OTOLARYNGOLOGY | Facility: CLINIC | Age: 85
End: 2025-01-21
Payer: MEDICARE

## 2025-01-21 VITALS
HEART RATE: 78 BPM | RESPIRATION RATE: 16 BRPM | SYSTOLIC BLOOD PRESSURE: 141 MMHG | OXYGEN SATURATION: 99 % | DIASTOLIC BLOOD PRESSURE: 86 MMHG

## 2025-01-21 DIAGNOSIS — H61.23 BILATERAL IMPACTED CERUMEN: Primary | ICD-10-CM

## 2025-01-21 NOTE — LETTER
1/21/2025      Felice Blood  [address]  [facility] [address]      Dear Colleague,    Thank you for referring your patient, Felice Blood, to the [facility]. Please see a copy of my visit note below.    Cerumen Removal - Patient here for ear cleaning. Denies ear pain, drainage, and infection.     He is status post Clarifix cryotherapy in both nasal cavities on 8/2/22     Physical Exam and Procedure  Ears - On examination of the ears, I found that both ears were impacted with cerumen.  Therefore, I positioned the patient in the examination chair in a semi-supine position. I used the binocular surgical microscope to perform cerumen removal.  On the right side, I began by using a cerumen loop to gently lift the edges of the cerumen mass away from the walls of the external canal.  Once I did this, I was able to pull away fragments of wax and debris. I removed all the wax and debri.  The tympanic membrane was intact, no sign of perforation or middle ear effusion.    I turned my attention to the left side once again using the binocular surgical microscope to perform cerumen removal.  I began by using a cerumen loop to gently lift the edges of the cerumen mass away from the walls of the external canal.  Once I did this, I was able to pull away fragments of wax and debris. I removed all the wax and debri. The tympanic membrane was intact, no sign of perforation or middle ear effusion.    A/P - bilateral cerumen impaction. Both ears cleaned today in clinic. Return prn.       Again, thank you for allowing me to participate in the care of your patient.        Sincerely,        Camron Vigil MD    Electronically signed

## 2025-02-24 ENCOUNTER — VIRTUAL VISIT (OUTPATIENT)
Dept: DERMATOLOGY | Facility: CLINIC | Age: 85
End: 2025-02-24
Payer: MEDICARE

## 2025-02-24 DIAGNOSIS — C44.41 BCC (BASAL CELL CARCINOMA), SCALP/NECK: Primary | ICD-10-CM

## 2025-02-24 NOTE — LETTER
2/24/2025       RE: Felice Blood  [address] [facility] [address]     Dear Colleague,    Thank you for referring your patient, Felice Blood, to the [facility] D[facility]MATOLOG[facility] SURGERY [facility] [place] at [facility]. Please see a copy of my visit note below.    [facility] Dermatology Note  Virtual Visit Details    Type of service:  Telephone Visit   Phone call duration: 4 minutes   Originating Location (pt. Location): Home    Distant Location (provider location):  On-site  Telephone visit completed due to n/a  Encounter Date: Feb 24, 2025  Store-and-Forward and Telephone. Location of teledermatologist: [facility] [place].  Start time: 3:29. End time: 3:33.    Dermatologic Surgery Telemedicine Consult Note    Dermatology Problem List:  Last skin check 01/03/25  1. 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 Seborrheic dermatitis.  - Ketoconazole shampoo  3. AKs, s/p cryotherapy   - HAK, R upper back, s/p shave bx 12/3/21  - AK, left chest, s/p shave bx 4/7/23  4. Xerosis cutis  - Current tx: daily moisturizer  5. Benign bx:  - Lichenoid keratosis, left lateral mid back, s/p shave bx 12/2021  - Lichenoid keratosis, left medial thigh, s/p shave bx 12/2021  -BLK, left upper posterior arm, s/p shave bx 4/7/23  6. Folliculitis  - BPO wash  7. Eczematous dermatitis  - TMC 0.1% cream  8. Seborrheic dermatitis  - ketoconazole shampoo     # LTM: L frontal scalp, excoriated pink macules x 2, favor traumatic vs inflammatory    CC: No chief complaint on file.      Subjective: Felice Blood is a 84 year old male who presents today for Mohs micrographic surgery consultation for a recent diagnosis of skin cancer.  - Skin cancer(s): BCC  - Location(s): Right lateral frontal scalp  - He  has had Mohs surgery in the past.  - no other concerns today       Objective:   Skin: Focused examination of the right lateral frontal scalp within the teledermatology photograph(s) on 02/24/25 was performed.   - There is a pink shiny depressed papule on the right lateral frontal scalp.  - See photos from bx date 01/03/25 below.            Path report:   Case: [id number]  Collected: 01/03/2025  Final Diagnosis   Skin, Right lateral frontal scalp, shave:  - Basal cell carcinoma, superficial and nodular types       Assessment and Plan:     1. Plan for Mohs micrographic surgery for skin cancer(s) above:  *Review lab result(s): Dermpath report   - We discussed the nature of the diagnosis/condition above. We discussed the treatment options, including the risks benefits and expectations of these options. We recommend micrographic surgery as the most effective and most tissue sparing option for treatment, and the patient agrees to proceed with this.  The patient is aware of the risks, benefits and expectations of this procedure. The patient will be scheduled for this procedure, if not already done so.  - We anticipate the following closure type: Linear closure, such as complex linear closure (CLC)    The patient was discussed with and evaluated by attending physician, Bryson Rocha MD and Katherin Chapa MD.    Staff Involved:  Staff/Scribe/Fellow    Scribe Disclosure:   I, Piedad [facility], am serving as a scribe; to document services personally performed by Bryson Rocha MD, based on data collection and the provider's statements to me.     Provider Disclosure:  I agree with the above history, review of systems, physical exam and plan.  I have reviewed the content of the documentation and have edited it as needed. I have personally performed the services documented here and the documentation accurately represents those services and the decisions I have made.      Electronically signed by:  Attending Attestation  I attest that the  Laurie recorded the interview and exam that I personally performed.  I have reviewed the note and edited it as necessary.    Bryson Rocha M.D.  Professor  Director of Dermatologic Surgery  Department of Dermatology  [facility]         Again, thank you for allowing me to participate in the care of your patient.      Sincerely,    Bryson Rocha MD

## 2025-02-24 NOTE — NURSING NOTE
Chief Complaint   Patient presents with    Derm Problem     Consult for BCC R lateral frontal scalp     Ivelisse NEVES RN  Dermatology Surgery

## 2025-02-24 NOTE — PROGRESS NOTES
[facility] Dermatology Note  Virtual Visit Details    Type of service:  Telephone Visit   Phone call duration: 4 minutes   Originating Location (pt. Location): Home    Distant Location (provider location):  On-site  Telephone visit completed due to n/a  Encounter Date: Feb 24, 2025  Store-and-Forward and Telephone. Location of teledermatologist: [facility] [place].  Start time: 3:29. End time: 3:33.    Dermatologic Surgery Telemedicine Consult Note    Dermatology Problem List:  Last skin check 01/03/25  1. 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 Seborrheic dermatitis.  - Ketoconazole shampoo  3. AKs, s/p cryotherapy   - HAK, R upper back, s/p shave bx 12/3/21  - AK, left chest, s/p shave bx 4/7/23  4. Xerosis cutis  - Current tx: daily moisturizer  5. Benign bx:  - Lichenoid keratosis, left lateral mid back, s/p shave bx 12/2021  - Lichenoid keratosis, left medial thigh, s/p shave bx 12/2021  -BLK, left upper posterior arm, s/p shave bx 4/7/23  6. Folliculitis  - BPO wash  7. Eczematous dermatitis  - TMC 0.1% cream  8. Seborrheic dermatitis  - ketoconazole shampoo     # LTM: L frontal scalp, excoriated pink macules x 2, favor traumatic vs inflammatory    CC: No chief complaint on file.      Subjective: Felice Blood is a 84 year old male who presents today for Mohs micrographic surgery consultation for a recent diagnosis of skin cancer.  - Skin cancer(s): BCC  - Location(s): Right lateral frontal scalp  - He has had Mohs surgery in the past.  - no other concerns today       Objective:   Skin: Focused examination of the right lateral frontal scalp within the teledermatology photograph(s) on 02/24/25 was performed.   - There is a pink shiny depressed papule on the right lateral frontal scalp.  - See photos from bx date 01/03/25  below.            Path report:   Case: [id number]  Collected: 01/03/2025  Final Diagnosis   Skin, Right lateral frontal scalp, shave:  - Basal cell carcinoma, superficial and nodular types       Assessment and Plan:     1. Plan for Mohs micrographic surgery for skin cancer(s) above:  *Review lab result(s): Dermpath report   - We discussed the nature of the diagnosis/condition above. We discussed the treatment options, including the risks benefits and expectations of these options. We recommend micrographic surgery as the most effective and most tissue sparing option for treatment, and the patient agrees to proceed with this.  The patient is aware of the risks, benefits and expectations of this procedure. The patient will be scheduled for this procedure, if not already done so.  - We anticipate the following closure type: Linear closure, such as complex linear closure (CLC)    The patient was discussed with and evaluated by attending physician, Bryson Rocha MD and Katherin Chapa MD.    Staff Involved:  Staff/Scribe/Fellow    Scribe Disclosure:   I, Piedad [facility], am serving as a scribe; to document services personally performed by Bryson Rocha MD, based on data collection and the provider's statements to me.     Provider Disclosure:  I agree with the above history, review of systems, physical exam and plan.  I have reviewed the content of the documentation and have edited it as needed. I have personally performed the services documented here and the documentation accurately represents those services and the decisions I have made.      Electronically signed by:  Attending Attestation  I attest that the Scribe recorded the interview and exam that I personally performed.  I have reviewed the note and edited it as necessary.    Bryson Rocha M.D.  Professor  Director of Dermatologic Surgery  Department of Dermatology  [facility]

## 2025-03-04 ENCOUNTER — OFFICE VISIT (OUTPATIENT)
Dept: DERMATOLOGY | Facility: CLINIC | Age: 85
End: 2025-03-04
Payer: MEDICARE

## 2025-03-04 ENCOUNTER — PRE VISIT (OUTPATIENT)
Dept: DERMATOLOGY | Facility: CLINIC | Age: 85
End: 2025-03-04

## 2025-03-04 ENCOUNTER — TELEPHONE (OUTPATIENT)
Dept: DERMATOLOGY | Facility: CLINIC | Age: 85
End: 2025-03-04

## 2025-03-04 VITALS — SYSTOLIC BLOOD PRESSURE: 127 MMHG | DIASTOLIC BLOOD PRESSURE: 80 MMHG | HEART RATE: 74 BPM

## 2025-03-04 DIAGNOSIS — C44.41 BASAL CELL CARCINOMA (BCC) OF SCALP: ICD-10-CM

## 2025-03-04 PROCEDURE — 17311 MOHS 1 STAGE H/N/HF/G: CPT | Mod: GC | Performed by: DERMATOLOGY

## 2025-03-04 PROCEDURE — 13120 CMPLX RPR S/A/L 1.1-2.5 CM: CPT | Mod: GC | Performed by: DERMATOLOGY

## 2025-03-04 ASSESSMENT — PAIN SCALES - GENERAL: PAINLEVEL_OUTOF10: NO PAIN (0)

## 2025-03-04 NOTE — TELEPHONE ENCOUNTER
Follow up call completed following Mohs procedure with Dr. Rocha.       Are you having pain? NO  Are you taking pain medication?  none  Are you applying ice?  NO  Have you had any noticeable bleeding through the bandage? NO   Do you have any other concerns? NO       Please call [phone] if you have any questions or concerns during business hours. For concerns after hours, call the hospital  to reach the dermatology resident on call at [phone], option 4.       Ivelisse NEVES RN  Dermatology Surgery

## 2025-03-04 NOTE — PATIENT INSTRUCTIONS
Wound care    I will experience scar, bleeding, swelling, pain, crusting and redness. I may experience incomplete removal or recurrence. Risks are bleeding, bruising, swelling, infection, nerve damage, & a large wound. A second procedure may be recommended to obtain the best cosmetic or functional result.       A three month office visit with your Surgeon is recommended for scar evaluation. Please reach out sooner if you have concerns about you surgical site/wound.    Caring for your skin after surgery    After your surgery, a pressure bandage will be placed over the area that has stitches. This is important to prevent bleeding. Please follow these instructions over the next 1 to 2 weeks. Following this regimen will help to prevent complications as your wound heals.     For the first 48 hours after your surgery:    Leave the pressure dressing on and keep it dry. If it should come loose, you may re-tape it, but do not take it off.  Relax and take it easy. Do not do any vigorous exercise or heavy lifting. This could cause the wound to bleed.  Post-Operative pain is usually mild. If you are able to take tylenol, You may take plain or extra-strength Tylenol (acetaminophen) As directed on the bottle (do not take more than 4,000mg in one day). If you are able to take ibuprofen, you can alternate the tylenol and ibuprofen.   Avoid alcohol as this may increase your tendency to bleed.   You may put an ice pack around the bandaged area for 20 minutes at a time as needed. This may help reduce swelling, bruising, and pain. Make sure the ice pack is waterproof so that the pressure bandage doesn t get wet.  If the wound is on the face try to sleep with your head elevated. Either in a recliner or propped up in bed, this will decrease swelling around the eyes.   You may see a small amount of drainage or blood on your pressure bandage. This is normal. However:  If drainage or bleeding continues or saturates the bandage, you will  need to apply firm pressure over the bandage with a piece of gauze for 15 minutes.  If bleeding continues after applying pressure for 15 minutes, apply an ice pack to the bandaged area for 15 minutes.  If bleeding still continues, call our office or go to the nearest emergency room.    Remove pressure dressing 48 hours after surgery:    Carefully remove the pressure bandage. If it seems sticky or too difficult to get off, you may need to soak it off in the shower.  After the pressure dressing is removed, you may shower and get the wound wet. However, Do Not let the forceful stream of the shower hit the wound directly.  Follow these wound care and dressing change instructions:    You have skin glue over the stitches. This will dry and flake off with time (about 2 weeks). If the stitches are still hanging around after 2 weeks, let us know, we can clip them out for you if they do not fall out with washing.   You may allow water to run over the site. Do not soak.  Do Not rub or scrub the site.  Pat dry after the shower or bath.  Avoid topical medications, lotions, creams, ointment,or oils.  Do not use tanning lamps or expose the site to sun.   Check wound appearance daily, some swelling and redness is normal after a procedure but should go away as your would is healing. If the swelling and redness or pain increases or if any other signs of infection occur listed below please send in a photo via my chart and or call us to let us know.  The clear glue film should start peeling and flaking off approximately around 2 weeks. By this time your wound should be sufficiently healed. If it still looks to be healing when the glue comes off you can clean the wound with soapy warm water daily in the shower. No need to bandage further, but you can put a bandage on the area daily for the two weeks if you would like.   If skin glue and sutures are still intact at 2 weeks after your procedure, you can start applying Vaseline daily to  "help soften up the skin glue. It will come off easier this way.        If you are able to take acetaminophen (\"Tylenol,\" etc.) and ibuprofen (\"Advil\" or \"Motrin,\" etc.), then you may STAGGER these medications by taking 400 mg of ibuprofen (usually two tabs) every 8 hours and 1,000 mg of acetaminophen (e.g., two tabs of extra-strength Tylenol) every 8 hours.    This means, for example, that you could take the followin,000 mg of Tylenol, followed 4 hours later by 400 mg Ibuprofen, followed 4 hours later with 1,000 mg of Tylenol, followed 4 hours later by 400 mg Ibuprofen, followed 4 hours later with 1,000 mg of Tylenol, and so forth.     Essentially, you can take either 1,000 mg of Tylenol or 400 mg of ibuprofen in alternating fashion EVERY FOUR HOURS.    Do NOT exceed more than 4,000 mg of Tylenol or 3,200 mg of ibuprofen per 24 hours. If you are not able to take Tylenol or ibuprofen as above due to other health issues (or a physician has told you directly that you are not allowed to take one of them, say due to pre-existing severe liver or kidney issues), then disregard the above directions.    Scientific evidence supports that this combination/schedule of pain medications is just as effective, if not more effective, than taking a narcotic pain medicine.       Follow up will be a 3 month scar evaluation either in person or via a telephone visit with you sending in a photo via Twenga. Unless you have been told to follow up sooner or if you have concerns and would like to be see sooner. Please call or send us in a Twenga message if possible and attach a photo.        What to expect:    The first couple of days your wound may be tender and may bleed slightly when doing wound care.  There may be swelling and bruising around the wound, especially if it is near the eyes. For your comfort, you may apply ice or cold compresses to the bruises after your have removed the pressure bandage.  The area around your wound may " be numb for several weeks or even months.  You may experience periodic sharp pain or mild itching around the wound as it heals.   The suture line will look dark for a while but will lighten over time.       When to call us:    You have bleeding that will not stop after applying pressure and ice.  You have pain that is not controlled with Tylenol (acetaminophen.)  You have signs or symptoms of an infection such as:  Fever over 100 degrees Fahrenheit  Redness, warmth or foul-smelling drainage from the wound  If you have any questions, or are not sure how to take care of the wound.    Phone numbers:    During business hours (M-F 8:00-4:30 p.m.)  [facility]-  [phone] Option 1 appt. Desk and ask for the Dermatology Surgery Team  [phone] [facility] .     ---------------------------------------------------------  Evenings/Weekends/Holidays  Hospital - [phone]   TTY for hearing qbasuwey-[phone]  *Ask  to page dermatologist on-call  Emergency Mezx-[phone]  TTY for hearing impaired- [phone]

## 2025-03-04 NOTE — LETTER
3/4/2025       RE: Felice Blood  [address] [facility] [address]     Dear Colleague,    Thank you for referring your patient, Felice Blood, to the [facility] DERMATOLOGIC SURGERY CLINIC [place] at [facility]. Please see a copy of my visit note below.    See other note        [id number]     [facility] Mohs Surgery Procedure Note    Case #: [id number]  Date of Service:  Mar 4, 2025  Surgery: Mohs micrographic surgery (MMS)  Staff surgeon: Bryson Rocha MD  Fellow surgeon: Oli Lindsey MD  Resident surgeon: Trinh Desai MD  Nurse: Ca De Dios    Tumor Type: BCC  Location: Left lateral scalp  Derm-Path Accession #: [id number]     Mohs Accession #:   Pre-Op Size: 0.8 cm x 0.6 cm  Final Defect Size: 1.5 cm x 1.4 cm  Number of Mohs stages: 1  Level of Defect: Fat  Local anesthetic: 5 mL 1% lidocaine with epinephrine  Repair Type: complex linear  Repair Size: 2.5 cm  Suture Material: 4-0 Monocryl; 5-0 Vicryl Rapide    Procedure:    Stage I  We discussed the principles of treatment and most likely complications including scarring, bleeding, infection, swelling, pain, crusting, nerve damage, large wound,  incomplete excision, wound dehiscence,  nerve damage, recurrence, and a second procedure may be recommended to obtain the best cosmetic or functional result.    Informed consent was obtained and the patient underwent the procedure as follows:  The patient was placed supine on the operating table.  The cancer was identified, outlined with a marker, and verified by the patient.  The entire surgical field was prepped with chlorhexidine.  The surgical site was anesthetized using lidocaine with epinephrine.    The area of clinically apparent tumor was debulked. The layer of tissue was then surgically excised using a #15 blade and was then transferred onto a specimen sheet maintaining the orientation of the specimen. Hemostasis was  obtained using electrocoagulation. The wound site was then covered with a dressing while the tissue samples were processed for examination.    The excised tissue was transported to the Mohs histology laboratory maintaining the tissue orientation.  The tissue specimen was relaxed so that the entire surgical margin was in a a single horizontal plane for sectioning and inked for precise mapping.  A precise reference map was drawn to reflect the sectioning of the specimen, colored inking of the margins, and orientation on the patient.  The tissue was processed using horizontal sectioning of the base and continuous peripheral margins.  The histopathologic sections were reviewed in conjunction with the reference map.    Total blocks: 1  Total slides: 2    There were no cancer cells visualized on examination, therefore Mohs surgery was complete.    REPAIR:     Due to one or more of the following factors, complex linear closure was required/indicated: Extensive undermining (equal to or greater than the maximum perpendicular width of the defect), exposure of underlying structure (bone, cartilage, tendon, named neurovascular), free margin involvement (helical rim, vermillion border, nostril rim), and/or placement of retention sutures.    After the excision of the tumor, the area was extensively and carefully undermined using tissue scissors. Hemostasis was obtained with spot electrocautery and ligation of vessels where necessary. Initial deep plication sutures of 4-0 Monocryl sutures were placed for deep layer suturing  in the deep, subcutaneous, and fascial planes to appose the lateral margins. The subcutaneous and dermal layers were then closed with 4-0 Monocryl sutures. The epidermis was then carefully approximated along the length of the wound using 5-0 Vicryl Rapide running sutures.     Estimated blood loss was less than 10 ml for all surgical sites. A sterile pressure dressing was applied and wound care instructions,  with a written handout, were given. The patient was discharged from the [facility] alert and ambulatory.    Bryson Rocha MD staffed the patient and was present for the key portions of the above procedure.      Dr Lindsey (Mohs micrographic surgery fellow) performed the Mohs micrographic surgery  under the direct supervision of Bryson Rocha MD, who was present for the entire micrographic surgery and key portions of the reconstruction, and always immediately available. Dr Rocha performed the repair.     Attestation signed by Bryson Rocha MD at 3/5/2025  8:33 AM:    Attending attestation:  I was present for key elements of the procedure and immediately available for all other portions of the procedure.  I have reviewed the note and edited it as necessary.    Bryson Rocha M.D.  Professor  Director of Dermatologic Surgery  Department of Dermatology  [facility]    Dermatology Surgery Clinic  [facility]  [address] [place] [address]      Again, thank you for allowing me to participate in the care of your patient.      Sincerely,    Bryson Rocha MD

## 2025-03-04 NOTE — NURSING NOTE
Chief Complaint   Patient presents with    Procedure     Mohs and reconstruction to right lateral frontal scalp     Melanie SINGH CMA

## 2025-03-04 NOTE — PROGRESS NOTES
[id number]     [facility] Mohs Surgery Procedure Note    Case #: 1  Date of Service:  Mar 4, 2025  Surgery: Mohs micrographic surgery (MMS)  Staff surgeon: Bryson Rocha MD  Fellow surgeon: Oli Lindsey MD  Resident surgeon: Trinh Desai MD  Nurse: Ca De Dios    Tumor Type: BCC  Location: Left lateral scalp  Derm-Path Accession #: [id number]     Mohs Accession #:   Pre-Op Size: 0.8 cm x 0.6 cm  Final Defect Size: 1.5 cm x 1.4 cm  Number of Mohs stages: 1  Level of Defect: Fat  Local anesthetic: 5 mL 1% lidocaine with epinephrine  Repair Type: complex linear  Repair Size: 2.5 cm  Suture Material: 4-0 Monocryl; 5-0 Vicryl Rapide    Procedure:    Stage I  We discussed the principles of treatment and most likely complications including scarring, bleeding, infection, swelling, pain, crusting, nerve damage, large wound,  incomplete excision, wound dehiscence,  nerve damage, recurrence, and a second procedure may be recommended to obtain the best cosmetic or functional result.    Informed consent was obtained and the patient underwent the procedure as follows:  The patient was placed supine on the operating table.  The cancer was identified, outlined with a marker, and verified by the patient.  The entire surgical field was prepped with chlorhexidine.  The surgical site was anesthetized using lidocaine with epinephrine.    The area of clinically apparent tumor was debulked. The layer of tissue was then surgically excised using a #15 blade and was then transferred onto a specimen sheet maintaining the orientation of the specimen. Hemostasis was obtained using electrocoagulation. The wound site was then covered with a dressing while the tissue samples were processed for examination.    The excised tissue was transported to the Mohs histology laboratory maintaining the tissue orientation.  The tissue specimen was relaxed so that the entire surgical margin was in a a single horizontal plane for  sectioning and inked for precise mapping.  A precise reference map was drawn to reflect the sectioning of the specimen, colored inking of the margins, and orientation on the patient.  The tissue was processed using horizontal sectioning of the base and continuous peripheral margins.  The histopathologic sections were reviewed in conjunction with the reference map.    Total blocks: 1  Total slides: 2    There were no cancer cells visualized on examination, therefore Mohs surgery was complete.    REPAIR:     Due to one or more of the following factors, complex linear closure was required/indicated: Extensive undermining (equal to or greater than the maximum perpendicular width of the defect), exposure of underlying structure (bone, cartilage, tendon, named neurovascular), free margin involvement (helical rim, vermillion border, nostril rim), and/or placement of retention sutures.    After the excision of the tumor, the area was extensively and carefully undermined using tissue scissors. Hemostasis was obtained with spot electrocautery and ligation of vessels where necessary. Initial deep plication sutures of 4-0 Monocryl sutures were placed for deep layer suturing  in the deep, subcutaneous, and fascial planes to appose the lateral margins. The subcutaneous and dermal layers were then closed with 4-0 Monocryl sutures. The epidermis was then carefully approximated along the length of the wound using 5-0 Vicryl Rapide running sutures.     Estimated blood loss was less than 10 ml for all surgical sites. A sterile pressure dressing was applied and wound care instructions, with a written handout, were given. The [facility] alert and ambulatory.    Bryson Rocha MD staffed the patient and was present for the key portions of the above procedure.      Dr Lindsye (Mohs micrographic surgery fellow) performed the Mohs micrographic surgery  under the direct supervision of Bryson  MD Josefina, who was present for the entire micrographic surgery and key portions of the reconstruction, and always immediately available. Dr Rocha performed the repair.

## 2025-03-31 DIAGNOSIS — N52.9 IMPOTENCE OF ORGANIC ORIGIN: ICD-10-CM

## 2025-04-02 RX ORDER — TADALAFIL 20 MG/1
20 TABLET ORAL
Qty: 30 TABLET | Refills: 0 | Status: SHIPPED | OUTPATIENT
Start: 2025-04-02

## 2025-05-26 DIAGNOSIS — N52.9 IMPOTENCE OF ORGANIC ORIGIN: ICD-10-CM

## 2025-05-28 RX ORDER — TADALAFIL 20 MG/1
20 TABLET ORAL
Qty: 30 TABLET | Refills: 1 | Status: SHIPPED | OUTPATIENT
Start: 2025-05-28

## 2025-05-28 NOTE — TELEPHONE ENCOUNTER
Last Written Prescription:  tadalafil (CIALIS) 20 MG tablet  20 mg, DAILY AT 2 PM           Summary: Take 1 tablet (20 mg) by mouth daily as needed, Disp-30 tablet, R-0, E-Prescribe  Dose, Route, Frequency: 20 mg, Oral, DAILY AT 2 PMStart: 04/02/2025Ordered On: 04/02/2025Pharmacy: [facility] [address] [place], [place] [address]ReportDx Associated: Taking: Long-term: Med Note:                Directions: Take 1 tablet (20 mg) by mouth daily as needed  Ordering Department: [facility]  Authorized By: Anthony Maddox MD  Dispense: 30 tablet  Refills: 0 ordered       ----------------------  Last Visit Date: 09/17/2024 Office Visit CARLIE Tang   Future Visit Date: 9/18/25  ----------------------      Refill decision: Medication refilled per  Medication Refill in Ambulatory Care  policy.   []  If no future appointment scheduled: Route to Clinic Coordinators to contact the pt for appointment.      Refill decision: Medication unable to be refilled by RN due to: Other:    Provider to review prn use    Request from pharmacy:  Requested Prescriptions   Pending Prescriptions Disp Refills    tadalafil (CIALIS) 20 MG tablet [Pharmacy Med Name: Tadalafil Oral Tablet 20 MG] 30 tablet 0     Sig: Take 1 tablet (20 mg) by mouth daily as needed       Erectile Dysfuction Protocol Passed - 5/28/2025  1:02 PM        Passed - Absence of nitrates on medication list        Passed - Absence of Alpha Blockers on Med list        Passed - Medication is active on med list and the sig matches. RN to manually verify dose and sig if red X/fail.     If the protocol passes (green check), you do not need to verify med dose and sig.    A prescription matches if they are the same clinical intention.    For Example: once daily and every morning are the same.    The protocol can not identify upper and lower case letters as matching and will fail.     For Example: Take 1 tablet (50 mg) by mouth daily     TAKE 1 TABLET (50 MG) BY  MOUTH DAILY    For all fails (red x), verify dose and sig.    If the refill does match what is on file, the RN can still proceed to approve the refill request.       If they do not match, route to the appropriate provider.             Passed - Recent (12 month) or future (90 days) visit with authorizing provider's specialty (provided they have been seen in the past 15 months)     The patient must have completed an in-person or virtual visit within the past 12 months or has a future visit scheduled within the next 90 days with the authorizing provider s specialty.  Urgent care and e-visits do not qualify as an office visit for this protocol.          Passed - Medication indicated for associated diagnosis     Medication is associated with one or more of the following diagnoses:   Benign prostatic hyperplasia  Erectile dysfunction  Female sexual arousal disorder  Pulmonary hypertension  Raynaud s  Sexual Dysfunction            Passed - Patient is age 18 or older         Nataliia B, RN  [facility] Central Nursing/Red Flag Triage & Med Refill Team

## 2025-06-24 ENCOUNTER — OFFICE VISIT (OUTPATIENT)
Dept: OPHTHALMOLOGY | Facility: CLINIC | Age: 85
End: 2025-06-24
Attending: OPHTHALMOLOGY
Payer: MEDICARE

## 2025-06-24 DIAGNOSIS — H35.342 LAMELLAR MACULAR HOLE, LEFT: ICD-10-CM

## 2025-06-24 DIAGNOSIS — H35.373 EPIRETINAL MEMBRANE (ERM) OF BOTH EYES: Primary | ICD-10-CM

## 2025-06-24 DIAGNOSIS — H43.21 ASTEROID HYALITIS OF RIGHT EYE: ICD-10-CM

## 2025-06-24 DIAGNOSIS — Z96.1 PSEUDOPHAKIA OF BOTH EYES: ICD-10-CM

## 2025-06-24 PROCEDURE — G0463 HOSPITAL OUTPT CLINIC VISIT: HCPCS | Performed by: OPHTHALMOLOGY

## 2025-06-24 PROCEDURE — 92134 CPTRZ OPH DX IMG PST SGM RTA: CPT | Performed by: OPHTHALMOLOGY

## 2025-06-24 PROCEDURE — 92015 DETERMINE REFRACTIVE STATE: CPT | Mod: GY

## 2025-06-24 ASSESSMENT — REFRACTION_WEARINGRX
OD_CYLINDER: +1.00
OS_AXIS: 025
OS_CYLINDER: +0.50
OD_SPHERE: -2.25
OD_CYLINDER: +1.00
OD_ADD: +2.75
OS_AXIS: 025
SPECS_TYPE: PAL
OS_ADD: +2.75
SPECS_TYPE: PAL
OS_SPHERE: -2.00
OD_SPHERE: -2.25
OD_AXIS: 165
OS_SPHERE: -2.00
OS_ADD: +2.75
OD_AXIS: 165
OS_CYLINDER: +0.50
OD_ADD: +2.75

## 2025-06-24 ASSESSMENT — CONF VISUAL FIELD
OD_SUPERIOR_TEMPORAL_RESTRICTION: 0
OS_INFERIOR_TEMPORAL_RESTRICTION: 0
OS_SUPERIOR_NASAL_RESTRICTION: 0
OS_NORMAL: 1
OD_SUPERIOR_NASAL_RESTRICTION: 0
OD_INFERIOR_NASAL_RESTRICTION: 0
OD_NORMAL: 1
OS_INFERIOR_NASAL_RESTRICTION: 0
OS_SUPERIOR_TEMPORAL_RESTRICTION: 0
OD_INFERIOR_TEMPORAL_RESTRICTION: 0
METHOD: COUNTING FINGERS

## 2025-06-24 ASSESSMENT — VISUAL ACUITY
CORRECTION_TYPE: GLASSES
OD_CC: 20/40
OS_CC: 20/30
OD_CC+: +2
METHOD: SNELLEN - LINEAR

## 2025-06-24 ASSESSMENT — REFRACTION_MANIFEST
OS_CYLINDER: +0.50
OS_SPHERE: -2.00
OS_AXIS: 025
OD_ADD: +2.50
OD_CYLINDER: +1.00
OS_ADD: +2.50
OD_AXIS: 165
OD_SPHERE: -2.25

## 2025-06-24 ASSESSMENT — TONOMETRY
OD_IOP_MMHG: 10
OS_IOP_MMHG: 10
IOP_METHOD: TONOPEN

## 2025-06-24 ASSESSMENT — CUP TO DISC RATIO
OD_RATIO: 0.3
OS_RATIO: 0.3

## 2025-06-24 ASSESSMENT — SLIT LAMP EXAM - LIDS: COMMENTS: MILD MGD

## 2025-06-24 NOTE — NURSING NOTE
Chief Complaints and History of Present Illnesses   Patient presents with    Follow Up     Chief Complaint(s) and History of Present Illness(es)       Follow Up              Laterality: both eyes    Associated symptoms: Negative for photophobia, flashes and floaters    Treatments tried: no treatments    Pain scale: 3/10              Comments    Patient reports stable vision.  He notes he was able to pass his drivers license test last year.  He has stable health with no new medications.  Claudia Galvez, COA, COA 7:38 AM 06/24/2025

## 2025-06-24 NOTE — PROGRESS NOTES
HPI       Follow Up    In both eyes.  Associated symptoms include Negative for photophobia, flashes and floaters.  Treatments tried include no treatments.  Pain was noted as 3/10.             Comments    Patient reports stable vision.  He notes he was able to pass his drivers license test last year.  He has stable health with no new medications.  TERESO Leonard, COA 7:38 AM 06/24/2025              Last edited by Claudia Galvez COA on 6/24/2025  7:38 AM.          Review of systems for the eyes was negative other than the pertinent positives/negatives listed in the HPI.      Assessment & Plan      Felice Blood is a 84 year old male with the following diagnoses:   1. Epiretinal membrane (ERM) of both eyes    2. Lamellar macular hole, left    3. Pseudophakia of both eyes    4. Asteroid hyalitis of right eye           Here for annual dilated fundus exam.  Feels vision is stable and is happy to have passed his DMV vision test last year  Epiretinal membrane c noted progression in the right eye last year  OCT mac overall stable in both eyes this year  Continue Amsler monitoring  Return precautions reviewed    Posterior capsular opacity (PCO) not visually significant left eye      Refractive options reviewed  Refraction given   Return precautions reviewed          Patient disposition:   Return in about 1 year (around 6/24/2026) for DFE, OCT Macula.           Attending Physician Attestation:  Complete documentation of historical and exam elements from today's encounter can be found in the full encounter summary report (not reduplicated in this progress note).  I personally obtained the chief complaint(s) and history of present illness.  I confirmed and edited as necessary the review of systems, past medical/surgical history, family history, social history, and examination findings as documented by others; and I examined the patient myself.  I personally reviewed the relevant tests, images, and reports as  documented above.  I formulated and edited as necessary the assessment and plan and discussed the findings and management plan with the patient and family. . - Camron Hansen MD

## 2025-06-25 NOTE — PROGRESS NOTES
[facility] Dermatology Note  Encounter Date: Jul 2, 2025  Office Visit     Dermatology Problem List:  Last skin check 7/2/25  # NUB  - right cheek, s/p shave biopsy 7/2/25  1. Seborrheic dermatitis.  - Ketoconazole shampoo  2. Hx of NMSC  - snBCC, R lateral frontal scalp, s/p bx 1/3/25, s/p mohs 03/04/2025  - SCCis, left upper posterior inferior arm, s/p excision 4/4/2022  3. AKs, s/p cryotherapy   - HAK, R upper back, s/p shave bx 12/3/21  - AK, left chest, s/p shave bx 4/7/23  4. Xerosis cutis  - Current tx: daily moisturizer  5. Benign bx:  - Lichenoid keratosis, left lateral mid back, s/p shave bx 12/2021  - Lichenoid keratosis, left medial thigh, s/p shave bx 12/2021  -BLK, left upper posterior arm, s/p shave bx 4/7/23  6. Folliculitis  - BPO wash  7. Eczematous dermatitis  - TMC 0.1% cream  8. Seborrheic dermatitis  - ketoconazole shampoo  ____________________________________________    Assessment & Plan:  # NUB  - right cheek, s/p shave biopsy 7/2/25  - Ddx: bcc vs fibrous papule vs sebaceous hyperplasia vs intradermal nevus  - procedure note provided below    # Benign lesions - SKs, cherry angiomas, lentigenes.  - No treatment required    # Multiple benign nevi.   - Monitor for ABCDEs of melanoma   - Continue sun protection - recommend SPF 30 or higher with frequent application   - Return sooner if noticing changing or symptomatic lesions    # History of NMSC. No evidence of recurrent disease.  - Continue photoprotection - recommend SPF 30 or higher with frequent reapplication  - Continue yearly skin exams  - Advised to monitor for changing, non-healing, bleeding, painful, changing, or otherwise symptomatic lesions    # Seborrheic dermatitis. Not addressed today.  - Can continue ketoconazole shampoo 3x weekly     Procedures Performed:   Shave biopsy: Location(s) right cheek.  After discussion of benefits and risks including but not limited to bleeding/bruising, pain/swelling, infection,  scar, incomplete removal, nerve damage/numbness, recurrence, and non-diagnostic biopsy,  verbal consent and photographs were obtained. Time-out was performed. The area was cleaned with isopropyl alcohol. 0.5mL of 1% lidocaine with epinephrine was injected to obtain adequate anesthesia of each lesion. Shave biopsy was performed. Hemostasis was achieved with aluminium chloride. Vaseline and a sterile dressing were applied. The patient tolerated the procedure and no complications were noted. The patient was provided with verbal and written post care instructions.      Follow-up: 6 -12 month(s) in-person, or earlier for new or changing lesions    Staff and Scribe:     Scribe Disclosure:   I, Shannon Ayala, am serving as a scribe; to document services personally performed by Silvana Do MD -based on data collection and the provider's statements to me.     Pretty Epstein MD  [facility] Dermatology PGY-3     Staff Physician Comments:   I saw and evaluated the patient with the resident and I agree with the assessment and plan.  I was present for the key portions of the above major procedure and examination.    Silvana Do MD    Department of Dermatology  [facility]: Phone: [phone], Fax: [phone]  7/8/2025      ____________________________________________    CC: Derm Problem (Here today for a skin check. No concerns. )    HPI:  Mr. Felice Blood is a(n) 84 year old male who presents today as a return patient for skin check    Last seen in dermatology 2/16/24 by me for skin check. Benign skin exam with lesions to monitor noted.    Today, patient reports no areas of concern.       Patient is otherwise feeling well, without additional skin concerns.    Labs Reviewed:  Derm path 01/03/2025  Final Diagnosis   Skin, Right lateral frontal scalp, shave:  - Basal cell carcinoma, superficial and nodular  types - (see description)      Physical exam:  Vitals: There were no vitals taken for this visit.  GEN: This is a well developed, well-nourished male in no acute distress, in a pleasant mood.    SKIN: Full body skin exam excluding the genitals was performed including face, scalp, neck, ears, chest, back, bilateral arms, hands, bilateral legs, feet, and buttocks.   - light pink dome shaped papule on the right cheek   - There are dome shaped bright red papules on the trunk and extremities .   - Multiple regular brown pigmented macules and papules are identified on the trunk and extremities. .   - Scattered brown macules on sun exposed areas.  - Waxy stuck on papules and plaques on trunk and extremities.    - No other lesions of concern on areas examined.       Medications:  Current Outpatient Medications   Medication Sig Dispense Refill    acetaminophen (TYLENOL) 325 MG tablet Take 2 tablets (650 mg) by mouth every 4 hours as needed for other (mild pain) 100 tablet 0    albuterol (PROAIR HFA/PROVENTIL HFA/VENTOLIN HFA) 108 (90 Base) MCG/ACT inhaler Inhale 2 puffs into the lungs every 4 hours as needed for shortness of breath or wheezing 18 g 0    atorvastatin (LIPITOR) 10 MG tablet Take 1 tablet (10 mg) by mouth daily. 90 tablet 3    Calcium-Magnesium-Zinc 333-133-5 MG TABS per tablet Take 1 tablet by mouth every morning      cholecalciferol (VITAMIN D3) 25 mcg (1000 units) capsule Take 1 capsule by mouth every morning      fluticasone (FLONASE) 50 MCG/ACT spray Spray 2 sprays into both nostrils At Bedtime (Patient taking differently: Spray 2 sprays into both nostrils as needed.) 1 Bottle 1    hydrOXYzine (ATARAX) 10 MG tablet Take 1 tablet (10 mg) by mouth every 6 hours as needed for itching or anxiety (with pain, moderate pain) 30 tablet 0    ketoconazole (NIZORAL) 2 % external shampoo Massage into wet scalp, let sit 3-5 min, then rinse. Do this 3x weekly. 120 mL 11    levothyroxine (SYNTHROID/LEVOTHROID) 25 MCG  tablet Take 1 tablet (25 mcg) by mouth daily. 90 tablet 3    losartan (COZAAR) 25 MG tablet Take 1 tablet (25 mg) by mouth daily. 90 tablet 3    montelukast (SINGULAIR) 10 MG tablet Take 1 tablet (10 mg) by mouth at bedtime. 90 tablet 3    sildenafil (REVATIO) 20 MG tablet Take 5 tablets by mouth once daily as needed 60 tablet 3    sildenafil (VIAGRA) 100 MG tablet Take 1 tablet (100 mg) by mouth daily as needed 20 tablet 11    tadalafil (CIALIS) 20 MG tablet Take 1 tablet (20 mg) by mouth daily as needed 30 tablet 1    triamcinolone (KENALOG) 0.1 % external cream Apply topically 2 times daily To legs as needed 80 g 1    Cetirizine HCl (ZYRTEC PO) Take by mouth. (Patient not taking: Reported on 7/2/2025)       No current facility-administered medications for this visit.      Past Medical History:   Patient Active Problem List   Diagnosis    Essential hypertension    Pure hypercholesterolemia    Impotence of organic origin    Acute pain of left lower extremity    Status post lumbar spine operative procedure for decompression of spinal cord    Cough    Seborrheic dermatitis    Bartow's disease    Actinic keratosis    Seborrheic keratoses    Excoriation     Past Medical History:   Diagnosis Date    Arthritis     Cobblestone retinal degeneration     History of blood transfusion     Impotence of organic origin     Nonsenile cataract     Pure hypercholesterolemia     Sciatica, unspecified laterality 04/2022    Unspecified essential hypertension     Vitreous detachment of both eyes         CC Silvana Do MD  [address] [place] [address] on close of this encounter.

## 2025-07-02 ENCOUNTER — OFFICE VISIT (OUTPATIENT)
Dept: DERMATOLOGY | Facility: CLINIC | Age: 85
End: 2025-07-02
Payer: MEDICARE

## 2025-07-02 DIAGNOSIS — D18.01 CHERRY ANGIOMA: ICD-10-CM

## 2025-07-02 DIAGNOSIS — D49.2 NEOPLASM OF UNSPECIFIED BEHAVIOR OF BONE, SOFT TISSUE, AND SKIN: Primary | ICD-10-CM

## 2025-07-02 DIAGNOSIS — L82.1 SEBORRHEIC KERATOSIS: ICD-10-CM

## 2025-07-02 DIAGNOSIS — D22.9 MULTIPLE BENIGN NEVI: ICD-10-CM

## 2025-07-02 DIAGNOSIS — L81.4 LENTIGINES: ICD-10-CM

## 2025-07-02 DIAGNOSIS — Z85.828 HISTORY OF NONMELANOMA SKIN CANCER: ICD-10-CM

## 2025-07-02 PROCEDURE — 99213 OFFICE O/P EST LOW 20 MIN: CPT | Mod: 25 | Performed by: DERMATOLOGY

## 2025-07-02 PROCEDURE — 88305 TISSUE EXAM BY PATHOLOGIST: CPT | Mod: 26 | Performed by: DERMATOLOGY

## 2025-07-02 PROCEDURE — 11102 TANGNTL BX SKIN SINGLE LES: CPT | Mod: GC | Performed by: DERMATOLOGY

## 2025-07-02 PROCEDURE — 88305 TISSUE EXAM BY PATHOLOGIST: CPT | Mod: TC | Performed by: DERMATOLOGY

## 2025-07-02 PROCEDURE — 1126F AMNT PAIN NOTED NONE PRSNT: CPT | Performed by: DERMATOLOGY

## 2025-07-02 ASSESSMENT — PAIN SCALES - GENERAL: PAINLEVEL_OUTOF10: NO PAIN (0)

## 2025-07-02 NOTE — NURSING NOTE
Lidocaine-epinephrine 1-1:286280 % injection   0.25mL once for one use, starting 4/11/2025 ending 4/11/2025,  2mL disp, R-0, injection  Injected by Thais Beck RN

## 2025-07-02 NOTE — NURSING NOTE
Dermatology Rooming Note    Felice Blood's goals for this visit include:   Chief Complaint   Patient presents with    Derm Problem     Here today for a skin check. No concerns.      Thais Beck RN

## 2025-07-02 NOTE — LETTER
7/2/2025       RE: Felice Blood  [address] [facility] [address]     Dear Colleague,    Thank you for referring your patient, Felice Blood, to the [facility] [place] at [facility]. Please see a copy of my visit note below.    [facility] Dermatology Note  Encounter Date: Jul 2, 2025  Office Visit     Dermatology Problem List:  Last skin check 7/2/25  # NUB  - right cheek, s/p shave biopsy 7/2/25  1. Seborrheic dermatitis.  - Ketoconazole shampoo  2. Hx of NMSC  - snBCC, R lateral frontal scalp, s/p bx 1/3/25, s/p mohs 03/04/2025  - SCCis, left upper posterior inferior arm, s/p excision 4/4/2022  3. AKs, s/p cryotherapy   - HAK, R upper back, s/p shave bx 12/3/21  - AK, left chest, s/p shave bx 4/7/23  4. Xerosis cutis  - Current tx: daily moisturizer  5. Benign bx:  - Lichenoid keratosis, left lateral mid back, s/p shave bx 12/2021  - Lichenoid keratosis, left medial thigh, s/p shave bx 12/2021  -BLK, left upper posterior arm, s/p shave bx 4/7/23  6. Folliculitis  - BPO wash  7. Eczematous dermatitis  - TMC 0.1% cream  8. Seborrheic dermatitis  - ketoconazole shampoo  ____________________________________________    Assessment & Plan:  # NUB  - right cheek, s/p shave biopsy 7/2/25  - Ddx: bcc vs fibrous papule vs sebaceous hyperplasia vs intradermal nevus  - procedure note provided below    # Benign lesions - SKs, cherry angiomas, lentigenes.  - No treatment required    # Multiple benign nevi.   - Monitor for ABCDEs of melanoma   - Continue sun protection - recommend SPF 30 or higher with frequent application   - Return sooner if noticing changing or symptomatic lesions    # History of NMSC. No evidence of recurrent disease.  - Continue photoprotection - recommend SPF 30 or higher with frequent reapplication  - Continue yearly skin exams  - Advised to monitor for changing, non-healing, bleeding,  painful, changing, or otherwise symptomatic lesions    # Seborrheic dermatitis. Not addressed today.  - Can continue ketoconazole shampoo 3x weekly     Procedures Performed:   Shave biopsy: Location(s) right cheek.  After discussion of benefits and risks including but not limited to bleeding/bruising, pain/swelling, infection, scar, incomplete removal, nerve damage/numbness, recurrence, and non-diagnostic biopsy,  verbal consent and photographs were obtained. Time-out was performed. The area was cleaned with isopropyl alcohol. 0.5mL of 1% lidocaine with epinephrine was injected to obtain adequate anesthesia of each lesion. Shave biopsy was performed. Hemostasis was achieved with aluminium chloride. Vaseline and a sterile dressing were applied. The patient tolerated the procedure and no complications were noted. The patient was provided with verbal and written post care instructions.      Follow-up: 6 -12 month(s) in-person, or earlier for new or changing lesions    Staff and Scribe:     Scribe Disclosure:   I, Shannon Ayala, am serving as a scribe; to document services personally performed by Silvana Do MD -based on data collection and the provider's statements to me.     Pretty Epstein MD  [facility] PGY-3     Staff Physician Comments:   I saw and evaluated the patient with the resident and I agree with the assessment and plan.  I was present for the key portions of the above major procedure and examination.    Silvana Do MD    Department of Dermatology  [facility]: Phone: [phone], Fax: [phone]  7/8/2025      ____________________________________________    CC: Derm Problem (Here today for a skin check. No concerns. )    HPI:  Mr. Felice Blood is a(n) 84 year old male who presents today as a return patient for skin check    Last seen in dermatology 2/16/24 by me for skin  check. Benign skin exam with lesions to monitor noted.    Today, patient reports no areas of concern.       Patient is otherwise feeling well, without additional skin concerns.    Labs Reviewed:  Derm path 01/03/2025  Final Diagnosis   Skin, Right lateral frontal scalp, shave:  - Basal cell carcinoma, superficial and nodular types - (see description)      Physical exam:  Vitals: There were no vitals taken for this visit.  GEN: This is a well developed, well-nourished male in no acute distress, in a pleasant mood.    SKIN: Full body skin exam excluding the genitals was performed including face, scalp, neck, ears, chest, back, bilateral arms, hands, bilateral legs, feet, and buttocks.   - light pink dome shaped papule on the right cheek   - There are dome shaped bright red papules on the trunk and extremities .   - Multiple regular brown pigmented macules and papules are identified on the trunk and extremities. .   - Scattered brown macules on sun exposed areas.  - Waxy stuck on papules and plaques on trunk and extremities.    - No other lesions of concern on areas examined.       Medications:  Current Outpatient Medications   Medication Sig Dispense Refill     acetaminophen (TYLENOL) 325 MG tablet Take 2 tablets (650 mg) by mouth every 4 hours as needed for other (mild pain) 100 tablet 0     albuterol (PROAIR HFA/PROVENTIL HFA/VENTOLIN HFA) 108 (90 Base) MCG/ACT inhaler Inhale 2 puffs into the lungs every 4 hours as needed for shortness of breath or wheezing 18 g 0     atorvastatin (LIPITOR) 10 MG tablet Take 1 tablet (10 mg) by mouth daily. 90 tablet 3     Calcium-Magnesium-Zinc 333-133-5 MG TABS per tablet Take 1 tablet by mouth every morning       cholecalciferol (VITAMIN D3) 25 mcg (1000 units) capsule Take 1 capsule by mouth every morning       fluticasone (FLONASE) 50 MCG/ACT spray Spray 2 sprays into both nostrils At Bedtime (Patient taking differently: Spray 2 sprays into both nostrils as needed.) 1 Bottle 1      hydrOXYzine (ATARAX) 10 MG tablet Take 1 tablet (10 mg) by mouth every 6 hours as needed for itching or anxiety (with pain, moderate pain) 30 tablet 0     ketoconazole (NIZORAL) 2 % external shampoo Massage into wet scalp, let sit 3-5 min, then rinse. Do this 3x weekly. 120 mL 11     levothyroxine (SYNTHROID/LEVOTHROID) 25 MCG tablet Take 1 tablet (25 mcg) by mouth daily. 90 tablet 3     losartan (COZAAR) 25 MG tablet Take 1 tablet (25 mg) by mouth daily. 90 tablet 3     montelukast (SINGULAIR) 10 MG tablet Take 1 tablet (10 mg) by mouth at bedtime. 90 tablet 3     sildenafil (REVATIO) 20 MG tablet Take 5 tablets by mouth once daily as needed 60 tablet 3     sildenafil (VIAGRA) 100 MG tablet Take 1 tablet (100 mg) by mouth daily as needed 20 tablet 11     tadalafil (CIALIS) 20 MG tablet Take 1 tablet (20 mg) by mouth daily as needed 30 tablet 1     triamcinolone (KENALOG) 0.1 % external cream Apply topically 2 times daily To legs as needed 80 g 1     Cetirizine HCl (ZYRTEC PO) Take by mouth. (Patient not taking: Reported on 7/2/2025)       No current facility-administered medications for this visit.      Past Medical History:   Patient Active Problem List   Diagnosis     Essential hypertension     Pure hypercholesterolemia     Impotence of organic origin     Acute pain of left lower extremity     Status post lumbar spine operative procedure for decompression of spinal cord     Cough     Seborrheic dermatitis     Wellington's disease     Actinic keratosis     Seborrheic keratoses     Excoriation     Past Medical History:   Diagnosis Date     Arthritis      Cobblestone retinal degeneration      History of blood transfusion      Impotence of organic origin      Nonsenile cataract      Pure hypercholesterolemia      Sciatica, unspecified laterality 04/2022     Unspecified essential hypertension      Vitreous detachment of both eyes         CC Silvana Do MD  [address] [place] [address] on close of this  encounter.      Again, thank you for allowing me to participate in the care of your patient.      Sincerely,    Silvana Do MD

## 2025-07-02 NOTE — PATIENT INSTRUCTIONS
Wound Care After a Biopsy    What is a skin biopsy?  A skin biopsy allows the doctor to examine a very small piece of tissue under the microscope to determine the diagnosis and the best treatment for the skin condition. A local anesthetic (numbing medicine) is injected with a very small needle into the skin area to be tested. A small piece of skin is taken from the area. Sometimes a suture (stitch) is used.     What are the risks of a skin biopsy?  I will experience scar, bleeding, swelling, pain, crusting and redness. I may experience incomplete removal or recurrence. Risks of this procedure are excessive bleeding, bruising, infection, nerve damage, numbness, thick (hypertrophic or keloidal) scar and non-diagnostic biopsy.    How should I care for my wound for the first 24 hours?  Keep the wound dry and covered for 24 hours  If it bleeds, hold direct pressure on the area for 15 minutes. If bleeding does not stop, call us or go to the emergency room  Avoid strenuous exercise the first 1-2 days or as your doctor instructs you    How should I care for the wound after 24 hours?  After 24 hours, remove the bandage  You may bathe or shower as normal  If you had a scalp biopsy, you can shampoo as usual and can use shower water to clean the biopsy site daily  Clean the wound once a day with gentle soap and water  Do not scrub, be gentle  Apply white petroleum/Vaseline after cleaning the wound with a cotton swab or a clean finger, and keep the site covered with a Bandaid /bandage. Bandages are not necessary with a scalp biopsy  If you are unable to cover the site with a Bandaid /bandage, re-apply ointment 2-3 times a day to keep the site moist. Moisture will help with healing  Avoid strenuous activity for first 1-2 days  Avoid lakes, rivers, pools, and oceans until the stitches are removed or the site is healed    How do I clean my wound?  Wash hands thoroughly with soap or use hand  before all wound care  Clean  the wound with gentle soap and water  Apply white petroleum/Vaseline  to wound after it is clean  Replace the Bandaid /bandage to keep the wound covered for the first few days or as instructed by your doctor  If you had a scalp biopsy, warm shower water to the area on a daily basis should suffice    What should I use to clean my wound?   Cotton-tipped applicators (Qtips )  White petroleum jelly (Vaseline ). Use a clean new container and use Q-tips to apply.  Bandaids  as needed  Gentle soap     How should I care for my wound long term?  Do not get your wound dirty  Keep up with wound care for one week or until the area is healed.  If you have stitches, stitches need to be removed in 14 days. You may return to our clinic for this or you may have it done locally at your doctor s office.  A small scab will form and fall off by itself when the area is completely healed. The area will be red and will become pink in color as it heals. Sun protection is very important for how your scar will turn out. Sunscreen with an SPF 30 or greater is recommended once the area is healed.  You should have some soreness but it should be mild and slowly go away over several days. Talk to your doctor about using tylenol for pain,    When should I call my doctor?  If you have increased:   Pain or swelling  Pus or drainage (clear or slightly yellow drainage is ok)  Temperature over 100F  Spreading redness or warmth around wound    When will I hear about my results?  The biopsy results can take 2 weeks to come back.  Your results will automatically release to [employer] before your provider has even reviewed them.  The clinic will call you with the results, send you a [employer] message, or have you schedule a follow-up clinic or phone time to discuss the results.  Contact our clinics if you do not hear from us in 2 weeks.    Who should I call with questions?  [facility]: [phone]  [facility]  [facility]: [phone]  For urgent needs outside of business hours call the [facility] at [phone] and ask for the dermatology resident on call     Checking for Skin Cancer  You can help find cancer early by checking your skin each month. There are 3 main kinds of skin cancer: melanoma, basal cell carcinoma, and squamous cell carcinoma. Doing monthly skin checks is the best way to find new marks, sores, or skin changes. Follow these instructions for checking your skin.   The ABCDEs of checking moles for melanoma   Check your moles or growths for signs of melanoma using ABCDE:   Asymmetry: The sides of the mole or growth don t match.  Border: The edges are ragged, notched, or blurred.  Color: The color within the mole or growth varies. It could be black, brown, tan, white, or shades of red, gray, or blue.  Diameter: The mole or growth is larger than   inch or 6 mm (size of a pencil eraser).  Evolving: The size, shape, texture, or color of the mole or growth is changing.     ABCDE's of moles on light skin.        ABCDE's of moles on dark skin may be harder to identify.     Checking for other types of skin cancer  Basal cell carcinoma or squamous cell carcinoma cause symptoms like:     A spot or mole that looks different from all other marks on your skin  Changes in how an area feels, such as itching, tenderness, or pain  Changes in the skin's surface, such as oozing, bleeding, or scaliness  A sore that doesn't heal  New swelling, redness, or spread of color beyond the border of a mole    Who s at risk?  Anyone of any skin color can get skin cancer. But you're at greater risk if you have:   Fair skin that freckles easily and burns instead of tanning  Light-colored or red hair  Light-colored eyes  Many moles or abnormal moles on your skin  A long history of unprotected exposure to sunlight or tanning beds  A history of many blistering sunburns as a child or teen  A family history of skin cancer  Been  exposed to radiation or chemicals  A weakened immune system  Been exposed to arsenic  If you've had skin cancer in the past, you're at high risk of having it again.   How to check your skin  Do your monthly skin checkups in front of a full-length mirror. Use a room with good lighting so it's easier to see. Use a hand mirror to look at hard-to-see places like your buttocks and back. You can also have a trusted friend or family member help you with these checks. Check every part of your body, including your:   Head (ears, face, neck, and scalp)  Torso (front, back, sides, and under breasts)  Arms (tops, undersides, and armpits)  Hands (palms, backs, and fingers, including under the nails)  Lower back, buttocks, and genitals  Legs (front, back, and sides)  Feet (tops, soles, toes, including under the nails, and between toes)  Watch for new spots on your skin or a spot that's changing in color, shape, size.   If you have a lot of moles, take digital photos of them each month. Make sure to take photos both up close and from a distance. These can help you see if any moles change over time.   Know your skin  Most skin changes aren't cancer. But if you see any changes in your skin, call your healthcare provider right away. Only they can tell you if a change is a problem. If you have skin cancer, seeing your provider can be the first step to getting the treatment that could save your life.   [employer] last reviewed this educational content on 10/1/2021    1644-5220 [employer]. All rights reserved. This information is not intended as a substitute for professional medical care. Always follow your healthcare professional's instructions.

## 2025-08-10 DIAGNOSIS — Z98.890 STATUS POST LUMBAR SPINE OPERATIVE PROCEDURE FOR DECOMPRESSION OF SPINAL CORD: ICD-10-CM

## 2025-08-13 RX ORDER — HYDROXYZINE HYDROCHLORIDE 10 MG/1
10 TABLET, FILM COATED ORAL EVERY 6 HOURS PRN
Qty: 30 TABLET | Refills: 3 | Status: SHIPPED | OUTPATIENT
Start: 2025-08-13

## 2025-08-22 DIAGNOSIS — E03.9 HYPOTHYROIDISM: ICD-10-CM

## 2025-08-25 RX ORDER — LEVOTHYROXINE SODIUM 25 UG/1
25 TABLET ORAL DAILY
Qty: 90 TABLET | Refills: 0 | Status: SHIPPED | OUTPATIENT
Start: 2025-08-25

## (undated) DEVICE — SUCTION MANIFOLD NEPTUNE 2 SYS 4 PORT 0702-020-000

## (undated) DEVICE — EYE NDL STELLARIS STRAIGHT BL3318S

## (undated) DEVICE — LINEN GOWN X4 5410

## (undated) DEVICE — Device

## (undated) DEVICE — EYE KNIFE SLIT XSTAR VISITEC 2.6MM 45DEG 373726

## (undated) DEVICE — LINEN BACK PACK 5440

## (undated) DEVICE — SU ETHILON 3-0 PS-1 18" 1663H

## (undated) DEVICE — DRAPE LAP W/ARMBOARD 29410

## (undated) DEVICE — GLOVE PROTEXIS BLUE W/NEU-THERA 8.0  2D73EB80

## (undated) DEVICE — LINEN TOWEL PACK X5 5464

## (undated) DEVICE — EYE PREP BETADINE 5% SOLUTION 30ML 0065-0411-30

## (undated) DEVICE — SOL NACL 0.9% IRRIG 1000ML BOTTLE 2F7124

## (undated) DEVICE — GLOVE PROTEXIS MICRO 7.5  2D73PM75

## (undated) DEVICE — SU VICRYL 1 CT-1 27" UND J261H

## (undated) DEVICE — EYE PACK PHACO AVS W/2.6 SLEEVE BLV2260000

## (undated) DEVICE — SU VICRYL 1 CT-1 CR 8X18" J741D

## (undated) DEVICE — ADH SKIN CLOSURE PREMIERPRO EXOFIN 1.0ML 3470

## (undated) DEVICE — EYE SOL BSS 500ML

## (undated) DEVICE — POSITIONER ARMBOARD FOAM 1PAIR LF FP-ARMB1

## (undated) DEVICE — GLOVE PROTEXIS W/NEU-THERA 7.5  2D73TE75

## (undated) DEVICE — PACK CATARACT CUSTOM ASC SEY15CPUMC

## (undated) DEVICE — NDL 18GA 1.5" 305196

## (undated) DEVICE — SOL ISOPROPYL RUBBING ALCOHOL USP 70% 4OZ HDX-20 I0020

## (undated) DEVICE — SU MONOCRYL 3-0 PS-2 18" UND Y497G

## (undated) DEVICE — EYE SHIELD PLASTIC

## (undated) DEVICE — SYR 50ML LL W/O NDL 309653

## (undated) DEVICE — DECANTER TRANSFER DEVICE 2008S

## (undated) DEVICE — TAPE MEDIPORE 4"X2YD 2864

## (undated) DEVICE — SOL WATER IRRIG 1000ML BOTTLE 2F7114

## (undated) DEVICE — SU VICRYL 2-0 CT-2 8X18" UND D8144

## (undated) DEVICE — TOOL DISSECT MIDAS MR8 14CM MATCH HEAD 3MM MR8-14MH30

## (undated) DEVICE — NDL SPINAL 18GA 3.5" 405184

## (undated) RX ORDER — ONDANSETRON 2 MG/ML
INJECTION INTRAMUSCULAR; INTRAVENOUS
Status: DISPENSED
Start: 2022-04-21

## (undated) RX ORDER — LIDOCAINE HYDROCHLORIDE 10 MG/ML
INJECTION, SOLUTION EPIDURAL; INFILTRATION; INTRACAUDAL; PERINEURAL
Status: DISPENSED
Start: 2022-10-24

## (undated) RX ORDER — BUPIVACAINE HYDROCHLORIDE AND EPINEPHRINE 2.5; 5 MG/ML; UG/ML
INJECTION, SOLUTION INFILTRATION; PERINEURAL
Status: DISPENSED
Start: 2022-04-21

## (undated) RX ORDER — LIDOCAINE HYDROCHLORIDE 20 MG/ML
INJECTION, SOLUTION EPIDURAL; INFILTRATION; INTRACAUDAL; PERINEURAL
Status: DISPENSED
Start: 2022-04-21

## (undated) RX ORDER — PROPOFOL 10 MG/ML
INJECTION, EMULSION INTRAVENOUS
Status: DISPENSED
Start: 2022-04-21

## (undated) RX ORDER — OXYCODONE HYDROCHLORIDE 5 MG/1
TABLET ORAL
Status: DISPENSED
Start: 2022-04-21

## (undated) RX ORDER — ROCURONIUM BROMIDE 50 MG/5 ML
SYRINGE (ML) INTRAVENOUS
Status: DISPENSED
Start: 2022-04-21

## (undated) RX ORDER — DEXAMETHASONE SODIUM PHOSPHATE 4 MG/ML
INJECTION, SOLUTION INTRA-ARTICULAR; INTRALESIONAL; INTRAMUSCULAR; INTRAVENOUS; SOFT TISSUE
Status: DISPENSED
Start: 2022-04-21

## (undated) RX ORDER — ACETAMINOPHEN 325 MG/1
TABLET ORAL
Status: DISPENSED
Start: 2022-04-21

## (undated) RX ORDER — BUPIVACAINE HYDROCHLORIDE 5 MG/ML
INJECTION, SOLUTION EPIDURAL; INTRACAUDAL
Status: DISPENSED
Start: 2022-02-21

## (undated) RX ORDER — CEFAZOLIN SODIUM/WATER 2 G/20 ML
SYRINGE (ML) INTRAVENOUS
Status: DISPENSED
Start: 2022-04-21

## (undated) RX ORDER — FENTANYL CITRATE 50 UG/ML
INJECTION, SOLUTION INTRAMUSCULAR; INTRAVENOUS
Status: DISPENSED
Start: 2022-04-21

## (undated) RX ORDER — METHYLPREDNISOLONE ACETATE 80 MG/ML
INJECTION, SUSPENSION INTRA-ARTICULAR; INTRALESIONAL; INTRAMUSCULAR; SOFT TISSUE
Status: DISPENSED
Start: 2022-02-21

## (undated) RX ORDER — FENTANYL CITRATE-0.9 % NACL/PF 10 MCG/ML
PLASTIC BAG, INJECTION (ML) INTRAVENOUS
Status: DISPENSED
Start: 2022-04-21

## (undated) RX ORDER — TRIAMCINOLONE ACETONIDE 40 MG/ML
INJECTION, SUSPENSION INTRA-ARTICULAR; INTRAMUSCULAR
Status: DISPENSED
Start: 2022-10-24

## (undated) RX ORDER — EPHEDRINE SULFATE 50 MG/ML
INJECTION, SOLUTION INTRAMUSCULAR; INTRAVENOUS; SUBCUTANEOUS
Status: DISPENSED
Start: 2022-04-21

## (undated) RX ORDER — VANCOMYCIN HYDROCHLORIDE 1 G/20ML
INJECTION, POWDER, LYOPHILIZED, FOR SOLUTION INTRAVENOUS
Status: DISPENSED
Start: 2022-04-21

## (undated) RX ORDER — LIDOCAINE HYDROCHLORIDE 10 MG/ML
INJECTION, SOLUTION EPIDURAL; INFILTRATION; INTRACAUDAL; PERINEURAL
Status: DISPENSED
Start: 2022-02-21

## (undated) RX ORDER — GABAPENTIN 100 MG/1
CAPSULE ORAL
Status: DISPENSED
Start: 2022-04-21